# Patient Record
Sex: MALE | Race: WHITE | Employment: OTHER | URBAN - METROPOLITAN AREA
[De-identification: names, ages, dates, MRNs, and addresses within clinical notes are randomized per-mention and may not be internally consistent; named-entity substitution may affect disease eponyms.]

---

## 2017-02-08 ENCOUNTER — OFFICE VISIT (OUTPATIENT)
Dept: CARDIOLOGY | Facility: CLINIC | Age: 72
End: 2017-02-08
Payer: MEDICARE

## 2017-02-08 VITALS
HEART RATE: 54 BPM | DIASTOLIC BLOOD PRESSURE: 72 MMHG | OXYGEN SATURATION: 95 % | HEIGHT: 68 IN | SYSTOLIC BLOOD PRESSURE: 120 MMHG | BODY MASS INDEX: 33.95 KG/M2 | WEIGHT: 224 LBS

## 2017-02-08 DIAGNOSIS — I25.10 CORONARY ARTERY DISEASE INVOLVING NATIVE CORONARY ARTERY OF NATIVE HEART WITHOUT ANGINA PECTORIS: Primary | ICD-10-CM

## 2017-02-08 PROCEDURE — 99213 OFFICE O/P EST LOW 20 MIN: CPT | Performed by: INTERNAL MEDICINE

## 2017-02-08 NOTE — Clinical Note
2017             [place] [address]      RE:    Jose Tejada   MRN:  [id number]   :  1945      Dear Dr. Arizmendi:      I had the pleasure of following up on our mutual patient, Jose Tejada.  He is a most delightful, pleasant 71-year-old gentleman.  He has coronary artery disease dating back essentially 2 decades.  He has had stenting to the LAD and the right coronary artery.  He has a 70% obtuse marginal lesion.  Given one of his episodes a DVT and an unusual amount of clot in his coronary arteries, I elected do a hypercoagulable workup, and as you know, he was identified as a result though that as having a genetic predisposition with both factor V Leiden and protein C deficiency.        After he was diagnosed with a hypercoagulable state, it turned out that we then uncovered a family history of clot disorder.  Fortunately, the patient has not had any angina.  A little over a year ago, we cleared him for his hip surgery.  His stress test did show mild ischemia of the lateral wall which probably corresponds to the obtuse marginal which was 70% narrowed.  It was a small area.  On his echocardiogram that was done as part of an attempt at a dobutamine stress echo, his ejection fraction is normal.  A nuclear test suggested a mildly reduced ejection fraction.        He has no heart failure symptoms.  There is a discrepancy.  Our office note says he is on Zocor 30 mg, which is an unusual dose and the records we got from [facility], that are dated 2017, if I am reading correctly, does not list any cholesterol pill.  The patient unfortunately did not have his medicine list with him so the medicine list attached to this chart may be inaccurate.      Also, I do not have any blood work over the last 2 years, although I have no doubt that you are following it regularly and the patient thinks all those numbers were at  goal.        At this juncture then, I am not making any changes.  I will see him back in a year.  I am going to do a nuclear stress test next year; that will be slightly more than 2 years from the last test, which showed ischemia.  I just want to make sure that the ischemia burden did not increase.  If he has new onset angina, he would require an angiogram or if he has no angina, but the next nuclear test shows a large area of ischemia, we would also do an angiogram.  If it shows the same mild area of ischemia, I would not recommend heart catheterization in an asymptomatic patient.      Thank you for allowing me to see Mr. Tejada.  This was a 35-minute visit; greater than 50% in counseling.      Sincerely,         Gurvinder Yun MD

## 2017-02-08 NOTE — PROGRESS NOTES
HPI and Plan:   See dictation    Orders Placed This Encounter   Procedures     NM Lexiscan stress test (nuc card)     Follow-Up with Cardiologist     No orders of the defined types were placed in this encounter.     There are no discontinued medications.      Encounter Diagnosis   Name Primary?     Coronary artery disease involving native coronary artery of native heart without angina pectoris Yes       CURRENT MEDICATIONS:  Current Outpatient Prescriptions   Medication Sig Dispense Refill     Multiple Vitamin (DAILY MULTIVITAMIN PO) Take by mouth daily       Omega-3 Fatty Acids (FISH OIL PO) Take by mouth daily       Multiple Vitamins-Minerals (ICAPS PO) Take by mouth daily       Coenzyme Q10 (COQ-10 PO) Take by mouth daily       aspirin 81 MG tablet Take 81 mg by mouth daily       gabapentin (NEURONTIN) 300 MG capsule Take 300 mg by mouth 3 times daily       Warfarin Sodium (COUMADIN PO) Take 5 mg by mouth daily 7.5 mg on Monday, Wednesday and Friday, 5 mg all other days       BuPROPion HCl (WELLBUTRIN PO) Take 100 mg by mouth daily.       Metoprolol Succinate (TOPROL XL PO) Take 25 mg by mouth daily Dosage is unknown       Simvastatin (ZOCOR PO) Take 30 mg by mouth.       oxyCODONE-acetaminophen (PERCOCET) 5-325 MG per tablet Take 1 tablet by mouth every 6 hours as needed 1 or 2 for breakthrough pain         ALLERGIES     Allergies   Allergen Reactions     Norvasc [Amlodipine] Rash     Septra [Sulfamethoxazole W-Trimethoprim] Rash       PAST MEDICAL HISTORY:  Past Medical History   Diagnosis Date     CAD (coronary artery disease)      Hyperlipidemia      HTN (hypertension)      PRIMITIVO (obstructive sleep apnea)      cpap     Asthma      MI, old      DVT (deep vein thrombosis) in pregnancy (H)      Spinal stenosis      GERD (gastroesophageal reflux disease)      Factor 5 Leiden mutation, heterozygous (H)      Protein C deficiency (H)        PAST SURGICAL HISTORY:  Past Surgical History   Procedure Laterality Date      "Arthroplasty revision hip Left 2013     Appendectomy open  1958     Arthroplasty hip Left 1997     As spinal fusion,ant,ea adnl level  2010     L4-L5     C total hip arthroplasty Right 2015     THR       FAMILY HISTORY:  Family History   Problem Relation Age of Onset     Myocardial Infarction Father      HEART DISEASE Maternal Grandfather        SOCIAL HISTORY:  Social History     Social History     Marital Status:      Spouse Name: N/A     Number of Children: N/A     Years of Education: N/A     Social History Main Topics     Smoking status: Former Smoker     Smokeless tobacco: Not on file     Alcohol Use: No      Comment: rare      Drug Use: Not on file     Sexual Activity: Not on file     Other Topics Concern     Caffeine Concern No     1 -2 cups daily      Special Diet No     Exercise No     Social History Narrative       Review of Systems:  Skin:  Negative     Eyes:  Negative    ENT:  Negative    Respiratory:  Positive for sleep apnea;CPAP  Cardiovascular:  Negative    Gastroenterology: Negative    Genitourinary:  Negative    Musculoskeletal:  Positive for joint pain;muscular weakness;nocturnal cramping  Neurologic:  Positive for numbness or tingling of feet;numbness or tingling of hands  Psychiatric:  Negative    Heme/Lymph/Imm:  Negative    Endocrine:  Negative      Physical Exam:  Vitals: /72 mmHg  Pulse 54  Ht 1.727 m (5' 8\")  Wt 101.606 kg (224 lb)  BMI 34.07 kg/m2  SpO2 95%    Constitutional:  cooperative, alert and oriented, well developed, well nourished, in no acute distress        Skin:  warm and dry to the touch, no apparent skin lesions or masses noted        Head:  normocephalic, no masses or lesions        Eyes:  pupils equal and round, conjunctivae and lids unremarkable, sclera white, no xanthalasma, EOMS intact, no nystagmus        ENT:  no pallor or cyanosis, dentition good        Neck:  carotid pulses are full and equal bilaterally, JVP normal, no carotid bruit, no thyromegaly "        Chest:  normal breath sounds, clear to auscultation, normal A-P diameter, normal symmetry, normal respiratory excursion, no use of accessory muscles          Cardiac: regular rhythm, normal S1/S2, no S3 or S4, apical impulse not displaced, no murmurs, gallops or rubs                  Abdomen:  abdomen soft, non-tender, BS normoactive, no mass, no HSM, no bruits obese difficult exam    Vascular: pulses full and equal, no bruits auscultated                                        Extremities and Back:  no edema         brace on R leg--foot drop    Neurological:  affect appropriate, oriented to time, person and place   foot drop on R      Recent Lab Results:  LIPID RESULTS:  Lab Results   Component Value Date    CHOL 140 03/20/2008    HDL 35* 04/21/2014    LDL 60* 04/21/2014    TRIG 142 04/21/2014    CHOLHDLRATIO 3.5 04/21/2014       LIVER ENZYME RESULTS:  Lab Results   Component Value Date    AST 20 04/03/2014    ALT 5 04/21/2014       CBC RESULTS:  Lab Results   Component Value Date    WBC 8.7 03/21/2008    RBC 4.51 03/21/2008    HGB 14.6 04/03/2014    HCT 43.4 04/03/2014    MCV 90.6 04/03/2014    MCH 30.5 04/03/2014    MCHC 33.6 04/03/2014    RDW 12.9 04/03/2014     03/21/2008       BMP RESULTS:  Lab Results   Component Value Date     04/21/2014    POTASSIUM 4.7 04/21/2014    CHLORIDE 104 04/21/2014    CO2 25 04/21/2014    ANIONGAP 8 03/21/2008    * 04/21/2014    BUN 12.5 04/21/2014    BUN 12 04/21/2014    CR 1.0 04/21/2014    GFRESTIMATED 73 03/21/2008    GFRESTBLACK 89 03/21/2008    CODI 9.3 04/21/2014        A1C RESULTS:  No results found for: A1C    INR RESULTS:  Lab Results   Component Value Date    INR 1.80* 03/21/2008    INR 2.83* 03/20/2008           CC  DO STEPHANIE Ron Ferdinand  [address] [place] [address]

## 2017-02-08 NOTE — PROGRESS NOTES
2017             [place] [address]      RE:    Jose Tejada   MRN:  [id number]   :  1945      Dear Dr. Arizmendi:      I had the pleasure of following up on our mutual patient, Jose Tejada.  He is a most delightful, pleasant 71-year-old gentleman.  He has coronary artery disease dating back essentially 2 decades.  He has had stenting to the LAD and the right coronary artery.  He has a 70% obtuse marginal lesion.  Given one of his episodes a DVT and an unusual amount of clot in his coronary arteries, I elected do a hypercoagulable workup, and as you know, he was identified as a result though that as having a genetic predisposition with both factor V Leiden and protein C deficiency.        After he was diagnosed with a hypercoagulable state, it turned out that we then uncovered a family history of clot disorder.  Fortunately, the patient has not had any angina.  A little over a year ago, we cleared him for his hip surgery.  His stress test did show mild ischemia of the lateral wall which probably corresponds to the obtuse marginal which was 70% narrowed.  It was a small area.  On his echocardiogram that was done as part of an attempt at a dobutamine stress echo, his ejection fraction is normal.  A nuclear test suggested a mildly reduced ejection fraction.        He has no heart failure symptoms.  There is a discrepancy.  Our office note says he is on Zocor 30 mg, which is an unusual dose and the records we got from [facility], that are dated 2017, if I am reading correctly, does not list any cholesterol pill.  The patient unfortunately did not have his medicine list with him so the medicine list attached to this chart may be inaccurate.      Also, I do not have any blood work over the last 2 years, although I have no doubt that you are following it regularly and the patient thinks all those numbers were at  goal.        At this juncture then, I am not making any changes.  I will see him back in a year.  I am going to do a nuclear stress test next year; that will be slightly more than 2 years from the last test, which showed ischemia.  I just want to make sure that the ischemia burden did not increase.  If he has new onset angina, he would require an angiogram or if he has no angina, but the next nuclear test shows a large area of ischemia, we would also do an angiogram.  If it shows the same mild area of ischemia, I would not recommend heart catheterization in an asymptomatic patient.      Thank you for allowing me to see Mr. Tejada.  This was a 35-minute visit; greater than 50% in counseling.      Sincerely,         MD MELIA Cardona MD             D: 2017 10:45   T: 2017 16:01   MT: PRISCILLA      Name:     ELIE TEJADA   MRN:      [id number]        Account:      [id number]   :      1945           Service Date: 2017      Document: [id number]

## 2018-03-15 ENCOUNTER — HOSPITAL ENCOUNTER (OUTPATIENT)
Dept: CARDIOLOGY | Facility: CLINIC | Age: 73
Discharge: HOME | End: 2018-03-15
Attending: INTERNAL MEDICINE | Admitting: INTERNAL MEDICINE
Payer: MEDICARE

## 2018-03-15 DIAGNOSIS — I25.10 CORONARY ARTERY DISEASE INVOLVING NATIVE CORONARY ARTERY OF NATIVE HEART WITHOUT ANGINA PECTORIS: ICD-10-CM

## 2018-03-15 PROCEDURE — 93018 CV STRESS TEST I&R ONLY: CPT | Performed by: INTERNAL MEDICINE

## 2018-03-15 PROCEDURE — 78452 HT MUSCLE IMAGE SPECT MULT: CPT | Mod: 26 | Performed by: INTERNAL MEDICINE

## 2018-03-15 PROCEDURE — 93017 CV STRESS TEST TRACING ONLY: CPT

## 2018-03-15 PROCEDURE — 25000128 H RX IP 250 OP 636: Performed by: INTERNAL MEDICINE

## 2018-03-15 PROCEDURE — 34300033 ZZH RX 343: Performed by: INTERNAL MEDICINE

## 2018-03-15 PROCEDURE — A9502 TC99M TETROFOSMIN: HCPCS | Performed by: INTERNAL MEDICINE

## 2018-03-15 PROCEDURE — 93016 CV STRESS TEST SUPVJ ONLY: CPT | Performed by: INTERNAL MEDICINE

## 2018-03-15 RX ORDER — REGADENOSON 0.08 MG/ML
0.4 INJECTION, SOLUTION INTRAVENOUS ONCE
Status: COMPLETED | OUTPATIENT
Start: 2018-03-15 | End: 2018-03-15

## 2018-03-15 RX ORDER — ACYCLOVIR 200 MG/1
0-1 CAPSULE ORAL
Status: DISCONTINUED | OUTPATIENT
Start: 2018-03-15 | End: 2018-03-16 | Stop reason: HOSPADM

## 2018-03-15 RX ORDER — AMINOPHYLLINE 25 MG/ML
50-100 INJECTION, SOLUTION INTRAVENOUS
Status: DISCONTINUED | OUTPATIENT
Start: 2018-03-15 | End: 2018-03-16 | Stop reason: HOSPADM

## 2018-03-15 RX ORDER — ALBUTEROL SULFATE 90 UG/1
2 AEROSOL, METERED RESPIRATORY (INHALATION) EVERY 5 MIN PRN
Status: DISCONTINUED | OUTPATIENT
Start: 2018-03-15 | End: 2018-03-16 | Stop reason: HOSPADM

## 2018-03-15 RX ADMIN — TETROFOSMIN 5.7 MCI.: 1.38 INJECTION, POWDER, LYOPHILIZED, FOR SOLUTION INTRAVENOUS at 09:43

## 2018-03-15 RX ADMIN — REGADENOSON 0.4 MG: 0.08 INJECTION, SOLUTION INTRAVENOUS at 11:14

## 2018-03-15 RX ADMIN — TETROFOSMIN 19.69 MCI.: 1.38 INJECTION, POWDER, LYOPHILIZED, FOR SOLUTION INTRAVENOUS at 11:16

## 2018-04-23 ENCOUNTER — TELEPHONE (OUTPATIENT)
Dept: CARDIOLOGY | Facility: CLINIC | Age: 73
End: 2018-04-23

## 2018-04-23 NOTE — TELEPHONE ENCOUNTER
Result Notes   Notes Recorded by Shantell Redd, RN on 3/20/2018 at 3:51 PM  Called Pt regarding stress test per DR Yun. Pt says he is doing well with no SOB, or any CP Pt is asymptomatic. Pt asked to call it any changes. Pt has OV 4/30/18. MARIA R Redd RN  ------    Notes Recorded by Shantell Redd RN on 3/15/2018 at 1:43 PM  Ov 4/30/18 new ischemia results to DR Yun paper inbox

## 2018-04-30 ENCOUNTER — OFFICE VISIT (OUTPATIENT)
Dept: CARDIOLOGY | Facility: CLINIC | Age: 73
End: 2018-04-30
Attending: INTERNAL MEDICINE
Payer: MEDICARE

## 2018-04-30 VITALS
WEIGHT: 225.8 LBS | SYSTOLIC BLOOD PRESSURE: 128 MMHG | HEIGHT: 68 IN | BODY MASS INDEX: 34.22 KG/M2 | DIASTOLIC BLOOD PRESSURE: 73 MMHG | HEART RATE: 56 BPM

## 2018-04-30 DIAGNOSIS — I25.10 CORONARY ARTERY DISEASE INVOLVING NATIVE CORONARY ARTERY OF NATIVE HEART WITHOUT ANGINA PECTORIS: ICD-10-CM

## 2018-04-30 DIAGNOSIS — I10 ESSENTIAL HYPERTENSION: ICD-10-CM

## 2018-04-30 DIAGNOSIS — E78.2 MIXED HYPERLIPIDEMIA: Primary | ICD-10-CM

## 2018-04-30 PROCEDURE — 99214 OFFICE O/P EST MOD 30 MIN: CPT | Performed by: INTERNAL MEDICINE

## 2018-04-30 RX ORDER — DESONIDE 0.5 MG/G
CREAM TOPICAL 2 TIMES DAILY
Status: ON HOLD | COMMUNITY
End: 2019-10-22

## 2018-04-30 RX ORDER — CLOTRIMAZOLE 1 %
CREAM (GRAM) TOPICAL 2 TIMES DAILY
Status: ON HOLD | COMMUNITY
End: 2019-10-22

## 2018-04-30 RX ORDER — METOPROLOL SUCCINATE 25 MG/1
25 TABLET, EXTENDED RELEASE ORAL DAILY
COMMUNITY
End: 2018-04-30

## 2018-04-30 RX ORDER — LIDOCAINE 50 MG/G
1 PATCH TOPICAL EVERY 12 HOURS PRN
Status: ON HOLD | COMMUNITY
End: 2019-10-25

## 2018-04-30 RX ORDER — LORATADINE 10 MG/1
10 CAPSULE, LIQUID FILLED ORAL DAILY
Status: ON HOLD | COMMUNITY
End: 2019-10-22

## 2018-04-30 RX ORDER — SIMVASTATIN 80 MG
80 TABLET ORAL AT BEDTIME
COMMUNITY
End: 2019-11-08

## 2018-04-30 RX ORDER — CLOBETASOL PROPIONATE 0.5 MG/G
CREAM TOPICAL 2 TIMES DAILY
Status: ON HOLD | COMMUNITY
End: 2019-10-22

## 2018-04-30 RX ORDER — NORTRIPTYLINE HCL 25 MG
25 CAPSULE ORAL AT BEDTIME
Status: ON HOLD | COMMUNITY
End: 2021-11-19

## 2018-04-30 RX ORDER — LIDOCAINE 40 MG/G
CREAM TOPICAL PRN
Status: ON HOLD | COMMUNITY
End: 2019-10-22

## 2018-04-30 RX ORDER — SODIUM CHLORIDE/ALOE VERA
GEL (GRAM) NASAL 4 TIMES DAILY PRN
Status: ON HOLD | COMMUNITY
End: 2019-10-22

## 2018-04-30 RX ORDER — METOPROLOL TARTRATE 50 MG
25 TABLET ORAL 2 TIMES DAILY
Status: ON HOLD | COMMUNITY
End: 2019-10-22

## 2018-04-30 RX ORDER — SIMVASTATIN 20 MG
30 TABLET ORAL AT BEDTIME
COMMUNITY
End: 2018-04-30

## 2018-04-30 NOTE — PROGRESS NOTES
HPI and Plan:   See dictation    Orders Placed This Encounter   Procedures     Follow-Up with Cardiologist     Orders Placed This Encounter   Medications     DISCONTD: metoprolol succinate (TOPROL-XL) 25 MG 24 hr tablet     Sig: Take 25 mg by mouth daily     DISCONTD: simvastatin (ZOCOR) 20 MG tablet     Sig: Take 30 mg by mouth At Bedtime     Medications Discontinued During This Encounter   Medication Reason     Metoprolol Succinate (TOPROL XL PO) Medication Reconciliation Clean Up     Simvastatin (ZOCOR PO) Medication Reconciliation Clean Up     metoprolol succinate (TOPROL-XL) 25 MG 24 hr tablet Medication Reconciliation Clean Up     simvastatin (ZOCOR) 20 MG tablet Medication Reconciliation Clean Up         Encounter Diagnoses   Name Primary?     Coronary artery disease involving native coronary artery of native heart without angina pectoris      Mixed hyperlipidemia Yes     Essential hypertension        CURRENT MEDICATIONS:  Current Outpatient Prescriptions   Medication Sig Dispense Refill     aspirin 81 MG tablet Take 81 mg by mouth daily       BuPROPion HCl (WELLBUTRIN PO) Take 100 mg by mouth daily.       Coenzyme Q10 (COQ-10 PO) Take by mouth daily       gabapentin (NEURONTIN) 300 MG capsule Take 300 mg by mouth 3 times daily Pt takes 400 mg tid       Multiple Vitamin (DAILY MULTIVITAMIN PO) Take by mouth daily       Multiple Vitamins-Minerals (ICAPS PO) Take by mouth daily       Omega-3 Fatty Acids (FISH OIL PO) Take by mouth daily       oxyCODONE-acetaminophen (PERCOCET) 5-325 MG per tablet Take 1 tablet by mouth every 6 hours as needed 1 or 2 for breakthrough pain   Pt takes 2 tabs tid       Warfarin Sodium (COUMADIN PO) Take 5 mg by mouth daily 7.5 mg on Monday, Wednesday and Friday, 5 mg all other days         ALLERGIES     Allergies   Allergen Reactions     Norvasc [Amlodipine] Rash     Septra [Sulfamethoxazole W-Trimethoprim] Rash       PAST MEDICAL HISTORY:  Past Medical History:   Diagnosis Date  "    Asthma      CAD (coronary artery disease)     stent to Lad, RCA (OM 70%-med tx)     DVT (deep venous thrombosis) (H)      Factor 5 Leiden mutation, heterozygous (H)      GERD (gastroesophageal reflux disease)      HTN (hypertension)      Hyperlipidemia      Neuropathy     wears brace R foot for drop foot     PRIMITIVO (obstructive sleep apnea)     cpap     Protein C deficiency (H)      Spinal stenosis        PAST SURGICAL HISTORY:  Past Surgical History:   Procedure Laterality Date     APPENDECTOMY OPEN  1958     ARTHROPLASTY HIP Left 1997     ARTHROPLASTY REVISION HIP Left 2013     AS SPINAL FUSION,ANT,EA ADNL LEVEL  2010    L4-L5     C TOTAL HIP ARTHROPLASTY Right 2015    THR       FAMILY HISTORY:  Family History   Problem Relation Age of Onset     Myocardial Infarction Father      HEART DISEASE Maternal Grandfather        SOCIAL HISTORY:  Social History     Social History     Marital status:      Spouse name: N/A     Number of children: N/A     Years of education: N/A     Social History Main Topics     Smoking status: Former Smoker     Smokeless tobacco: Never Used     Alcohol use 0.0 oz/week     0 Standard drinks or equivalent per week      Comment: once every two weeks     Drug use: None     Sexual activity: Not Asked     Other Topics Concern     Caffeine Concern No     1 -2 cups daily      Special Diet No     Exercise No     Social History Narrative       Review of Systems:  Skin:  Negative     Eyes:  Negative    ENT:  Negative    Respiratory:  Positive for sleep apnea;CPAP  Cardiovascular:  Negative    Gastroenterology: Negative    Genitourinary:  Negative    Musculoskeletal:  Positive for joint pain  Neurologic:  Positive for numbness or tingling of feet;numbness or tingling of hands  Psychiatric:  Negative    Heme/Lymph/Imm:  Positive for allergies  Endocrine:  Negative      Physical Exam:  Vitals: /73  Pulse 56  Ht 1.727 m (5' 8\")  Wt 102.4 kg (225 lb 12.8 oz)  BMI 34.33 " kg/m2    Constitutional:  cooperative, alert and oriented, well developed, well nourished, in no acute distress        Skin:  warm and dry to the touch, no apparent skin lesions or masses noted          Head:  normocephalic, no masses or lesions        Eyes:  pupils equal and round, conjunctivae and lids unremarkable, sclera white, no xanthalasma, EOMS intact, no nystagmus        Lymph:No Cervical lymphadenopathy present     ENT:  no pallor or cyanosis, dentition good        Neck:  carotid pulses are full and equal bilaterally, JVP normal, no carotid bruit        Respiratory:  normal breath sounds, clear to auscultation, normal A-P diameter, normal symmetry, normal respiratory excursion, no use of accessory muscles         Cardiac: regular rhythm, normal S1/S2, no S3 or S4, apical impulse not displaced, no murmurs, gallops or rubs                    2+           1+ 2+           2+          GI:  abdomen soft, non-tender, BS normoactive, no mass, no HSM, no bruits obese      Extremities and Muscular Skeletal:  no deformities, clubbing, cyanosis, erythema observed;no edema         brace on R leg--foot drop    Neurological:      foot drop on R    Psych:  Alert and Oriented x 3      Recent Lab Results:  LIPID RESULTS:  Lab Results   Component Value Date    CHOL 140 03/20/2008    HDL 35 (L) 04/21/2014    LDL 60 (L) 04/21/2014    TRIG 142 04/21/2014    CHOLHDLRATIO 3.5 04/21/2014       LIVER ENZYME RESULTS:  Lab Results   Component Value Date    AST 20 04/03/2014    ALT 5 04/21/2014       CBC RESULTS:  Lab Results   Component Value Date    WBC 8.7 03/21/2008    RBC 4.51 03/21/2008    HGB 14.6 04/03/2014    HCT 43.4 04/03/2014    MCV 90.6 04/03/2014    MCH 30.5 04/03/2014    MCHC 33.6 04/03/2014    RDW 12.9 04/03/2014     03/21/2008       BMP RESULTS:  Lab Results   Component Value Date     04/21/2014    POTASSIUM 4.7 04/21/2014    CHLORIDE 104 04/21/2014    CO2 25 04/21/2014    ANIONGAP 8 03/21/2008      (H) 04/21/2014    BUN 12 04/21/2014    CR 1.0 04/21/2014    GFRESTIMATED 73 03/21/2008    GFRESTBLACK 89 03/21/2008    CODI 9.3 04/21/2014        A1C RESULTS:  No results found for: A1C    INR RESULTS:  Lab Results   Component Value Date    INR 1.80 (H) 03/21/2008    INR 2.83 (H) 03/20/2008           CC  Gurvinder Yun MD  [address]  [place] [id number]

## 2018-04-30 NOTE — LETTER
4/30/2018      [facility]  [address] [place] [id number]      RE: Jose Tejada       Dear Colleague,    I had the pleasure of seeing Jose Tejada in the [facility].    Service Date: 04/30/2018      HISTORY OF PRESENT ILLNESS:  I had the pleasure following up on our mutual patient, Jose Tejada.  He is a delightful 73-year-old gentleman.  One of the issues here is [facility] but he states he has not been to [facility] or had blood work for more than 2 years and he is not sure what the [facility] has done in terms of blood work, although I cannot imagine that they have not been totally up-to-date with him.  In [facility], again, the last blood work was more than 2 years ago at [facility].  I cannot access the records from the [facility] and the patient is not sure what has been done.  The reason I bring this up is the last blood work I have here is 2014 which was reasonable.  He runs a low HDL and upper-normal triglyceride and then in 2016 he had a similar pattern at [facility].  He had a history of coronary disease and MI.  We eventually stented his LAD and his right coronary artery.  He also had a DVT which made me suspicious.  I ordered a hypercoagulable panel and it turned out he indeed has genetic hypercoagulable state with protein C deficiency and heterozygous factor V Leiden.  He has been maintained on Coumadin with [facility] pokes [facility].  I asked him if he ever had an arm blood draw since that is how they would have done his cholesterol, electrolytes, etc. and he said no but then he thought more about it and thought maybe.  So, unfortunately, does not identify anyone as his primary care doctor and I told him it would be in his interest to have one place that would be identified as his main doctor.      Today, he reports no anginal symptoms at all and is feeling well.  His  blood pressure is normal.  We reviewed his nuclear stress test.  He has a known stent to the LAD, known stent to the right coronary artery.  He has a 70% circumflex marginal with a mildly positive stress test.  He again reports no angina, no cardiovascular complaints whatsoever.  No heart failure symptoms, no fluid, etc.  The nuclear stress test is a little bit more ischemic in the lateral wall but it is still a relatively small area with no symptoms, and knowing it is branch-vessel disease, I am going to continue to watch it with medical therapy as we have been doing.  I told him if he develops new angina, he should let us know.  Otherwise, I will see him back in 2 years at which time I will repeat the nuclear stress test.  I hand-wrote out a note that he should give to whoever he identifies as his primary care doctor to make sure that at least once a year he has had a lipid and periodic electrolyte panel.  [facility] has been [facility] and I understand that they have been managing it quite well.  The medicine list that we have may not be correct.  He did not bring all of his medicines in and so we do not know if the list is correct.  This is the same issue we had last time.  It makes it a little hard to manage him when we do not exactly know what medicines were done, who is his primary doctor and what tests have been done in the past.        Today's visit was 25 minutes, greater than 50% counseling.      Melia Vences MD       cc:   [address]   Attn:  Medical Records/[place] [address]         MELIA VENCES MD             D: 2018   T: 2018   MT: MARILYNN      Name:     ELIE FONTANEZ   MRN:      [id number]        Account:      [id number]   :      1945           Service Date: 2018      Document: [id number]         Outpatient  Encounter Prescriptions as of 4/30/2018   Medication Sig Dispense Refill     aspirin 81 MG tablet Take 81 mg by mouth daily       BuPROPion HCl (WELLBUTRIN PO) Take 100 mg by mouth daily Takes 150 bid       clobetasol (TEMOVATE) 0.05 % cream Apply topically 2 times daily       clotrimazole (LOTRIMIN) 1 % cream Apply topically 2 times daily       Coenzyme Q10 (COQ-10 PO) Take by mouth daily       desonide (DESOWEN) 0.05 % cream Apply topically 2 times daily       gabapentin (NEURONTIN) 300 MG capsule Take 300 mg by mouth 3 times daily Pt takes 400 mg tid       lidocaine (LIDODERM) 5 % Patch Place 1 patch onto the skin every 12 hours       lidocaine (LMX4) 4 % CREA cream Apply topically as needed for mild pain       loratadine 10 MG capsule Take 10 mg by mouth daily       metoprolol tartrate (LOPRESSOR) 50 MG tablet Take 25 mg by mouth 2 times daily       Multiple Vitamin (DAILY MULTIVITAMIN PO) Take by mouth daily       Multiple Vitamins-Minerals (ICAPS PO) Take by mouth daily       nortriptyline (PAMELOR) 25 MG capsule Take 25 mg by mouth daily       Omega-3 Fatty Acids (FISH OIL PO) Take by mouth daily       omeprazole (PRILOSEC) 20 MG CR capsule Take 20 mg by mouth 2 times daily       oxyCODONE-acetaminophen (PERCOCET) 5-325 MG per tablet Take 1 tablet by mouth every 6 hours as needed 1 or 2 for breakthrough pain   Pt takes 2 tabs tid       saline nasal (AYR SALINE) GEL topical gel Apply into each nare 4 times daily as needed for congestion       simvastatin (ZOCOR) 80 MG tablet Take 80 mg by mouth At Bedtime       Warfarin Sodium (COUMADIN PO) Take 5 mg by mouth daily 7.5 mg on Monday, Wednesday and Friday, 5 mg all other days       [DISCONTINUED] Metoprolol Succinate (TOPROL XL PO) Take 25 mg by mouth daily Dosage is unknown       [DISCONTINUED] metoprolol succinate (TOPROL-XL) 25 MG 24 hr tablet Take 25 mg by mouth daily       [DISCONTINUED] Simvastatin (ZOCOR PO) Take 30 mg by mouth.       [DISCONTINUED]  simvastatin (ZOCOR) 20 MG tablet Take 30 mg by mouth At Bedtime       No facility-administered encounter medications on file as of 4/30/2018.          Again, thank you for allowing me to participate in the care of your patient.      Sincerely,    Gurvinder Yun MD     [facility]

## 2018-04-30 NOTE — PROGRESS NOTES
Service Date: 04/30/2018      HISTORY OF PRESENT ILLNESS:  I had the pleasure following up on our mutual patient, Jose Tejada.  He is a delightful 73-year-old gentleman.  One of the issues here is [facility] but he states he has not been to [facility] or had blood work for more than 2 years and he is not sure what the [facility] has done in terms of blood work, although I cannot imagine that they have not been totally up-to-date with him.  In Care Everywhere/[facility], again, the last blood work was more than 2 years ago at [facility].  I cannot access the records from the [facility] and the patient is not sure what has been done.  The reason I bring this up is the last blood work I have here is 2014 which was reasonable.  He runs a low HDL and upper-normal triglyceride and then in 2016 he had a similar pattern at [facility].  He had a history of coronary disease and MI.  We eventually stented his LAD and his right coronary artery.  He also had a DVT which made me suspicious.  I ordered a hypercoagulable panel and it turned out he indeed has genetic hypercoagulable state with protein C deficiency and heterozygous factor V Leiden.  He has been maintained on Coumadin [facility].  I asked him if he ever had an arm blood draw since that is how they would have done his cholesterol, electrolytes, etc. and he said no but then he thought more about it and thought maybe.  So, unfortunately, does not identify anyone as his primary care doctor and I told him it would be in his interest to have one place that would be identified as his main doctor.      Today, he reports no anginal symptoms at all and is feeling well.  His blood pressure is normal.  We reviewed his nuclear stress test.  He has a known stent to the LAD, known stent to the right coronary artery.  He has a 70% circumflex marginal with a mildly positive stress test.  He again reports no angina, no  cardiovascular complaints whatsoever.  No heart failure symptoms, no fluid, etc.  The nuclear stress test is a little bit more ischemic in the lateral wall but it is still a relatively small area with no symptoms, and knowing it is branch-vessel disease, I am going to continue to watch it with medical therapy as we have been doing.  I told him if he develops new angina, he should let us know.  Otherwise, I will see him back in 2 years at which time I will repeat the nuclear stress test.  I hand-wrote out a note that he should give to whoever he identifies as his primary care doctor to make sure that at least once a year he has had a lipid and periodic electrolyte panel.  [facility] has [facility] and I understand that they have been managing it quite well.  The medicine list that we have may not be correct.  He did not bring all of his medicines in and so we do not know if the list is correct.  This is the same issue we had last time.  It makes it a little hard to manage him when we do not exactly know what medicines were done, who is his primary doctor and what tests have been done in the past.        Today's visit was 25 minutes, greater than 50% counseling.      Melia Vences MD       cc:   [address]   Attn:  Medical Records/[place] [address]         MELIA VENCES MD             D: 2018   T: 2018   MT: MARILYNN      Name:     ELIE FONTANEZ   MRN:      [id number]        Account:      [id number]   :      1945           Service Date: 2018      Document: [id number]

## 2018-04-30 NOTE — LETTER
4/30/2018    [facility]  [address] [place] [id number]    RE: Jose Tejada       Dear Colleague,    I had the pleasure of seeing Jose Tejada in the [facility].    HPI and Plan:   See dictation    Orders Placed This Encounter   Procedures     Follow-Up with Cardiologist     Orders Placed This Encounter   Medications     DISCONTD: metoprolol succinate (TOPROL-XL) 25 MG 24 hr tablet     Sig: Take 25 mg by mouth daily     DISCONTD: simvastatin (ZOCOR) 20 MG tablet     Sig: Take 30 mg by mouth At Bedtime     Medications Discontinued During This Encounter   Medication Reason     Metoprolol Succinate (TOPROL XL PO) Medication Reconciliation Clean Up     Simvastatin (ZOCOR PO) Medication Reconciliation Clean Up     metoprolol succinate (TOPROL-XL) 25 MG 24 hr tablet Medication Reconciliation Clean Up     simvastatin (ZOCOR) 20 MG tablet Medication Reconciliation Clean Up         Encounter Diagnoses   Name Primary?     Coronary artery disease involving native coronary artery of native heart without angina pectoris      Mixed hyperlipidemia Yes     Essential hypertension        CURRENT MEDICATIONS:  Current Outpatient Prescriptions   Medication Sig Dispense Refill     aspirin 81 MG tablet Take 81 mg by mouth daily       BuPROPion HCl (WELLBUTRIN PO) Take 100 mg by mouth daily.       Coenzyme Q10 (COQ-10 PO) Take by mouth daily       gabapentin (NEURONTIN) 300 MG capsule Take 300 mg by mouth 3 times daily Pt takes 400 mg tid       Multiple Vitamin (DAILY MULTIVITAMIN PO) Take by mouth daily       Multiple Vitamins-Minerals (ICAPS PO) Take by mouth daily       Omega-3 Fatty Acids (FISH OIL PO) Take by mouth daily       oxyCODONE-acetaminophen (PERCOCET) 5-325 MG per tablet Take 1 tablet by mouth every 6 hours as needed 1 or 2 for breakthrough pain   Pt takes 2 tabs tid       Warfarin Sodium (COUMADIN PO) Take 5 mg by mouth daily 7.5 mg on  Monday, Wednesday and Friday, 5 mg all other days         ALLERGIES     Allergies   Allergen Reactions     Norvasc [Amlodipine] Rash     Septra [Sulfamethoxazole W-Trimethoprim] Rash       PAST MEDICAL HISTORY:  Past Medical History:   Diagnosis Date     Asthma      CAD (coronary artery disease)     stent to Lad, RCA (OM 70%-med tx)     DVT (deep venous thrombosis) (H)      Factor 5 Leiden mutation, heterozygous (H)      GERD (gastroesophageal reflux disease)      HTN (hypertension)      Hyperlipidemia      Neuropathy     wears brace R foot for drop foot     PRIMITIVO (obstructive sleep apnea)     cpap     Protein C deficiency (H)      Spinal stenosis        PAST SURGICAL HISTORY:  Past Surgical History:   Procedure Laterality Date     APPENDECTOMY OPEN  1958     ARTHROPLASTY HIP Left 1997     ARTHROPLASTY REVISION HIP Left 2013     AS SPINAL FUSION,ANT,EA ADNL LEVEL  2010    L4-L5     C TOTAL HIP ARTHROPLASTY Right 2015    THR       FAMILY HISTORY:  Family History   Problem Relation Age of Onset     Myocardial Infarction Father      HEART DISEASE Maternal Grandfather        SOCIAL HISTORY:  Social History     Social History     Marital status:      Spouse name: N/A     Number of children: N/A     Years of education: N/A     Social History Main Topics     Smoking status: Former Smoker     Smokeless tobacco: Never Used     Alcohol use 0.0 oz/week     0 Standard drinks or equivalent per week      Comment: once every two weeks     Drug use: None     Sexual activity: Not Asked     Other Topics Concern     Caffeine Concern No     1 -2 cups daily      Special Diet No     Exercise No     Social History Narrative       Review of Systems:  Skin:  Negative     Eyes:  Negative    ENT:  Negative    Respiratory:  Positive for sleep apnea;CPAP  Cardiovascular:  Negative    Gastroenterology: Negative    Genitourinary:  Negative    Musculoskeletal:  Positive for joint pain  Neurologic:  Positive for numbness or tingling of  "feet;numbness or tingling of hands  Psychiatric:  Negative    Heme/Lymph/Imm:  Positive for allergies  Endocrine:  Negative      Physical Exam:  Vitals: /73  Pulse 56  Ht 1.727 m (5' 8\")  Wt 102.4 kg (225 lb 12.8 oz)  BMI 34.33 kg/m2    Constitutional:  cooperative, alert and oriented, well developed, well nourished, in no acute distress        Skin:  warm and dry to the touch, no apparent skin lesions or masses noted          Head:  normocephalic, no masses or lesions        Eyes:  pupils equal and round, conjunctivae and lids unremarkable, sclera white, no xanthalasma, EOMS intact, no nystagmus        Lymph:No Cervical lymphadenopathy present     ENT:  no pallor or cyanosis, dentition good        Neck:  carotid pulses are full and equal bilaterally, JVP normal, no carotid bruit        Respiratory:  normal breath sounds, clear to auscultation, normal A-P diameter, normal symmetry, normal respiratory excursion, no use of accessory muscles         Cardiac: regular rhythm, normal S1/S2, no S3 or S4, apical impulse not displaced, no murmurs, gallops or rubs                    2+           1+ 2+           2+          GI:  abdomen soft, non-tender, BS normoactive, no mass, no HSM, no bruits obese      Extremities and Muscular Skeletal:  no deformities, clubbing, cyanosis, erythema observed;no edema         brace on R leg--foot drop    Neurological:      foot drop on R    Psych:  Alert and Oriented x 3      Recent Lab Results:  LIPID RESULTS:  Lab Results   Component Value Date    CHOL 140 03/20/2008    HDL 35 (L) 04/21/2014    LDL 60 (L) 04/21/2014    TRIG 142 04/21/2014    CHOLHDLRATIO 3.5 04/21/2014       LIVER ENZYME RESULTS:  Lab Results   Component Value Date    AST 20 04/03/2014    ALT 5 04/21/2014       CBC RESULTS:  Lab Results   Component Value Date    WBC 8.7 03/21/2008    RBC 4.51 03/21/2008    HGB 14.6 04/03/2014    HCT 43.4 04/03/2014    MCV 90.6 04/03/2014    MCH 30.5 04/03/2014    MCHC 33.6 " 04/03/2014    RDW 12.9 04/03/2014     03/21/2008       BMP RESULTS:  Lab Results   Component Value Date     04/21/2014    POTASSIUM 4.7 04/21/2014    CHLORIDE 104 04/21/2014    CO2 25 04/21/2014    ANIONGAP 8 03/21/2008     (H) 04/21/2014    BUN 12 04/21/2014    CR 1.0 04/21/2014    GFRESTIMATED 73 03/21/2008    GFRESTBLACK 89 03/21/2008    CODI 9.3 04/21/2014        A1C RESULTS:  No results found for: A1C    INR RESULTS:  Lab Results   Component Value Date    INR 1.80 (H) 03/21/2008    INR 2.83 (H) 03/20/2008           CC  Gurvinder Yun MD  [address]  [place] [address]    Service Date: 04/30/2018      HISTORY OF PRESENT ILLNESS:  I had the pleasure following up on our mutual patient, Jose Tejada.  He is a delightful 73-year-old gentleman.  One of the issues here is he [facility] but he states he has not been to [facility] or had blood work for more than 2 years and he is not sure what the [facility] has done in terms of blood work, although I cannot imagine that they have not been totally up-to-date with him.  In Care Everywhere/[facility], again, the last blood work was more than 2 years ago at [facility].  I cannot access the records from the [facility] and the patient is not sure what has been done.  The reason I bring this up is the last blood work I have here is 2014 which was reasonable.  He runs a low HDL and upper-normal triglyceride and then in 2016 he had a similar pattern at [facility].  He had a history of coronary disease and MI.  We eventually stented his LAD and his right coronary artery.  He also had a DVT which made me suspicious.  I ordered a hypercoagulable panel and it turned out he indeed has genetic hypercoagulable state with protein C deficiency and heterozygous factor V Leiden.  He has been maintained on Coumadin [facility].  I asked him if he ever had an arm blood draw since that is how they would  have done his cholesterol, electrolytes, etc. and he said no but then he thought more about it and thought maybe.  So, unfortunately, does not identify anyone as his primary care doctor and I told him it would be in his interest to have one place that would be identified as his main doctor.      Today, he reports no anginal symptoms at all and is feeling well.  His blood pressure is normal.  We reviewed his nuclear stress test.  He has a known stent to the LAD, known stent to the right coronary artery.  He has a 70% circumflex marginal with a mildly positive stress test.  He again reports no angina, no cardiovascular complaints whatsoever.  No heart failure symptoms, no fluid, etc.  The nuclear stress test is a little bit more ischemic in the lateral wall but it is still a relatively small area with no symptoms, and knowing it is branch-vessel disease, I am going to continue to watch it with medical therapy as we have been doing.  I told him if he develops new angina, he should let us know.  Otherwise, I will see him back in 2 years at which time I will repeat the nuclear stress test.  I hand-wrote out a note that he should give to whoever he identifies as his primary care doctor to make sure that at least once a year he has had a lipid and periodic electrolyte panel.  [facility] and I understand that they have been managing it quite well.  The medicine list that we have may not be correct.  He did not bring all of his medicines in and so we do not know if the list is correct.  This is the same issue we had last time.  It makes it a little hard to manage him when we do not exactly know what medicines were done, who is his primary doctor and what tests have been done in the past.        Today's visit was 25 minutes, greater than 50% counseling.      Gurvinder Yun MD       cc:   [place] [address]  [facility]   Attn:  Medical Records/[place] [address]         GURVINDER YUN MD             D: 2018   T: 2018   MT: MARILYNN      Name:     ELIE FONTANEZ   MRN:      [id number]        Account:      [id number]   :      1945           Service Date: 2018      Document: [id number]       Thank you for allowing me to participate in the care of your patient.      Sincerely,     Gurvinder Yun MD     [facility]    cc:   Gurvinder Yun MD  [address]  [place] [address]

## 2018-04-30 NOTE — MR AVS SNAPSHOT
"              After Visit Summary   4/30/2018    Jose Tejada    MRN: [id number]           Patient Information     Date Of Birth          1945        Visit Information        Provider Department      4/30/2018 8:15 AM Gurvinder Yun MD [facility]        Today's Diagnoses     Mixed hyperlipidemia    -  1    Coronary artery disease involving native coronary artery of native heart without angina pectoris        Essential hypertension           Follow-ups after your visit        Additional Services     Follow-Up with Cardiologist       Order  lexiscan nuclear stress test before office visit                  Future tests that were ordered for you today     Open Future Orders        Priority Expected Expires Ordered    Follow-Up with Cardiologist Routine 4/29/2020 5/19/2020 4/30/2018            Who to contact     If you have questions or need follow up information about today's clinic visit or your schedule please contact [facility] directly at [phone].  Normal or non-critical lab and imaging results will be communicated to you by MyChart, letter or phone within 4 business days after the clinic has received the results. If you do not hear from us within 7 days, please contact the clinic through [employer] or phone. If you have a critical or abnormal lab result, we will notify you by phone as soon as possible.  Submit refill requests through [employer] or call your pharmacy and they will forward the refill request to us. Please allow 3 business days for your refill to be completed.          Additional Information About Your Visit        MyChart Information     [employer] lets you send messages to your doctor, view your test results, renew your prescriptions, schedule appointments and more. To sign up, go to [email or web address] . Click on \"Log in\" on the left side of the screen, which will take you to the Welcome page. Then click " "on \"Sign up Now\" on the right side of the page.     You will be asked to enter the access code listed below, as well as some personal information. Please follow the directions to create your username and password.     Your access code is: [email or web address]  Expires: 2018  2:29 PM     Your access code will  in 90 days. If you need help or a new code, please call your [place] clinic or [phone].        Care EveryWhere ID     This is your Care EveryWhere ID. This could be used by other organizations to access your [place] medical records  [id number]        Your Vitals Were     Pulse Height BMI (Body Mass Index)             56 1.727 m (5' 8\") 34.33 kg/m2          Blood Pressure from Last 3 Encounters:   18 128/73   17 120/72   12/23/15 120/76    Weight from Last 3 Encounters:   18 102.4 kg (225 lb 12.8 oz)   17 101.6 kg (224 lb)   12/23/15 101.6 kg (224 lb)              We Performed the Following     Follow-Up with Cardiologist        Primary Care Provider Office Phone # Fax #    [facility] [phone] [address] [facility] [address]        Equal Access to Services     MEDINA JOHNS AH: Hadii christos greeno Sogeoffrey, waaxda luqadaha, qaybta kaalmada adeegyada, alex celeste . So [facility] [phone].    ATENCIÓN: Si habla español, tiene a aguilar disposición servicios gratuitos de asistencia lingüística. Llame al [phone].    We comply with applicable federal civil rights laws and [place] laws. We do not discriminate on the basis of race, color, national origin, age, disability, sex, sexual orientation, or gender identity.            Thank you!     Thank you for choosing [facility]  for your care. Our goal is always to provide you with excellent care. Hearing back from our patients is one way we can continue to improve our services. Please take a few minutes to " complete the written survey that you may receive in the mail after your visit with us. Thank you!             Your Updated Medication List - Protect others around you: Learn how to safely use, store and throw away your medicines at www.disposemymeds.org.          This list is accurate as of 4/30/18  2:29 PM.  Always use your most recent med list.                   Brand Name Dispense Instructions for use Diagnosis    aspirin 81 MG tablet      Take 81 mg by mouth daily        clobetasol 0.05 % cream    TEMOVATE     Apply topically 2 times daily        clotrimazole 1 % cream    LOTRIMIN     Apply topically 2 times daily        COQ-10 PO      Take by mouth daily        COUMADIN PO      Take 5 mg by mouth daily 7.5 mg on Monday, Wednesday and Friday, 5 mg all other days        DAILY MULTIVITAMIN PO      Take by mouth daily        ICAPS PO      Take by mouth daily        desonide 0.05 % cream    DESOWEN     Apply topically 2 times daily        FISH OIL PO      Take by mouth daily        gabapentin 300 MG capsule    NEURONTIN     Take 300 mg by mouth 3 times daily Pt takes 400 mg tid        * lidocaine 4 % Crea cream    LMX4     Apply topically as needed for mild pain        * lidocaine 5 % Patch    LIDODERM     Place 1 patch onto the skin every 12 hours        loratadine 10 MG capsule      Take 10 mg by mouth daily        metoprolol tartrate 50 MG tablet    LOPRESSOR     Take 25 mg by mouth 2 times daily        nortriptyline 25 MG capsule    PAMELOR     Take 25 mg by mouth daily        omeprazole 20 MG CR capsule    priLOSEC     Take 20 mg by mouth 2 times daily        oxyCODONE-acetaminophen 5-325 MG per tablet    PERCOCET     Take 1 tablet by mouth every 6 hours as needed 1 or 2 for breakthrough pain  Pt takes 2 tabs tid        saline nasal Gel topical gel      Apply into each nare 4 times daily as needed for congestion        simvastatin 80 MG tablet    ZOCOR     Take 80 mg by mouth At Bedtime        WELLBUTRIN PO       Take 100 mg by mouth daily Takes 150 bid        * Notice:  This list has 2 medication(s) that are the same as other medications prescribed for you. Read the directions carefully, and ask your doctor or other care provider to review them with you.

## 2019-10-22 ENCOUNTER — HOSPITAL ENCOUNTER (INPATIENT)
Facility: CLINIC | Age: 74
LOS: 3 days | Discharge: HOME | DRG: 287 | End: 2019-10-25
Attending: EMERGENCY MEDICINE | Admitting: HOSPITALIST
Payer: MEDICARE

## 2019-10-22 ENCOUNTER — APPOINTMENT (OUTPATIENT)
Dept: GENERAL RADIOLOGY | Facility: CLINIC | Age: 74
DRG: 287 | End: 2019-10-22
Attending: EMERGENCY MEDICINE
Payer: MEDICARE

## 2019-10-22 ENCOUNTER — APPOINTMENT (OUTPATIENT)
Dept: CT IMAGING | Facility: CLINIC | Age: 74
DRG: 287 | End: 2019-10-22
Attending: EMERGENCY MEDICINE
Payer: MEDICARE

## 2019-10-22 DIAGNOSIS — I20.89 STABLE ANGINA (H): ICD-10-CM

## 2019-10-22 DIAGNOSIS — M25.50 MULTIPLE JOINT PAIN: ICD-10-CM

## 2019-10-22 DIAGNOSIS — Z79.01 CURRENT USE OF LONG TERM ANTICOAGULATION: ICD-10-CM

## 2019-10-22 DIAGNOSIS — I20.0 UNSTABLE ANGINA (H): Primary | ICD-10-CM

## 2019-10-22 DIAGNOSIS — I25.118 CORONARY ARTERY DISEASE OF NATIVE ARTERY OF NATIVE HEART WITH STABLE ANGINA PECTORIS (H): ICD-10-CM

## 2019-10-22 DIAGNOSIS — I20.0 UNSTABLE ANGINA (H): ICD-10-CM

## 2019-10-22 DIAGNOSIS — R07.9 EXERTIONAL CHEST PAIN: ICD-10-CM

## 2019-10-22 LAB
ALBUMIN SERPL-MCNC: 4.2 G/DL (ref 3.4–5)
ALP SERPL-CCNC: 65 U/L (ref 40–150)
ALT SERPL W P-5'-P-CCNC: 25 U/L (ref 0–70)
ANION GAP SERPL CALCULATED.3IONS-SCNC: 4 MMOL/L (ref 3–14)
AST SERPL W P-5'-P-CCNC: 21 U/L (ref 0–45)
BASOPHILS # BLD AUTO: 0 10E9/L (ref 0–0.2)
BASOPHILS NFR BLD AUTO: 0.6 %
BILIRUB SERPL-MCNC: 0.6 MG/DL (ref 0.2–1.3)
BUN SERPL-MCNC: 9 MG/DL (ref 7–30)
CALCIUM SERPL-MCNC: 9.4 MG/DL (ref 8.5–10.1)
CHLORIDE SERPL-SCNC: 106 MMOL/L (ref 94–109)
CO2 SERPL-SCNC: 29 MMOL/L (ref 20–32)
CREAT SERPL-MCNC: 0.81 MG/DL (ref 0.66–1.25)
DIFFERENTIAL METHOD BLD: NORMAL
EOSINOPHIL # BLD AUTO: 0.2 10E9/L (ref 0–0.7)
EOSINOPHIL NFR BLD AUTO: 2.6 %
ERYTHROCYTE [DISTWIDTH] IN BLOOD BY AUTOMATED COUNT: 13.2 % (ref 10–15)
GFR SERPL CREATININE-BSD FRML MDRD: 88 ML/MIN/{1.73_M2}
GLUCOSE SERPL-MCNC: 97 MG/DL (ref 70–99)
HCT VFR BLD AUTO: 43.8 % (ref 40–53)
HGB BLD-MCNC: 15.2 G/DL (ref 13.3–17.7)
IMM GRANULOCYTES # BLD: 0 10E9/L (ref 0–0.4)
IMM GRANULOCYTES NFR BLD: 0.2 %
INR PPP: 2.06 (ref 0.86–1.14)
LYMPHOCYTES # BLD AUTO: 1.7 10E9/L (ref 0.8–5.3)
LYMPHOCYTES NFR BLD AUTO: 26.4 %
MCH RBC QN AUTO: 31.6 PG (ref 26.5–33)
MCHC RBC AUTO-ENTMCNC: 34.7 G/DL (ref 31.5–36.5)
MCV RBC AUTO: 91 FL (ref 78–100)
MONOCYTES # BLD AUTO: 0.5 10E9/L (ref 0–1.3)
MONOCYTES NFR BLD AUTO: 8.2 %
NEUTROPHILS # BLD AUTO: 4 10E9/L (ref 1.6–8.3)
NEUTROPHILS NFR BLD AUTO: 62 %
NRBC # BLD AUTO: 0 10*3/UL
NRBC BLD AUTO-RTO: 0 /100
PLATELET # BLD AUTO: 260 10E9/L (ref 150–450)
POTASSIUM SERPL-SCNC: 4.3 MMOL/L (ref 3.4–5.3)
PROT SERPL-MCNC: 8.2 G/DL (ref 6.8–8.8)
RBC # BLD AUTO: 4.81 10E12/L (ref 4.4–5.9)
SODIUM SERPL-SCNC: 139 MMOL/L (ref 133–144)
TROPONIN I BLD-MCNC: 0.02 UG/L (ref 0–0.08)
TROPONIN I SERPL-MCNC: <0.015 UG/L (ref 0–0.04)
TROPONIN I SERPL-MCNC: <0.015 UG/L (ref 0–0.04)
TSH SERPL DL<=0.005 MIU/L-ACNC: 1.35 MU/L (ref 0.4–4)
WBC # BLD AUTO: 6.4 10E9/L (ref 4–11)

## 2019-10-22 PROCEDURE — 85025 COMPLETE CBC W/AUTO DIFF WBC: CPT | Performed by: EMERGENCY MEDICINE

## 2019-10-22 PROCEDURE — 84484 ASSAY OF TROPONIN QUANT: CPT

## 2019-10-22 PROCEDURE — 93005 ELECTROCARDIOGRAM TRACING: CPT

## 2019-10-22 PROCEDURE — 85610 PROTHROMBIN TIME: CPT | Performed by: EMERGENCY MEDICINE

## 2019-10-22 PROCEDURE — 25000132 ZZH RX MED GY IP 250 OP 250 PS 637: Mod: GY | Performed by: HOSPITALIST

## 2019-10-22 PROCEDURE — 99285 EMERGENCY DEPT VISIT HI MDM: CPT | Mod: 25

## 2019-10-22 PROCEDURE — 84484 ASSAY OF TROPONIN QUANT: CPT | Performed by: EMERGENCY MEDICINE

## 2019-10-22 PROCEDURE — 99223 1ST HOSP IP/OBS HIGH 75: CPT | Mod: AI | Performed by: HOSPITALIST

## 2019-10-22 PROCEDURE — 70450 CT HEAD/BRAIN W/O DYE: CPT

## 2019-10-22 PROCEDURE — 36415 COLL VENOUS BLD VENIPUNCTURE: CPT | Performed by: HOSPITALIST

## 2019-10-22 PROCEDURE — 80053 COMPREHEN METABOLIC PANEL: CPT | Performed by: EMERGENCY MEDICINE

## 2019-10-22 PROCEDURE — 71046 X-RAY EXAM CHEST 2 VIEWS: CPT

## 2019-10-22 PROCEDURE — 84443 ASSAY THYROID STIM HORMONE: CPT | Performed by: HOSPITALIST

## 2019-10-22 PROCEDURE — 21000001 ZZH R&B HEART CARE

## 2019-10-22 PROCEDURE — 84484 ASSAY OF TROPONIN QUANT: CPT | Performed by: HOSPITALIST

## 2019-10-22 RX ORDER — NORTRIPTYLINE HCL 25 MG
25 CAPSULE ORAL AT BEDTIME
Status: DISCONTINUED | OUTPATIENT
Start: 2019-10-22 | End: 2019-10-25 | Stop reason: HOSPADM

## 2019-10-22 RX ORDER — GABAPENTIN 300 MG/1
1200 CAPSULE ORAL 3 TIMES DAILY
Status: DISCONTINUED | OUTPATIENT
Start: 2019-10-22 | End: 2019-10-25 | Stop reason: HOSPADM

## 2019-10-22 RX ORDER — LIDOCAINE 4 G/G
1 PATCH TOPICAL
Status: DISCONTINUED | OUTPATIENT
Start: 2019-10-22 | End: 2019-10-23

## 2019-10-22 RX ORDER — SODIUM CHLORIDE 9 MG/ML
INJECTION, SOLUTION INTRAVENOUS CONTINUOUS
Status: DISCONTINUED | OUTPATIENT
Start: 2019-10-23 | End: 2019-10-25

## 2019-10-22 RX ORDER — NALOXONE HYDROCHLORIDE 0.4 MG/ML
.1-.4 INJECTION, SOLUTION INTRAMUSCULAR; INTRAVENOUS; SUBCUTANEOUS
Status: DISCONTINUED | OUTPATIENT
Start: 2019-10-22 | End: 2019-10-24

## 2019-10-22 RX ORDER — OXYCODONE AND ACETAMINOPHEN 5; 325 MG/1; MG/1
1 TABLET ORAL EVERY 6 HOURS PRN
Status: DISCONTINUED | OUTPATIENT
Start: 2019-10-22 | End: 2019-10-23

## 2019-10-22 RX ORDER — ONDANSETRON 4 MG/1
4 TABLET, ORALLY DISINTEGRATING ORAL EVERY 6 HOURS PRN
Status: DISCONTINUED | OUTPATIENT
Start: 2019-10-22 | End: 2019-10-25 | Stop reason: HOSPADM

## 2019-10-22 RX ORDER — LIDOCAINE 40 MG/G
CREAM TOPICAL
Status: DISCONTINUED | OUTPATIENT
Start: 2019-10-22 | End: 2019-10-24

## 2019-10-22 RX ORDER — ACETAMINOPHEN 650 MG/1
650 SUPPOSITORY RECTAL EVERY 4 HOURS PRN
Status: DISCONTINUED | OUTPATIENT
Start: 2019-10-22 | End: 2019-10-25 | Stop reason: HOSPADM

## 2019-10-22 RX ORDER — ONDANSETRON 2 MG/ML
4 INJECTION INTRAMUSCULAR; INTRAVENOUS EVERY 6 HOURS PRN
Status: DISCONTINUED | OUTPATIENT
Start: 2019-10-22 | End: 2019-10-25 | Stop reason: HOSPADM

## 2019-10-22 RX ORDER — NITROGLYCERIN 0.4 MG/1
0.4 TABLET SUBLINGUAL EVERY 5 MIN PRN
Status: DISCONTINUED | OUTPATIENT
Start: 2019-10-22 | End: 2019-10-25 | Stop reason: HOSPADM

## 2019-10-22 RX ORDER — LISINOPRIL 10 MG/1
10 TABLET ORAL DAILY
Status: ON HOLD | COMMUNITY
End: 2019-10-25

## 2019-10-22 RX ORDER — ASPIRIN 81 MG/1
81 TABLET, CHEWABLE ORAL DAILY
Status: DISCONTINUED | OUTPATIENT
Start: 2019-10-22 | End: 2019-10-24

## 2019-10-22 RX ORDER — GABAPENTIN 400 MG/1
1200 CAPSULE ORAL 3 TIMES DAILY
COMMUNITY

## 2019-10-22 RX ORDER — ALBUTEROL SULFATE 90 UG/1
2 AEROSOL, METERED RESPIRATORY (INHALATION) EVERY 4 HOURS PRN
COMMUNITY

## 2019-10-22 RX ORDER — ACETAMINOPHEN 325 MG/1
650 TABLET ORAL EVERY 4 HOURS PRN
Status: DISCONTINUED | OUTPATIENT
Start: 2019-10-22 | End: 2019-10-24

## 2019-10-22 RX ORDER — SIMVASTATIN 40 MG
80 TABLET ORAL AT BEDTIME
Status: DISCONTINUED | OUTPATIENT
Start: 2019-10-22 | End: 2019-10-25 | Stop reason: HOSPADM

## 2019-10-22 RX ORDER — POLYETHYLENE GLYCOL 3350 17 G/17G
17 POWDER, FOR SOLUTION ORAL DAILY PRN
Status: DISCONTINUED | OUTPATIENT
Start: 2019-10-22 | End: 2019-10-25 | Stop reason: HOSPADM

## 2019-10-22 RX ORDER — ALBUTEROL SULFATE 90 UG/1
2 AEROSOL, METERED RESPIRATORY (INHALATION) EVERY 6 HOURS PRN
Status: DISCONTINUED | OUTPATIENT
Start: 2019-10-22 | End: 2019-10-25 | Stop reason: HOSPADM

## 2019-10-22 RX ORDER — METOPROLOL TARTRATE 25 MG/1
25 TABLET, FILM COATED ORAL 2 TIMES DAILY
Status: DISCONTINUED | OUTPATIENT
Start: 2019-10-22 | End: 2019-10-22

## 2019-10-22 RX ADMIN — NORTRIPTYLINE HYDROCHLORIDE 25 MG: 25 CAPSULE ORAL at 21:10

## 2019-10-22 RX ADMIN — ASPIRIN 81 MG 81 MG: 81 TABLET ORAL at 21:10

## 2019-10-22 RX ADMIN — GABAPENTIN 1200 MG: 300 CAPSULE ORAL at 21:10

## 2019-10-22 RX ADMIN — SIMVASTATIN 80 MG: 40 TABLET, FILM COATED ORAL at 21:10

## 2019-10-22 RX ADMIN — OMEPRAZOLE 20 MG: 20 CAPSULE, DELAYED RELEASE ORAL at 21:10

## 2019-10-22 ASSESSMENT — ACTIVITIES OF DAILY LIVING (ADL): ADLS_ACUITY_SCORE: 14

## 2019-10-22 ASSESSMENT — ENCOUNTER SYMPTOMS
NAUSEA: 0
SHORTNESS OF BREATH: 0
FEVER: 0
HEADACHES: 1
COUGH: 0

## 2019-10-22 ASSESSMENT — MIFFLIN-ST. JEOR
SCORE: 1563.65
SCORE: 1540.97

## 2019-10-22 NOTE — H&P
[facility]  History and Physical - Hospitalist Service       Date of Admission:  10/22/2019    Assessment & Plan   Jose Tejada is a 74 year old very pleasant male with medical history significant for coronary artery disease with stents to LAD and RCA with known 70% lesion to circumflex, protein C deficiency and factor V Leyden mutation, heterozygous with history of DVT, peripheral neuropathy and right foot drop, hypertension, hyperlipidemia, GERD, asthma, spinal stenosis presented to the ER due to exertional chest tightness.  He is being admitted to the hospital for further management.       Stable angina  CAD with stents to LAD and RCA, and Cx lesion  HTN, HL  Patient with known CAD, stents to LAD and RCA in the past, abnormal stress test in February 2017, followed by cardiology,  noted residual 70% lesion in circumflex per cardiology.  Medical management was recommended since he did not have a symptom at that time.  Patient now seems to have progressive symptoms. EKG shows T inversion on lead III.  Troponin is negative.  -Continue PTA Lopressor 25 mg p.o. twice daily with hold parameters (HR in 50s), ASA 81 mg p.o. daily, simvastatin 80 mg p.o. at bedtime.  -Telemetry, serial troponin, PRN nitro if needed  -Patient will likely need coronary angiogram, cardiology consulted, defer ECHO to cardiology.    Factor V Leiden mutation, heterozygous  History of DVT on chronic anticoagulation with Coumadin  -INR is therapeutic at 2.06  -Hold warfarin tonight since he will likely go for angiogram tomorrow.    Asthma:   He has exertional chest discomfort but no wheezing.  Presentation does not seem to be related to asthma saturation.  -PRN albuterol    Neuropathy and peripheral foot drop  -Patient uses splint to the right leg  -Continue PTA Neurontin when verified    Bilateral dense cataract  -Follow up with PCP      Diet: Cardiac diet for now, n.p.o. after midnight.  DVT Prophylaxis: Warfarin  Bennie  Catheter: not present  Code Status: Full code    Disposition Plan   Expected discharge: 2 days likely, recommended to prior living arrangement once cardiac work up complete.  Entered: Rocio Landon MD 10/22/2019, 6:23 PM     The patient's care was discussed with the Patient.    Rocio Landon MD  [facility]    ______________________________________________________________________    Chief Complaint   Chest tightness on exertion    History is obtained from the patient, chart review and discussion with ER attending.    History of Present Illness   Jose Tejada is a 74 year old very pleasant male with medical history significant for coronary artery disease with stents to LAD and RCA with known 70% lesion to circumflex, protein C deficiency and factor V Leiden mutation, heterozygous with history of DVT, peripheral neuropathy and right foot drop, hypertension, hyperlipidemia, GERD, asthma, spinal stenosis presented to the ER due to exertional chest tightness.    Patient reports progressive left-sided dull chest discomfort/pain with exertion.  He is doing some yard work and has been noticing it.  It has progressively worsened over the last couple/few weeks.  Patient does not have any symptom at rest.  Chest discomfort in fact alleviates with rest.  Patient states his normal heart rate is in 50s and with the discomfort he feels runs faster than his usual.    He went to the urgent care clinic today with above symptoms and was sent to ER for further evaluation.  Patient denies orthopnea, PND, dyspnea, fever, cough.    In ER, patient was evaluated by Dr. Hensley.  Patient is hypertensive.  Heart rate mostly in 50s/60s.  Afebrile.  Room air.  Troponin has been negative x2.  CMP and CBC is unremarkable.  INR was therapeutic at 2.06.  Chest x-ray is unremarkable.  EKG shows underlying sinus rhythm with T inversion in lead III.    Review of Systems    The 10 point Review of Systems is negative other  than noted in the HPI or here.      Past Medical History    I have reviewed this patient's medical history and updated it with pertinent information if needed.   Past Medical History:   Diagnosis Date     Asthma      CAD (coronary artery disease)     stent to Lad, RCA (OM 70%-med tx)     DVT (deep venous thrombosis) (H)      Factor 5 Leiden mutation, heterozygous (H)      GERD (gastroesophageal reflux disease)      HTN (hypertension)      Hyperlipidemia      Neuropathy     wears brace R foot for drop foot     PRIMITIVO (obstructive sleep apnea)     cpap     Protein C deficiency (H)      Spinal stenosis        Past Surgical History   I have reviewed this patient's surgical history and updated it with pertinent information if needed.  Past Surgical History:   Procedure Laterality Date     APPENDECTOMY OPEN  1958     ARTHROPLASTY HIP Left 1997     ARTHROPLASTY REVISION HIP Left 2013     AS SPINAL FUSION,ANT,EA ADNL LEVEL  2010    L4-L5     C TOTAL HIP ARTHROPLASTY Right 2015    THR       Social History   I have reviewed this patient's social history and updated it with pertinent information if needed.  Social History     Tobacco Use     Smoking status: Former Smoker     Smokeless tobacco: Never Used   Substance Use Topics     Alcohol use: Yes     Alcohol/week: 0.0 standard drinks     Comment: once every two weeks     Drug use: Not on file       Family History   I have reviewed this patient's family history and updated it with pertinent information if needed.   Family History   Problem Relation Age of Onset     Myocardial Infarction Father      Heart Disease Maternal Grandfather         Prior to Admission Medications   Prior to Admission Medications   Prescriptions Last Dose Informant Patient Reported? Taking?   BuPROPion HCl (WELLBUTRIN PO)   Yes No   Sig: Take 100 mg by mouth daily Takes 150 bid   Coenzyme Q10 (COQ-10 PO)   Yes No   Sig: Take by mouth daily   Multiple Vitamin (DAILY MULTIVITAMIN PO)   Yes No   Sig: Take by  mouth daily   Multiple Vitamins-Minerals (ICAPS PO)   Yes No   Sig: Take by mouth daily   Omega-3 Fatty Acids (FISH OIL PO)   Yes No   Sig: Take by mouth daily   Warfarin Sodium (COUMADIN PO)   Yes No   Sig: Take 5 mg by mouth daily 7.5 mg on Monday, Wednesday and Friday, 5 mg all other days   aspirin 81 MG tablet   Yes No   Sig: Take 81 mg by mouth daily   clobetasol (TEMOVATE) 0.05 % cream   Yes No   Sig: Apply topically 2 times daily   clotrimazole (LOTRIMIN) 1 % cream   Yes No   Sig: Apply topically 2 times daily   desonide (DESOWEN) 0.05 % cream   Yes No   Sig: Apply topically 2 times daily   gabapentin (NEURONTIN) 300 MG capsule   Yes No   Sig: Take 300 mg by mouth 3 times daily Pt takes 400 mg tid   lidocaine (LIDODERM) 5 % Patch   Yes No   Sig: Place 1 patch onto the skin every 12 hours   lidocaine (LMX4) 4 % CREA cream   Yes No   Sig: Apply topically as needed for mild pain   loratadine 10 MG capsule   Yes No   Sig: Take 10 mg by mouth daily   metoprolol tartrate (LOPRESSOR) 50 MG tablet   Yes No   Sig: Take 25 mg by mouth 2 times daily   nortriptyline (PAMELOR) 25 MG capsule   Yes No   Sig: Take 25 mg by mouth daily   omeprazole (PRILOSEC) 20 MG CR capsule   Yes No   Sig: Take 20 mg by mouth 2 times daily   oxyCODONE-acetaminophen (PERCOCET) 5-325 MG per tablet   Yes No   Sig: Take 1 tablet by mouth every 6 hours as needed 1 or 2 for breakthrough pain   Pt takes 2 tabs tid   saline nasal (AYR SALINE) GEL topical gel   Yes No   Sig: Apply into each nare 4 times daily as needed for congestion   simvastatin (ZOCOR) 80 MG tablet   Yes No   Sig: Take 80 mg by mouth At Bedtime      Facility-Administered Medications: None     Allergies   Allergies   Allergen Reactions     Norvasc [Amlodipine] Rash     Septra [Sulfamethoxazole W-Trimethoprim] Rash       Physical Exam   Vital Signs: Temp: 97.9  F (36.6  C) Temp src: Oral BP: 132/87 Pulse: 58   Resp: 16 SpO2: 98 % O2 Device: None (Room air)    Weight: 185 lbs 0  oz    General: AAOx3, appears comfortable.  HEENT: PERRLA EOMI. Mucosa moist.  Bilateral dense cataract  Lungs: Bilateral equal air entry. Clear to auscultation, normal work of breathing.   CVS: S1S2 regular, no tachycardia or murmur.   Abdomen: Soft, NT, ND. BS heard.  MSK: No edema.  Right foot drop noted.  Neuro: AAOX3. CN 2-12 normal. Strength symmetrical.  Skin: No rash.     Data   Data reviewed today: I reviewed all medications, new labs and imaging results over the last 24 hours. I personally reviewed EKG and labs as above.    Recent Labs   Lab 10/22/19  1602 10/22/19  1557   WBC 6.4  --    HGB 15.2  --    MCV 91  --      --    INR 2.06*  --      --    POTASSIUM 4.3  --    CHLORIDE 106  --    CO2 29  --    BUN 9  --    CR 0.81  --    ANIONGAP 4  --    CODI 9.4  --    GLC 97  --    ALBUMIN 4.2  --    PROTTOTAL 8.2  --    BILITOTAL 0.6  --    ALKPHOS 65  --    ALT 25  --    AST 21  --    TROPI <0.015  --    TROPONIN  --  0.02     Recent Results (from the past 24 hour(s))   XR Chest 2 Views    Narrative    CHEST TWO VIEW   10/22/2019 4:54 PM     HISTORY: Chest pain.    COMPARISON: Chest x-ray 10/22/2019.      Impression    IMPRESSION: PA and lateral views of the chest. Lungs are clear. Heart  is normal in size. No effusions are evident. No pneumothorax. Tortuous  thoracic aorta is noted. Degenerative lower thoracic spine changes are  present.    HARLEY RODRIGUEZ MD   Head CT w/o contrast    Narrative    CT SCAN OF THE HEAD WITHOUT CONTRAST   10/22/2019 5:25 PM     HISTORY: Daily headaches, on coumadin    TECHNIQUE: Axial images of the head and coronal reformations without  IV contrast material. Radiation dose for this scan was reduced using  automated exposure control, adjustment of the mA and/or kV according  to patient size, or iterative reconstruction technique.    COMPARISON: None.    FINDINGS: There is no evidence of intracranial hemorrhage, mass, acute  infarct or anomaly. The ventricles are normal  in size, shape and  configuration. The brain parenchyma and subarachnoid spaces are  normal.     Mild mucosal thickening in the left ethmoid air cells and visualized  left maxillary sinus. The bony calvarium and bones of the skull base  appear intact.       Impression    IMPRESSION: No evidence of acute intracranial hemorrhage, mass, or  herniation.      MARIANA BAEZA MD

## 2019-10-22 NOTE — ED PROVIDER NOTES
History     Chief Complaint:  Chest Pain    HPI   Jose Tejada is a 74 year old male with history of CAD, DVT, Factor 5 Leiden mutation anticoagulated on Coumadin (last INR 1.80 (H), dated 3/21/08) s/p coronary stent placement in 1996 who presents with localized left sided chest pain. Per chart review, the patient was in urgent care today, suggested to come here for further work up, prompting his presentation. Here, the patient reports mild left sided headaches, mostly at night, and localized left sided chest discomfort, exacerbated with exertion, alleviated with rest. He explains that he went out last night with friends, not having much to eat during the evening, and on the way home he felt his heart beat faster. He checked his pulse upon arriving home, which is nromally mid-high 50's, but it was over 80. Reported blood pressure was 190/97. As he laid down for bed, his chest discomfort subsided, but upon waking in the morning he had a lingering headache with pressure behind his eyes and return of mild chest pain.  There is no difficulty breathing or nausea associated with his headache. No recent falls or head trauma, fever, leg swelling, or coughing. Chest discomfort has been ongoing intermittently for a few weeks now.    Allergies:  Norvasc  Septra     Medications:    Wellbutrin  Gabapentin  Loratadine  Lisinopril  Nortriptyline  Omeprazole  Percocet  Simvastatin  Coumadin    Past Medical History:    Asthma  CAD  DVT  Factor 5 Leiden mutation, heterozygous  GERD  Hypertension  Hyperlipidemia  Neuropathy  PRIMITIVO  Protein C deficiency  Spinal stenosis  Sleep apnea  Nuclear cataract  RLS  MI  Mitral incompetence    Past Surgical History:    Open appendectomy  Hip arthroplasty  Spinal fusion  Coronary stent placement    Family History:    MI    Social History:  Smoking status: Former smoker  Alcohol use: Yes  Drug use: No  [facility]  Presents to the ED with himself  Marital Status:   "      Review of Systems   Constitutional: Negative for fever.   Respiratory: Negative for cough and shortness of breath.    Cardiovascular: Positive for chest pain. Negative for leg swelling.   Gastrointestinal: Negative for nausea.   Neurological: Positive for headaches.   All other systems reviewed and are negative.    Physical Exam     Patient Vitals for the past 24 hrs:   BP Temp Temp src Pulse Resp SpO2 Height Weight   10/22/19 1434 132/87 -- -- -- -- -- -- --   10/22/19 1433 -- 97.9  F (36.6  C) Oral 58 16 98 % 1.727 m (5' 8\") 83.9 kg (185 lb)       Physical Exam  General: Resting comfortably on the gurney  Eyes:  The pupils are equal and round    Conjunctivae and sclerae are normal  ENT:    Moist mucous membranes  Neck:  Normal range of motion  CV:  Bradycardic rate and regular rhythm    Skin warm and well perfused   Resp:  Lungs are clear    Non-labored    No rales    No wheezing   GI:  Abdomen is soft, there is no rigidity    No distension    No rebound tenderness     No abdominal tenderness  MS:  Normal muscular tone  Skin:  No rash or acute skin lesions noted  Neuro:   Awake, alert.      Speech is normal and fluent.    Face is symmetric.     Moves all extremities equally    No arm or leg drift    SILT on bilateral UE/LE    EOMI  Psych: Normal affect.  Appropriate interactions.    Emergency Department Course   ECG (14:34:53):  Rate 65 bpm. IL interval 184. QRS duration 98. QT/QTc 410/426. P-R-T axes 48 -22 11. Sinus rhythm with occasional premature ventricular complexes. Anterior infarct, age undetermined. Abnormal EKG. Agree with computer interpretation. New T wave inversion in lead III compared to EKG dated 12/1/15. Interpreted at 1610 by Essence Hensley MD.    Imaging:  Radiographic findings were communicated with the patient who voiced understanding of the findings.    XR Chest 2 Views  PA and lateral views of the chest. Lungs are clear. Heart  is normal in size. No effusions are " evident. No pneumothorax. Tortuous  thoracic aorta is noted. Degenerative lower thoracic spine changes are  present.  As read by Radiology.    Head CT w/o Contrast  No evidence of acute intracranial hemorrhage, mass, or  herniation.  As read by Radiology.    Laboratory:  CBC: WNL (WBC 6.4, HGB 15.2, )  INR: 2.06 (H)  Troponin I (1602): <0.015  Troponin POCT (1557): 0.02  CMP: (Creatinine 0.81)    Emergency Department Course:  Past medical records, nursing notes, and vitals reviewed.  1606: I performed an exam of the patient and obtained history, as documented above.    EKG obtained, findings above.    IV inserted and blood drawn.    The patient was sent for a chest x-ray and head CT while in the emergency department, findings above.    1744: Findings and plan explained to the patient who consents to admission.     1754: Discussed the patient with Dr. Landon, who will admit the patient to an observation bed for further monitoring, evaluation, and treatment.    Impression & Plan    Medical Decision Making:  Jose Tejada is a 74-year-old male who presented to the emergency department with chest pain.  Has had exertional chest pain for the last 2 weeks.  Concerning for angina.  EKG with T wave inversion in lead III which is new.  Labs with a normal troponin.  INR is therapeutic at 2.06.  Doubt PE or dissection.  Chest x-ray is clear.  Already given aspirin at urgent care.  Has also had mild intermittent headaches and given that he is on anticoagulation, did obtain head CT.  This was unremarkable. Doubt CVA, meningitis, temporal arteritis, etc. Likely will need angiogram given that he had an abnormal stress test about 1-1/2 years ago but angiogram was not done at that time given that he had no symptoms and the area of ischemia was mild. Discussed patient with hospitalist for admission.    Diagnosis:    ICD-10-CM    1. Exertional chest pain R07.9        Disposition: Admitted     I, Mony Reyes am serving  as a scribe at 4:06 PM on 10/22/2019 to document services personally performed by Essence Hensley MD based on my observations and the provider's statements to me.       Mony Reyes  10/22/2019    EMERGENCY DEPARTMENT       Essence Hensley MD  10/22/19 [id number]

## 2019-10-22 NOTE — ED TRIAGE NOTES
Chest pain and palpitations for last week. Pt noticed it worse last night after [facility]. Recent changes in medications as well.

## 2019-10-22 NOTE — ED NOTES
"[facility]  ED Nurse Handoff Report    ED Chief complaint: Chest Pain      ED Diagnosis:   Final diagnoses:   Exertional chest pain       Code Status: Full Code    Allergies:   Allergies   Allergen Reactions     Norvasc [Amlodipine] Rash     Septra [Sulfamethoxazole W-Trimethoprim] Rash       Activity level - Baseline/Home:  Independent  Activity Level - Current:   Independent    Patient's Preferred language: English   Needed?: No    Isolation: No  Infection: Not Applicable  Bariatric?: No    Vital Signs:   Vitals:    10/22/19 1433 10/22/19 1434   BP:  132/87   Pulse: 58    Resp: 16    Temp: 97.9  F (36.6  C)    TempSrc: Oral    SpO2: 98%    Weight: 83.9 kg (185 lb)    Height: 1.727 m (5' 8\")        Cardiac Rhythm: ,        Pain level: 0-10 Pain Scale: 3    Is this patient confused?: No   Does this patient have a guardian?  No         If yes, is there guardianship documents in the Epic \"Code/ACP\" activity?  N/A         Guardian Notified?  N/A  [place] - Suicide Severity Rating Scale Completed?  Yes  If yes, what color did the patient score?  White    Patient Report: Initial Complaint: Palpitations since last evening.  A/O x4.  Walks to [place] independently.  Troponin .02 so will stay for obs care.  Denies chest pain now.    Focused Assessment: see above  Tests Performed:   Results for orders placed or performed during the hospital encounter of 10/22/19   XR Chest 2 Views    Narrative    CHEST TWO VIEW   10/22/2019 4:54 PM     HISTORY: Chest pain.    COMPARISON: Chest x-ray 10/22/2019.      Impression    IMPRESSION: PA and lateral views of the chest. Lungs are clear. Heart  is normal in size. No effusions are evident. No pneumothorax. Tortuous  thoracic aorta is noted. Degenerative lower thoracic spine changes are  present.    HARLEY RODRIGUEZ MD   Head CT w/o contrast    Narrative    CT SCAN OF THE HEAD WITHOUT CONTRAST   10/22/2019 5:25 PM     HISTORY: Daily headaches, on coumadin    TECHNIQUE: " Axial images of the head and coronal reformations without  IV contrast material. Radiation dose for this scan was reduced using  automated exposure control, adjustment of the mA and/or kV according  to patient size, or iterative reconstruction technique.    COMPARISON: None.    FINDINGS: There is no evidence of intracranial hemorrhage, mass, acute  infarct or anomaly. The ventricles are normal in size, shape and  configuration. The brain parenchyma and subarachnoid spaces are  normal.     Mild mucosal thickening in the left ethmoid air cells and visualized  left maxillary sinus. The bony calvarium and bones of the skull base  appear intact.       Impression    IMPRESSION: No evidence of acute intracranial hemorrhage, mass, or  herniation.     CBC with platelets differential   Result Value Ref Range    WBC 6.4 4.0 - 11.0 10e9/L    RBC Count 4.81 4.4 - 5.9 10e12/L    Hemoglobin 15.2 13.3 - 17.7 g/dL    Hematocrit 43.8 40.0 - 53.0 %    MCV 91 78 - 100 fl    MCH 31.6 26.5 - 33.0 pg    MCHC 34.7 31.5 - 36.5 g/dL    RDW 13.2 10.0 - 15.0 %    Platelet Count 260 150 - 450 10e9/L    Diff Method Automated Method     % Neutrophils 62.0 %    % Lymphocytes 26.4 %    % Monocytes 8.2 %    % Eosinophils 2.6 %    % Basophils 0.6 %    % Immature Granulocytes 0.2 %    Nucleated RBCs 0 0 /100    Absolute Neutrophil 4.0 1.6 - 8.3 10e9/L    Absolute Lymphocytes 1.7 0.8 - 5.3 10e9/L    Absolute Monocytes 0.5 0.0 - 1.3 10e9/L    Absolute Eosinophils 0.2 0.0 - 0.7 10e9/L    Absolute Basophils 0.0 0.0 - 0.2 10e9/L    Abs Immature Granulocytes 0.0 0 - 0.4 10e9/L    Absolute Nucleated RBC 0.0    INR   Result Value Ref Range    INR 2.06 (H) 0.86 - 1.14   Troponin I   Result Value Ref Range    Troponin I ES <0.015 0.000 - 0.045 ug/L   Comprehensive metabolic panel   Result Value Ref Range    Sodium 139 133 - 144 mmol/L    Potassium 4.3 3.4 - 5.3 mmol/L    Chloride 106 94 - 109 mmol/L    Carbon Dioxide 29 20 - 32 mmol/L    Anion Gap 4 3 - 14  mmol/L    Glucose 97 70 - 99 mg/dL    Urea Nitrogen 9 7 - 30 mg/dL    Creatinine 0.81 0.66 - 1.25 mg/dL    GFR Estimate 88 >60 mL/min/[1.73_m2]    GFR Estimate If Black >90 >60 mL/min/[1.73_m2]    Calcium 9.4 8.5 - 10.1 mg/dL    Bilirubin Total 0.6 0.2 - 1.3 mg/dL    Albumin 4.2 3.4 - 5.0 g/dL    Protein Total 8.2 6.8 - 8.8 g/dL    Alkaline Phosphatase 65 40 - 150 U/L    ALT 25 0 - 70 U/L    AST 21 0 - 45 U/L   Troponin POCT   Result Value Ref Range    Troponin I 0.02 0.00 - 0.08 ug/L       Abnormal Results: see above  Treatments provided: see above    Family Comments: none    OBS brochure/video discussed/provided to patient/family: Yes              Name of person given brochure if not patient: na              Relationship to patient: na    ED Medications: Medications - No data to display    Drips infusing?:  No    For the majority of the shift this patient was Green.   Interventions performed were encouragement.    Severe Sepsis OR Septic Shock Diagnosis Present: No    To be done/followed up on inpatient unit:  none    ED NURSE PHONE NUMBER: [phone]

## 2019-10-22 NOTE — PROGRESS NOTES
RECEIVING UNIT ED HANDOFF REVIEW    ED Nurse Handoff Report was reviewed by: Marlyn Adler RN on October 22, 2019 at 6:34 PM

## 2019-10-23 PROBLEM — I20.0 UNSTABLE ANGINA (H): Status: ACTIVE | Noted: 2019-10-22

## 2019-10-23 LAB
ANION GAP SERPL CALCULATED.3IONS-SCNC: 4 MMOL/L (ref 3–14)
BUN SERPL-MCNC: 11 MG/DL (ref 7–30)
CALCIUM SERPL-MCNC: 8.7 MG/DL (ref 8.5–10.1)
CHLORIDE SERPL-SCNC: 107 MMOL/L (ref 94–109)
CHOLEST SERPL-MCNC: 106 MG/DL
CO2 SERPL-SCNC: 28 MMOL/L (ref 20–32)
CREAT SERPL-MCNC: 0.72 MG/DL (ref 0.66–1.25)
GFR SERPL CREATININE-BSD FRML MDRD: >90 ML/MIN/{1.73_M2}
GLUCOSE SERPL-MCNC: 93 MG/DL (ref 70–99)
HDLC SERPL-MCNC: 35 MG/DL
INR PPP: 2.13 (ref 0.86–1.14)
INR PPP: 2.46 (ref 0.86–1.14)
LDLC SERPL CALC-MCNC: 49 MG/DL
NONHDLC SERPL-MCNC: 71 MG/DL
POTASSIUM SERPL-SCNC: 3.5 MMOL/L (ref 3.4–5.3)
SODIUM SERPL-SCNC: 139 MMOL/L (ref 133–144)
TRIGL SERPL-MCNC: 109 MG/DL
TROPONIN I SERPL-MCNC: <0.015 UG/L (ref 0–0.04)

## 2019-10-23 PROCEDURE — 99232 SBSQ HOSP IP/OBS MODERATE 35: CPT | Performed by: PHYSICIAN ASSISTANT

## 2019-10-23 PROCEDURE — 80061 LIPID PANEL: CPT | Performed by: HOSPITALIST

## 2019-10-23 PROCEDURE — 84484 ASSAY OF TROPONIN QUANT: CPT | Performed by: HOSPITALIST

## 2019-10-23 PROCEDURE — 80048 BASIC METABOLIC PNL TOTAL CA: CPT | Performed by: HOSPITALIST

## 2019-10-23 PROCEDURE — 36415 COLL VENOUS BLD VENIPUNCTURE: CPT | Performed by: HOSPITALIST

## 2019-10-23 PROCEDURE — 25000132 ZZH RX MED GY IP 250 OP 250 PS 637: Mod: GY | Performed by: HOSPITALIST

## 2019-10-23 PROCEDURE — 85610 PROTHROMBIN TIME: CPT | Performed by: PHYSICIAN ASSISTANT

## 2019-10-23 PROCEDURE — 99222 1ST HOSP IP/OBS MODERATE 55: CPT | Performed by: INTERNAL MEDICINE

## 2019-10-23 PROCEDURE — 25000132 ZZH RX MED GY IP 250 OP 250 PS 637: Mod: GY | Performed by: INTERNAL MEDICINE

## 2019-10-23 PROCEDURE — 25000132 ZZH RX MED GY IP 250 OP 250 PS 637: Mod: GY | Performed by: PHYSICIAN ASSISTANT

## 2019-10-23 PROCEDURE — 36415 COLL VENOUS BLD VENIPUNCTURE: CPT | Performed by: PHYSICIAN ASSISTANT

## 2019-10-23 PROCEDURE — 85610 PROTHROMBIN TIME: CPT | Performed by: HOSPITALIST

## 2019-10-23 PROCEDURE — 21000001 ZZH R&B HEART CARE

## 2019-10-23 RX ORDER — LIDOCAINE 4 G/G
1 PATCH TOPICAL
Status: DISCONTINUED | OUTPATIENT
Start: 2019-10-23 | End: 2019-10-25 | Stop reason: HOSPADM

## 2019-10-23 RX ORDER — LISINOPRIL 20 MG/1
20 TABLET ORAL DAILY
Status: DISCONTINUED | OUTPATIENT
Start: 2019-10-23 | End: 2019-10-25 | Stop reason: HOSPADM

## 2019-10-23 RX ORDER — OXYCODONE AND ACETAMINOPHEN 5; 325 MG/1; MG/1
2 TABLET ORAL 3 TIMES DAILY PRN
Status: DISCONTINUED | OUTPATIENT
Start: 2019-10-23 | End: 2019-10-25 | Stop reason: HOSPADM

## 2019-10-23 RX ORDER — LISINOPRIL 10 MG/1
10 TABLET ORAL DAILY
Status: DISCONTINUED | OUTPATIENT
Start: 2019-10-23 | End: 2019-10-23

## 2019-10-23 RX ORDER — OXYCODONE AND ACETAMINOPHEN 5; 325 MG/1; MG/1
2 TABLET ORAL EVERY 6 HOURS PRN
Status: DISCONTINUED | OUTPATIENT
Start: 2019-10-23 | End: 2019-10-23

## 2019-10-23 RX ADMIN — OXYCODONE HYDROCHLORIDE AND ACETAMINOPHEN 1 TABLET: 5; 325 TABLET ORAL at 10:45

## 2019-10-23 RX ADMIN — OMEPRAZOLE 20 MG: 20 CAPSULE, DELAYED RELEASE ORAL at 21:55

## 2019-10-23 RX ADMIN — LISINOPRIL 20 MG: 20 TABLET ORAL at 10:32

## 2019-10-23 RX ADMIN — OXYCODONE HYDROCHLORIDE AND ACETAMINOPHEN 1 TABLET: 5; 325 TABLET ORAL at 18:52

## 2019-10-23 RX ADMIN — GABAPENTIN 1200 MG: 300 CAPSULE ORAL at 15:59

## 2019-10-23 RX ADMIN — ASPIRIN 81 MG 81 MG: 81 TABLET ORAL at 10:32

## 2019-10-23 RX ADMIN — SIMVASTATIN 80 MG: 40 TABLET, FILM COATED ORAL at 18:52

## 2019-10-23 RX ADMIN — LIDOCAINE 1 PATCH: 560 PATCH PERCUTANEOUS; TOPICAL; TRANSDERMAL at 13:59

## 2019-10-23 RX ADMIN — GABAPENTIN 1200 MG: 300 CAPSULE ORAL at 10:44

## 2019-10-23 RX ADMIN — NORTRIPTYLINE HYDROCHLORIDE 25 MG: 25 CAPSULE ORAL at 18:53

## 2019-10-23 RX ADMIN — GABAPENTIN 1200 MG: 300 CAPSULE ORAL at 21:55

## 2019-10-23 ASSESSMENT — ACTIVITIES OF DAILY LIVING (ADL)
ADLS_ACUITY_SCORE: 10
FALL_HISTORY_WITHIN_LAST_SIX_MONTHS: NO
ADLS_ACUITY_SCORE: 14
ADLS_ACUITY_SCORE: 14
ADLS_ACUITY_SCORE: 10
ADLS_ACUITY_SCORE: 11
ADLS_ACUITY_SCORE: 10

## 2019-10-23 ASSESSMENT — MIFFLIN-ST. JEOR: SCORE: 1526.91

## 2019-10-23 NOTE — PLAN OF CARE
A&Ox4. Up SBA, unsteady upon standing. VSS on RA ex bradycardia into 50's at times. Tele SR/SB. Denies CP, nausea, or SOB at this time. CMS intact. Baseline neuropathy. Cards consulted. Trops neg. NPO midnight. Possible angio tomorrow.

## 2019-10-23 NOTE — PROGRESS NOTES
[facility]    Medicine Progress Note - Hospitalist Service       Date of Admission:  10/22/2019  Assessment & Plan      Jose Tejada is a 74 year old very pleasant male with medical history significant for coronary artery disease with stents to LAD and RCA with known 70% lesion to circumflex, protein C deficiency and factor V Leyden mutation, heterozygous with history of DVT, peripheral neuropathy and right foot drop, hypertension, hyperlipidemia, GERD, asthma, spinal stenosis presented to the ER due to exertional chest tightness.  He is being admitted to the hospital for further management.        Unstable Angina  CAD with stents to LAD and RCA, and Cx lesion  HTN, HL: Patient with known CAD, stents to LAD and RCA in the past, abnormal stress test in February 2017, followed by cardiology,  noted residual 70% lesion in circumflex per cardiology.  Medical management was recommended since he did not have a symptom at that time.  Patient now seems to have progressive symptoms. EKG shows T inversion on lead III.    - Trop negative x 3. No further chest pain.  - Greatly appreciate Cardiology consultation. Plan for angiogram when INR lower, hopefully tomorrow  - Continue PTA ASA and statin  - PTA lisinopril increased to 20 mg/d. Not on BB due to bradycardia  - Echo pending  - Per Cardiology no need for heparin gtt until INR < 1.8       Factor V Leiden mutation, heterozygous  Protein C deficiency  History of DVT on chronic anticoagulation with Coumadin  -INR 2.4 today  -Hold warfarin. Avoiding reversal due to h/o hypercoagulable state     Asthma:   He has exertional chest discomfort but no wheezing.  Presentation does not seem to be related to asthma saturation.  -PRN albuterol     Neuropathy and peripheral foot drop  -Patient uses splint to the right leg  -Continue PTA Neurontin      Bilateral dense cataract  -Follow up with PCP       Diet: Regular Diet Adult    DVT Prophylaxis: Warfarin on hold.  INR drifting down  Avery Catheter: not present  Code Status: Full Code      Disposition Plan   Expected discharge: 1-2 days pending angiogram  Entered: Darling Carias PA-C 10/23/2019, 10:17 AM       The patient's care was discussed with the Bedside Nurse, Patient and Cardiology Consultant.    Darling Carias PA-C  Hospitalist Service  [facility]    ______________________________________________________________________    Interval History   No complaints of chest pain overnight. Discussed plans per cardiology. Denies SOB    Data reviewed today: I reviewed all medications, new labs and imaging results over the last 24 hours. I personally reviewed no images or EKG's today.    Physical Exam   Vital Signs: Temp: 97.7  F (36.5  C) Temp src: Oral BP: (!) 133/91 Pulse: 59 Heart Rate: 57 Resp: 18 SpO2: 98 % O2 Device: None (Room air)    Weight: 176 lbs 14.4 oz  Constitutional: Alert, resting comfortably in NAD  HEENT: Head normocephalic, atraumatic. Eyes sclera non icteric.   Respiratory: Normal effort, symmetric expansion, no crackles or wheezing  Cardiovascular: RRR no murmurs   GI: Non distended, normal bowels sounds, no tenderness or guarding  MSK: LE without edema. Dorsalis pedis pulse palpated bilaterally.   Skin/Integumen: Clear  Neuro: CN II-XII grossly intact  Psych:  Alert and oriented x 3. Normal affect      Data   Recent Labs   Lab 10/23/19  0252 10/22/19  2133 10/22/19  1602 10/22/19  1557   WBC  --   --  6.4  --    HGB  --   --  15.2  --    MCV  --   --  91  --    PLT  --   --  260  --    INR 2.46*  --  2.06*  --      --  139  --    POTASSIUM 3.5  --  4.3  --    CHLORIDE 107  --  106  --    CO2 28  --  29  --    BUN 11  --  9  --    CR 0.72  --  0.81  --    ANIONGAP 4  --  4  --    CODI 8.7  --  9.4  --    GLC 93  --  97  --    ALBUMIN  --   --  4.2  --    PROTTOTAL  --   --  8.2  --    BILITOTAL  --   --  0.6  --    ALKPHOS  --   --  65  --    ALT  --   --  25  --    AST  --   --   21  --    TROPI <0.015 <0.015 <0.015  --    TROPONIN  --   --   --  0.02

## 2019-10-23 NOTE — PLAN OF CARE
VSS:WNL for this pt  Neuro/Orientation: A&Ox4  Transfer/ activity: SBA  LS/O2 sats:clear on room air  CV/Rhythm: SR  GI/diet: NPO at NOC  /Avery: voiding adequately  Lines/ drains/ IVs/ GTT: PIV SL  Procedures/Test results/Labs: R radial and R groin angio site   Arterial sites/Bruit/ CMS /hematoma: CDI, bruising  Isolation/Wounds/ Drains/ Dressing: CDI  Pain/PRN meds:na  Behaviors/ sitters/ CIWA:na  Edema:na  Discharge Plan: possible today    Other:  Will pass on and continue to monitor

## 2019-10-23 NOTE — CONSULTS
[facility]    Cardiology Consultation     Date of Admission:  10/22/2019    Assessment & Plan   Jose Tejada is a 74 year old male who was admitted on 10/22/2019.    1.  Recent onset exertional angina on mild exertion, now crescendo, form of unstable angina    Patient's symptoms are mainly exertional but recent onset.  It is somewhat worsening.  It could be prescribed by lack of antianginal therapy in form of metoprolol and uncontrolled high blood pressure.  However, he also has underlying circumflex lesion with the known area of lateral wall ischemia on the last stress test.  Given the above, I recommend coronary angiography with ad hoc revascularization.Risks and benefits of left heart catheterization and coronary angiogram were discussed with the patient in detail. 0.1-0.3% (for diagnostic angio) and 1-2% (for PCI)  risk of stroke, MI, death, emergent bypass for diagnostic angio, risk of contrast induced allergic reaction, renal dysfunction, vascular complications were discussed. Pros and cons of bare metal Vs drug eluting stents was discussed. Patient understands and wishes to proceed with it.  He understands that he will need aspirin Plavix if a drug-eluting stent is placed along with warfarin.  We will do that for about 2 to 4 weeks and then discontinue aspirin and just use Plavix or Brilinta with warfarin for rest of the 1 year of therapy after the stent.  After that, he can go back on warfarin and baby aspirin.  Given his baseline heart rate is 55, will hold off on beta-blockers.  We will increase lisinopril for high blood pressure to 20 mg daily.  We will do the coronary angiography when INR.  I am holding of reversing INR with vitamin K given that he has history of protein C deficiency and DVT.  Echocardiogram has been requested to assess wall motion and ejection fraction.  EKG was reviewed by me revealed sinus rhythm with occasional PVC and poor R wave progression.  Cannot rule out  anterior infarct given loss of R wave height.    2.  Hypertension  Increase lisinopril to 20 mg daily.    3.  Hyperlipidemia  Continue high-dose simvastatin.  LDL was 49.    4.  History of DVT and protein C deficiency and heterozygous factor V Leyden  On warfarin.  Will need to bridge him after the PCI.      Pradip Daniel MD    Primary cardiologist is Dr. Yun    Primary Care Physician   [facility]    Reason for Consult   Reason for consult: I was asked by hospitalist to evaluate this patient for exertional chest pain.    History of Present Illness   Jose Tejada is a 74 year old very pleasant male with medical history significant for coronary artery disease with stents to LAD and RCA (1996) with known 70% lesion to circumflex, protein C deficiency and factor V Leiden mutation, heterozygous with history of DVT, peripheral neuropathy and right foot drop, hypertension, hyperlipidemia, GERD, asthma, spinal stenosis presented to the ER due to exertional chest tightness.  Patient tells me that he has been having exertional chest tightness of mild to moderate intensity for last 2 to 3 weeks.  It does occur when he is climbing stairs was working on his yard.  There is no rest pain.    Patient had a stress nuclear study last year which showed some lateral wall ischemia consistent with his known 70% circumflex lesion but was managed medically as he was asymptomatic.    Earlier this year, his heart rates were apparently in the 40s and 50s but he was asymptomatic.  His metoprolol was discontinued recently and started on lisinopril few weeks ago for high blood pressure readings.  Patient thinks that since the metoprolol was discontinued, he felt that the chest tightness was related to that.    On admission, his troponins have been negative.  EKG was sinus rhythm without ischemic changes.  He was hypertensive.  Heart rates are in the 50s to 60s.    His INR was 2.06 and increased to 2.4.   His last dose of warfarin was yesterday morning.        Patient Active Problem List   Diagnosis     CAD (coronary artery disease)     Hyperlipidemia     HTN (hypertension)     Stable angina (H)       Past Medical History   I have reviewed this patient's medical history and updated it with pertinent information if needed.   Past Medical History:   Diagnosis Date     Asthma      CAD (coronary artery disease)     stent to Lad, RCA (OM 70%-med tx)     DVT (deep venous thrombosis) (H)      Factor 5 Leiden mutation, heterozygous (H)      GERD (gastroesophageal reflux disease)      HTN (hypertension)      Hyperlipidemia      Neuropathy     wears brace R foot for drop foot     PRIMITIVO (obstructive sleep apnea)     cpap     Protein C deficiency (H)      Spinal stenosis        Past Surgical History   I have reviewed this patient's surgical history and updated it with pertinent information if needed.  Past Surgical History:   Procedure Laterality Date     APPENDECTOMY OPEN  1958     ARTHROPLASTY HIP Left 1997     ARTHROPLASTY REVISION HIP Left 2013     AS SPINAL FUSION,ANT,EA ADNL LEVEL  2010    L4-L5     C TOTAL HIP ARTHROPLASTY Right 2015    THR       Prior to Admission Medications   Prior to Admission Medications   Prescriptions Last Dose Informant Patient Reported? Taking?   Warfarin Sodium (COUMADIN PO) 10/21/2019 at Unknown time  Yes Yes   Sig: Take 5 mg by mouth daily 7.5 mg on Monday and Friday, 5 mg all other days   albuterol (PROAIR HFA/PROVENTIL HFA/VENTOLIN HFA) 108 (90 Base) MCG/ACT inhaler prn  Yes Yes   Sig: Inhale 2 puffs into the lungs every 4 hours as needed for shortness of breath / dyspnea or wheezing   gabapentin (NEURONTIN) 400 MG capsule 10/22/2019 at x2 doses  Yes Yes   Sig: Take 1,200 mg by mouth 3 times daily   lidocaine (LIDODERM) 5 % Patch PRN  Yes Yes   Sig: Place 1 patch onto the skin every 12 hours as needed (BACK PAIN)    lisinopril (PRINIVIL/ZESTRIL) 10 MG tablet 10/22/2019 at am  Yes Yes   Sig:  Take 10 mg by mouth daily   nortriptyline (PAMELOR) 25 MG capsule 10/21/2019 at HS  Yes Yes   Sig: Take 25 mg by mouth At Bedtime    omeprazole (PRILOSEC) 20 MG CR capsule 10/21/2019 at hs  Yes Yes   Sig: Take 20 mg by mouth At Bedtime    oxyCODONE-acetaminophen (PERCOCET) 5-325 MG per tablet 10/22/2019 at Unknown time  Yes Yes   Sig: Take 2 tablets by mouth 3 times daily as needed Patient takes 2 tablets TID Scheduled   simvastatin (ZOCOR) 80 MG tablet 10/21/2019 at hs  Yes Yes   Sig: Take 80 mg by mouth At Bedtime      Facility-Administered Medications: None     Current Facility-Administered Medications   Medication Dose Route Frequency     aspirin  81 mg Oral Daily     gabapentin  1,200 mg Oral TID     lisinopril  10 mg Oral Daily     nortriptyline  25 mg Oral At Bedtime     omeprazole  20 mg Oral At Bedtime     simvastatin  80 mg Oral At Bedtime     sodium chloride (PF)  3 mL Intracatheter Q8H     Current Facility-Administered Medications   Medication Last Rate     - MEDICATION INSTRUCTIONS -       - MEDICATION INSTRUCTIONS -       - MEDICATION INSTRUCTIONS -       Reason anticoagulation order not selected       - MEDICATION INSTRUCTIONS -       ACE/ARB/ARNI NOT PRESCRIBED       sodium chloride       Allergies   Allergies   Allergen Reactions     Norvasc [Amlodipine] Rash     Septra [Sulfamethoxazole W-Trimethoprim] Rash       Social History    reports that he has quit smoking. He has never used smokeless tobacco. He reports current alcohol use.    Family History   Family History   Problem Relation Age of Onset     Myocardial Infarction Father      Heart Disease Maternal Grandfather        Review of Systems   The comprehensive 10 point Review of Systems is negative other than noted in the HPI or here.     Physical Exam   Vital Signs with Ranges  Temp:  [97.9  F (36.6  C)-98.7  F (37.1  C)] 98.1  F (36.7  C)  Pulse:  [51-63] 59  Heart Rate:  [52-65] 55  Resp:  [10-23] 18  BP: (132-163)/() 145/84  SpO2:  [89  "%-98 %] 92 %  Wt Readings from Last 4 Encounters:   10/23/19 80.2 kg (176 lb 14.4 oz)   04/30/18 102.4 kg (225 lb 12.8 oz)   02/08/17 101.6 kg (224 lb)   12/23/15 101.6 kg (224 lb)     No intake/output data recorded.      Vitals: BP (!) 145/84 (BP Location: Right arm)   Pulse 59   Temp 98.1  F (36.7  C) (Oral)   Resp 18   Ht 1.727 m (5' 8\")   Wt 80.2 kg (176 lb 14.4 oz)   SpO2 92%   BMI 26.90 kg/m      Constitutional:   awake   Eyes:   extra-ocular muscles intact   ENT:   normocepalic, without obvious abnormality   Neck:   no jugular venous distension   Hematologic / Lymphatic:   no cervical lymphadenopathy   Back:   symmetric   Lungs:   clear to auscultation   Cardiovascular:   normal S1 and S2 and no murmur noted   Abdomen:   non-distended and non-tender   Musculoskeletal:   motor strength is 5 out of 5 all extremities bilaterally   Neurologic:   Motor Exam:  moves all extremities well and symmetrically   Neuropsychiatric:   Orientation: oriented to self, place, time and situation         Recent Labs   Lab 10/23/19  0252 10/22/19  2133 10/22/19  1602   TROPI <0.015 <0.015 <0.015       Recent Labs   Lab 10/23/19  0252 10/22/19  2133 10/22/19  1602   WBC  --   --  6.4   HGB  --   --  15.2   MCV  --   --  91   PLT  --   --  260   INR 2.46*  --  2.06*     --  139   POTASSIUM 3.5  --  4.3   CHLORIDE 107  --  106   CO2 28  --  29   BUN 11  --  9   CR 0.72  --  0.81   GFRESTIMATED >90  --  88   GFRESTBLACK >90  --  >90   ANIONGAP 4  --  4   CODI 8.7  --  9.4   GLC 93  --  97   ALBUMIN  --   --  4.2   PROTTOTAL  --   --  8.2   BILITOTAL  --   --  0.6   ALKPHOS  --   --  65   ALT  --   --  25   AST  --   --  21   TROPI <0.015 <0.015 <0.015     Recent Labs   Lab Test 10/23/19  0252 04/21/14  0000  03/26/13  0000   CHOL 106  --   --   --    HDL 35* 35*   < > 32*   LDL 49 60*   < > 49*   TRIG 109 142   < > 152*   CHOLHDLRATIO  --  3.5  --  3.5    < > = values in this interval not displayed.     Recent Labs   Lab " 10/22/19  1602   WBC 6.4   HGB 15.2   HCT 43.8   MCV 91        No results for input(s): PH, PHV, PO2, PO2V, SAT, PCO2, PCO2V, HCO3, HCO3V in the last 168 hours.  No results for input(s): NTBNPI, NTBNP in the last 168 hours.  No results for input(s): DD in the last 168 hours.  No results for input(s): SED, CRP in the last 168 hours.  Recent Labs   Lab 10/22/19  1602        Recent Labs   Lab 10/22/19  2133   TSH 1.35     No results for input(s): COLOR, APPEARANCE, URINEGLC, URINEBILI, URINEKETONE, SG, UBLD, URINEPH, PROTEIN, UROBILINOGEN, NITRITE, LEUKEST, RBCU, WBCU in the last 168 hours.    Imaging:  Recent Results (from the past 48 hour(s))   XR Chest 2 Views    Narrative    CHEST TWO VIEW   10/22/2019 4:54 PM     HISTORY: Chest pain.    COMPARISON: Chest x-ray 10/22/2019.      Impression    IMPRESSION: PA and lateral views of the chest. Lungs are clear. Heart  is normal in size. No effusions are evident. No pneumothorax. Tortuous  thoracic aorta is noted. Degenerative lower thoracic spine changes are  present.    HARLEY RODRIGUEZ MD   Head CT w/o contrast    Narrative    CT SCAN OF THE HEAD WITHOUT CONTRAST   10/22/2019 5:25 PM     HISTORY: Daily headaches, on coumadin    TECHNIQUE: Axial images of the head and coronal reformations without  IV contrast material. Radiation dose for this scan was reduced using  automated exposure control, adjustment of the mA and/or kV according  to patient size, or iterative reconstruction technique.    COMPARISON: None.    FINDINGS: There is no evidence of intracranial hemorrhage, mass, acute  infarct or anomaly. The ventricles are normal in size, shape and  configuration. The brain parenchyma and subarachnoid spaces are  normal.     Mild mucosal thickening in the left ethmoid air cells and visualized  left maxillary sinus. The bony calvarium and bones of the skull base  appear intact.       Impression    IMPRESSION: No evidence of acute intracranial hemorrhage, mass,  or  herniation.      MARIANA BAEZA MD       Echo:  No results found for this or any previous visit (from the past 4320 hour(s)).

## 2019-10-24 ENCOUNTER — APPOINTMENT (OUTPATIENT)
Dept: CARDIOLOGY | Facility: CLINIC | Age: 74
DRG: 287 | End: 2019-10-24
Attending: NURSE PRACTITIONER
Payer: MEDICARE

## 2019-10-24 ENCOUNTER — SURGERY (OUTPATIENT)
Age: 74
End: 2019-10-24
Payer: MEDICARE

## 2019-10-24 LAB
ANION GAP SERPL CALCULATED.3IONS-SCNC: 6 MMOL/L (ref 3–14)
BUN SERPL-MCNC: 13 MG/DL (ref 7–30)
CALCIUM SERPL-MCNC: 8.6 MG/DL (ref 8.5–10.1)
CHLORIDE SERPL-SCNC: 109 MMOL/L (ref 94–109)
CO2 SERPL-SCNC: 26 MMOL/L (ref 20–32)
CREAT SERPL-MCNC: 0.76 MG/DL (ref 0.66–1.25)
ERYTHROCYTE [DISTWIDTH] IN BLOOD BY AUTOMATED COUNT: 13.2 % (ref 10–15)
GFR SERPL CREATININE-BSD FRML MDRD: >90 ML/MIN/{1.73_M2}
GLUCOSE SERPL-MCNC: 97 MG/DL (ref 70–99)
HCT VFR BLD AUTO: 41 % (ref 40–53)
HGB BLD-MCNC: 14.1 G/DL (ref 13.3–17.7)
INR PPP: 1.61 (ref 0.86–1.14)
MCH RBC QN AUTO: 31 PG (ref 26.5–33)
MCHC RBC AUTO-ENTMCNC: 34.4 G/DL (ref 31.5–36.5)
MCV RBC AUTO: 90 FL (ref 78–100)
PLATELET # BLD AUTO: 250 10E9/L (ref 150–450)
POTASSIUM SERPL-SCNC: 3.6 MMOL/L (ref 3.4–5.3)
RBC # BLD AUTO: 4.55 10E12/L (ref 4.4–5.9)
SODIUM SERPL-SCNC: 141 MMOL/L (ref 133–144)
WBC # BLD AUTO: 5.7 10E9/L (ref 4–11)

## 2019-10-24 PROCEDURE — 93005 ELECTROCARDIOGRAM TRACING: CPT

## 2019-10-24 PROCEDURE — C1887 CATHETER, GUIDING: HCPCS | Performed by: INTERNAL MEDICINE

## 2019-10-24 PROCEDURE — 93454 CORONARY ARTERY ANGIO S&I: CPT | Mod: 26 | Performed by: INTERNAL MEDICINE

## 2019-10-24 PROCEDURE — 25000128 H RX IP 250 OP 636: Performed by: HOSPITALIST

## 2019-10-24 PROCEDURE — 27210794 ZZH OR GENERAL SUPPLY STERILE: Performed by: INTERNAL MEDICINE

## 2019-10-24 PROCEDURE — 36415 COLL VENOUS BLD VENIPUNCTURE: CPT | Performed by: PHYSICIAN ASSISTANT

## 2019-10-24 PROCEDURE — 4A033BC MEASUREMENT OF ARTERIAL PRESSURE, CORONARY, PERCUTANEOUS APPROACH: ICD-10-PCS | Performed by: INTERNAL MEDICINE

## 2019-10-24 PROCEDURE — 93571 IV DOP VEL&/PRESS C FLO 1ST: CPT | Performed by: INTERNAL MEDICINE

## 2019-10-24 PROCEDURE — 99233 SBSQ HOSP IP/OBS HIGH 50: CPT | Mod: 25 | Performed by: INTERNAL MEDICINE

## 2019-10-24 PROCEDURE — B2111ZZ FLUOROSCOPY OF MULTIPLE CORONARY ARTERIES USING LOW OSMOLAR CONTRAST: ICD-10-PCS | Performed by: INTERNAL MEDICINE

## 2019-10-24 PROCEDURE — C1769 GUIDE WIRE: HCPCS | Performed by: INTERNAL MEDICINE

## 2019-10-24 PROCEDURE — 99152 MOD SED SAME PHYS/QHP 5/>YRS: CPT | Performed by: INTERNAL MEDICINE

## 2019-10-24 PROCEDURE — 93306 TTE W/DOPPLER COMPLETE: CPT | Mod: 26 | Performed by: INTERNAL MEDICINE

## 2019-10-24 PROCEDURE — 40000852 ZZH STATISTIC HEART CATH LAB OR EP LAB

## 2019-10-24 PROCEDURE — 93571 IV DOP VEL&/PRESS C FLO 1ST: CPT | Mod: 26 | Performed by: INTERNAL MEDICINE

## 2019-10-24 PROCEDURE — 25000132 ZZH RX MED GY IP 250 OP 250 PS 637: Mod: GY | Performed by: INTERNAL MEDICINE

## 2019-10-24 PROCEDURE — 80048 BASIC METABOLIC PNL TOTAL CA: CPT | Performed by: PHYSICIAN ASSISTANT

## 2019-10-24 PROCEDURE — 25000132 ZZH RX MED GY IP 250 OP 250 PS 637: Mod: GY | Performed by: HOSPITALIST

## 2019-10-24 PROCEDURE — 93454 CORONARY ARTERY ANGIO S&I: CPT | Performed by: INTERNAL MEDICINE

## 2019-10-24 PROCEDURE — 25500064 ZZH RX 255 OP 636: Performed by: HOSPITALIST

## 2019-10-24 PROCEDURE — 25000128 H RX IP 250 OP 636: Performed by: INTERNAL MEDICINE

## 2019-10-24 PROCEDURE — 99232 SBSQ HOSP IP/OBS MODERATE 35: CPT | Performed by: HOSPITALIST

## 2019-10-24 PROCEDURE — 21000001 ZZH R&B HEART CARE

## 2019-10-24 PROCEDURE — 99153 MOD SED SAME PHYS/QHP EA: CPT | Performed by: INTERNAL MEDICINE

## 2019-10-24 PROCEDURE — 85610 PROTHROMBIN TIME: CPT | Performed by: PHYSICIAN ASSISTANT

## 2019-10-24 PROCEDURE — 85027 COMPLETE CBC AUTOMATED: CPT | Performed by: HOSPITALIST

## 2019-10-24 PROCEDURE — 25800030 ZZH RX IP 258 OP 636: Performed by: INTERNAL MEDICINE

## 2019-10-24 PROCEDURE — 25000132 ZZH RX MED GY IP 250 OP 250 PS 637: Mod: GY | Performed by: PHYSICIAN ASSISTANT

## 2019-10-24 PROCEDURE — C1894 INTRO/SHEATH, NON-LASER: HCPCS | Performed by: INTERNAL MEDICINE

## 2019-10-24 PROCEDURE — 40000264 ECHOCARDIOGRAM COMPLETE

## 2019-10-24 PROCEDURE — 93010 ELECTROCARDIOGRAM REPORT: CPT | Performed by: INTERNAL MEDICINE

## 2019-10-24 PROCEDURE — 25000125 ZZHC RX 250: Performed by: INTERNAL MEDICINE

## 2019-10-24 PROCEDURE — 36415 COLL VENOUS BLD VENIPUNCTURE: CPT | Performed by: HOSPITALIST

## 2019-10-24 RX ORDER — NALOXONE HYDROCHLORIDE 0.4 MG/ML
.2-.4 INJECTION, SOLUTION INTRAMUSCULAR; INTRAVENOUS; SUBCUTANEOUS
Status: DISCONTINUED | OUTPATIENT
Start: 2019-10-24 | End: 2019-10-24

## 2019-10-24 RX ORDER — HEPARIN SODIUM 10000 [USP'U]/100ML
900 INJECTION, SOLUTION INTRAVENOUS CONTINUOUS
Status: DISCONTINUED | OUTPATIENT
Start: 2019-10-24 | End: 2019-10-24

## 2019-10-24 RX ORDER — DOBUTAMINE HYDROCHLORIDE 200 MG/100ML
2-20 INJECTION INTRAVENOUS CONTINUOUS PRN
Status: DISCONTINUED | OUTPATIENT
Start: 2019-10-24 | End: 2019-10-24 | Stop reason: HOSPADM

## 2019-10-24 RX ORDER — ATROPINE SULFATE 0.1 MG/ML
0.5 INJECTION INTRAVENOUS EVERY 5 MIN PRN
Status: ACTIVE | OUTPATIENT
Start: 2019-10-24 | End: 2019-10-25

## 2019-10-24 RX ORDER — FENTANYL CITRATE 50 UG/ML
25-50 INJECTION, SOLUTION INTRAMUSCULAR; INTRAVENOUS
Status: ACTIVE | OUTPATIENT
Start: 2019-10-24 | End: 2019-10-25

## 2019-10-24 RX ORDER — NITROGLYCERIN 20 MG/100ML
.07-2 INJECTION INTRAVENOUS CONTINUOUS PRN
Status: DISCONTINUED | OUTPATIENT
Start: 2019-10-24 | End: 2019-10-24 | Stop reason: HOSPADM

## 2019-10-24 RX ORDER — WARFARIN SODIUM 7.5 MG/1
7.5 TABLET ORAL
Status: DISCONTINUED | OUTPATIENT
Start: 2019-10-24 | End: 2019-10-24

## 2019-10-24 RX ORDER — EPTIFIBATIDE 2 MG/ML
2 INJECTION, SOLUTION INTRAVENOUS CONTINUOUS PRN
Status: DISCONTINUED | OUTPATIENT
Start: 2019-10-24 | End: 2019-10-24 | Stop reason: HOSPADM

## 2019-10-24 RX ORDER — LORAZEPAM 2 MG/ML
0.5 INJECTION INTRAMUSCULAR
Status: DISCONTINUED | OUTPATIENT
Start: 2019-10-24 | End: 2019-10-24 | Stop reason: HOSPADM

## 2019-10-24 RX ORDER — DOPAMINE HYDROCHLORIDE 160 MG/100ML
2-20 INJECTION, SOLUTION INTRAVENOUS CONTINUOUS PRN
Status: DISCONTINUED | OUTPATIENT
Start: 2019-10-24 | End: 2019-10-24 | Stop reason: HOSPADM

## 2019-10-24 RX ORDER — NOREPINEPHRINE BITARTRATE 0.06 MG/ML
.03-.4 INJECTION, SOLUTION INTRAVENOUS CONTINUOUS PRN
Status: DISCONTINUED | OUTPATIENT
Start: 2019-10-24 | End: 2019-10-24 | Stop reason: HOSPADM

## 2019-10-24 RX ORDER — FENTANYL CITRATE 50 UG/ML
INJECTION, SOLUTION INTRAMUSCULAR; INTRAVENOUS
Status: DISCONTINUED | OUTPATIENT
Start: 2019-10-24 | End: 2019-10-24 | Stop reason: HOSPADM

## 2019-10-24 RX ORDER — ARGATROBAN 1 MG/ML
350 INJECTION, SOLUTION INTRAVENOUS
Status: DISCONTINUED | OUTPATIENT
Start: 2019-10-24 | End: 2019-10-24 | Stop reason: HOSPADM

## 2019-10-24 RX ORDER — HEPARIN SODIUM 1000 [USP'U]/ML
INJECTION, SOLUTION INTRAVENOUS; SUBCUTANEOUS
Status: DISCONTINUED | OUTPATIENT
Start: 2019-10-24 | End: 2019-10-24 | Stop reason: HOSPADM

## 2019-10-24 RX ORDER — ARGATROBAN 1 MG/ML
150 INJECTION, SOLUTION INTRAVENOUS
Status: DISCONTINUED | OUTPATIENT
Start: 2019-10-24 | End: 2019-10-24 | Stop reason: HOSPADM

## 2019-10-24 RX ORDER — SODIUM CHLORIDE 9 MG/ML
INJECTION, SOLUTION INTRAVENOUS CONTINUOUS
Status: DISCONTINUED | OUTPATIENT
Start: 2019-10-24 | End: 2019-10-24

## 2019-10-24 RX ORDER — ISOSORBIDE MONONITRATE 30 MG/1
30 TABLET, EXTENDED RELEASE ORAL DAILY
Status: DISCONTINUED | OUTPATIENT
Start: 2019-10-24 | End: 2019-10-25 | Stop reason: HOSPADM

## 2019-10-24 RX ORDER — NITROGLYCERIN 5 MG/ML
VIAL (ML) INTRAVENOUS
Status: DISCONTINUED | OUTPATIENT
Start: 2019-10-24 | End: 2019-10-24 | Stop reason: HOSPADM

## 2019-10-24 RX ORDER — CLOPIDOGREL BISULFATE 75 MG/1
75 TABLET ORAL DAILY
Status: DISCONTINUED | OUTPATIENT
Start: 2019-10-25 | End: 2019-10-25 | Stop reason: HOSPADM

## 2019-10-24 RX ORDER — HEPARIN SODIUM 10000 [USP'U]/100ML
100-1000 INJECTION, SOLUTION INTRAVENOUS CONTINUOUS PRN
Status: DISCONTINUED | OUTPATIENT
Start: 2019-10-24 | End: 2019-10-24 | Stop reason: HOSPADM

## 2019-10-24 RX ORDER — WARFARIN SODIUM 7.5 MG/1
7.5 TABLET ORAL
Status: COMPLETED | OUTPATIENT
Start: 2019-10-24 | End: 2019-10-24

## 2019-10-24 RX ORDER — LORAZEPAM 0.5 MG/1
0.5 TABLET ORAL
Status: DISCONTINUED | OUTPATIENT
Start: 2019-10-24 | End: 2019-10-24 | Stop reason: HOSPADM

## 2019-10-24 RX ORDER — LIDOCAINE 40 MG/G
CREAM TOPICAL
Status: DISCONTINUED | OUTPATIENT
Start: 2019-10-24 | End: 2019-10-25 | Stop reason: HOSPADM

## 2019-10-24 RX ORDER — VERAPAMIL HYDROCHLORIDE 2.5 MG/ML
INJECTION, SOLUTION INTRAVENOUS
Status: DISCONTINUED | OUTPATIENT
Start: 2019-10-24 | End: 2019-10-24 | Stop reason: HOSPADM

## 2019-10-24 RX ORDER — NALOXONE HYDROCHLORIDE 0.4 MG/ML
.1-.4 INJECTION, SOLUTION INTRAMUSCULAR; INTRAVENOUS; SUBCUTANEOUS
Status: DISCONTINUED | OUTPATIENT
Start: 2019-10-24 | End: 2019-10-25 | Stop reason: HOSPADM

## 2019-10-24 RX ORDER — PHENYLEPHRINE HCL IN 0.9% NACL 50MG/250ML
.5-6 PLASTIC BAG, INJECTION (ML) INTRAVENOUS CONTINUOUS PRN
Status: DISCONTINUED | OUTPATIENT
Start: 2019-10-24 | End: 2019-10-24 | Stop reason: HOSPADM

## 2019-10-24 RX ORDER — SODIUM CHLORIDE 9 MG/ML
INJECTION, SOLUTION INTRAVENOUS CONTINUOUS
Status: DISCONTINUED | OUTPATIENT
Start: 2019-10-24 | End: 2019-10-24 | Stop reason: HOSPADM

## 2019-10-24 RX ORDER — FLUMAZENIL 0.1 MG/ML
0.2 INJECTION, SOLUTION INTRAVENOUS
Status: ACTIVE | OUTPATIENT
Start: 2019-10-24 | End: 2019-10-25

## 2019-10-24 RX ORDER — HEPARIN SODIUM 10000 [USP'U]/100ML
1300 INJECTION, SOLUTION INTRAVENOUS CONTINUOUS
Status: DISCONTINUED | OUTPATIENT
Start: 2019-10-24 | End: 2019-10-25

## 2019-10-24 RX ORDER — POTASSIUM CHLORIDE 1500 MG/1
20 TABLET, EXTENDED RELEASE ORAL
Status: COMPLETED | OUTPATIENT
Start: 2019-10-24 | End: 2019-10-24

## 2019-10-24 RX ORDER — LIDOCAINE 40 MG/G
CREAM TOPICAL
Status: DISCONTINUED | OUTPATIENT
Start: 2019-10-24 | End: 2019-10-24 | Stop reason: HOSPADM

## 2019-10-24 RX ORDER — ACETAMINOPHEN 325 MG/1
650 TABLET ORAL EVERY 4 HOURS PRN
Status: DISCONTINUED | OUTPATIENT
Start: 2019-10-24 | End: 2019-10-25 | Stop reason: HOSPADM

## 2019-10-24 RX ORDER — IOPAMIDOL 755 MG/ML
INJECTION, SOLUTION INTRAVASCULAR
Status: DISCONTINUED | OUTPATIENT
Start: 2019-10-24 | End: 2019-10-24 | Stop reason: HOSPADM

## 2019-10-24 RX ORDER — EPTIFIBATIDE 2 MG/ML
180 INJECTION, SOLUTION INTRAVENOUS EVERY 10 MIN PRN
Status: DISCONTINUED | OUTPATIENT
Start: 2019-10-24 | End: 2019-10-24 | Stop reason: HOSPADM

## 2019-10-24 RX ORDER — CLOPIDOGREL BISULFATE 75 MG/1
300 TABLET ORAL ONCE
Status: COMPLETED | OUTPATIENT
Start: 2019-10-24 | End: 2019-10-24

## 2019-10-24 RX ADMIN — FENTANYL CITRATE 50 MCG: 50 INJECTION, SOLUTION INTRAMUSCULAR; INTRAVENOUS at 12:28

## 2019-10-24 RX ADMIN — LORAZEPAM 0.5 MG: 2 INJECTION, SOLUTION INTRAMUSCULAR; INTRAVENOUS at 11:19

## 2019-10-24 RX ADMIN — SODIUM CHLORIDE: 9 INJECTION, SOLUTION INTRAVENOUS at 08:16

## 2019-10-24 RX ADMIN — HEPARIN SODIUM 5000 UNITS: 1000 INJECTION, SOLUTION INTRAVENOUS; SUBCUTANEOUS at 12:31

## 2019-10-24 RX ADMIN — FENTANYL CITRATE 25 MCG: 50 INJECTION, SOLUTION INTRAMUSCULAR; INTRAVENOUS at 12:20

## 2019-10-24 RX ADMIN — HEPARIN SODIUM 1300 UNITS/HR: 10000 INJECTION, SOLUTION INTRAVENOUS at 19:52

## 2019-10-24 RX ADMIN — OXYCODONE HYDROCHLORIDE AND ACETAMINOPHEN 2 TABLET: 5; 325 TABLET ORAL at 14:07

## 2019-10-24 RX ADMIN — OMEPRAZOLE 20 MG: 20 CAPSULE, DELAYED RELEASE ORAL at 18:27

## 2019-10-24 RX ADMIN — OXYCODONE HYDROCHLORIDE AND ACETAMINOPHEN 2 TABLET: 5; 325 TABLET ORAL at 08:21

## 2019-10-24 RX ADMIN — GABAPENTIN 1200 MG: 300 CAPSULE ORAL at 08:15

## 2019-10-24 RX ADMIN — HUMAN ALBUMIN MICROSPHERES AND PERFLUTREN 9 ML: 10; .22 INJECTION, SOLUTION INTRAVENOUS at 14:58

## 2019-10-24 RX ADMIN — NITROGLYCERIN 400 MCG: 5 INJECTION, SOLUTION INTRAVENOUS at 12:30

## 2019-10-24 RX ADMIN — IOPAMIDOL 160 ML: 755 INJECTION, SOLUTION INTRAVENOUS at 13:15

## 2019-10-24 RX ADMIN — NORTRIPTYLINE HYDROCHLORIDE 25 MG: 25 CAPSULE ORAL at 18:27

## 2019-10-24 RX ADMIN — POTASSIUM CHLORIDE 20 MEQ: 1500 TABLET, EXTENDED RELEASE ORAL at 08:15

## 2019-10-24 RX ADMIN — VERAPAMIL HYDROCHLORIDE 2.5 MG: 2.5 INJECTION, SOLUTION INTRAVENOUS at 12:31

## 2019-10-24 RX ADMIN — MIDAZOLAM 1 MG: 1 INJECTION INTRAMUSCULAR; INTRAVENOUS at 12:28

## 2019-10-24 RX ADMIN — GABAPENTIN 1200 MG: 300 CAPSULE ORAL at 14:07

## 2019-10-24 RX ADMIN — LISINOPRIL 20 MG: 20 TABLET ORAL at 08:15

## 2019-10-24 RX ADMIN — GABAPENTIN 1200 MG: 300 CAPSULE ORAL at 18:27

## 2019-10-24 RX ADMIN — ASPIRIN 325 MG: 325 TABLET, DELAYED RELEASE ORAL at 08:15

## 2019-10-24 RX ADMIN — WARFARIN SODIUM 7.5 MG: 7.5 TABLET ORAL at 18:28

## 2019-10-24 RX ADMIN — LIDOCAINE HYDROCHLORIDE 1 ML: 10 INJECTION, SOLUTION EPIDURAL; INFILTRATION; INTRACAUDAL; PERINEURAL at 12:27

## 2019-10-24 RX ADMIN — MIDAZOLAM 0.5 MG: 1 INJECTION INTRAMUSCULAR; INTRAVENOUS at 12:20

## 2019-10-24 RX ADMIN — HEPARIN SODIUM 900 UNITS/HR: 10000 INJECTION, SOLUTION INTRAVENOUS at 08:17

## 2019-10-24 RX ADMIN — SIMVASTATIN 80 MG: 40 TABLET, FILM COATED ORAL at 18:28

## 2019-10-24 RX ADMIN — ISOSORBIDE MONONITRATE 30 MG: 30 TABLET, EXTENDED RELEASE ORAL at 18:27

## 2019-10-24 RX ADMIN — CLOPIDOGREL BISULFATE 300 MG: 75 TABLET, FILM COATED ORAL at 18:27

## 2019-10-24 RX ADMIN — OXYCODONE HYDROCHLORIDE AND ACETAMINOPHEN 2 TABLET: 5; 325 TABLET ORAL at 19:49

## 2019-10-24 ASSESSMENT — ACTIVITIES OF DAILY LIVING (ADL)
ADLS_ACUITY_SCORE: 10

## 2019-10-24 ASSESSMENT — MIFFLIN-ST. JEOR: SCORE: 1536.44

## 2019-10-24 NOTE — PRE-PROCEDURE
GENERAL PRE-PROCEDURE:   Procedure:  CAG possible PCI  Date/Time:  10/24/2019 12:12 PM    Verbal consent obtained?: Yes    Written consent obtained?: Yes    Risks and benefits: Risks, benefits and alternatives were discussed    Consent given by:  Patient  Patient states understanding of procedure being performed: Yes    Patient's understanding of procedure matches consent: Yes    Procedure consent matches procedure scheduled: Yes    Expected level of sedation:  Moderate  Appropriately NPO:  Yes  ASA Class:  Class 3- Severe systemic disease, definite functional limitations  Mallampati  :  Grade 2- soft palate, base of uvula, tonsillar pillars, and portion of posterior pharyngeal wall visible  Lungs:  Lungs clear with good breath sounds bilaterally  Heart:  Normal heart sounds and rate  Statement of review:  I have reviewed the lab findings, diagnostic data, medications, and the plan for sedation  I have examined the patient, reviewed the history, medications and pre procedural tests. He has known CAD sp PCI many years ago and describes worsening angina, felt to be unstable angina by his referring cardiologist. He has FV and protein C deficiency with a history of DVT and is on chronic anticoagulation.  I have explained to the patient the risks of death, MI, stroke, hematoma, possible urgent bypass surgery for failed PCI, use of stents, thienopyridine agents which may increase bleeding complications, possible peripheral vascular complications, arrhythmia, the use of FFR in clinical decision-making and alternative of medical therapy alone in regards to left heart catheterization, left ventriculography, coronary angiography, and possible percutaneous coronary intervention. The patient voiced understanding and wishes to proceed. The patient has a good right radial pulse, normal ulnar pulse and a normal Terell's sign.

## 2019-10-24 NOTE — PROGRESS NOTES
[facility]    Medicine Progress Note - Hospitalist Service       Date of Admission:  10/22/2019  Assessment & Plan   Jose Tejada is a 74 year old very pleasant male with medical history significant for coronary artery disease with stents to LAD and RCA with known 70% lesion to circumflex, protein C deficiency and factor V Leyden mutation, heterozygous with history of DVT, peripheral neuropathy and right foot drop, hypertension, hyperlipidemia, GERD, asthma, spinal stenosis presented to the ER due to exertional chest tightness.  He is being admitted to the hospital for further management.        Unstable Angina  CAD with stents to LAD and RCA, and Cx lesion  HTN, HL  Patient with known CAD, stents to LAD and RCA in the past, abnormal stress test in February 2017, followed by cardiology, noted residual 70% lesion in circumflex per cardiology.  Medical management was recommended, but now with progressive symptoms. EKG shows T inversion on lead III, serial trop negative.   - angiogram 10/24; no intervention, recommend medical management  - continue aspirin, statin, lisinopril (increased 10/23)  - no BB due to bradycardia  - Echo pending      Factor V Leiden mutation, heterozygous  Protein C deficiency  History of DVT on chronic anticoagulation with Coumadin  INR allowed to trend down for angiogram 10/24.  - resume heparin bridging following angiogram     Asthma  - PRN albuterol     Neuropathy and peripheral foot drop  - Patient uses splint to the right leg  - Continue PTA Neurontin   - recommend establish with neurology outpatient     Bilateral dense cataract  - Follow up with PCP           Diet: NPO for Medical/Clinical Reasons Except for: Meds    DVT Prophylaxis: heparin gtt  Avery Catheter: not present  Code Status: Full Code      Disposition Plan   Expected discharge: Tomorrow, recommended to prior living arrangement once cleared by cardiology.  Entered: Jacob Owens MD 10/24/2019, 11:21  AM       The patient's care was discussed with the Bedside Nurse, Patient and Patient's Family.    Jacob Owens MD  Hospitalist Service  [facility]    ______________________________________________________________________    Interval History   No chest pain/pressure, dyspnea.  Primary complaint is neuropathy and has multiple questions regarding management options, which were answered.    Data reviewed today: I reviewed all medications, new labs and imaging results over the last 24 hours. I personally reviewed no images or EKG's today.    Physical Exam   Vital Signs: Temp: 97.6  F (36.4  C) Temp src: Oral BP: 124/86   Heart Rate: 59 Resp: 14 SpO2: 92 % O2 Device: None (Room air)    Weight: 179 lbs 0 oz  General Appearance: Well developed, well nourished male in NAD  Respiratory: lungs CTAB, no wheezes or crackles  Cardiovascular: RRR, normal s1/s2 without murmur  GI: abdomen soft, normal bowel sounds  Skin: no swelling of radial band site  Other: Alert and appropriate, cranial nerves grossly intact     Data   Recent Labs   Lab 10/24/19  0753 10/24/19  0538 10/23/19  1531 10/23/19  0252 10/22/19  2133 10/22/19  1602  10/22/19  1557   WBC 5.7  --   --   --   --  6.4  --   --    HGB 14.1  --   --   --   --  15.2  --   --    MCV 90  --   --   --   --  91  --   --      --   --   --   --  260  --   --    INR  --  1.61* 2.13* 2.46*  --  2.06*   < >  --    NA  --  141  --  139  --  139  --   --    POTASSIUM  --  3.6  --  3.5  --  4.3  --   --    CHLORIDE  --  109  --  107  --  106  --   --    CO2  --  26  --  28  --  29  --   --    BUN  --  13  --  11  --  9  --   --    CR  --  0.76  --  0.72  --  0.81  --   --    ANIONGAP  --  6  --  4  --  4  --   --    CODI  --  8.6  --  8.7  --  9.4  --   --    GLC  --  97  --  93  --  97  --   --    ALBUMIN  --   --   --   --   --  4.2  --   --    PROTTOTAL  --   --   --   --   --  8.2  --   --    BILITOTAL  --   --   --   --   --  0.6  --   --    ALKPHOS  --    --   --   --   --  65  --   --    ALT  --   --   --   --   --  25  --   --    AST  --   --   --   --   --  21  --   --    TROPI  --   --   --  <0.015 <0.015 <0.015  --   --    TROPONIN  --   --   --   --   --   --   --  0.02    < > = values in this interval not displayed.

## 2019-10-24 NOTE — PHARMACY-ANTICOAGULATION SERVICE
Clinical Pharmacy - Warfarin Dosing Consult     Pharmacy has been consulted to manage this patient s warfarin therapy.  Indication: DVT/ PE Treatment  Therapy Goal: INR 2-3  Warfarin Prior to Admission: Yes  Warfarin PTA Regimen: 7.5 mg Monday and Friday, 5 mg all other days  Recent documented change in oral intake/nutrition: Unknown    INR   Date Value Ref Range Status   10/24/2019 1.61 (H) 0.86 - 1.14 Final   10/23/2019 2.13 (H) 0.86 - 1.14 Final       Recommend warfarin 7.5 mg today.  Pharmacy will monitor Jose Tejada daily and order warfarin doses to achieve specified goal.      Please contact pharmacy as soon as possible if the warfarin needs to be held for a procedure or if the warfarin goals change.      Miranda Ritter RPH on 10/24/2019 at 2:56 PM

## 2019-10-24 NOTE — PLAN OF CARE
A&O x 4. VSS on RA. Pt c/o pain in legs. PRN Percocet x 2 given.Tele: SR.  Up with SBA.  Aggression color: green. Recheck INR in morning 10/24. Plan for Angio tmr 10/24 once INR is less.Continue to monitor.

## 2019-10-24 NOTE — PROVIDER NOTIFICATION
MD Notification    Notified Person: MD    Notified Person Name: Suhail    Notification Date/Time: 10/24/19 0603    Notification Interaction: amcon text page    Purpose of Notification: Pt having angio today, per hospitalist note pt needs heparin gtt when INR <1.8. this morning INR is 1.61, please advise      Orders Received: New order for pharmacy to dose heparin gtt.     Comments:

## 2019-10-24 NOTE — PLAN OF CARE
[id number]: A&Ox4, flat affect, irritable at times. VSS on RA. Denies CP or shortness of breath. LS clear. Baseline neuropathy and R foot drop. INR 2.13, recheck in morning is 1.61, will start heparin gtt. K 3.6, replaced with 20mEq orally. NPO @ 0000. Plan for angio 10-24.

## 2019-10-24 NOTE — PROCEDURES
[facility]    Procedure: *Cath without PCI  Date/Time: 10/24/2019 1:15 PM  Performed by: Valdemar Hinojosa MD  Authorized by: Valdemar Hinojosa MD     UNIVERSAL PROTOCOL   Site Marked: Yes  Prior Images Obtained and Reviewed:  Yes  Required items: Required blood products, implants, devices and special equipment available    Patient identity confirmed:  Verbally with patient  Patient was reevaluated immediately before administering moderate or deep sedation or anesthesia  Confirmation Checklist:  Patient's identity using two indicators  Time out: Immediately prior to the procedure a time out was called       ANESTHESIA    Local Anesthetic: Lidocaine 1% without epinephrine      SEDATION    Patient Sedated: Yes    Sedation:  Midazolam and fentanyl  Vital signs: Vital signs monitored during sedation    PROCEDURE   Time of Sedation in Minutes by Physician:  45  Procedure  1) CAG  2) IFR proximal CX 0.95    Findings  LMCA no significant stenosis  LAD stented with 30% in stent restenosis  CX proximal eccentric 70 %  IFR 0.95   % proximal very well developed left to right collaterals    Assess The CX lesion does not appear to warrant PCI. The RCA is chronically occluded and well collateralized     Recommend Medical management. If he fails medical therapy could discuss with  team, but may be challenging.     Dior

## 2019-10-24 NOTE — PLAN OF CARE
VSS on RA.  Tele: SR with PVC.  Denies pain or shortness of breath.  Up ind.  L radial site CDI.  Call light within reach.  Will continue to monitor.

## 2019-10-24 NOTE — PROGRESS NOTES
Care Suites Admission Nursing Note    Reason for admission: Left heart cath holding  CS arrival time: 1134  Accompanied by: ZOHRA Arnold  Consent signed: yes  Abnormal assessment/labs: K replaced  If abnormal, provider notified: Yes  Education/questions answered: yes  Plan: Continue plan of care  VS stable, denies any pain or discomfort. NSR, pt's wife is at the bedside.

## 2019-10-24 NOTE — PROGRESS NOTES
[facility]  Cardiology Progress Note  Date of Service: 10/24/2019  Primary Cardiologist: Dr. Yun    Assessment & Plan    Jose Tejada is a 74 year old male with past medical history significant for coronary artery disease with stents to LAD and RCA (1996) with known 70% lesion to circumflex, protein C deficiency and factor V Leiden mutation, heterozygous with history of DVT, peripheral neuropathy and right foot drop, hypertension, hyperlipidemia, GERD, asthma, spinal stenosis was admitted on 10/22/2019 with exertional chest tightness.     Assessment and Plan:   1. Recent onset of exertional angina with mild exertion, now crescendo, from unstable angina    Known CAD with underlying circumflex lesion with the known area of lateral wall ischemia on the last stress test    EKG was SR without ischemic changes, HR 50's    Serial troponin's negative    Hepatin gtt infusing, aspirin and statin therapy     No current chest pain     2. Hypertension    Controlled, 124/81    Lisinopril 20 mg daily    3. Hyperlipidemia    LDL 49    Simvastatin 80 mg daily    4. History of DVT and protein C deficiency and heterozygous factor V Leiden    Warfarin therapy as outpatient    INR 1.6 today    Currently on IV heparin gtt      likely will need to be bridged after PCI    Plan:  1. Echocardiogram to assess for wall motion abnormalities (ordered yesterday)  2. Coronary angiogram today given unstable angina. Risks and benefits reviewed and discussed with Dr. Daniel. ( If a KEITH is placed, pt will require ASA and Plavix alone with Warfarin for 2-4 weeks. Then discontinue aspirin and use Plavix/Brilinta and warfarin for the remainder of the year. After that, return to warfarin and aspirin. )   3. Continue to hold off on beta blocker due to bradycardia  4. Continue lisinopril and high intensity statin       BRENDAN Virgen CNP  Pager:  [phone]   Text Page  (7am - 4pm, M-F)      Interval History   Resting in bed.  No complaints of chest pain or shortness of breath    Physical Exam   Temp: 97.6  F (36.4  C) Temp src: Oral BP: 124/86   Heart Rate: 59 Resp: 14 SpO2: 92 % O2 Device: None (Room air)    Vitals:    10/22/19 1855 10/23/19 0445 10/24/19 0601   Weight: 81.6 kg (180 lb) 80.2 kg (176 lb 14.4 oz) 81.2 kg (179 lb)       Constitutional:   NAD   Skin:   Warm and dry   Head:   Nontraumatic   Neck:   supple, symmetrical, trachea midline   Lungs:   symmetric, clear to auscultation   Cardiovascular:   regular rate and rhythm, normal S1 and S2 and no murmur noted   Abdomen:   Benign   Extremities and Back:   No edema   Neurological:   Grossly nonfocal       Medications     - MEDICATION INSTRUCTIONS -       - MEDICATION INSTRUCTIONS -       - MEDICATION INSTRUCTIONS -       Reason anticoagulation order not selected       HEParin 900 Units/hr (10/24/19 0817)     - MEDICATION INSTRUCTIONS -       - MEDICATION INSTRUCTIONS -       ACE/ARB/ARNI NOT PRESCRIBED       sodium chloride 150 mL/hr at 10/24/19 0816     sodium chloride         aspirin  81 mg Oral Daily     aspirin  325 mg Oral Daily     gabapentin  1,200 mg Oral TID     lidocaine   Transdermal Q8H     lidocaine   Transdermal Q24h     lisinopril  20 mg Oral Daily     nortriptyline  25 mg Oral At Bedtime     omeprazole  20 mg Oral At Bedtime     simvastatin  80 mg Oral At Bedtime     sodium chloride (PF)  3 mL Intracatheter Q8H     sodium chloride (PF)  3 mL Intracatheter Q8H       Data     Most Recent 3 CBC's:  Recent Labs   Lab Test 10/24/19  0753 10/22/19  1602 04/03/14  0000   WBC 5.7 6.4  --    HGB 14.1 15.2 14.6   MCV 90 91 90.6    260  --      Most Recent 3 BMP's:  Recent Labs   Lab Test 10/24/19  0538 10/23/19  0252 10/22/19  1602    139 139   POTASSIUM 3.6 3.5 4.3   CHLORIDE 109 107 106   CO2 26 28 29   BUN 13 11 9   CR 0.76 0.72 0.81   ANIONGAP 6 4 4   CODI 8.6 8.7 9.4   GLC 97 93 97     Most Recent 3 Troponin's:  Recent Labs   Lab Test 10/23/19  0252  10/22/19  2133 10/22/19  1602 10/22/19  1557   TROPI <0.015 <0.015 <0.015  --    TROPONIN  --   --   --  0.02

## 2019-10-25 ENCOUNTER — APPOINTMENT (OUTPATIENT)
Dept: OCCUPATIONAL THERAPY | Facility: CLINIC | Age: 74
DRG: 287 | End: 2019-10-25
Attending: HOSPITALIST
Payer: MEDICARE

## 2019-10-25 VITALS
TEMPERATURE: 98 F | HEIGHT: 68 IN | DIASTOLIC BLOOD PRESSURE: 66 MMHG | HEART RATE: 69 BPM | OXYGEN SATURATION: 94 % | SYSTOLIC BLOOD PRESSURE: 107 MMHG | BODY MASS INDEX: 27.45 KG/M2 | RESPIRATION RATE: 16 BRPM | WEIGHT: 181.1 LBS

## 2019-10-25 LAB
INR PPP: 1.35 (ref 0.86–1.14)
LMWH PPP CHRO-ACNC: 0.48 IU/ML

## 2019-10-25 PROCEDURE — 97110 THERAPEUTIC EXERCISES: CPT | Mod: GO | Performed by: OCCUPATIONAL THERAPIST

## 2019-10-25 PROCEDURE — 25000132 ZZH RX MED GY IP 250 OP 250 PS 637: Mod: GY | Performed by: HOSPITALIST

## 2019-10-25 PROCEDURE — 85610 PROTHROMBIN TIME: CPT | Performed by: HOSPITALIST

## 2019-10-25 PROCEDURE — 97165 OT EVAL LOW COMPLEX 30 MIN: CPT | Mod: GO

## 2019-10-25 PROCEDURE — 36415 COLL VENOUS BLD VENIPUNCTURE: CPT | Performed by: HOSPITALIST

## 2019-10-25 PROCEDURE — 25000128 H RX IP 250 OP 636: Performed by: HOSPITALIST

## 2019-10-25 PROCEDURE — 99232 SBSQ HOSP IP/OBS MODERATE 35: CPT | Performed by: INTERNAL MEDICINE

## 2019-10-25 PROCEDURE — 25000132 ZZH RX MED GY IP 250 OP 250 PS 637: Mod: GY | Performed by: PHYSICIAN ASSISTANT

## 2019-10-25 PROCEDURE — 25000132 ZZH RX MED GY IP 250 OP 250 PS 637: Mod: GY | Performed by: INTERNAL MEDICINE

## 2019-10-25 PROCEDURE — 97110 THERAPEUTIC EXERCISES: CPT | Mod: GO

## 2019-10-25 PROCEDURE — 99239 HOSP IP/OBS DSCHRG MGMT >30: CPT | Performed by: HOSPITALIST

## 2019-10-25 PROCEDURE — 85520 HEPARIN ASSAY: CPT | Performed by: HOSPITALIST

## 2019-10-25 RX ORDER — LISINOPRIL 20 MG/1
20 TABLET ORAL DAILY
Qty: 30 TABLET | Refills: 0 | Status: SHIPPED | OUTPATIENT
Start: 2019-10-25

## 2019-10-25 RX ORDER — CLOPIDOGREL BISULFATE 75 MG/1
75 TABLET ORAL DAILY
Qty: 30 TABLET | Refills: 0 | Status: SHIPPED | OUTPATIENT
Start: 2019-10-26

## 2019-10-25 RX ORDER — WARFARIN SODIUM 7.5 MG/1
7.5 TABLET ORAL
Status: DISCONTINUED | OUTPATIENT
Start: 2019-10-25 | End: 2019-10-25 | Stop reason: HOSPADM

## 2019-10-25 RX ORDER — ISOSORBIDE MONONITRATE 30 MG/1
30 TABLET, EXTENDED RELEASE ORAL DAILY
Qty: 30 TABLET | Refills: 0 | Status: SHIPPED | OUTPATIENT
Start: 2019-10-26 | End: 2020-03-04

## 2019-10-25 RX ORDER — LIDOCAINE 50 MG/G
1 PATCH TOPICAL EVERY 24 HOURS
Start: 2019-10-25 | End: 2021-11-17

## 2019-10-25 RX ORDER — NITROGLYCERIN 0.4 MG/1
TABLET SUBLINGUAL
Qty: 20 TABLET | Refills: 0 | Status: SHIPPED | OUTPATIENT
Start: 2019-10-25

## 2019-10-25 RX ORDER — HEPARIN SODIUM 10000 [USP'U]/100ML
1300 INJECTION, SOLUTION INTRAVENOUS CONTINUOUS
Status: CANCELLED | OUTPATIENT
Start: 2019-10-25

## 2019-10-25 RX ADMIN — GABAPENTIN 1200 MG: 300 CAPSULE ORAL at 11:46

## 2019-10-25 RX ADMIN — CLOPIDOGREL BISULFATE 75 MG: 75 TABLET, FILM COATED ORAL at 09:29

## 2019-10-25 RX ADMIN — OXYCODONE HYDROCHLORIDE AND ACETAMINOPHEN 1 TABLET: 5; 325 TABLET ORAL at 11:55

## 2019-10-25 RX ADMIN — GABAPENTIN 1200 MG: 300 CAPSULE ORAL at 06:25

## 2019-10-25 RX ADMIN — ISOSORBIDE MONONITRATE 30 MG: 30 TABLET, EXTENDED RELEASE ORAL at 09:29

## 2019-10-25 RX ADMIN — OXYCODONE HYDROCHLORIDE AND ACETAMINOPHEN 1 TABLET: 5; 325 TABLET ORAL at 06:26

## 2019-10-25 RX ADMIN — ENOXAPARIN SODIUM 80 MG: 80 INJECTION SUBCUTANEOUS at 11:46

## 2019-10-25 ASSESSMENT — ACTIVITIES OF DAILY LIVING (ADL)
ADLS_ACUITY_SCORE: 10

## 2019-10-25 ASSESSMENT — MIFFLIN-ST. JEOR: SCORE: 1545.96

## 2019-10-25 NOTE — PROGRESS NOTES
10/25/19 0841   Quick Adds   Type of Visit Initial Occupational Therapy Evaluation   Living Environment   Lives With spouse   Living Arrangements house   Living Environment Comment [address], 15 stairs inside.    Self-Care   Regular Exercise No   Activity/Exercise/Self-Care Comment Has 7 acres, does gardening and yardwork. No formal exercise.   Functional Level   Prior Functional Level Comment Has an AFO for RLE.   General Information   Onset of Illness/Injury or Date of Surgery - Date 10/22/19   Referring Physician María   Patient/Family Goals Statement None stated.   Additional Occupational Profile Info/Pertinent History of Current Problem Admitted iwth chest tightness.    Precautions/Limitations no known precautions/limitations   General Info Comments Started Imdur today.   Cognitive Status Examination   Cognitive Comment No cognitive deficits noted.    Pain Assessment   Patient Currently in Pain No  (headache)   Balance   Balance Comments Up I'ly in room.   Grooming   Level of [place]: Grooming independent   Activities of Daily Living Analysis   Impairments Contributing to Impaired Activities of Daily Living   (decreased exercise)   General Therapy Interventions   Planned Therapy Interventions home program guidelines;progressive activity/exercise   Clinical Impression   Criteria for Skilled Therapeutic Interventions Met yes, treatment indicated   OT Diagnosis Decreased exercise tolerance   Influenced by the following impairments need for monitored exercise   Assessment of Occupational Performance 1-3 Performance Deficits   Identified Performance Deficits exercise   Clinical Decision Making (Complexity) Low complexity   Therapy Frequency Daily   Predicted Duration of Therapy Intervention (days/wks) 2 days   Anticipated Discharge Disposition Home with Outpatient Therapy   Risks and Benefits of Treatment have been explained. Yes   Patient, Family & other staff in agreement with plan of care Yes   Total  Evaluation Time   Total Evaluation Time (Minutes) 10

## 2019-10-25 NOTE — PROGRESS NOTES
NSG DISCHARGE NOTE    Patient discharged to home at 5:06 PM via wheel chair. Accompanied by other: staff. Discharge instructions reviewed with patient, opportunity offered to ask questions. Prescriptions sent to patients preferred pharmacy. All belongings sent with patient.    Norman Morales RN

## 2019-10-25 NOTE — DISCHARGE SUMMARY
[facility]  Hospitalist Discharge Summary       Date of Admission:  10/22/2019  Date of Discharge:  10/25/2019  Discharging Provider: Jacob Owens MD      Discharge Diagnoses   Unstable angina  Hx CAD s/p PCI  HTN  Hyperlipidemia  Factori V Leiden mutation  Protein C deficiency  Hx DVT  Asthma  Neuropathy  Bilateral cataracts    Follow-ups Needed After Discharge   Follow-up Appointments     Follow-up and recommended labs and tests       -A follow-up appointment has been made for you with Dr. Meyer, [facility] Provider on Tuesday, October 29th at 9:30 AM at [facility].  Please call the clinic at [phone] should you need to   change or cancel your appointment.  Please arrive 15 minutes early to your   scheduled appointment and bring your photo ID, insurance card and   co-payment.     -A follow-up appointment has been made for you with Sara Jhaveri NP   on Wednesday, October 30th at 8:15 AM at [facility].  Please call   the clinic at [phone] should you need to change or cancel your   appointment.  Please arrive 15 minutes early to your scheduled appointment   and bring your photo ID, insurance card and co-payment.             Discharge Disposition   Discharged to home  Condition at discharge: Stable    Hospital Course   Jose Tejada is a 74 year old very pleasant male with medical history significant for coronary artery disease with stents to LAD and RCA with known 70% lesion to circumflex, protein C deficiency and factor V Leyden mutation, heterozygous with history of DVT, peripheral neuropathy and right foot drop, hypertension, hyperlipidemia, GERD, asthma, spinal stenosis presented to the ER due to exertional chest tightness.  He is being admitted to the hospital for further management.        Unstable Angina  CAD with stents to LAD and RCA, and Cx lesion  HTN, HL  Patient with known CAD, stents to LAD and RCA in the past, abnormal stress  test in February 2017, followed by cardiology, noted residual 70% lesion in circumflex per cardiology.  Medical management was recommended, but now with progressive symptoms. EKG shows T inversion on lead III, serial trop negative.  TTE 10/24 with EF 55-60% without WMA, mildly decreased RV systolic function.  Underwent angiogram 10/24; no intervention, recommend medical management but may consider complex PCI if recurrent symptoms.  Initiated on Plavix this admission and lisinopril increased to 20 mg daily.  Continue PTA statin, is not on BB due to bradycardia, no aspirin due to Plavix and warfarin.       Factor V Leiden mutation, heterozygous  Protein C deficiency  History of DVT on chronic anticoagulation with Coumadin  INR allowed to trend down for angiogram 10/24.  Will discharge on lovenox bridge, follow up for INR check 10/26.     Asthma  Continue PRN albuterol     Neuropathy and peripheral foot drop  Patient uses splint to the right leg.  Continue PTA Neurontin, recommend establish with neurology outpatient.     Bilateral dense cataract  Follow up with PCP        Consultations This Hospital Stay   CARDIAC REHAB IP CONSULT  CARDIOLOGY IP CONSULT  PHARMACY TO DOSE HEPARIN  PHARMACY TO DOSE HEPARIN  PHARMACY IP CONSULT  PHARMACY IP CONSULT  PHARMACY TO DOSE WARFARIN  PHARMACY TO DOSE HEPARIN  PHARMACY TO DOSE HEPARIN  CARDIAC REHAB IP CONSULT  CARE TRANSITION RN/SW IP CONSULT  SMOKING CESSATION PROGRAM IP CONSULT  SMOKING CESSATION PROGRAM IP CONSULT    Code Status   Full Code    Time Spent on this Encounter   I, Jacob Owens MD, personally saw the patient today and spent greater than 30 minutes discharging this patient.       Jacob Owens MD  [facility]  ______________________________________________________________________    Physical Exam   Vital Signs: Temp: 98.1  F (36.7  C) Temp src: Oral BP: 117/64 Pulse: 69 Heart Rate: 70 Resp: 16 SpO2: 94 % O2 Device: None (Room air) Oxygen Delivery:  2 LPM  Weight: 181 lbs 1.6 oz  General Appearance: Well developed, well nourished male in NAD  Respiratory: lungs CTAB, no wheezes or crackles  Cardiovascular: RRR, normal s1/s2 without murmur  GI: abdomen soft, nontender, normal bowel sounds  Skin: no swelling over radial access site  Other: Alert and appropriate, cranial nerves grossly intact        Primary Care Physician   [facility]    Discharge Orders      Cardiac Rehab Referral      Reason for your hospital stay    You were admitted for chest pain due to coronary disease (plaque in the arteries of your heart).     Activity    Your activity upon discharge: activity as tolerated     Follow-up and recommended labs and tests     -A follow-up appointment has been made for you with Dr. Meyer, [facility] Provider on Tuesday, October 29th at 9:30 AM at [facility].  Please call the clinic at [phone] should you need to change or cancel your appointment.  Please arrive 15 minutes early to your scheduled appointment and bring your photo ID, insurance card and co-payment.     -A follow-up appointment has been made for you with Sara Jhaveri NP on Wednesday, October 30th at 8:15 AM at [facility].  Please call the clinic at [phone] should you need to change or cancel your appointment.  Please arrive 15 minutes early to your scheduled appointment and bring your photo ID, insurance card and co-payment.    Follow up for INR check on 10/26/19.     Full Code     Diet    Follow this diet upon discharge:  Low Saturated Fat Na <2400mg Diet       Significant Results and Procedures   Most Recent 3 CBC's:  Recent Labs   Lab Test 10/24/19  0753 10/22/19  1602 04/03/14  0000   WBC 5.7 6.4  --    HGB 14.1 15.2 14.6   MCV 90 91 90.6    260  --      Most Recent 3 BMP's:  Recent Labs   Lab Test 10/24/19  0538 10/23/19  0252 10/22/19  1602    139 139   POTASSIUM 3.6 3.5 4.3   CHLORIDE 109 107 106   CO2 26 28 29    BUN 13 11 9   CR 0.76 0.72 0.81   ANIONGAP 6 4 4   CODI 8.6 8.7 9.4   GLC 97 93 97     Most Recent 3 Troponin's:  Recent Labs   Lab Test 10/23/19  0252 10/22/19  2133 10/22/19  1602 10/22/19  1557   TROPI <0.015 <0.015 <0.015  --    TROPONIN  --   --   --  0.02   ,   Results for orders placed or performed during the hospital encounter of 10/22/19   XR Chest 2 Views    Narrative    CHEST TWO VIEW   10/22/2019 4:54 PM     HISTORY: Chest pain.    COMPARISON: Chest x-ray 10/22/2019.      Impression    IMPRESSION: PA and lateral views of the chest. Lungs are clear. Heart  is normal in size. No effusions are evident. No pneumothorax. Tortuous  thoracic aorta is noted. Degenerative lower thoracic spine changes are  present.    HARLEY RODRIGUEZ MD   Head CT w/o contrast    Narrative    CT SCAN OF THE HEAD WITHOUT CONTRAST   10/22/2019 5:25 PM     HISTORY: Daily headaches, on coumadin    TECHNIQUE: Axial images of the head and coronal reformations without  IV contrast material. Radiation dose for this scan was reduced using  automated exposure control, adjustment of the mA and/or kV according  to patient size, or iterative reconstruction technique.    COMPARISON: None.    FINDINGS: There is no evidence of intracranial hemorrhage, mass, acute  infarct or anomaly. The ventricles are normal in size, shape and  configuration. The brain parenchyma and subarachnoid spaces are  normal.     Mild mucosal thickening in the left ethmoid air cells and visualized  left maxillary sinus. The bony calvarium and bones of the skull base  appear intact.       Impression    IMPRESSION: No evidence of acute intracranial hemorrhage, mass, or  herniation.      MARIANA BAEZA MD   Echocardiogram Complete    Narrative    [id number]  [id number]  [id number]  [id number]^OLIVERIO^RONNELL           [facility]  Echocardiography Laboratory  [facility] [place] [address]        Name: ELIE FONTANEZ  MRN: [id number]  : 1945  Study  Date: 10/24/2019 02:07 PM  Age: 74 yrs  Gender: Male  Patient Location: [facility]  Reason For Study: Unstable Angina  History: CAD  Ordering Physician: RONNELL DURON  Referring Physician: STEPHANIE DICKSON [facility]  Performed By: Jude Alergia RDCS     BSA: 2.0 m2  Height: 68 in  Weight: 180 lb  HR: 51  BP: 117/69 mmHg  _____________________________________________________________________________  __        Procedure  Complete Portable Echo Adult. Optison (NDC #2517-1605) given intravenously.  _____________________________________________________________________________  __        Interpretation Summary     Mild aortic root dilatation.  The ascending aorta is Mildly dilated.  The right ventricle is moderately dilated.  Mildly decreased right ventricular systolic function  The left atrium is moderate to severely dilated.  The visual ejection fraction is estimated at 55-60%.  Normal left ventricular wall motion  Right ventricle systolic pressure estimate normal  Inferior vena cava not well visualized for estimation of right atrial  pressure.  The patient exhibited frequent PVCs.  There is no comparison study available.  _____________________________________________________________________________  __        Left Ventricle  The left ventricle is normal in size. There is normal left ventricular wall  thickness. The visual ejection fraction is estimated at 55-60%. Grade I or  early diastolic dysfunction. Normal left ventricular wall motion.     Right Ventricle  The right ventricle is moderately dilated. Mildly decreased right ventricular  systolic function.     Atria  The left atrium is moderate to severely dilated. Right atrial size is normal.  Intact atrial septum.     Mitral Valve  The mitral valve is normal in structure and function. There is trace mitral  regurgitation.        Tricuspid Valve  The tricuspid valve is normal in structure and function. Right ventricle  systolic pressure estimate normal. There is  trace to mild tricuspid  regurgitation.     Aortic Valve  The aortic valve is trileaflet with aortic valve sclerosis. There is mild (1+)  aortic regurgitation.     Pulmonic Valve  The pulmonic valve is not well seen, but is grossly normal. There is trace  pulmonic valvular regurgitation.     Vessels  Mild aortic root dilatation. The ascending aorta is Mildly dilated. Inferior  vena cava not well visualized for estimation of right atrial pressure.     Pericardium  The pericardium appears normal.        Rhythm  Sinus rhythm was noted. The patient exhibited frequent PVCs.  _____________________________________________________________________________  __  MMode/2D Measurements & Calculations  IVSd: 1.1 cm     LVIDd: 5.1 cm  LVIDs: 3.4 cm  LVPWd: 1.2 cm  FS: 33.8 %  LV mass(C)d: 227.4 grams  LV mass(C)dI: 116.3 grams/m2  Ao root diam: 4.2 cm  LA dimension: 3.5 cm  asc Aorta Diam: 4.3 cm  LA/Ao: 0.84  LVOT diam: 2.3 cm  LVOT area: 4.0 cm2  LA Volume (BP): 89.5 ml  LA Volume Index (BP): 45.9 ml/m2  RWT: 0.46           Doppler Measurements & Calculations  MV E max misbah: 35.3 cm/sec  MV A max misbah: 49.0 cm/sec  MV E/A: 0.72  MV dec time: 0.40 sec  Ao V2 max: 179.2 cm/sec  Ao max P.0 mmHg  Ao V2 mean: 116.1 cm/sec  Ao mean P.6 mmHg  Ao V2 VTI: 39.2 cm  RITA(I,D): 2.5 cm2  RITA(V,D): 2.2 cm2  AI P1/2t: 907.4 msec  LV V1 max PG: 3.8 mmHg  LV V1 max: 97.7 cm/sec  LV V1 VTI: 24.9 cm  SV(LVOT): 99.6 ml  SI(LVOT): 51.0 ml/m2  PA acc time: 0.14 sec  TR max misbah: 207.6 cm/sec  TR max P.2 mmHg  AV Misbah Ratio (DI): 0.55  RITA Index (cm2/m2): 1.3  E/E' av.1  Lateral E/e': 3.9  Medial E/e': 6.4              _____________________________________________________________________________  __        Report approved by: Yaniv Hartman 10/24/2019 03:38 PM      Cardiac Catheterization    Narrative    1.  There is a proximal chronic occlusion of the dominant right coronary   which fills via profuse left to right collaterals.   There is no   significant narrowing the left main.  There is no significant narrowing in   the LAD which has a patent proximal stent.  There is a diffuse 70%   narrowing in the proximal circumflex with a Pd/Pa equals 0.95 that suggest   the patient would not benefit from revascularization of the circumflex.       Discharge Medications   Current Discharge Medication List      START taking these medications    Details   clopidogrel (PLAVIX) 75 MG tablet Take 1 tablet (75 mg) by mouth daily  Qty: 30 tablet, Refills: 0    Comments: [facility] [phone].  Associated Diagnoses: Coronary artery disease of native artery of native heart with stable angina pectoris (H)      enoxaparin ANTICOAGULANT (LOVENOX) 80 MG/0.8ML syringe Inject 0.8 mLs (80 mg) Subcutaneous every 12 hours  Qty: 10 Syringe, Refills: 0    Associated Diagnoses: Current use of long term anticoagulation      isosorbide mononitrate (IMDUR) 30 MG 24 hr tablet Take 1 tablet (30 mg) by mouth daily  Qty: 30 tablet, Refills: 0    Comments: Future refills [facility] phone number [phone].  Associated Diagnoses: Coronary artery disease of native artery of native heart with stable angina pectoris (H)      nitroGLYcerin (NITROSTAT) 0.4 MG sublingual tablet For chest pain place 1 tablet under the tongue every 5 minutes for 3 doses. If symptoms persist 5 minutes after 1st dose call 911.  Qty: 20 tablet, Refills: 0    Comments: Future refills [facility] phone number [phone].  Associated Diagnoses: Coronary artery disease of native artery of native heart with stable angina pectoris (H)         CONTINUE these medications which have CHANGED    Details   lidocaine (LIDODERM) 5 % patch Place 1 patch onto the skin every 24 hours    Associated Diagnoses: Multiple joint pain      lisinopril (PRINIVIL/ZESTRIL) 20 MG tablet  Take 1 tablet (20 mg) by mouth daily  Qty: 30 tablet, Refills: 0    Comments: Future refills by PCP Dr. Hood [facility] with phone number [phone].  Associated Diagnoses: Coronary artery disease of native artery of native heart with stable angina pectoris (H)         CONTINUE these medications which have NOT CHANGED    Details   albuterol (PROAIR HFA/PROVENTIL HFA/VENTOLIN HFA) 108 (90 Base) MCG/ACT inhaler Inhale 2 puffs into the lungs every 4 hours as needed for shortness of breath / dyspnea or wheezing    Comments: Pharmacy may dispense brand covered by insurance (Proair, or proventil or ventolin or generic albuterol inhaler)      gabapentin (NEURONTIN) 400 MG capsule Take 1,200 mg by mouth 3 times daily      nortriptyline (PAMELOR) 25 MG capsule Take 25 mg by mouth At Bedtime       omeprazole (PRILOSEC) 20 MG CR capsule Take 20 mg by mouth At Bedtime       oxyCODONE-acetaminophen (PERCOCET) 5-325 MG per tablet Take 2 tablets by mouth 3 times daily as needed Patient takes 2 tablets TID Scheduled      simvastatin (ZOCOR) 80 MG tablet Take 80 mg by mouth At Bedtime      Warfarin Sodium (COUMADIN PO) Take 5 mg by mouth daily 7.5 mg on Monday and Friday, 5 mg all other days           Allergies   Allergies   Allergen Reactions     Norvasc [Amlodipine] Rash     Septra [Sulfamethoxazole W-Trimethoprim] Rash

## 2019-10-25 NOTE — PLAN OF CARE
Discharge Planner OT   Patient plan for discharge: Home.   Current status: Eval complete and treatment initiated. Patient able to ambulate in [place] (treadmill exercise deferred as patient with AFO, declined trial) with IND, 12 min with stable CV response.   Barriers to return to prior living situation: None.   Recommendations for discharge: Home with OP CR if he'll go.   Rationale for recommendations: Patient would benefit from continued monitored exercise however not sure he'll go.        Entered by: Iva Ghotra 10/25/2019 9:16 AM

## 2019-10-25 NOTE — PLAN OF CARE
VSS. A&O.  Denies pain or discomfort. Rhythm is SR.  R wrisr cdi.  Cms good to R hand.  Independent  in room.

## 2019-10-25 NOTE — PROGRESS NOTES
[facility]  Cardiology Progress Note  Date of Service: 10/25/2019  Primary Cardiologist: Dr. Yun    Assessment & Plan    Jose Tejada is a 74 year old male with past medical history significant for coronary artery disease with stents to LAD and RCA (1996) with known 70% lesion to circumflex, protein C deficiency and factor V Leiden mutation, heterozygous with history of DVT, peripheral neuropathy and right foot drop, hypertension, hyperlipidemia, GERD, asthma, spinal stenosis was admitted on 10/22/2019 with exertional chest tightness.     Assessment and Plan:   1. Recent onset of exertional angina with mild exertion, now crescendo, from unstable angina    Known CAD with underlying circumflex lesion with the known area of lateral wall ischemia on the last stress test    Coronary angiogram 10/24 with  to prox RCA with profuse left to right collaterals. Diffuse 70% narrowing in the prox cx  with a Pd/Pa equals 0.95     Echocardiogram notes LVEF 55-60%    EKG was SR without ischemic changes, HR 50's    Serial troponin's negative    Plavix and Warfarin, Simvastatin 80 mg daily, Imdur 30 mg daily     No current chest pain     2. Hypertension    Controlled, 117/64    Lisinopril 20 mg daily    3. Hyperlipidemia    LDL 49    Simvastatin 80 mg daily    4. History of DVT and protein C deficiency and heterozygous factor V Leiden    Warfarin therapy as outpatient    INR 1.35 today    Will likely bridge with Lovenox, defer to Hospitalist      Plan:  1. Continue to hold off on beta blocker due to bradycardia  2. He is tolerating Imdur with no recurrent chest discomfort. Can up titrate to 60 mg if needed for exertional chest discomfort.   3. Continue stating therapy.   4. Continue Plavix and warfarin. Hospitalist to bridge patient with Lovenox   5. Cardiac rehab today and in the outpatient setting  6. If he fails medical therapy and rehab, and an outpatient elective PCI to the proximal circumflex can  be considered and eventually also look at  intervention of the right coronary artery. Will have patient follow-up with Jaya. This can be further discussed with Dr. Yun at that time     Follow-up NORI Lake 11/8/19 in [place] at 10:50    BRENDAN Virgen CNP  Pager:  [phone]   Text Page  (7am - 4pm, M-F)      Interval History   Resting in bed. No complaints of chest pain or shortness of breath    Physical Exam   Temp: 98.1  F (36.7  C) Temp src: Oral BP: 117/64 Pulse: 69 Heart Rate: 70 Resp: 16 SpO2: 94 % O2 Device: None (Room air) Oxygen Delivery: 2 LPM  Vitals:    10/23/19 0445 10/24/19 0601 10/25/19 0700   Weight: 80.2 kg (176 lb 14.4 oz) 81.2 kg (179 lb) 82.1 kg (181 lb 1.6 oz)       Constitutional:   NAD   Skin:   Warm and dry   Head:   Nontraumatic   Neck:   supple, symmetrical, trachea midline   Lungs:   symmetric, clear to auscultation   Cardiovascular:   regular rate and rhythm, normal S1 and S2 and no murmur noted   Abdomen:   Benign   Extremities and Back:   No edema   Neurological:   Grossly nonfocal       Medications     - MEDICATION INSTRUCTIONS -       - MEDICATION INSTRUCTIONS -       - MEDICATION INSTRUCTIONS -       Reason anticoagulation order not selected       - MEDICATION INSTRUCTIONS -       ACE/ARB/ARNI NOT PRESCRIBED       Warfarin Therapy Reminder         clopidogrel  75 mg Oral Daily     enoxaparin ANTICOAGULANT  1 mg/kg Subcutaneous Q12H     gabapentin  1,200 mg Oral TID     isosorbide mononitrate  30 mg Oral Daily     lidocaine   Transdermal Q8H     lidocaine   Transdermal Q24h     lisinopril  20 mg Oral Daily     nortriptyline  25 mg Oral At Bedtime     omeprazole  20 mg Oral At Bedtime     simvastatin  80 mg Oral At Bedtime     sodium chloride (PF)  3 mL Intracatheter Q8H     warfarin ANTICOAGULANT  7.5 mg Oral ONCE at 18:00       Data     Most Recent 3 CBC's:  Recent Labs   Lab Test 10/24/19  0753 10/22/19  1602 04/03/14  0000   WBC 5.7 6.4  --    HGB 14.1 15.2 14.6    MCV 90 91 90.6    260  --      Most Recent 3 BMP's:  Recent Labs   Lab Test 10/24/19  0538 10/23/19  0252 10/22/19  1602    139 139   POTASSIUM 3.6 3.5 4.3   CHLORIDE 109 107 106   CO2 26 28 29   BUN 13 11 9   CR 0.76 0.72 0.81   ANIONGAP 6 4 4   CODI 8.6 8.7 9.4   GLC 97 93 97     Most Recent 3 Troponin's:  Recent Labs   Lab Test 10/23/19  0252 10/22/19  2133 10/22/19  1602 10/22/19  1557   TROPI <0.015 <0.015 <0.015  --    TROPONIN  --   --   --  0.02

## 2019-10-25 NOTE — PLAN OF CARE
Discharge Planner OT   Patient plan for discharge: Home  Current status: Seen for PM session.  Pt stated he may discharge today, talked about home situation and possible methods for exercising.  Pt states he is limited seconday to his AFO and peripheral neuropathy.  Estimated he could walk 2 blocks at a time.  Vital signs Patient would benefit from continued monitored exercise however not sure he'll go.stable, see VS flowsheet for details  Barriers to return to prior living situation: None noted  Recommendations for discharge: Home with OP CR.  Rationale for recommendations:        Entered by: Homero Harman 10/25/2019 1:34 PM        Occupational Therapy Discharge Summary    Reason for therapy discharge:    Discharged to home with outpatient therapy.    Progress towards therapy goal(s). See goals on Care Plan in [facility] electronic health record for goal details.  Goals partially met.  Barriers to achieving goals:   discharge from facility.    Therapy recommendation(s):    Continued therapy is recommended.  Rationale/Recommendations:  Recommend Phase II CR to increase endurance for activity and knowledge about heart healthy behaviors.  Pt is uncertain if he will follow up with this, provided encouragement as able..

## 2019-10-25 NOTE — PROGRESS NOTES
Care Transition Initial Assessment - RN        Met with: Patient and Family.  DATA   Principal Problem:    Unstable angina (H)  Active Problems:    Stable angina (H)       Cognitive Status: awake and alert.        Contact information and PCP information verified: Yes  Lives With: spouse           Insurance concerns: No Insurance issues identified  ASSESSMENT  Patient currently receives the following services:  none        Identified issues/concerns regarding health management: Jose was admitted on 10/22 for stable angina. He lives independently in his home with his wife.  [facility]. Pt states his old primary MD has left the [facility] and he now see's and NP.  He is concerned about seeing her given his cardiac issues and wonders if he should see an MD.  Offered to make an appointment with another provider in the clinic but pt prefers to stay with Sara Jhaveri NP  PLAN  Financial costs for the patient include: copays .  Patient given options and choices for discharge; Yes .  Patient/family is agreeable to the plan?  Yes:   Patient anticipates discharging to Home .        Patient anticipates needs for home equipment: No  Plan/Disposition: Home   Appointments:     A follow-up appointment has been made for you with Dr. Meyer, [facility] Provider on Tuesday, October 29th at 9:30 AM at [facility].  Please call the clinic at [phone] should you need to change or cancel your appointment.  Please arrive 15 minutes early to your scheduled appointment and bring your photo ID, insurance card and co-payment.     A follow-up appointment has been made for you with Sara Jhaveri NP on Wednesday, October 30th at 8:15 AM at [facility].  Please call the clinic at [phone] should you need to change or cancel your appointment.  Please arrive 15 minutes early to your scheduled appointment and bring your photo ID, insurance card and  co-payment.     Care  (CTS) will continue to follow as needed.      Danisha Bass RN BAN  Inpatient Care Coordination  [address] [address]  [email or web address]   Office: [phone]  Fax: [phone]  Gender pronouns: she/her/hers  Employed by [facility]

## 2019-10-28 ENCOUNTER — TELEPHONE (OUTPATIENT)
Dept: CARDIOLOGY | Facility: CLINIC | Age: 74
End: 2019-10-28

## 2019-10-28 LAB — INTERPRETATION ECG - MUSE: NORMAL

## 2019-10-28 NOTE — TELEPHONE ENCOUNTER
Patient was evaluated by cardiology while inpatient for increased chest pain with exertion. PMH of CAD with KEITH placement. 10/23/19: There is a proximal chronic occlusion of the dominant right coronary which fills via profuse left to right collaterals. There is no significant narrowing the left main. There is no significant narrowing in the LAD which has a patent proximal stent. There is a diffuse 70% narrowing in the proximal circumflex with a Pd/Pa equals 0.95 that suggest the patient would not benefit from revascularization of the circumflex. We would advise continued medical therapy. If the patient fails medical therapy, consultation with the /CHIP team could be considered. Started on Imdur, Plavix and PRN SL NTG. Writer attempted to call patient to discuss any post hospital d/c questions he may have, review medication changes, and confirm f/u appts, but no answer. VM left instructing pt to call back with any medication questions, if experiencing any chest pain, SOB or lightheadedness, or with any questions or concerns. Instructed that RRA access site should be without any signs of infection, swelling or drainage. Reminder left of  apt scheduled on 11/8/19 with NIURKA Jaya Nunes. Cardiac rehab scheduled on 11/1/19. Advised to call clinic with any cardiac related questions or concerns prior to this niurka'jazmine Lund RN.

## 2019-10-30 LAB — INTERPRETATION ECG - MUSE: NORMAL

## 2019-11-08 ENCOUNTER — OFFICE VISIT (OUTPATIENT)
Dept: CARDIOLOGY | Facility: CLINIC | Age: 74
End: 2019-11-08
Payer: MEDICARE

## 2019-11-08 ENCOUNTER — CARE COORDINATION (OUTPATIENT)
Dept: CARDIOLOGY | Facility: CLINIC | Age: 74
End: 2019-11-08

## 2019-11-08 VITALS
WEIGHT: 186 LBS | BODY MASS INDEX: 28.19 KG/M2 | HEART RATE: 68 BPM | DIASTOLIC BLOOD PRESSURE: 68 MMHG | SYSTOLIC BLOOD PRESSURE: 110 MMHG | HEIGHT: 68 IN

## 2019-11-08 DIAGNOSIS — I25.10 CORONARY ARTERY DISEASE INVOLVING NATIVE CORONARY ARTERY OF NATIVE HEART WITHOUT ANGINA PECTORIS: ICD-10-CM

## 2019-11-08 DIAGNOSIS — I25.118 CORONARY ARTERY DISEASE OF NATIVE ARTERY OF NATIVE HEART WITH STABLE ANGINA PECTORIS (H): Primary | ICD-10-CM

## 2019-11-08 DIAGNOSIS — I10 BENIGN ESSENTIAL HYPERTENSION: ICD-10-CM

## 2019-11-08 LAB
ANION GAP SERPL CALCULATED.3IONS-SCNC: 5 MMOL/L (ref 3–14)
BUN SERPL-MCNC: 12 MG/DL (ref 7–30)
CALCIUM SERPL-MCNC: 8.6 MG/DL (ref 8.5–10.1)
CHLORIDE SERPL-SCNC: 102 MMOL/L (ref 94–109)
CO2 SERPL-SCNC: 28 MMOL/L (ref 20–32)
CREAT SERPL-MCNC: 0.75 MG/DL (ref 0.66–1.25)
GFR SERPL CREATININE-BSD FRML MDRD: >90 ML/MIN/{1.73_M2}
GLUCOSE SERPL-MCNC: 109 MG/DL (ref 70–99)
POTASSIUM SERPL-SCNC: 3.9 MMOL/L (ref 3.4–5.3)
SODIUM SERPL-SCNC: 135 MMOL/L (ref 133–144)

## 2019-11-08 PROCEDURE — 80048 BASIC METABOLIC PNL TOTAL CA: CPT | Performed by: NURSE PRACTITIONER

## 2019-11-08 PROCEDURE — 99215 OFFICE O/P EST HI 40 MIN: CPT | Performed by: NURSE PRACTITIONER

## 2019-11-08 PROCEDURE — 36415 COLL VENOUS BLD VENIPUNCTURE: CPT | Performed by: NURSE PRACTITIONER

## 2019-11-08 RX ORDER — RANOLAZINE 500 MG/1
500 TABLET, EXTENDED RELEASE ORAL 2 TIMES DAILY
Qty: 60 TABLET | Refills: 3 | Status: SHIPPED | OUTPATIENT
Start: 2019-11-08 | End: 2019-11-08

## 2019-11-08 RX ORDER — RANOLAZINE 500 MG/1
500 TABLET, EXTENDED RELEASE ORAL 2 TIMES DAILY
Qty: 180 TABLET | Refills: 3 | Status: ON HOLD | OUTPATIENT
Start: 2019-11-08 | End: 2020-02-28

## 2019-11-08 RX ORDER — ROSUVASTATIN CALCIUM 20 MG/1
20 TABLET, COATED ORAL DAILY
Qty: 90 TABLET | Refills: 3 | Status: SHIPPED | OUTPATIENT
Start: 2019-11-08

## 2019-11-08 RX ORDER — ROSUVASTATIN CALCIUM 20 MG/1
20 TABLET, COATED ORAL DAILY
Qty: 30 TABLET | Refills: 3 | Status: SHIPPED | OUTPATIENT
Start: 2019-11-08 | End: 2019-11-08

## 2019-11-08 ASSESSMENT — MIFFLIN-ST. JEOR: SCORE: 1568.19

## 2019-11-08 NOTE — PROGRESS NOTES
Crestor Rx and Ranexa Rx were printed and signed by NORI Carroll.  Faxed prescriptions and NORI Gibson's signed office note [facility] (fx: [phone])    ZOHRA Durham 2:51 PM 11/8/2019

## 2019-11-08 NOTE — PROGRESS NOTES
Service Date: 11/08/2019      OFFICE NOTE      HISTORY OF PRESENT ILLNESS:  Jose Tejada is a delightful 74-year-old gentleman followed here for many years by Dr. Gurvinder Yun.  He has a history of coronary artery disease with stents to his LAD and right coronary in 1996 with a known 70% lesion to his circumflex.  He has a protein C deficiency and factor V Leiden mutation (heterozygous) with history of DVT.  He has developed peripheral neuropathy with right foot drop and wears a brace without frequent falls.  He also has hypertension, dyslipidemia, reflux disease, asthma, spinal stenosis and is followed at the [facility] by Dr. Meyer.      Two week ago, he presented to [facility] with exertional chest tightness.  This came on after a meal and playing bingo with his friends at the [facility].  He reported to Dr. Daniel at the time that he had been having exertional chest tightness for 2-3 weeks prior to admission that was occurring when he would go up stairs or work in his yard.  He had no rest pain.      His last nuclear study a year ago showed some lateral wall ischemia consistent with the circumflex stenosis and was managed medically.  He was asymptomatic until recently.  He has developed slow heart rates and his metoprolol was discontinued recently because of that.  He was also placed on lisinopril recently for high blood pressure.  The patient feels his chest discomfort increased when the metoprolol was stopped.  His troponins in the hospital were negative.  His EKG was unremarkable.  However, Dr. Daniel felt that an angiogram was indicated.      This was performed via right radial artery approach by Dr. Hinojosa on 10/22.  Findings included a proximal chronic total occlusion of a dominant right coronary artery which had profuse left-to-right collateral flow.  There was no significant re-narrowing in the LAD which had been stented in the past and there was an ongoing 70% proximal  circumflex with a flow reserve (Pd/Pa) of 0.95(cut off is 0.89) suggesting no revascularization would be necessary.  He was placed on isosorbide and if fails medical therapy, the recommendation was to consider a  or CHIP procedure.  I have messaged Dr. Yun to review his films but I have not heard back yet.  Plavix was also added during his recent admission and aspirin was stopped.      He remains on his warfarin therapy and he was bridged post procedure but INR is now therapeutic, so bridging has been discontinued (Lovenox).      Echocardiography was done in the hospital showing preserved LV function at 55%-60%.  The ascending aorta is mildly dilated at the root at 4.2 cm, ascending position 4.3 cm.  Right ventricle is moderately dilated with mild decrease in RV systolic function.  Left atrium is moderately to severely dilated.  He has known sleep apnea treated with Cpap. Frequent PVCs were also noted during this echo.      Jose tells me, since being home, he is still having mild exertional discomfort that is lessened but not disappeared.  He has a mild acute on chronic headache from the isosorbide.  His blood pressure is relatively soft, not allowing me to increase his isosorbide further.  His right radial artery has healed well with no hum or bruit.  His lungs are clear.  He looks much thinner than the last time I saw him many years ago and in fact, over the past year and a half, he has lost 40 pounds which he states is intentional.  Lipid profile in October showed an LDL of 49, HDL 35, triglycerides 109.  He has been on simvastatin 80 mg per day.  QT interval on nortriptyline was normal on his EKG in the hospital.      IMPRESSION AND PLAN:   1.  Recent onset of unstable angina with known coronary artery disease.  His anatomy is outlined above as is his angiogram.  I will maximize medical therapy by adding Ranexa 500 mg twice daily.  I will send a prescription to the [facility].  I am not able to  increase his isosorbide due to headache and low blood pressure.  We will continue Plavix.  I have asked Dr. Yun to review his films in the event that we cannot obtain symptom control on medical therapy.  Because of the drug-drug interaction between Ranexa and simvastatin high-dose, I will stop simvastatin and place him on Crestor.  Please see below.  I have scheduled a visit back with us in 4-6 weeks with an EKG to check his QTc on the Ranexa.  For now, continue Plavix remain off aspirin.   2.  Hypertension, controlled.   3.  Dyslipidemia, controlled.  Switch simvastatin to Crestor 20 mg per day.  He will need a lipid profile in 8-12 weeks.   4.  History of DVT and protein C deficiency and heterozygous for factor V Leiden.  He remains on warfarin and does need bridging prior to or after any invasive procedures where the Coumadin is interrupted,   5.  Neuropathy with right foot drop.  He has not had frequent falls.   6.  Basic metabolic panel was drawn today post contrast and it is normal.      We will plan to see him back in 4-6 weeks' time.  Once I hear from Dr. Yun regarding the potential for revascularization with  procedure, I will contact the patient.  I have asked him to call me in the interim if his chest pain increases or if he has any problems with Ranexa.  I will forward my note to the [facility] with prescriptions to try to obtain coverage for these new medications.  For the short term, they were sent to a local pharmacy.        ADDENDUM: Dr. Mccall reviewed films and feels there may be thrombus burden in the RCA. We will try medical therapy for 30 days and if fails we will consider bridging with lovenox and perhaps IV Heparin for 24 hours prior to the angiogram with  attempt. I will try to move up his office visit and I have called the patient.    Greater than 50% of this 45-minute visit today was spent in counseling/collaboration         ARSENIO ROSADO NP             D:  2019   T: 2019   MT: MARILYNN      Name:     ELEI FONTANEZ   MRN:      [id number]        Account:      [id number]   :      1945           Service Date: 2019      Document: [id number]

## 2019-11-08 NOTE — LETTER
11/8/2019      [facility]  [address] [place] [id number]      RE: Jose Tejada       Dear Colleague,    I had the pleasure of seeing Jose Tejada in the [facility].    Service Date: 11/08/2019      OFFICE NOTE      HISTORY OF PRESENT ILLNESS:  Jose Tejada is a delightful 74-year-old gentleman followed here for many years by Dr. Gurvinder Yun.  He has a history of coronary artery disease with stents to his LAD and right coronary in 1996 with a known 70% lesion to his circumflex.  He has a protein C deficiency and factor V Leiden mutation (heterozygous) with history of DVT.  He has developed peripheral neuropathy with right foot drop and wears a brace without frequent falls.  He also has hypertension, dyslipidemia, reflux disease, asthma, spinal stenosis and is followed at the [facility] by Dr. Meyer.      Two week ago, he presented to [facility] with exertional chest tightness.  This came on after a meal and playing bingo with his friends at the [facility].  He reported to Dr. Daniel at the time that he had been having exertional chest tightness for 2-3 weeks prior to admission that was occurring when he would go up stairs or work in his yard.  He had no rest pain.      His last nuclear study a year ago showed some lateral wall ischemia consistent with the circumflex stenosis and was managed medically.  He was asymptomatic until recently.  He has developed slow heart rates and his metoprolol was discontinued recently because of that.  He was also placed on lisinopril recently for high blood pressure.  The patient feels his chest discomfort increased when the metoprolol was stopped.  His troponins in the hospital were negative.  His EKG was unremarkable.  However, Dr. Daniel felt that an angiogram was indicated.      This was performed via right radial artery approach by Dr. Hinojosa on 10/22.  Findings  included a proximal chronic total occlusion of a dominant right coronary artery which had profuse left-to-right collateral flow.  There was no significant re-narrowing in the LAD which had been stented in the past and there was an ongoing 70% proximal circumflex with a flow reserve (Pd/Pa) of 0.95(cut off is 0.89) suggesting no revascularization would be necessary.  He was placed on isosorbide and if fails medical therapy, the recommendation was to consider a  or CHIP procedure.  I have messaged Dr. Yun to review his films but I have not heard back yet.  Plavix was also added during his recent admission and aspirin was stopped.      He remains on his warfarin therapy and he was bridged post procedure but INR is now therapeutic, so bridging has been discontinued (Lovenox).      Echocardiography was done in the hospital showing preserved LV function at 55%-60%.  The ascending aorta is mildly dilated at the root at 4.2 cm, ascending position 4.3 cm.  Right ventricle is moderately dilated with mild decrease in RV systolic function.  Left atrium is moderately to severely dilated.  He has known sleep apnea treated with Cpap. Frequent PVCs were also noted during this echo.      Jose tells me, since being home, he is still having mild exertional discomfort that is lessened but not disappeared.  He has a mild acute on chronic headache from the isosorbide.  His blood pressure is relatively soft, not allowing me to increase his isosorbide further.  His right radial artery has healed well with no hum or bruit.  His lungs are clear.  He looks much thinner than the last time I saw him many years ago and in fact, over the past year and a half, he has lost 40 pounds which he states is intentional.  Lipid profile in October showed an LDL of 49, HDL 35, triglycerides 109.  He has been on simvastatin 80 mg per day.  QT interval on nortriptyline was normal on his EKG in the hospital.      IMPRESSION AND PLAN:   1.  Recent  onset of unstable angina with known coronary artery disease.  His anatomy is outlined above as is his angiogram.  I will maximize medical therapy by adding Ranexa 500 mg twice daily.  I will send a prescription to the [facility].  I am not able to increase his isosorbide due to headache and low blood pressure.  We will continue Plavix.  I have asked Dr. Yun to review his films in the event that we cannot obtain symptom control on medical therapy.  Because of the drug-drug interaction between Ranexa and simvastatin high-dose, I will stop simvastatin and place him on Crestor.  Please see below.  I have scheduled a visit back with us in 4-6 weeks with an EKG to check his QTc on the Ranexa.  For now, continue Plavix remain off aspirin.   2.  Hypertension, controlled.   3.  Dyslipidemia, controlled.  Switch simvastatin to Crestor 20 mg per day.  He will need a lipid profile in 8-12 weeks.   4.  History of DVT and protein C deficiency and heterozygous for factor V Leiden.  He remains on warfarin and does need bridging prior to or after any invasive procedures where the Coumadin is interrupted,   5.  Neuropathy with right foot drop.  He has not had frequent falls.   6.  Basic metabolic panel was drawn today post contrast and it is normal.      We will plan to see him back in 4-6 weeks' time.  Once I hear from Dr. Yun regarding the potential for revascularization with  procedure, I will contact the patient.  I have asked him to call me in the interim if his chest pain increases or if he has any problems with Ranexa.  I will forward my note to the [facility] with prescriptions to try to obtain coverage for these new medications.  For the short term, they were sent to a local pharmacy.        ADDENDUM: Dr. Mccall reviewed films and feels there may be thrombus burden in the RCA. We will try medical therapy for 30 days and if fails we will consider bridging with lovenox and perhaps IV Heparin for 24  hours prior to the angiogram with  attempt. I will try to move up his office visit and I have called the patient.    Greater than 50% of this 45-minute visit today was spent in counseling/collaboration         ARSENIO ROSADO NP             D: 2019   T: 2019   MT: MARILYNN      Name:     ELIE FONTANEZ   MRN:      [id number]        Account:      [id number]   :      1945           Service Date: 2019      Document: [id number]           Outpatient Encounter Medications as of 2019   Medication Sig Dispense Refill     albuterol (PROAIR HFA/PROVENTIL HFA/VENTOLIN HFA) 108 (90 Base) MCG/ACT inhaler Inhale 2 puffs into the lungs every 4 hours as needed for shortness of breath / dyspnea or wheezing       clopidogrel (PLAVIX) 75 MG tablet Take 1 tablet (75 mg) by mouth daily 30 tablet 0     gabapentin (NEURONTIN) 400 MG capsule Take 1,200 mg by mouth 3 times daily       isosorbide mononitrate (IMDUR) 30 MG 24 hr tablet Take 1 tablet (30 mg) by mouth daily 30 tablet 0     lidocaine (LIDODERM) 5 % patch Place 1 patch onto the skin every 24 hours       lisinopril (PRINIVIL/ZESTRIL) 20 MG tablet Take 1 tablet (20 mg) by mouth daily 30 tablet 0     nitroGLYcerin (NITROSTAT) 0.4 MG sublingual tablet For chest pain place 1 tablet under the tongue every 5 minutes for 3 doses. If symptoms persist 5 minutes after 1st dose call 911. 20 tablet 0     nortriptyline (PAMELOR) 25 MG capsule Take 25 mg by mouth At Bedtime        omeprazole (PRILOSEC) 20 MG CR capsule Take 20 mg by mouth At Bedtime        oxyCODONE-acetaminophen (PERCOCET) 5-325 MG per tablet Take 2 tablets by mouth 3 times daily as needed Patient takes 2 tablets TID Scheduled       ranolazine (RANEXA) 500 MG 12 hr tablet Take 1 tablet (500 mg) by mouth 2 times daily 180 tablet 3     rosuvastatin (CRESTOR) 20 MG tablet Take 1 tablet (20 mg) by mouth daily 90 tablet 3     Warfarin Sodium (COUMADIN PO) Take 5 mg by mouth daily 7.5 mg on  Monday and Friday, 5 mg all other days       [DISCONTINUED] enoxaparin ANTICOAGULANT (LOVENOX) 80 MG/0.8ML syringe Inject 0.8 mLs (80 mg) Subcutaneous every 12 hours (Patient not taking: Reported on 11/8/2019) 10 Syringe 0     [DISCONTINUED] ranolazine (RANEXA) 500 MG 12 hr tablet Take 1 tablet (500 mg) by mouth 2 times daily 60 tablet 3     [DISCONTINUED] ranolazine (RANEXA) 500 MG 12 hr tablet Take 1 tablet (500 mg) by mouth 2 times daily 60 tablet 3     [DISCONTINUED] rosuvastatin (CRESTOR) 20 MG tablet Take 1 tablet (20 mg) by mouth daily 30 tablet 3     [DISCONTINUED] simvastatin (ZOCOR) 80 MG tablet Take 80 mg by mouth At Bedtime       No facility-administered encounter medications on file as of 11/8/2019.        Again, thank you for allowing me to participate in the care of your patient.      Sincerely,    BRENDAN Mccrary [facility]

## 2019-11-08 NOTE — PATIENT INSTRUCTIONS
Blood work today    Stop Simvastatin and start Crestor (rosuvastatin) 20mg daily instead    Add Ranexa 500mg twice daily    Call my nurse if you have worse chest pain or problems  [phone]--Maria Victoria    See Dr. Yun in December with fasting labs and EKG

## 2019-11-08 NOTE — LETTER
11/8/2019    [facility]  [address] [place] [id number]    RE: Jose Tejada       Dear Colleague,    I had the pleasure of seeing Jose CANDY Tejada in the [facility].      History of Present Illness:    [id number]  Impression/Plan:         Jaya Flores, MSN, APRN-BC, CNP  Cardiology    Orders Placed This Encounter   Procedures     Basic metabolic panel     Lipid Profile     ALT     Follow-Up with Cardiologist     EKG 12-lead complete w/read - Clinics (to be scheduled)     Orders Placed This Encounter   Medications     DISCONTD: ranolazine (RANEXA) 500 MG 12 hr tablet     Sig: Take 1 tablet (500 mg) by mouth 2 times daily     Dispense:  60 tablet     Refill:  3     ranolazine (RANEXA) 500 MG 12 hr tablet     Sig: Take 1 tablet (500 mg) by mouth 2 times daily     Dispense:  60 tablet     Refill:  3     rosuvastatin (CRESTOR) 20 MG tablet     Sig: Take 1 tablet (20 mg) by mouth daily     Dispense:  30 tablet     Refill:  3     Medications Discontinued During This Encounter   Medication Reason     enoxaparin ANTICOAGULANT (LOVENOX) 80 MG/0.8ML syringe      ranolazine (RANEXA) 500 MG 12 hr tablet      simvastatin (ZOCOR) 80 MG tablet          Encounter Diagnoses   Name Primary?     Benign essential hypertension Yes     Coronary artery disease involving native coronary artery of native heart without angina pectoris        CURRENT MEDICATIONS:  Current Outpatient Medications   Medication Sig Dispense Refill     albuterol (PROAIR HFA/PROVENTIL HFA/VENTOLIN HFA) 108 (90 Base) MCG/ACT inhaler Inhale 2 puffs into the lungs every 4 hours as needed for shortness of breath / dyspnea or wheezing       clopidogrel (PLAVIX) 75 MG tablet Take 1 tablet (75 mg) by mouth daily 30 tablet 0     gabapentin (NEURONTIN) 400 MG capsule Take 1,200 mg by mouth 3 times daily       isosorbide mononitrate (IMDUR) 30 MG 24 hr tablet Take 1 tablet (30 mg) by mouth  daily 30 tablet 0     lidocaine (LIDODERM) 5 % patch Place 1 patch onto the skin every 24 hours       lisinopril (PRINIVIL/ZESTRIL) 20 MG tablet Take 1 tablet (20 mg) by mouth daily 30 tablet 0     nitroGLYcerin (NITROSTAT) 0.4 MG sublingual tablet For chest pain place 1 tablet under the tongue every 5 minutes for 3 doses. If symptoms persist 5 minutes after 1st dose call 911. 20 tablet 0     nortriptyline (PAMELOR) 25 MG capsule Take 25 mg by mouth At Bedtime        omeprazole (PRILOSEC) 20 MG CR capsule Take 20 mg by mouth At Bedtime        oxyCODONE-acetaminophen (PERCOCET) 5-325 MG per tablet Take 2 tablets by mouth 3 times daily as needed Patient takes 2 tablets TID Scheduled       ranolazine (RANEXA) 500 MG 12 hr tablet Take 1 tablet (500 mg) by mouth 2 times daily 60 tablet 3     rosuvastatin (CRESTOR) 20 MG tablet Take 1 tablet (20 mg) by mouth daily 30 tablet 3     Warfarin Sodium (COUMADIN PO) Take 5 mg by mouth daily 7.5 mg on Monday and Friday, 5 mg all other days         ALLERGIES     Allergies   Allergen Reactions     Niacin Other (See Comments)     Other reaction(s): Flushing         Norvasc [Amlodipine] Rash     Septra [Sulfamethoxazole W-Trimethoprim] Rash       PAST MEDICAL HISTORY:  Past Medical History:   Diagnosis Date     Asthma      CAD (coronary artery disease)     1996 angioplasty and stent to the prox LAD, PTCA with intracoronary stent placement of RCA, 70%OM; 10/24/19 Cath: Occluded RCA, prox circumflex lesion - pd/pa not significant, advised medical therapy first by Interventional cardiologist     DVT (deep venous thrombosis) (H)      Factor 5 Leiden mutation, heterozygous (H)      GERD (gastroesophageal reflux disease)      HTN (hypertension)      Hyperlipidemia      Neuropathy     wears brace R foot for drop foot     PRIMITIVO (obstructive sleep apnea)     cpap     Protein C deficiency (H)      Spinal stenosis        PAST SURGICAL HISTORY:  Past Surgical History:   Procedure Laterality Date      APPENDECTOMY OPEN  1958     ARTHROPLASTY HIP Left 1997     ARTHROPLASTY REVISION HIP Left 2013     AS SPINAL FUSION,ANT,EA ADNL LEVEL  2010    L4-L5     C TOTAL HIP ARTHROPLASTY Right 2015    THR     CV CORONARY ANGIOGRAM N/A 10/24/2019    Procedure: Coronary Angiogram;  Surgeon: Valdemar Hinojosa MD;  Location:  HE[facility]ATH LAB     CV CORONARY ANGIOGRAM  1996 1996 angioplasty and stent to the prox LAD, PTCA with intracoronary stent placement of RCA, 70%OM     CV FRACTIONAL FLOW RESERVE N/A 10/24/2019    Procedure: Fractional Flow Cook Sta;  Surgeon: Valdemar Hinojosa MD;  Location:  HEART CARD[facility] CATH LAB       FAMILY HISTORY:  Family History   Problem Relation Age of Onset     Myocardial Infarction Father      Heart Disease Maternal Grandfather        SOCIAL HISTORY:  Social History     Socioeconomic History     Marital status:      Spouse name: None     Number of children: None     Years of education: None     Highest education level: None   Occupational History     None   Social Needs     Financial resource strain: None     Food insecurity:     Worry: None     Inability: None     Transportation needs:     Medical: None     Non-medical: None   Tobacco Use     Smoking status: Former Smoker     Smokeless tobacco: Never Used   Substance and Sexual Activity     Alcohol use: Yes     Alcohol/week: 0.0 standard drinks     Comment: once every two weeks     Drug use: None     Sexual activity: None   Lifestyle     Physical activity:     Days per week: None     Minutes per session: None     Stress: None   Relationships     Social connections:     Talks on phone: None     Gets together: None     Attends Buddhist service: None     Active member of club or organization: None     Attends meetings of clubs or organizations: None     Relationship status: None     Intimate partner violence:     Fear of current or ex partner: None     Emotionally abused: None     Physically abused: None     Forced  "sexual activity: None   Other Topics Concern     Parent/sibling w/ CABG, MI or angioplasty before 65F 55M? Not Asked      Service Not Asked     Blood Transfusions Not Asked     Caffeine Concern No     Comment: 1 -2 cups daily      Occupational Exposure Not Asked     Hobby Hazards Not Asked     Sleep Concern Not Asked     Stress Concern Not Asked     Weight Concern Not Asked     Special Diet No     Back Care Not Asked     Exercise No     Bike Helmet Not Asked     Seat Belt Not Asked     Self-Exams Not Asked   Social History Narrative     None       Review of Systems:  Skin:  Negative       Eyes:  Negative      ENT:  Negative      Respiratory:  Positive for sleep apnea;CPAP     Cardiovascular:  Negative      Gastroenterology: Negative      Genitourinary:  Negative      Musculoskeletal:  Negative      Neurologic:  Positive for numbness or tingling of feet;numbness or tingling of hands;headaches occ headaches  Psychiatric:  Negative      Heme/Lymph/Imm:  Positive for allergies    Endocrine:  Negative        Physical Exam:  Vitals: /68 (BP Location: Right arm, Patient Position: Sitting, Cuff Size: Adult Regular)   Pulse 68   Ht 1.727 m (5' 8\")   Wt 84.4 kg (186 lb)   BMI 28.28 kg/m       Constitutional:  cooperative, alert and oriented, well developed, well nourished, in no acute distress   thin and frail looking    Skin:  warm and dry to the touch, no apparent skin lesions or masses noted          Head:  normocephalic, no masses or lesions        Eyes:  pupils equal and round, conjunctivae and lids unremarkable, sclera white, no xanthalasma, EOMS intact, no nystagmus        Lymph:No Cervical lymphadenopathy present     ENT:  no pallor or cyanosis, dentition good        Neck:  carotid pulses are full and equal bilaterally, JVP normal, no carotid bruit        Respiratory:  normal breath sounds, clear to auscultation, normal A-P diameter, normal symmetry, normal respiratory excursion, no use of accessory " muscles         Cardiac: regular rhythm, normal S1/S2, no S3 or S4, apical impulse not displaced, no murmurs, gallops or rubs                pulses full and equal, no bruits auscultated   2+           1+ 2+           2+     right radial artery clean without hum or bruit    GI:  abdomen soft, non-tender, BS normoactive, no mass, no HSM, no bruits obese difficult exam    Extremities and Muscular Skeletal:  no deformities, clubbing, cyanosis, erythema observed;no edema         brace on R leg--foot drop    Neurological:      foot drop on R-wears brace    Psych:  Alert and Oriented x 3      Recent Lab Results:  LIPID RESULTS:  Lab Results   Component Value Date    CHOL 106 10/23/2019    HDL 35 (L) 10/23/2019    LDL 49 10/23/2019    TRIG 109 10/23/2019    CHOLHDLRATIO 3.5 04/21/2014       LIVER ENZYME RESULTS:  Lab Results   Component Value Date    AST 21 10/22/2019    ALT 25 10/22/2019       CBC RESULTS:  Lab Results   Component Value Date    WBC 5.7 10/24/2019    RBC 4.55 10/24/2019    HGB 14.1 10/24/2019    HCT 41.0 10/24/2019    MCV 90 10/24/2019    MCH 31.0 10/24/2019    MCHC 34.4 10/24/2019    RDW 13.2 10/24/2019     10/24/2019       BMP RESULTS:  Lab Results   Component Value Date     11/08/2019    POTASSIUM 3.9 11/08/2019    CHLORIDE 102 11/08/2019    CO2 28 11/08/2019    ANIONGAP 5 11/08/2019     (H) 11/08/2019    BUN 12 11/08/2019    CR 0.75 11/08/2019    GFRESTIMATED >90 11/08/2019    GFRESTBLACK >90 11/08/2019    CODI 8.6 11/08/2019        A1C RESULTS:  No results found for: A1C    INR RESULTS:  Lab Results   Component Value Date    INR 1.35 (H) 10/25/2019    INR 1.61 (H) 10/24/2019           CC  No referring provider defined for this encounter.                  Service Date: 11/08/2019      OFFICE NOTE      HISTORY OF PRESENT ILLNESS:  Jose Tejada is a delightful 74-year-old gentleman followed here for many years by Dr. Gurvinder Yun.  He has a history of coronary artery disease with  stents to his LAD and right coronary in 1996 with a known 70% lesion to his circumflex.  He has a protein C deficiency and factor V Leiden mutation (heterozygous) with history of DVT.  He has developed peripheral neuropathy with right foot drop and wears a brace without frequent falls.  He also has hypertension, dyslipidemia, reflux disease, asthma, spinal stenosis and is followed at the [facility] by Dr. Meyer.      Two week ago, he presented to [facility] with exertional chest tightness.  This came on after a meal and playing bingo with his friends at the [facility].  He reported to Dr. Daniel at the time that he had been having exertional chest tightness for 2-3 weeks prior to admission that was occurring when he would go up stairs or work in his yard.  He had no rest pain.      His last nuclear study a year ago showed some lateral wall ischemia consistent with the circumflex stenosis and was managed medically.  He was asymptomatic until recently.  He has developed slow heart rates and his metoprolol was discontinued recently because of that.  He was also placed on lisinopril recently for high blood pressure.  The patient feels his chest discomfort increased when the metoprolol was stopped.  His troponins in the hospital were negative.  His EKG was unremarkable.  However, Dr. Daniel felt that an angiogram was indicated.      This was performed via right radial artery approach by Dr. Hinojosa on 10/22.  Findings included a proximal chronic total occlusion of a dominant right coronary artery which had profuse left-to-right collateral flow.  There was no significant re-narrowing in the LAD which had been stented in the past and there was an ongoing 70% proximal circumflex with a flow reserve (Pd/Pa) of 0.95(cut off is 0.89) suggesting no revascularization would be necessary.  He was placed on isosorbide and if fails medical therapy, the recommendation was to consider a  or CHIP  procedure.  I have messaged Dr. Yun to review his films but I have not heard back yet.  Plavix was also added during his recent admission and aspirin was stopped.      He remains on his warfarin therapy and he was bridged post procedure but INR is now therapeutic, so bridging has been discontinued (Lovenox).      Echocardiography was done in the hospital showing preserved LV function at 55%-60%.  The ascending aorta is mildly dilated at the root at 4.2 cm, ascending position 4.3 cm.  Right ventricle is moderately dilated with mild decrease in RV systolic function.  Left atrium is moderately to severely dilated.  He has known sleep apnea treated with Cpap. Frequent PVCs were also noted during this echo.      Jose tells me, since being home, he is still having mild exertional discomfort that is lessened but not disappeared.  He has a mild acute on chronic headache from the isosorbide.  His blood pressure is relatively soft, not allowing me to increase his isosorbide further.  His right radial artery has healed well with no hum or bruit.  His lungs are clear.  He looks much thinner than the last time I saw him many years ago and in fact, over the past year and a half, he has lost 40 pounds which he states is intentional.  Lipid profile in October showed an LDL of 49, HDL 35, triglycerides 109.  He has been on simvastatin 80 mg per day.  QT interval on nortriptyline was normal on his EKG in the hospital.      IMPRESSION AND PLAN:   1.  Recent onset of unstable angina with known coronary artery disease.  His anatomy is outlined above as is his angiogram.  I will maximize medical therapy by adding Ranexa 500 mg twice daily.  I will send a prescription to the [facility].  I am not able to increase his isosorbide due to headache and low blood pressure.  We will continue Plavix.  I have asked Dr. Yun to review his films in the event that we cannot obtain symptom control on medical therapy.  Because of the  drug-drug interaction between Ranexa and simvastatin high-dose, I will stop simvastatin and place him on Crestor.  Please see below.  I have scheduled a visit back with us in 4-6 weeks with an EKG to check his QTc on the Ranexa.  For now, continue Plavix remain off aspirin.   2.  Hypertension, controlled.   3.  Dyslipidemia, controlled.  Switch simvastatin to Crestor 20 mg per day.  He will need a lipid profile in 8-12 weeks.   4.  History of DVT and protein C deficiency and heterozygous for factor V Leiden.  He remains on warfarin and does need bridging prior to or after any invasive procedures where the Coumadin is interrupted,   5.  Neuropathy with right foot drop.  He has not had frequent falls.   6.  Basic metabolic panel was drawn today post contrast and it is normal.      We will plan to see him back in 4-6 weeks' time.  Once I hear from Dr. Yun regarding the potential for revascularization with  procedure, I will contact the patient.  I have asked him to call me in the interim if his chest pain increases or if he has any problems with Ranexa.  I will forward my note to the [facility] with prescriptions to try to obtain coverage for these new medications.  For the short term, they were sent to a local pharmacy.        ADDENDUM: Dr. Mccall reviewed films and feels there may be thrombus burden in the RCA. We will try medical therapy for 30 days and if fails we will consider bridging with lovenox and perhaps IV Heparin for 24 hours prior to the angiogram with  attempt. I will try to move up his office visit and I have called the patient.    Greater than 50% of this 45-minute visit today was spent in counseling/collaboration         ARSENIO ROSADO NP             D: 2019   T: 2019   MT: MARILYNN      Name:     ELIE FONTANEZ   MRN:      [id number]        Account:      [id number]   :      1945           Service Date: 2019      Document: [id number]        Thank  you for allowing me to participate in the care of your patient.      Sincerely,     BRENDAN Mccrary [facility]    cc:   No referring provider defined for this encounter.

## 2019-11-08 NOTE — PROGRESS NOTES
History of Present Illness:    [id number]  Impression/Plan:         Jaya Flores, MSN, APRN-BC, CNP  Cardiology    Orders Placed This Encounter   Procedures     Basic metabolic panel     Lipid Profile     ALT     Follow-Up with Cardiologist     EKG 12-lead complete w/read - Clinics (to be scheduled)     Orders Placed This Encounter   Medications     DISCONTD: ranolazine (RANEXA) 500 MG 12 hr tablet     Sig: Take 1 tablet (500 mg) by mouth 2 times daily     Dispense:  60 tablet     Refill:  3     ranolazine (RANEXA) 500 MG 12 hr tablet     Sig: Take 1 tablet (500 mg) by mouth 2 times daily     Dispense:  60 tablet     Refill:  3     rosuvastatin (CRESTOR) 20 MG tablet     Sig: Take 1 tablet (20 mg) by mouth daily     Dispense:  30 tablet     Refill:  3     Medications Discontinued During This Encounter   Medication Reason     enoxaparin ANTICOAGULANT (LOVENOX) 80 MG/0.8ML syringe      ranolazine (RANEXA) 500 MG 12 hr tablet      simvastatin (ZOCOR) 80 MG tablet          Encounter Diagnoses   Name Primary?     Benign essential hypertension Yes     Coronary artery disease involving native coronary artery of native heart without angina pectoris        CURRENT MEDICATIONS:  Current Outpatient Medications   Medication Sig Dispense Refill     albuterol (PROAIR HFA/PROVENTIL HFA/VENTOLIN HFA) 108 (90 Base) MCG/ACT inhaler Inhale 2 puffs into the lungs every 4 hours as needed for shortness of breath / dyspnea or wheezing       clopidogrel (PLAVIX) 75 MG tablet Take 1 tablet (75 mg) by mouth daily 30 tablet 0     gabapentin (NEURONTIN) 400 MG capsule Take 1,200 mg by mouth 3 times daily       isosorbide mononitrate (IMDUR) 30 MG 24 hr tablet Take 1 tablet (30 mg) by mouth daily 30 tablet 0     lidocaine (LIDODERM) 5 % patch Place 1 patch onto the skin every 24 hours       lisinopril (PRINIVIL/ZESTRIL) 20 MG tablet Take 1 tablet (20 mg) by mouth daily 30 tablet 0     nitroGLYcerin (NITROSTAT) 0.4 MG sublingual tablet For  chest pain place 1 tablet under the tongue every 5 minutes for 3 doses. If symptoms persist 5 minutes after 1st dose call 911. 20 tablet 0     nortriptyline (PAMELOR) 25 MG capsule Take 25 mg by mouth At Bedtime        omeprazole (PRILOSEC) 20 MG CR capsule Take 20 mg by mouth At Bedtime        oxyCODONE-acetaminophen (PERCOCET) 5-325 MG per tablet Take 2 tablets by mouth 3 times daily as needed Patient takes 2 tablets TID Scheduled       ranolazine (RANEXA) 500 MG 12 hr tablet Take 1 tablet (500 mg) by mouth 2 times daily 60 tablet 3     rosuvastatin (CRESTOR) 20 MG tablet Take 1 tablet (20 mg) by mouth daily 30 tablet 3     Warfarin Sodium (COUMADIN PO) Take 5 mg by mouth daily 7.5 mg on Monday and Friday, 5 mg all other days         ALLERGIES     Allergies   Allergen Reactions     Niacin Other (See Comments)     Other reaction(s): Flushing         Norvasc [Amlodipine] Rash     Septra [Sulfamethoxazole W-Trimethoprim] Rash       PAST MEDICAL HISTORY:  Past Medical History:   Diagnosis Date     Asthma      CAD (coronary artery disease)     1996 angioplasty and stent to the prox LAD, PTCA with intracoronary stent placement of RCA, 70%OM; 10/24/19 Cath: Occluded RCA, prox circumflex lesion - pd/pa not significant, advised medical therapy first by Interventional cardiologist     DVT (deep venous thrombosis) (H)      Factor 5 Leiden mutation, heterozygous (H)      GERD (gastroesophageal reflux disease)      HTN (hypertension)      Hyperlipidemia      Neuropathy     wears brace R foot for drop foot     PRIMITIVO (obstructive sleep apnea)     cpap     Protein C deficiency (H)      Spinal stenosis        PAST SURGICAL HISTORY:  Past Surgical History:   Procedure Laterality Date     APPENDECTOMY OPEN  1958     ARTHROPLASTY HIP Left 1997     ARTHROPLASTY REVISION HIP Left 2013     AS SPINAL FUSION,ANT,EA ADNL LEVEL  2010    L4-L5     C TOTAL HIP ARTHROPLASTY Right 2015    THR     CV CORONARY ANGIOGRAM N/A 10/24/2019    Procedure:  Coronary Angiogram;  Surgeon: Valdemar Hinojosa MD;  Location: [facility] CARDIAC CATH LAB     CV CORONARY ANGIOGRAM  1996 1996 angioplasty and stent to the prox LAD, PTCA with intracoronary stent placement of RCA, 70%OM     CV FRACTIONAL FLOW RESERVE N/A 10/24/2019    Procedure: Fractional Flow [place];  Surgeon: Valdemar Hinojosa MD;  Location:  HEART CARDIAC CATH LAB       FAMILY HISTORY:  Family History   Problem Relation Age of Onset     Myocardial Infarction Father      Heart Disease Maternal Grandfather        SOCIAL HISTORY:  Social History     Socioeconomic History     Marital status:      Spouse name: None     Number of children: None     Years of education: None     Highest education level: None   Occupational History     None   Social Needs     Financial resource strain: None     Food insecurity:     Worry: None     Inability: None     Transportation needs:     Medical: None     Non-medical: None   Tobacco Use     Smoking status: Former Smoker     Smokeless tobacco: Never Used   Substance and Sexual Activity     Alcohol use: Yes     Alcohol/week: 0.0 standard drinks     Comment: once every two weeks     Drug use: None     Sexual activity: None   Lifestyle     Physical activity:     Days per week: None     Minutes per session: None     Stress: None   Relationships     Social connections:     Talks on phone: None     Gets together: None     Attends Islam service: None     Active member of club or organization: None     Attends meetings of clubs or organizations: None     Relationship status: None     Intimate partner violence:     Fear of current or ex partner: None     Emotionally abused: None     Physically abused: None     Forced sexual activity: None   Other Topics Concern     Parent/sibling w/ CABG, MI or angioplasty before 65F 55M? Not Asked      Service Not Asked     Blood Transfusions Not Asked     Caffeine Concern No     Comment: 1 -2 cups daily      Occupational  "Exposure Not Asked     Hobby Hazards Not Asked     Sleep Concern Not Asked     Stress Concern Not Asked     Weight Concern Not Asked     Special Diet No     Back Care Not Asked     Exercise No     Bike Helmet Not Asked     Seat Belt Not Asked     Self-Exams Not Asked   Social History Narrative     None       Review of Systems:  Skin:  Negative       Eyes:  Negative      ENT:  Negative      Respiratory:  Positive for sleep apnea;CPAP     Cardiovascular:  Negative      Gastroenterology: Negative      Genitourinary:  Negative      Musculoskeletal:  Negative      Neurologic:  Positive for numbness or tingling of feet;numbness or tingling of hands;headaches occ headaches  Psychiatric:  Negative      Heme/Lymph/Imm:  Positive for allergies    Endocrine:  Negative        Physical Exam:  Vitals: /68 (BP Location: Right arm, Patient Position: Sitting, Cuff Size: Adult Regular)   Pulse 68   Ht 1.727 m (5' 8\")   Wt 84.4 kg (186 lb)   BMI 28.28 kg/m      Constitutional:  cooperative, alert and oriented, well developed, well nourished, in no acute distress   thin and frail looking    Skin:  warm and dry to the touch, no apparent skin lesions or masses noted          Head:  normocephalic, no masses or lesions        Eyes:  pupils equal and round, conjunctivae and lids unremarkable, sclera white, no xanthalasma, EOMS intact, no nystagmus        Lymph:No Cervical lymphadenopathy present     ENT:  no pallor or cyanosis, dentition good        Neck:  carotid pulses are full and equal bilaterally, JVP normal, no carotid bruit        Respiratory:  normal breath sounds, clear to auscultation, normal A-P diameter, normal symmetry, normal respiratory excursion, no use of accessory muscles         Cardiac: regular rhythm, normal S1/S2, no S3 or S4, apical impulse not displaced, no murmurs, gallops or rubs                pulses full and equal, no bruits auscultated   2+           1+ 2+           2+     right radial artery clean " without hum or bruit    GI:  abdomen soft, non-tender, BS normoactive, no mass, no HSM, no bruits obese difficult exam    Extremities and Muscular Skeletal:  no deformities, clubbing, cyanosis, erythema observed;no edema         brace on R leg--foot drop    Neurological:      foot drop on R-wears brace    Psych:  Alert and Oriented x 3      Recent Lab Results:  LIPID RESULTS:  Lab Results   Component Value Date    CHOL 106 10/23/2019    HDL 35 (L) 10/23/2019    LDL 49 10/23/2019    TRIG 109 10/23/2019    CHOLHDLRATIO 3.5 04/21/2014       LIVER ENZYME RESULTS:  Lab Results   Component Value Date    AST 21 10/22/2019    ALT 25 10/22/2019       CBC RESULTS:  Lab Results   Component Value Date    WBC 5.7 10/24/2019    RBC 4.55 10/24/2019    HGB 14.1 10/24/2019    HCT 41.0 10/24/2019    MCV 90 10/24/2019    MCH 31.0 10/24/2019    MCHC 34.4 10/24/2019    RDW 13.2 10/24/2019     10/24/2019       BMP RESULTS:  Lab Results   Component Value Date     11/08/2019    POTASSIUM 3.9 11/08/2019    CHLORIDE 102 11/08/2019    CO2 28 11/08/2019    ANIONGAP 5 11/08/2019     (H) 11/08/2019    BUN 12 11/08/2019    CR 0.75 11/08/2019    GFRESTIMATED >90 11/08/2019    GFRESTBLACK >90 11/08/2019    CODI 8.6 11/08/2019        A1C RESULTS:  No results found for: A1C    INR RESULTS:  Lab Results   Component Value Date    INR 1.35 (H) 10/25/2019    INR 1.61 (H) 10/24/2019           CC  No referring provider defined for this encounter.

## 2019-12-19 PROBLEM — I44.0 1ST DEGREE AV BLOCK: Status: ACTIVE | Noted: 2019-05-29

## 2019-12-20 ENCOUNTER — OFFICE VISIT (OUTPATIENT)
Dept: CARDIOLOGY | Facility: CLINIC | Age: 74
End: 2019-12-20
Attending: NURSE PRACTITIONER
Payer: MEDICARE

## 2019-12-20 VITALS
HEIGHT: 68 IN | DIASTOLIC BLOOD PRESSURE: 68 MMHG | BODY MASS INDEX: 28.01 KG/M2 | HEART RATE: 68 BPM | SYSTOLIC BLOOD PRESSURE: 110 MMHG | WEIGHT: 184.8 LBS

## 2019-12-20 DIAGNOSIS — I25.10 CORONARY ARTERY DISEASE INVOLVING NATIVE CORONARY ARTERY OF NATIVE HEART WITHOUT ANGINA PECTORIS: ICD-10-CM

## 2019-12-20 LAB
ALT SERPL W P-5'-P-CCNC: 20 U/L (ref 0–70)
CHOLEST SERPL-MCNC: 94 MG/DL
HDLC SERPL-MCNC: 40 MG/DL
LDLC SERPL CALC-MCNC: 36 MG/DL
NONHDLC SERPL-MCNC: 54 MG/DL
TRIGL SERPL-MCNC: 92 MG/DL

## 2019-12-20 PROCEDURE — 80061 LIPID PANEL: CPT | Performed by: NURSE PRACTITIONER

## 2019-12-20 PROCEDURE — 99215 OFFICE O/P EST HI 40 MIN: CPT | Performed by: INTERNAL MEDICINE

## 2019-12-20 PROCEDURE — 93000 ELECTROCARDIOGRAM COMPLETE: CPT | Performed by: INTERNAL MEDICINE

## 2019-12-20 PROCEDURE — 36415 COLL VENOUS BLD VENIPUNCTURE: CPT | Performed by: NURSE PRACTITIONER

## 2019-12-20 PROCEDURE — 84460 ALANINE AMINO (ALT) (SGPT): CPT | Performed by: NURSE PRACTITIONER

## 2019-12-20 RX ORDER — METOPROLOL SUCCINATE 25 MG/1
12.5 TABLET, EXTENDED RELEASE ORAL DAILY
Qty: 50 TABLET | Refills: 3 | Status: SHIPPED | OUTPATIENT
Start: 2019-12-20 | End: 2020-01-15

## 2019-12-20 ASSESSMENT — MIFFLIN-ST. JEOR: SCORE: 1562.75

## 2019-12-20 NOTE — LETTER
12/20/2019      [facility]  [address] [place] [id number]      RE: Jose Tejada       Dear Colleague,    I had the pleasure of seeing Jose Tejada in the [facility].    Service Date: 12/20/2019      HISTORY OF PRESENT ILLNESS:  I had the pleasure of reacquainting myself with our mutual patient, Jose Tejada.  I last saw him in 04/2018.  He has a history of coronary disease dating back more than 2 decades.  He has had a previous myocardial infarction.  He has had stenting to his LAD and to his right coronary artery, many, many years ago.  He had a 70% left circumflex lesion which had normal flow reserve, and he was doing well.  He also has hypercoagulable state and a DVT.  He has protein C deficiency, and he is also heterozygous for factor V Leiden.  As a result, he has been maintained on Coumadin, but he is also on Plavix.  Apparently in September he started having chest pain.  He presented to our hospital in October.  One of my partners saw him and heart cath was performed, which I reviewed with him today.  The left main artery is open.  LAD stent is open.  There is mild to no more than moderate diffuse LAD disease.  The left circumflex had a 70% narrowing again, but the flow reserve was normal.  The right coronary artery is totally occluded shortly after its origin and right after a conus branch.  The conus branch also has severe narrowing.  There is a robust collateral from the LAD apical epicardial to the PDA vessel.  There is a smaller collateral from the left circumflex to the posterolateral branch.  The proximal right coronary artery has a somewhat ambiguous takeoff.  It is certainly not tapered, but it almost has the look of an acute or subacute thrombosis.  The distance of the right coronary artery that is occluded is hard to tell.  The camera did not stay on long when they did the left coronary injections to see the  collateral, but I am confident that there is probably more than 20 mm of vessel missing.  The other problem is that the right coronary artery is heavily, heavily calcified.  No doubt, part of that is also the stent that was placed many years ago.  Calcium does make angioplasty much more difficult, although it does provide a road map for the vessel it is.  There may also be some very small LAD septal collaterals to the PDA, but the majority of the flow is apical LAD epicardial to the PDA.  The patient was maintained on medication.  He was taken off his beta blocker for fatigue in the past.  I am going to restart the metoprolol at Toprol-XL 12.5 mg a day.  He is already on nitrates and Ranexa, and his chest pain is better but still present.  On echo, his LV function is preserved.  His RV is enlarged and decreased function.  No doubt, that is because of ischemia to the right ventricle since he does not have known cor pulmonale or lung disease.  Today we reviewed the actual films with him.  His blood pressure and pulse look good, and that is with a heart rate of 68 and blood pressure 110, so I am not going to increase the metoprolol above 12.5 today at least.  He had been on 25 in the past.  We have the option of raising the Ranexa to 1000 mg b.i.d. later.  We do not have a lot more room to go with the isosorbide because of the somewhat low blood pressure unless we lowered the dose of lisinopril, which would be an option.  We talked about the pros and cons of  angioplasty.  I think if we are going to do it, we should do it on the earlier end because I suspect that some of this is a thrombotic occlusion and it is going to turn harder the longer we wait.  The patient will get back to us about his thoughts about doing this.  I talked to him about the fact that  angioplasty has a lower success rate and a higher complication rate than standard angioplasty, but I suspect that the success rate is still going to be above  60%-70% with a complication rate likely of 7% or less.  He would obviously have to come off his Coumadin in preparation for that.  He is already on long-term Plavix.  I am not going to add aspirin at this point, given the Coumadin and Plavix combination.  The patient will get back to us if he wants to move forward.  With regards to the lab tests, his cholesterol numbers were checked on , which was this morning.  They are excellent.  HDL is 40, LDL 36, triglycerides 92.  Electrolyte panel was checked in November and it was also quite good.      Today's visit was 45 minutes, greater than 50% counseling to discuss the above.  Thank you for allowing me to see Mr. Tejada.  We will keep you apprised of his decision.      cc:   [place] [phone] [place] [phone]      [facility]  [address]      Jose Tejada    [address] [place]  [address]         MELIA VENCES MD             D: 2019   T: 2019   MT: CARY      Name:     JOSE TEJADA   MRN:      [id number]        Account:      [id number]   :      1945           Service Date: 2019      Document: [id number]         Outpatient Encounter Medications as of 2019   Medication Sig Dispense Refill     albuterol (PROAIR HFA/PROVENTIL HFA/VENTOLIN HFA) 108 (90 Base) MCG/ACT inhaler Inhale 2 puffs into the lungs every 4 hours as needed for shortness of breath / dyspnea or wheezing       clopidogrel (PLAVIX) 75 MG tablet Take 1 tablet (75 mg) by mouth daily 30 tablet 0     gabapentin (NEURONTIN) 400 MG capsule Take 1,200 mg by mouth 3 times daily       isosorbide mononitrate (IMDUR) 30 MG 24 hr tablet Take 1 tablet (30 mg) by mouth daily 30 tablet 0     lidocaine (LIDODERM) 5 % patch Place 1 patch onto the skin every 24 hours       lisinopril (PRINIVIL/ZESTRIL) 20 MG tablet Take 1 tablet (20 mg) by mouth daily 30  tablet 0     metoprolol succinate ER (TOPROL XL) 25 MG 24 hr tablet Take 0.5 tablets (12.5 mg) by mouth daily 50 tablet 3     nitroGLYcerin (NITROSTAT) 0.4 MG sublingual tablet For chest pain place 1 tablet under the tongue every 5 minutes for 3 doses. If symptoms persist 5 minutes after 1st dose call 911. 20 tablet 0     nortriptyline (PAMELOR) 25 MG capsule Take 25 mg by mouth At Bedtime        omeprazole (PRILOSEC) 20 MG CR capsule Take 20 mg by mouth At Bedtime        oxyCODONE-acetaminophen (PERCOCET) 5-325 MG per tablet Take 2 tablets by mouth 3 times daily as needed Patient takes 2 tablets TID Scheduled       ranolazine (RANEXA) 500 MG 12 hr tablet Take 1 tablet (500 mg) by mouth 2 times daily 180 tablet 3     rosuvastatin (CRESTOR) 20 MG tablet Take 1 tablet (20 mg) by mouth daily 90 tablet 3     Warfarin Sodium (COUMADIN PO) Take 5 mg by mouth daily 7.5 mg on Monday and Friday, 5 mg all other days       No facility-administered encounter medications on file as of 12/20/2019.        Again, thank you for allowing me to participate in the care of your patient.      Sincerely,    Gurvinder Yun MD     [facility]

## 2019-12-20 NOTE — PROGRESS NOTES
Service Date: 12/20/2019      HISTORY OF PRESENT ILLNESS:  I had the pleasure of reacquainting myself with our mutual patient, Jose Tejada.  I last saw him in 04/2018.  He has a history of coronary disease dating back more than 2 decades.  He has had a previous myocardial infarction.  He has had stenting to his LAD and to his right coronary artery, many, many years ago.  He had a 70% left circumflex lesion which had normal flow reserve, and he was doing well.  He also has hypercoagulable state and a DVT.  He has protein C deficiency, and he is also heterozygous for factor V Leiden.  As a result, he has been maintained on Coumadin, but he is also on Plavix.  Apparently in September he started having chest pain.  He presented to our hospital in October.  One of my partners saw him and heart cath was performed, which I reviewed with him today.  The left main artery is open.  LAD stent is open.  There is mild to no more than moderate diffuse LAD disease.  The left circumflex had a 70% narrowing again, but the flow reserve was normal.  The right coronary artery is totally occluded shortly after its origin and right after a conus branch.  The conus branch also has severe narrowing.  There is a robust collateral from the LAD apical epicardial to the PDA vessel.  There is a smaller collateral from the left circumflex to the posterolateral branch.  The proximal right coronary artery has a somewhat ambiguous takeoff.  It is certainly not tapered, but it almost has the look of an acute or subacute thrombosis.  The distance of the right coronary artery that is occluded is hard to tell.  The camera did not stay on long when they did the left coronary injections to see the collateral, but I am confident that there is probably more than 20 mm of vessel missing.  The other problem is that the right coronary artery is heavily, heavily calcified.  No doubt, part of that is also the stent that was placed many years ago.  Calcium  does make angioplasty much more difficult, although it does provide a road map for the vessel it is.  There may also be some very small LAD septal collaterals to the PDA, but the majority of the flow is apical LAD epicardial to the PDA.  The patient was maintained on medication.  He was taken off his beta blocker for fatigue in the past.  I am going to restart the metoprolol at Toprol-XL 12.5 mg a day.  He is already on nitrates and Ranexa, and his chest pain is better but still present.  On echo, his LV function is preserved.  His RV is enlarged and decreased function.  No doubt, that is because of ischemia to the right ventricle since he does not have known cor pulmonale or lung disease.  Today we reviewed the actual films with him.  His blood pressure and pulse look good, and that is with a heart rate of 68 and blood pressure 110, so I am not going to increase the metoprolol above 12.5 today at least.  He had been on 25 in the past.  We have the option of raising the Ranexa to 1000 mg b.i.d. later.  We do not have a lot more room to go with the isosorbide because of the somewhat low blood pressure unless we lowered the dose of lisinopril, which would be an option.  We talked about the pros and cons of  angioplasty.  I think if we are going to do it, we should do it on the earlier end because I suspect that some of this is a thrombotic occlusion and it is going to turn harder the longer we wait.  The patient will get back to us about his thoughts about doing this.  I talked to him about the fact that  angioplasty has a lower success rate and a higher complication rate than standard angioplasty, but I suspect that the success rate is still going to be above 60%-70% with a complication rate likely of 7% or less.  He would obviously have to come off his Coumadin in preparation for that.  He is already on long-term Plavix.  I am not going to add aspirin at this point, given the Coumadin and Plavix combination.   The patient will get back to us if he wants to move forward.  With regards to the lab tests, his cholesterol numbers were checked on , which was this morning.  They are excellent.  HDL is 40, LDL 36, triglycerides 92.  Electrolyte panel was checked in November and it was also quite good.      Today's visit was 45 minutes, greater than 50% counseling to discuss the above.  Thank you for allowing me to see Mr. Tejada.  We will keep you apprised of his decision.      cc:   Valeri [place] [phone]      [facility] [place]  [address]      Elie Tejada    [address] [place]  [address]         MELIA VENCES MD             D: 2019   T: 2019   MT: CARY      Name:     ELIE TEJADA   MRN:      [id number]        Account:      [id number]   :      1945           Service Date: 2019      Document: [id number]

## 2019-12-20 NOTE — PROGRESS NOTES
HPI and Plan:   See dictation    No orders of the defined types were placed in this encounter.    Orders Placed This Encounter   Medications     metoprolol succinate ER (TOPROL XL) 25 MG 24 hr tablet     Sig: Take 0.5 tablets (12.5 mg) by mouth daily     Dispense:  50 tablet     Refill:  3     There are no discontinued medications.      Encounter Diagnosis   Name Primary?     Coronary artery disease involving native coronary artery of native heart without angina pectoris        CURRENT MEDICATIONS:  Current Outpatient Medications   Medication Sig Dispense Refill     albuterol (PROAIR HFA/PROVENTIL HFA/VENTOLIN HFA) 108 (90 Base) MCG/ACT inhaler Inhale 2 puffs into the lungs every 4 hours as needed for shortness of breath / dyspnea or wheezing       clopidogrel (PLAVIX) 75 MG tablet Take 1 tablet (75 mg) by mouth daily 30 tablet 0     gabapentin (NEURONTIN) 400 MG capsule Take 1,200 mg by mouth 3 times daily       isosorbide mononitrate (IMDUR) 30 MG 24 hr tablet Take 1 tablet (30 mg) by mouth daily 30 tablet 0     lidocaine (LIDODERM) 5 % patch Place 1 patch onto the skin every 24 hours       lisinopril (PRINIVIL/ZESTRIL) 20 MG tablet Take 1 tablet (20 mg) by mouth daily 30 tablet 0     metoprolol succinate ER (TOPROL XL) 25 MG 24 hr tablet Take 0.5 tablets (12.5 mg) by mouth daily 50 tablet 3     nitroGLYcerin (NITROSTAT) 0.4 MG sublingual tablet For chest pain place 1 tablet under the tongue every 5 minutes for 3 doses. If symptoms persist 5 minutes after 1st dose call 911. 20 tablet 0     nortriptyline (PAMELOR) 25 MG capsule Take 25 mg by mouth At Bedtime        omeprazole (PRILOSEC) 20 MG CR capsule Take 20 mg by mouth At Bedtime        oxyCODONE-acetaminophen (PERCOCET) 5-325 MG per tablet Take 2 tablets by mouth 3 times daily as needed Patient takes 2 tablets TID Scheduled       ranolazine (RANEXA) 500 MG 12 hr tablet Take 1 tablet (500 mg) by mouth 2 times daily 180 tablet 3     rosuvastatin (CRESTOR) 20 MG  tablet Take 1 tablet (20 mg) by mouth daily 90 tablet 3     Warfarin Sodium (COUMADIN PO) Take 5 mg by mouth daily 7.5 mg on Monday and Friday, 5 mg all other days         ALLERGIES     Allergies   Allergen Reactions     Niacin Other (See Comments)     Other reaction(s): Flushing         Norvasc [Amlodipine] Rash     Septra [Sulfamethoxazole W-Trimethoprim] Rash       PAST MEDICAL HISTORY:  Past Medical History:   Diagnosis Date     Asthma      CAD (coronary artery disease)     1996 angioplasty and stent to the prox LAD, PTCA with intracoronary stent placement of RCA, 70%OM; 10/24/19 Cath: Occluded RCA, prox circumflex lesion - pd/pa not significant, advised medical therapy first by Interventional cardiologist     DVT (deep venous thrombosis) (H)      Factor 5 Leiden mutation, heterozygous (H)      GERD (gastroesophageal reflux disease)      HTN (hypertension)      Hyperlipidemia      Neuropathy     wears brace R foot for drop foot     PRIMITIVO (obstructive sleep apnea)     cpap     Protein C deficiency (H)      Spinal stenosis        PAST SURGICAL HISTORY:  Past Surgical History:   Procedure Laterality Date     APPENDECTOMY OPEN  1958     ARTHROPLASTY HIP Left 1997     ARTHROPLASTY REVISION HIP Left 2013     AS SPINAL FUSION,ANT,EA ADNL LEVEL  2010    L4-L5     C TOTAL HIP ARTHROPLASTY Right 2015    THR     CV CORONARY ANGIOGRAM N/A 10/24/2019    Procedure: Coronary Angiogram;  Surgeon: Valdemar Hinojosa MD;  Location: [facility] CARDIAC CATH LAB     CV CORONARY ANGIOGRAM  1996 1996 angioplasty and stent to the prox LAD, PTCA with intracoronary stent placement of RCA, 70%OM     CV FRACTIONAL FLOW RESERVE N/A 10/24/2019    Procedure: Fractional Flow [place];  Surgeon: Valdemar Hinojosa MD;  Location: [facility] CARDIAC CATH LAB       FAMILY HISTORY:  Family History   Problem Relation Age of Onset     Myocardial Infarction Father      Heart Disease Maternal Grandfather        SOCIAL HISTORY:  Social History      Socioeconomic History     Marital status:      Spouse name: None     Number of children: None     Years of education: None     Highest education level: None   Occupational History     None   Social Needs     Financial resource strain: None     Food insecurity:     Worry: None     Inability: None     Transportation needs:     Medical: None     Non-medical: None   Tobacco Use     Smoking status: Former Smoker     Smokeless tobacco: Never Used   Substance and Sexual Activity     Alcohol use: Yes     Alcohol/week: 0.0 standard drinks     Comment: once every two weeks if that     Drug use: None     Sexual activity: None   Lifestyle     Physical activity:     Days per week: None     Minutes per session: None     Stress: None   Relationships     Social connections:     Talks on phone: None     Gets together: None     Attends Jain service: None     Active member of club or organization: None     Attends meetings of clubs or organizations: None     Relationship status: None     Intimate partner violence:     Fear of current or ex partner: None     Emotionally abused: None     Physically abused: None     Forced sexual activity: None   Other Topics Concern     Parent/sibling w/ CABG, MI or angioplasty before 65F 55M? Not Asked      Service Not Asked     Blood Transfusions Not Asked     Caffeine Concern No     Comment: 1 -2 cups daily      Occupational Exposure Not Asked     Hobby Hazards Not Asked     Sleep Concern Not Asked     Stress Concern Not Asked     Weight Concern Not Asked     Special Diet No     Back Care Not Asked     Exercise No     Bike Helmet Not Asked     Seat Belt Not Asked     Self-Exams Not Asked   Social History Narrative     None       Review of Systems:  Skin:  Negative     Eyes:  Negative    ENT:  Negative    Respiratory:  Positive for sleep apnea;CPAP  Cardiovascular:  Negative    Gastroenterology: Negative    Genitourinary:  Positive for nocturia;decreased urinary  "stream  Musculoskeletal:  Positive for nocturnal cramping  Neurologic:  Positive for numbness or tingling of hands;numbness or tingling of feet  Psychiatric:  Negative    Heme/Lymph/Imm:  Positive for allergies  Endocrine:  Negative      Physical Exam:  Vitals: /68   Pulse 68   Ht 1.727 m (5' 8\")   Wt 83.8 kg (184 lb 12.8 oz)   BMI 28.10 kg/m      Constitutional:  cooperative, alert and oriented, well developed, well nourished, in no acute distress        Skin:  warm and dry to the touch, no apparent skin lesions or masses noted          Head:  normocephalic, no masses or lesions        Eyes:  pupils equal and round, conjunctivae and lids unremarkable, sclera white, no xanthalasma, EOMS intact, no nystagmus        Lymph:No Cervical lymphadenopathy present     ENT:  no pallor or cyanosis, dentition good        Neck:  carotid pulses are full and equal bilaterally, JVP normal, no carotid bruit        Respiratory:  normal breath sounds, clear to auscultation, normal A-P diameter, normal symmetry, normal respiratory excursion, no use of accessory muscles         Cardiac: regular rhythm, normal S1/S2, no S3 or S4, apical impulse not displaced, no murmurs, gallops or rubs occasional premature beats S4            pulses full and equal, no bruits auscultated                                        GI:  abdomen soft, non-tender, BS normoactive, no mass, no HSM, no bruits obese      Extremities and Muscular Skeletal:  no edema         brace on R leg--foot drop    Neurological:  no gross motor deficits        Psych:  Alert and Oriented x 3      Recent Lab Results:  LIPID RESULTS:  Lab Results   Component Value Date    CHOL 94 12/20/2019    HDL 40 12/20/2019    LDL 36 12/20/2019    TRIG 92 12/20/2019    CHOLHDLRATIO 3.5 04/21/2014       LIVER ENZYME RESULTS:  Lab Results   Component Value Date    AST 21 10/22/2019    ALT 20 12/20/2019       CBC RESULTS:  Lab Results   Component Value Date    WBC 5.7 10/24/2019    RBC " 4.55 10/24/2019    HGB 14.1 10/24/2019    HCT 41.0 10/24/2019    MCV 90 10/24/2019    MCH 31.0 10/24/2019    MCHC 34.4 10/24/2019    RDW 13.2 10/24/2019     10/24/2019       BMP RESULTS:  Lab Results   Component Value Date     11/08/2019    POTASSIUM 3.9 11/08/2019    CHLORIDE 102 11/08/2019    CO2 28 11/08/2019    ANIONGAP 5 11/08/2019     (H) 11/08/2019    BUN 12 11/08/2019    CR 0.75 11/08/2019    GFRESTIMATED >90 11/08/2019    GFRESTBLACK >90 11/08/2019    CODI 8.6 11/08/2019        A1C RESULTS:  No results found for: A1C    INR RESULTS:  Lab Results   Component Value Date    INR 1.35 (H) 10/25/2019    INR 1.61 (H) 10/24/2019           CC  Jaya Flores, APRN CNP  [address]  [place] [id number]

## 2019-12-20 NOTE — LETTER
12/20/2019    [facility]  [address] [place] [id number]    RE: Jose Tejada       Dear Colleague,    I had the pleasure of seeing Jose Tejada in the [facility].    HPI and Plan:   See dictation    No orders of the defined types were placed in this encounter.    Orders Placed This Encounter   Medications     metoprolol succinate ER (TOPROL XL) 25 MG 24 hr tablet     Sig: Take 0.5 tablets (12.5 mg) by mouth daily     Dispense:  50 tablet     Refill:  3     There are no discontinued medications.      Encounter Diagnosis   Name Primary?     Coronary artery disease involving native coronary artery of native heart without angina pectoris        CURRENT MEDICATIONS:  Current Outpatient Medications   Medication Sig Dispense Refill     albuterol (PROAIR HFA/PROVENTIL HFA/VENTOLIN HFA) 108 (90 Base) MCG/ACT inhaler Inhale 2 puffs into the lungs every 4 hours as needed for shortness of breath / dyspnea or wheezing       clopidogrel (PLAVIX) 75 MG tablet Take 1 tablet (75 mg) by mouth daily 30 tablet 0     gabapentin (NEURONTIN) 400 MG capsule Take 1,200 mg by mouth 3 times daily       isosorbide mononitrate (IMDUR) 30 MG 24 hr tablet Take 1 tablet (30 mg) by mouth daily 30 tablet 0     lidocaine (LIDODERM) 5 % patch Place 1 patch onto the skin every 24 hours       lisinopril (PRINIVIL/ZESTRIL) 20 MG tablet Take 1 tablet (20 mg) by mouth daily 30 tablet 0     metoprolol succinate ER (TOPROL XL) 25 MG 24 hr tablet Take 0.5 tablets (12.5 mg) by mouth daily 50 tablet 3     nitroGLYcerin (NITROSTAT) 0.4 MG sublingual tablet For chest pain place 1 tablet under the tongue every 5 minutes for 3 doses. If symptoms persist 5 minutes after 1st dose call 911. 20 tablet 0     nortriptyline (PAMELOR) 25 MG capsule Take 25 mg by mouth At Bedtime        omeprazole (PRILOSEC) 20 MG CR capsule Take 20 mg by mouth At Bedtime        oxyCODONE-acetaminophen  (PERCOCET) 5-325 MG per tablet Take 2 tablets by mouth 3 times daily as needed Patient takes 2 tablets TID Scheduled       ranolazine (RANEXA) 500 MG 12 hr tablet Take 1 tablet (500 mg) by mouth 2 times daily 180 tablet 3     rosuvastatin (CRESTOR) 20 MG tablet Take 1 tablet (20 mg) by mouth daily 90 tablet 3     Warfarin Sodium (COUMADIN PO) Take 5 mg by mouth daily 7.5 mg on Monday and Friday, 5 mg all other days         ALLERGIES     Allergies   Allergen Reactions     Niacin Other (See Comments)     Other reaction(s): Flushing         Norvasc [Amlodipine] Rash     Septra [Sulfamethoxazole W-Trimethoprim] Rash       PAST MEDICAL HISTORY:  Past Medical History:   Diagnosis Date     Asthma      CAD (coronary artery disease)     1996 angioplasty and stent to the prox LAD, PTCA with intracoronary stent placement of RCA, 70%OM; 10/24/19 Cath: Occluded RCA, prox circumflex lesion - pd/pa not significant, advised medical therapy first by Interventional cardiologist     DVT (deep venous thrombosis) (H)      Factor 5 Leiden mutation, heterozygous (H)      GERD (gastroesophageal reflux disease)      HTN (hypertension)      Hyperlipidemia      Neuropathy     wears brace R foot for drop foot     PRIMITIVO (obstructive sleep apnea)     cpap     Protein C deficiency (H)      Spinal stenosis        PAST SURGICAL HISTORY:  Past Surgical History:   Procedure Laterality Date     APPENDECTOMY OPEN  1958     ARTHROPLASTY HIP Left 1997     ARTHROPLASTY REVISION HIP Left 2013     AS SPINAL FUSION,ANT,EA ADNL LEVEL  2010    L4-L5     C TOTAL HIP ARTHROPLASTY Right 2015    THR     CV CORONARY ANGIOGRAM N/A 10/24/2019    Procedure: Coronary Angiogram;  Surgeon: Valdemar Hinojosa MD;  Location:  HEART CARDIAC CATH LAB     CV CORONARY ANGIOGRAM  1996 1996 angioplasty and stent to the prox LAD, PTCA with intracoronary stent placement of RCA, 70%OM     CV FRACTIONAL FLOW RESERVE N/A 10/24/2019    Procedure: Fractional Flow [place];   Surgeon: Valdemar Hinojosa MD;  Location:  HEART CARDIAC CATH LAB       FAMILY HISTORY:  Family History   Problem Relation Age of Onset     Myocardial Infarction Father      Heart Disease Maternal Grandfather        SOCIAL HISTORY:  Social History     Socioeconomic History     Marital status:      Spouse name: None     Number of children: None     Years of education: None     Highest education level: None   Occupational History     None   Social Needs     Financial resource strain: None     Food insecurity:     Worry: None     Inability: None     Transportation needs:     Medical: None     Non-medical: None   Tobacco Use     Smoking status: Former Smoker     Smokeless tobacco: Never Used   Substance and Sexual Activity     Alcohol use: Yes     Alcohol/week: 0.0 standard drinks     Comment: once every two weeks if that     Drug use: None     Sexual activity: None   Lifestyle     Physical activity:     Days per week: None     Minutes per session: None     Stress: None   Relationships     Social connections:     Talks on phone: None     Gets together: None     Attends Congregation service: None     Active member of club or organization: None     Attends meetings of clubs or organizations: None     Relationship status: None     Intimate partner violence:     Fear of current or ex partner: None     Emotionally abused: None     Physically abused: None     Forced sexual activity: None   Other Topics Concern     Parent/sibling w/ CABG, MI or angioplasty before 65F 55M? Not Asked      Service Not Asked     Blood Transfusions Not Asked     Caffeine Concern No     Comment: 1 -2 cups daily      Occupational Exposure Not Asked     Hobby Hazards Not Asked     Sleep Concern Not Asked     Stress Concern Not Asked     Weight Concern Not Asked     Special Diet No     Back Care Not Asked     Exercise No     Bike Helmet Not Asked     Seat Belt Not Asked     Self-Exams Not Asked   Social History Narrative     None  "      Review of Systems:  Skin:  Negative     Eyes:  Negative    ENT:  Negative    Respiratory:  Positive for sleep apnea;CPAP  Cardiovascular:  Negative    Gastroenterology: Negative    Genitourinary:  Positive for nocturia;decreased urinary stream  Musculoskeletal:  Positive for nocturnal cramping  Neurologic:  Positive for numbness or tingling of hands;numbness or tingling of feet  Psychiatric:  Negative    Heme/Lymph/Imm:  Positive for allergies  Endocrine:  Negative      Physical Exam:  Vitals: /68   Pulse 68   Ht 1.727 m (5' 8\")   Wt 83.8 kg (184 lb 12.8 oz)   BMI 28.10 kg/m       Constitutional:  cooperative, alert and oriented, well developed, well nourished, in no acute distress        Skin:  warm and dry to the touch, no apparent skin lesions or masses noted          Head:  normocephalic, no masses or lesions        Eyes:  pupils equal and round, conjunctivae and lids unremarkable, sclera white, no xanthalasma, EOMS intact, no nystagmus        Lymph:No Cervical lymphadenopathy present     ENT:  no pallor or cyanosis, dentition good        Neck:  carotid pulses are full and equal bilaterally, JVP normal, no carotid bruit        Respiratory:  normal breath sounds, clear to auscultation, normal A-P diameter, normal symmetry, normal respiratory excursion, no use of accessory muscles         Cardiac: regular rhythm, normal S1/S2, no S3 or S4, apical impulse not displaced, no murmurs, gallops or rubs occasional premature beats S4            pulses full and equal, no bruits auscultated                                        GI:  abdomen soft, non-tender, BS normoactive, no mass, no HSM, no bruits obese      Extremities and Muscular Skeletal:  no edema         brace on R leg--foot drop    Neurological:  no gross motor deficits        Psych:  Alert and Oriented x 3      Recent Lab Results:  LIPID RESULTS:  Lab Results   Component Value Date    CHOL 94 12/20/2019    HDL 40 12/20/2019    LDL 36 12/20/2019 "    TRIG 92 12/20/2019    CHOLHDLRATIO 3.5 04/21/2014       LIVER ENZYME RESULTS:  Lab Results   Component Value Date    AST 21 10/22/2019    ALT 20 12/20/2019       CBC RESULTS:  Lab Results   Component Value Date    WBC 5.7 10/24/2019    RBC 4.55 10/24/2019    HGB 14.1 10/24/2019    HCT 41.0 10/24/2019    MCV 90 10/24/2019    MCH 31.0 10/24/2019    MCHC 34.4 10/24/2019    RDW 13.2 10/24/2019     10/24/2019       BMP RESULTS:  Lab Results   Component Value Date     11/08/2019    POTASSIUM 3.9 11/08/2019    CHLORIDE 102 11/08/2019    CO2 28 11/08/2019    ANIONGAP 5 11/08/2019     (H) 11/08/2019    BUN 12 11/08/2019    CR 0.75 11/08/2019    GFRESTIMATED >90 11/08/2019    GFRESTBLACK >90 11/08/2019    CODI 8.6 11/08/2019        A1C RESULTS:  No results found for: A1C    INR RESULTS:  Lab Results   Component Value Date    INR 1.35 (H) 10/25/2019    INR 1.61 (H) 10/24/2019           CC  BRENDAN Mccrary CNP  [address] [place] [address]      Thank you for allowing me to participate in the care of your patient.      Sincerely,     Gurvinder Yun MD     [facility]    cc:   BRENDAN Mccrary CNP  [address]  [place] [address]

## 2019-12-27 ENCOUNTER — TELEPHONE (OUTPATIENT)
Dept: LAB | Facility: CLINIC | Age: 74
End: 2019-12-27

## 2019-12-27 NOTE — TELEPHONE ENCOUNTER
Patient called and left VM with questions regarding upcoming procedure. RN returned patient's call and left VM advising patient to call clinic to discuss his questions further.       12/20/19 SELWYN Yun  HISTORY OF PRESENT ILLNESS:  I had the pleasure of reacquainting myself with our mutual patient, Jose Tejada.  I last saw him in 04/2018.  He has a history of coronary disease dating back more than 2 decades.  He has had a previous myocardial infarction.  He has had stenting to his LAD and to his right coronary artery, many, many years ago.  He had a 70% left circumflex lesion which had normal flow reserve, and he was doing well.  He also has hypercoagulable state and a DVT.  He has protein C deficiency, and he is also heterozygous for factor V Leiden.  As a result, he has been maintained on Coumadin, but he is also on Plavix.  Apparently in September he started having chest pain.  He presented to our hospital in October.  One of my partners saw him and heart cath was performed, which I reviewed with him today.  The left main artery is open.  LAD stent is open.  There is mild to no more than moderate diffuse LAD disease.  The left circumflex had a 70% narrowing again, but the flow reserve was normal.  The right coronary artery is totally occluded shortly after its origin and right after a conus branch.  The conus branch also has severe narrowing.  There is a robust collateral from the LAD apical epicardial to the PDA vessel.  There is a smaller collateral from the left circumflex to the posterolateral branch.  The proximal right coronary artery has a somewhat ambiguous takeoff.  It is certainly not tapered, but it almost has the look of an acute or subacute thrombosis.  The distance of the right coronary artery that is occluded is hard to tell.  The camera did not stay on long when they did the left coronary injections to see the collateral, but I am confident that there is probably more than 20 mm of  vessel missing.  The other problem is that the right coronary artery is heavily, heavily calcified.  No doubt, part of that is also the stent that was placed many years ago.  Calcium does make angioplasty much more difficult, although it does provide a road map for the vessel it is.  There may also be some very small LAD septal collaterals to the PDA, but the majority of the flow is apical LAD epicardial to the PDA.  The patient was maintained on medication.  He was taken off his beta blocker for fatigue in the past.  I am going to restart the metoprolol at Toprol-XL 12.5 mg a day.  He is already on nitrates and Ranexa, and his chest pain is better but still present.  On echo, his LV function is preserved.  His RV is enlarged and decreased function.  No doubt, that is because of ischemia to the right ventricle since he does not have known cor pulmonale or lung disease.  Today we reviewed the actual films with him.  His blood pressure and pulse look good, and that is with a heart rate of 68 and blood pressure 110, so I am not going to increase the metoprolol above 12.5 today at least.  He had been on 25 in the past.  We have the option of raising the Ranexa to 1000 mg b.i.d. later.  We do not have a lot more room to go with the isosorbide because of the somewhat low blood pressure unless we lowered the dose of lisinopril, which would be an option.  We talked about the pros and cons of  angioplasty.  I think if we are going to do it, we should do it on the earlier end because I suspect that some of this is a thrombotic occlusion and it is going to turn harder the longer we wait.  The patient will get back to us about his thoughts about doing this.  I talked to him about the fact that  angioplasty has a lower success rate and a higher complication rate than standard angioplasty, but I suspect that the success rate is still going to be above 60%-70% with a complication rate likely of 7% or less.  He would  obviously have to come off his Coumadin in preparation for that.  He is already on long-term Plavix.  I am not going to add aspirin at this point, given the Coumadin and Plavix combination.  The patient will get back to us if he wants to move forward.  With regards to the lab tests, his cholesterol numbers were checked on 12/20, which was this morning.  They are excellent.  HDL is 40, LDL 36, triglycerides 92.  Electrolyte panel was checked in November and it was also quite good.

## 2019-12-30 ENCOUNTER — TELEPHONE (OUTPATIENT)
Dept: CARDIOLOGY | Facility: CLINIC | Age: 74
End: 2019-12-30

## 2019-12-30 NOTE — TELEPHONE ENCOUNTER
Pt is to have  angioplasty 1/16/20. Pt called for bridging medication. Per DR Yun note:  He also has hypercoagulable state and a DVT.  He has protein C deficiency, and he is also heterozygous for factor V Leiden.  As a result, he has been maintained on Coumadin, but he is also on Plavix. Pt did call for bridging medication asking for Heparin.  Per DR's note it looks like Pt was to call back to clinic to say when he wanted to do procedure?  This is nurses first contact with Pt and procedure already scheduled as previously stated.       Pt did call back and informed nurse that RX Pt was given for metoprolol he did not fill and is not taking. Pt says his metoprolol had been stopped because of drowsiness. Per notes Pt was weaned off of it starting  5/29/19, and HR in the 40's and 50's. Note in care everywhere 5/28/19.        MARIA R Redd RN

## 2020-01-09 NOTE — TELEPHONE ENCOUNTER
Called Pt and went over instructions for plavix 5 day hold, 81 mg aspirin started same day plavix stopped. Hold coumadin 2-3 days before and check INR daily when drops drop below 2.0 start lovenox.  Pt weight 83.9 kg will need dosing called into pharmacy. Pt has no PMD [facility] does his INR's. MARIA R Redd RN

## 2020-01-09 NOTE — TELEPHONE ENCOUNTER
Call out to Pt to discuss angiogram/.Message   Received: Yesterday   Message Contents   Gurvinder Yun MD Schwartz, Shantell J, RN   Caller: Unspecified (1 week ago)             I would stop plavix 5 days before pci and start asa 81mg/day the day he stops the plavix   Hold coumadin for 2-3 days before and daily inr, when inr <2 start lovenox   Make sure his case is on a real  day with two MD's to do   thanks      MARIA R Redd RN

## 2020-01-10 DIAGNOSIS — D68.51 FACTOR 5 LEIDEN MUTATION, HETEROZYGOUS (H): Primary | ICD-10-CM

## 2020-01-10 NOTE — TELEPHONE ENCOUNTER
Spoke with DR Yun. Pt will do Lovenox when INR drops below 2.0. Per DR Yun will send Lovenox dosing 80 mg syringe Pt last weight 83.8 kg.   Pt's INR today 2.8 see care everywhere [facility]. Pt next INR on Monday 1/13/19.  MARIA R Redd RN

## 2020-01-13 NOTE — TELEPHONE ENCOUNTER
Called Pt this AM he states he stopped taking his warfarin saturday. Informed Pt that may put him on Lovenox sooner than nurse had intended. Pt will not start lovenox until nurse tells him to when INR's are below 2.0. Pt is holding Plavix, and on baby asirin 81 mg. MARIA R Redd RN

## 2020-01-13 NOTE — TELEPHONE ENCOUNTER
Pt's INR is already 1.8 today. Pt informed he needs to start Lovenox and it is at pharmacy for him to . Called pharmacy to verify med there.  Will ask pharmacy to send reminder call for Pt to  medication.  MARIA R Redd RN

## 2020-01-14 ENCOUNTER — TELEPHONE (OUTPATIENT)
Dept: CARDIOLOGY | Facility: CLINIC | Age: 75
End: 2020-01-14

## 2020-01-14 RX ORDER — POTASSIUM CHLORIDE 750 MG/1
20 TABLET, EXTENDED RELEASE ORAL
Status: CANCELLED | OUTPATIENT
Start: 2020-01-14

## 2020-01-14 RX ORDER — LIDOCAINE 40 MG/G
CREAM TOPICAL
Status: CANCELLED | OUTPATIENT
Start: 2020-01-14

## 2020-01-14 RX ORDER — SODIUM CHLORIDE 9 MG/ML
INJECTION, SOLUTION INTRAVENOUS CONTINUOUS
Status: CANCELLED | OUTPATIENT
Start: 2020-01-14

## 2020-01-14 RX ORDER — ASPIRIN 81 MG/1
81 TABLET ORAL DAILY
Status: CANCELLED | OUTPATIENT
Start: 2020-01-14 | End: 2020-01-16

## 2020-01-14 NOTE — PROGRESS NOTES
Orders entered: Left Heart Cath Order Set       Zoraida RN, BSN    [facility] [place] [facility]

## 2020-01-14 NOTE — TELEPHONE ENCOUNTER
Left Coronary Angiogram    Scheduled: 01- (Thursday)  Location: [facility]  Check-in time: 10 am   Procedure time: 12 noon      Prep instructions were reviewed with patient over the phone.      Plavix was stopped on 01-.   Patient is currently taking ASA 81 mg/d.   Coumadin is on hold.   Patient injected his first dose of Lovenox last night.     Patient to be NPO after midnight, the night before the procedure.      Patient should take his ASA, Lisinopril, and pain meds on the morning of the procedure. (With small sips of water)    Patient's wife will be driving him home and will be staying with him for 24 hours after the procedure.     Patient was advised to have his wife be present when he checks in for his procedure.    Patient stated that he would like to be admitted overnight. Patient was advised to speak with the cardiologist that is performing his procedure. This is a same day procedure. Patient's insurance will not cover a overnight stay if it is not warranted.        Zoraida RN, BSN  [facility]

## 2020-01-14 NOTE — TELEPHONE ENCOUNTER
Spoke with Pt he is doing well. Pt already received instructions form Nurse in [place] for pre heart cath instructions. Informed Pt aspirin night before and morning of angiogram. MARIA R Redd RN

## 2020-01-15 ENCOUNTER — TELEPHONE (OUTPATIENT)
Dept: CARDIOLOGY | Facility: CLINIC | Age: 75
End: 2020-01-15

## 2020-01-15 DIAGNOSIS — I25.10 CORONARY ARTERY DISEASE INVOLVING NATIVE CORONARY ARTERY OF NATIVE HEART WITHOUT ANGINA PECTORIS: ICD-10-CM

## 2020-01-15 RX ORDER — METOPROLOL SUCCINATE 25 MG/1
TABLET, EXTENDED RELEASE ORAL
Qty: 50 TABLET | Refills: 3 | Status: SHIPPED | DISCHARGE
Start: 2020-01-15 | End: 2020-02-07

## 2020-01-15 NOTE — TELEPHONE ENCOUNTER
Called Pt he is doing fine today. Reminded him again last Lovenox dose this evening 8 pm and no more before angiogram. Pt very concerned about being sent home. Pt wants to be admitted for overnight.  MARIA R Redd RN

## 2020-01-16 ENCOUNTER — HOSPITAL ENCOUNTER (OUTPATIENT)
Facility: CLINIC | Age: 75
Discharge: HOME | End: 2020-01-17
Admitting: HOSPITALIST
Payer: MEDICARE

## 2020-01-16 DIAGNOSIS — D68.51 FACTOR 5 LEIDEN MUTATION, HETEROZYGOUS (H): ICD-10-CM

## 2020-01-16 DIAGNOSIS — Z98.61 S/P PTCA (PERCUTANEOUS TRANSLUMINAL CORONARY ANGIOPLASTY): Primary | ICD-10-CM

## 2020-01-16 DIAGNOSIS — I25.10 CORONARY ARTERY DISEASE INVOLVING NATIVE CORONARY ARTERY OF NATIVE HEART WITHOUT ANGINA PECTORIS: ICD-10-CM

## 2020-01-16 DIAGNOSIS — I20.89 STABLE ANGINA (H): ICD-10-CM

## 2020-01-16 LAB
ANION GAP SERPL CALCULATED.3IONS-SCNC: 5 MMOL/L (ref 3–14)
APTT PPP: 35 SEC (ref 22–37)
BUN SERPL-MCNC: 12 MG/DL (ref 7–30)
CALCIUM SERPL-MCNC: 9.3 MG/DL (ref 8.5–10.1)
CHLORIDE SERPL-SCNC: 105 MMOL/L (ref 94–109)
CHOLEST SERPL-MCNC: 99 MG/DL
CO2 SERPL-SCNC: 28 MMOL/L (ref 20–32)
CREAT SERPL-MCNC: 0.82 MG/DL (ref 0.66–1.25)
ERYTHROCYTE [DISTWIDTH] IN BLOOD BY AUTOMATED COUNT: 12.6 % (ref 10–15)
GFR SERPL CREATININE-BSD FRML MDRD: 88 ML/MIN/{1.73_M2}
GLUCOSE SERPL-MCNC: 99 MG/DL (ref 70–99)
HCT VFR BLD AUTO: 40 % (ref 40–53)
HDLC SERPL-MCNC: 40 MG/DL
HGB BLD-MCNC: 13.7 G/DL (ref 13.3–17.7)
INR PPP: 1.11 (ref 0.86–1.14)
KCT BLD-ACNC: 259 SEC (ref 75–150)
KCT BLD-ACNC: 275 SEC (ref 75–150)
KCT BLD-ACNC: 324 SEC (ref 75–150)
KCT BLD-ACNC: 372 SEC (ref 75–150)
LDLC SERPL CALC-MCNC: 39 MG/DL
MCH RBC QN AUTO: 31.4 PG (ref 26.5–33)
MCHC RBC AUTO-ENTMCNC: 34.3 G/DL (ref 31.5–36.5)
MCV RBC AUTO: 92 FL (ref 78–100)
NONHDLC SERPL-MCNC: 59 MG/DL
PLATELET # BLD AUTO: 212 10E9/L (ref 150–450)
POTASSIUM SERPL-SCNC: 3.7 MMOL/L (ref 3.4–5.3)
RBC # BLD AUTO: 4.36 10E12/L (ref 4.4–5.9)
SODIUM SERPL-SCNC: 138 MMOL/L (ref 133–144)
TRIGL SERPL-MCNC: 101 MG/DL
WBC # BLD AUTO: 5.1 10E9/L (ref 4–11)

## 2020-01-16 PROCEDURE — C1760 CLOSURE DEV, VASC: HCPCS | Performed by: INTERNAL MEDICINE

## 2020-01-16 PROCEDURE — 25000132 ZZH RX MED GY IP 250 OP 250 PS 637: Mod: GY | Performed by: INTERNAL MEDICINE

## 2020-01-16 PROCEDURE — 27210788 ZZH MANIFOLD CR3: Performed by: INTERNAL MEDICINE

## 2020-01-16 PROCEDURE — 85347 COAGULATION TIME ACTIVATED: CPT

## 2020-01-16 PROCEDURE — C1887 CATHETER, GUIDING: HCPCS | Performed by: INTERNAL MEDICINE

## 2020-01-16 PROCEDURE — 93005 ELECTROCARDIOGRAM TRACING: CPT

## 2020-01-16 PROCEDURE — 25000125 ZZHC RX 250: Performed by: INTERNAL MEDICINE

## 2020-01-16 PROCEDURE — 25000128 H RX IP 250 OP 636: Performed by: INTERNAL MEDICINE

## 2020-01-16 PROCEDURE — 99222 1ST HOSP IP/OBS MODERATE 55: CPT | Mod: AI | Performed by: HOSPITALIST

## 2020-01-16 PROCEDURE — C1725 CATH, TRANSLUMIN NON-LASER: HCPCS | Performed by: INTERNAL MEDICINE

## 2020-01-16 PROCEDURE — 80048 BASIC METABOLIC PNL TOTAL CA: CPT | Performed by: INTERNAL MEDICINE

## 2020-01-16 PROCEDURE — C1894 INTRO/SHEATH, NON-LASER: HCPCS | Performed by: INTERNAL MEDICINE

## 2020-01-16 PROCEDURE — 92943 PRQ TRLUML REVSC CH OCC ANT: CPT | Mod: RC | Performed by: INTERNAL MEDICINE

## 2020-01-16 PROCEDURE — 93010 ELECTROCARDIOGRAM REPORT: CPT | Mod: 76 | Performed by: INTERNAL MEDICINE

## 2020-01-16 PROCEDURE — 36415 COLL VENOUS BLD VENIPUNCTURE: CPT

## 2020-01-16 PROCEDURE — 27210794 ZZH OR GENERAL SUPPLY STERILE: Performed by: INTERNAL MEDICINE

## 2020-01-16 PROCEDURE — 85730 THROMBOPLASTIN TIME PARTIAL: CPT | Performed by: INTERNAL MEDICINE

## 2020-01-16 PROCEDURE — C1769 GUIDE WIRE: HCPCS | Performed by: INTERNAL MEDICINE

## 2020-01-16 PROCEDURE — 25800030 ZZH RX IP 258 OP 636: Performed by: INTERNAL MEDICINE

## 2020-01-16 PROCEDURE — 40000065 ZZH STATISTIC EKG NON-CHARGEABLE

## 2020-01-16 PROCEDURE — 85027 COMPLETE CBC AUTOMATED: CPT | Performed by: INTERNAL MEDICINE

## 2020-01-16 PROCEDURE — 40000852 ZZH STATISTIC HEART CATH LAB OR EP LAB

## 2020-01-16 PROCEDURE — C1724 CATH, TRANS ATHEREC,ROTATION: HCPCS | Performed by: INTERNAL MEDICINE

## 2020-01-16 PROCEDURE — 99153 MOD SED SAME PHYS/QHP EA: CPT | Performed by: INTERNAL MEDICINE

## 2020-01-16 PROCEDURE — 92924 PRQ TRLUML C ATHRC 1 ART&/BR: CPT | Performed by: INTERNAL MEDICINE

## 2020-01-16 PROCEDURE — 85610 PROTHROMBIN TIME: CPT | Performed by: INTERNAL MEDICINE

## 2020-01-16 PROCEDURE — 40000235 ZZH STATISTIC TELEMETRY

## 2020-01-16 PROCEDURE — 80061 LIPID PANEL: CPT | Performed by: INTERNAL MEDICINE

## 2020-01-16 PROCEDURE — 21000001 ZZH R&B HEART CARE

## 2020-01-16 PROCEDURE — 99152 MOD SED SAME PHYS/QHP 5/>YRS: CPT | Performed by: INTERNAL MEDICINE

## 2020-01-16 RX ORDER — IOPAMIDOL 755 MG/ML
INJECTION, SOLUTION INTRAVASCULAR
Status: DISCONTINUED | OUTPATIENT
Start: 2020-01-16 | End: 2020-01-16 | Stop reason: HOSPADM

## 2020-01-16 RX ORDER — ASPIRIN 81 MG/1
81 TABLET ORAL DAILY
Status: DISCONTINUED | OUTPATIENT
Start: 2020-01-16 | End: 2020-01-16 | Stop reason: HOSPADM

## 2020-01-16 RX ORDER — NITROGLYCERIN 0.4 MG/1
0.4 TABLET SUBLINGUAL EVERY 5 MIN PRN
Status: DISCONTINUED | OUTPATIENT
Start: 2020-01-16 | End: 2020-01-17 | Stop reason: HOSPADM

## 2020-01-16 RX ORDER — ASPIRIN 81 MG/1
81 TABLET ORAL DAILY
Status: DISCONTINUED | OUTPATIENT
Start: 2020-01-17 | End: 2020-01-17 | Stop reason: HOSPADM

## 2020-01-16 RX ORDER — NALOXONE HYDROCHLORIDE 0.4 MG/ML
.2-.4 INJECTION, SOLUTION INTRAMUSCULAR; INTRAVENOUS; SUBCUTANEOUS
Status: ACTIVE | OUTPATIENT
Start: 2020-01-16 | End: 2020-01-17

## 2020-01-16 RX ORDER — SODIUM CHLORIDE 9 MG/ML
INJECTION, SOLUTION INTRAVENOUS CONTINUOUS
Status: DISCONTINUED | OUTPATIENT
Start: 2020-01-16 | End: 2020-01-16 | Stop reason: HOSPADM

## 2020-01-16 RX ORDER — NALOXONE HYDROCHLORIDE 0.4 MG/ML
.1-.4 INJECTION, SOLUTION INTRAMUSCULAR; INTRAVENOUS; SUBCUTANEOUS
Status: DISCONTINUED | OUTPATIENT
Start: 2020-01-16 | End: 2020-01-17 | Stop reason: HOSPADM

## 2020-01-16 RX ORDER — LISINOPRIL 20 MG/1
20 TABLET ORAL DAILY
Status: DISCONTINUED | OUTPATIENT
Start: 2020-01-17 | End: 2020-01-17 | Stop reason: HOSPADM

## 2020-01-16 RX ORDER — POTASSIUM CHLORIDE 1500 MG/1
20 TABLET, EXTENDED RELEASE ORAL
Status: COMPLETED | OUTPATIENT
Start: 2020-01-16 | End: 2020-01-16

## 2020-01-16 RX ORDER — FENTANYL CITRATE 50 UG/ML
25-50 INJECTION, SOLUTION INTRAMUSCULAR; INTRAVENOUS
Status: ACTIVE | OUTPATIENT
Start: 2020-01-16 | End: 2020-01-17

## 2020-01-16 RX ORDER — ATROPINE SULFATE 0.1 MG/ML
0.5 INJECTION INTRAVENOUS EVERY 5 MIN PRN
Status: ACTIVE | OUTPATIENT
Start: 2020-01-16 | End: 2020-01-17

## 2020-01-16 RX ORDER — HEPARIN SODIUM 1000 [USP'U]/ML
INJECTION, SOLUTION INTRAVENOUS; SUBCUTANEOUS
Status: DISCONTINUED | OUTPATIENT
Start: 2020-01-16 | End: 2020-01-16 | Stop reason: HOSPADM

## 2020-01-16 RX ORDER — NORTRIPTYLINE HCL 25 MG
25 CAPSULE ORAL AT BEDTIME
Status: DISCONTINUED | OUTPATIENT
Start: 2020-01-16 | End: 2020-01-17 | Stop reason: HOSPADM

## 2020-01-16 RX ORDER — WARFARIN SODIUM 5 MG/1
5 TABLET ORAL ONCE
Status: COMPLETED | OUTPATIENT
Start: 2020-01-16 | End: 2020-01-16

## 2020-01-16 RX ORDER — RANOLAZINE 500 MG/1
500 TABLET, EXTENDED RELEASE ORAL 2 TIMES DAILY
Status: DISCONTINUED | OUTPATIENT
Start: 2020-01-16 | End: 2020-01-17 | Stop reason: HOSPADM

## 2020-01-16 RX ORDER — GABAPENTIN 300 MG/1
1200 CAPSULE ORAL 3 TIMES DAILY
Status: DISCONTINUED | OUTPATIENT
Start: 2020-01-16 | End: 2020-01-17 | Stop reason: HOSPADM

## 2020-01-16 RX ORDER — CLOPIDOGREL BISULFATE 75 MG/1
75 TABLET ORAL DAILY
Status: DISCONTINUED | OUTPATIENT
Start: 2020-01-17 | End: 2020-01-17 | Stop reason: HOSPADM

## 2020-01-16 RX ORDER — LIDOCAINE 40 MG/G
CREAM TOPICAL
Status: DISCONTINUED | OUTPATIENT
Start: 2020-01-16 | End: 2020-01-16 | Stop reason: HOSPADM

## 2020-01-16 RX ORDER — ROSUVASTATIN CALCIUM 20 MG/1
20 TABLET, COATED ORAL DAILY
Status: DISCONTINUED | OUTPATIENT
Start: 2020-01-16 | End: 2020-01-17 | Stop reason: HOSPADM

## 2020-01-16 RX ORDER — OXYCODONE AND ACETAMINOPHEN 5; 325 MG/1; MG/1
2 TABLET ORAL 3 TIMES DAILY PRN
Status: DISCONTINUED | OUTPATIENT
Start: 2020-01-16 | End: 2020-01-17 | Stop reason: HOSPADM

## 2020-01-16 RX ORDER — ALBUTEROL SULFATE 90 UG/1
2 AEROSOL, METERED RESPIRATORY (INHALATION) EVERY 4 HOURS PRN
Status: DISCONTINUED | OUTPATIENT
Start: 2020-01-16 | End: 2020-01-17 | Stop reason: HOSPADM

## 2020-01-16 RX ORDER — SODIUM CHLORIDE 9 MG/ML
INJECTION, SOLUTION INTRAVENOUS CONTINUOUS
Status: ACTIVE | OUTPATIENT
Start: 2020-01-16 | End: 2020-01-17

## 2020-01-16 RX ORDER — CLOPIDOGREL BISULFATE 75 MG/1
TABLET ORAL
Status: DISCONTINUED | OUTPATIENT
Start: 2020-01-16 | End: 2020-01-16 | Stop reason: HOSPADM

## 2020-01-16 RX ORDER — FENTANYL CITRATE 50 UG/ML
INJECTION, SOLUTION INTRAMUSCULAR; INTRAVENOUS
Status: DISCONTINUED | OUTPATIENT
Start: 2020-01-16 | End: 2020-01-16 | Stop reason: HOSPADM

## 2020-01-16 RX ORDER — FLUMAZENIL 0.1 MG/ML
0.2 INJECTION, SOLUTION INTRAVENOUS
Status: ACTIVE | OUTPATIENT
Start: 2020-01-16 | End: 2020-01-17

## 2020-01-16 RX ORDER — ISOSORBIDE MONONITRATE 30 MG/1
30 TABLET, EXTENDED RELEASE ORAL DAILY
Status: DISCONTINUED | OUTPATIENT
Start: 2020-01-17 | End: 2020-01-17 | Stop reason: HOSPADM

## 2020-01-16 RX ORDER — ACETAMINOPHEN 325 MG/1
650 TABLET ORAL EVERY 4 HOURS PRN
Status: DISCONTINUED | OUTPATIENT
Start: 2020-01-16 | End: 2020-01-17 | Stop reason: HOSPADM

## 2020-01-16 RX ORDER — CLOPIDOGREL BISULFATE 75 MG/1
75 TABLET ORAL DAILY
Status: CANCELLED | OUTPATIENT
Start: 2020-01-17

## 2020-01-16 RX ORDER — NITROGLYCERIN 0.4 MG/1
0.3 TABLET SUBLINGUAL EVERY 5 MIN PRN
Status: DISCONTINUED | OUTPATIENT
Start: 2020-01-16 | End: 2020-01-16

## 2020-01-16 RX ADMIN — OMEPRAZOLE 20 MG: 20 CAPSULE, DELAYED RELEASE ORAL at 22:25

## 2020-01-16 RX ADMIN — GABAPENTIN 1200 MG: 300 CAPSULE ORAL at 22:23

## 2020-01-16 RX ADMIN — WARFARIN SODIUM 5 MG: 5 TABLET ORAL at 22:25

## 2020-01-16 RX ADMIN — SODIUM CHLORIDE: 9 INJECTION, SOLUTION INTRAVENOUS at 10:14

## 2020-01-16 RX ADMIN — POTASSIUM CHLORIDE 20 MEQ: 1500 TABLET, EXTENDED RELEASE ORAL at 11:02

## 2020-01-16 RX ADMIN — RANOLAZINE 500 MG: 500 TABLET, FILM COATED, EXTENDED RELEASE ORAL at 22:24

## 2020-01-16 RX ADMIN — ROSUVASTATIN CALCIUM 20 MG: 20 TABLET, FILM COATED ORAL at 22:24

## 2020-01-16 RX ADMIN — NORTRIPTYLINE HYDROCHLORIDE 25 MG: 25 CAPSULE ORAL at 22:24

## 2020-01-16 ASSESSMENT — MIFFLIN-ST. JEOR: SCORE: 1555.94

## 2020-01-16 NOTE — Clinical Note
The first balloon was inserted into the right coronary artery and proximal right coronary artery.Max pressure = 12 soniya. Total duration = 31 seconds.     Max pressure = 18 soniya. Total duration = 90 seconds.

## 2020-01-16 NOTE — Clinical Note
The first balloon was inserted into the right coronary artery and proximal right coronary artery.Max pressure = 16 soniya. Total duration = 12 seconds.

## 2020-01-16 NOTE — Clinical Note
The first balloon was inserted into the right coronary artery and proximal right coronary artery.Max pressure = 18 soniya. Total duration = 14 seconds.

## 2020-01-16 NOTE — Clinical Note
The first balloon was inserted into the right coronary artery and proximal right coronary artery.Max pressure = 8 soniya. Total duration = 16 seconds.

## 2020-01-16 NOTE — PROGRESS NOTES
Care Suites Admission Nursing Note    Reason for admission:   CS arrival time: 0940    Accompanied by: Sylvia-wife and Kaz-  Name/phone of DC : Sylvia [phone]    Medications held: Coumadin and Plavix last dose was Friday 1/10/2020.  Lovenox injection last dose was 1/15/2020 at 2000.  Consent signed: MD to round and obtain.    Abnormal assessment/labs: No.  Potassium replaced per protocol.  If abnormal, provider notified: NA.    Education/questions answered: Yes.    Plan: Proceed.        A/O. Denies pain. Labs WNL. IV flds infusing. Contrast D/C instructions reviewed with pt with verbal understanding received. Copy given to Sylvia. Procedure explained. All questions & concerns addressed.     Pt resting in bed, denies additional needs at this time, call light within reach. Family at bedside.  Sylvia will stay with pt overnight.      Procedure delayed.  Patient and family updated.     1315  Dr. Yun at bedside.  Informed consent obtained at this time.    1320  Service recovery completed for delay.  Client appears satisfied and understanding of the delay.    1330  Hand off report given to Rojelio ORTEGA RN.

## 2020-01-16 NOTE — Clinical Note
The first balloon was inserted into the right coronary artery and proximal right coronary artery.Max pressure = 20 soniya. Total duration = 35 seconds.     Max pressure = 16 soniya. Total duration = 20 seconds.    Balloon reinflated a second time: Max pressure = 16 soniya. Total duration = 20 seconds.  Balloon reinflated a third time:

## 2020-01-16 NOTE — Clinical Note
Potential access sites were evaluated for patency using ultrasound.   The left femoral artery was selected. Access was obtained under with Sonosite and Fluoroscopic guidance using a standard 18 guage needle with direct visualization of needle entry.

## 2020-01-16 NOTE — Clinical Note
The first balloon was inserted into the right coronary artery and proximal right coronary artery.Max pressure = 6 soniya. Total duration = 30 seconds.

## 2020-01-16 NOTE — Clinical Note
Balloon prepped per 's instructions.   This patient called to check status of call back re: headaches. She spoke w/nurse at @ 3am this morning who said they would talk to on-call provider for [place] and get back to her. Please call.

## 2020-01-16 NOTE — PRE-PROCEDURE
GENERAL PRE-PROCEDURE:   Procedure:  Chronic total    Written consent obtained?: Yes    Risks and benefits: Risks, benefits and alternatives were discussed    Consent given by:  Patient  Patient states understanding of procedure being performed: Yes    Patient's understanding of procedure matches consent: Yes    Procedure consent matches procedure scheduled: Yes    Appropriately NPO:  Yes  ASA Class:  Class 2- mild systemic disease, no acute problems, no functional limitations  Lungs:  Lungs clear with good breath sounds bilaterally  Heart:  Normal heart sounds and rate  History & Physical reviewed:  History and physical reviewed and no updates needed  Statement of review:  I have reviewed the lab findings, diagnostic data, medications, and the plan for sedation

## 2020-01-16 NOTE — Clinical Note
The first balloon was inserted into the right coronary artery and proximal right coronary artery.Max pressure = 14 soniya. Total duration = 11 seconds.     Max pressure = 15 soniya. Total duration = 24 seconds.    Balloon reinflated a second time: Max pressure = 15 soniya. Total duration = 24 seconds.

## 2020-01-16 NOTE — Clinical Note
The first balloon was inserted into the right coronary artery and proximal right coronary artery.Max pressure = 16 soniya. Total duration = 10 seconds.     Max pressure = 16 soniya. Total duration = 12 seconds.    Balloon reinflated a second time: Max pressure = 16 soniya. Total duration = 12 seconds.

## 2020-01-16 NOTE — Clinical Note
The first balloon was inserted into the right coronary artery and proximal right coronary artery.Max pressure = 16 soniya. Total duration = 17 seconds.

## 2020-01-17 ENCOUNTER — APPOINTMENT (OUTPATIENT)
Dept: OCCUPATIONAL THERAPY | Facility: CLINIC | Age: 75
End: 2020-01-17
Attending: INTERNAL MEDICINE
Payer: MEDICARE

## 2020-01-17 VITALS
RESPIRATION RATE: 16 BRPM | HEIGHT: 68 IN | OXYGEN SATURATION: 95 % | WEIGHT: 177.7 LBS | BODY MASS INDEX: 26.93 KG/M2 | TEMPERATURE: 97.4 F | DIASTOLIC BLOOD PRESSURE: 75 MMHG | SYSTOLIC BLOOD PRESSURE: 111 MMHG | HEART RATE: 59 BPM

## 2020-01-17 LAB
ANION GAP SERPL CALCULATED.3IONS-SCNC: 3 MMOL/L (ref 3–14)
BUN SERPL-MCNC: 11 MG/DL (ref 7–30)
CALCIUM SERPL-MCNC: 8.8 MG/DL (ref 8.5–10.1)
CHLORIDE SERPL-SCNC: 106 MMOL/L (ref 94–109)
CO2 SERPL-SCNC: 28 MMOL/L (ref 20–32)
CREAT SERPL-MCNC: 0.79 MG/DL (ref 0.66–1.25)
ERYTHROCYTE [DISTWIDTH] IN BLOOD BY AUTOMATED COUNT: 12.9 % (ref 10–15)
GFR SERPL CREATININE-BSD FRML MDRD: 89 ML/MIN/{1.73_M2}
GLUCOSE SERPL-MCNC: 144 MG/DL (ref 70–99)
HCT VFR BLD AUTO: 36 % (ref 40–53)
HGB BLD-MCNC: 12.4 G/DL (ref 13.3–17.7)
INR PPP: 1.17 (ref 0.86–1.14)
KCT BLD-ACNC: 312 SEC (ref 75–150)
KCT BLD-ACNC: 324 SEC (ref 75–150)
MCH RBC QN AUTO: 32 PG (ref 26.5–33)
MCHC RBC AUTO-ENTMCNC: 34.4 G/DL (ref 31.5–36.5)
MCV RBC AUTO: 93 FL (ref 78–100)
PLATELET # BLD AUTO: 196 10E9/L (ref 150–450)
POTASSIUM SERPL-SCNC: 4.1 MMOL/L (ref 3.4–5.3)
RBC # BLD AUTO: 3.88 10E12/L (ref 4.4–5.9)
SODIUM SERPL-SCNC: 137 MMOL/L (ref 133–144)
WBC # BLD AUTO: 7.4 10E9/L (ref 4–11)

## 2020-01-17 PROCEDURE — 85610 PROTHROMBIN TIME: CPT | Performed by: INTERNAL MEDICINE

## 2020-01-17 PROCEDURE — 80048 BASIC METABOLIC PNL TOTAL CA: CPT | Performed by: INTERNAL MEDICINE

## 2020-01-17 PROCEDURE — 99217 ZZC OBSERVATION CARE DISCHARGE: CPT | Performed by: HOSPITALIST

## 2020-01-17 PROCEDURE — 97165 OT EVAL LOW COMPLEX 30 MIN: CPT | Mod: GO | Performed by: OCCUPATIONAL THERAPIST

## 2020-01-17 PROCEDURE — 97110 THERAPEUTIC EXERCISES: CPT | Mod: GO | Performed by: OCCUPATIONAL THERAPIST

## 2020-01-17 PROCEDURE — 97535 SELF CARE MNGMENT TRAINING: CPT | Mod: GO | Performed by: OCCUPATIONAL THERAPIST

## 2020-01-17 PROCEDURE — 99222 1ST HOSP IP/OBS MODERATE 55: CPT | Performed by: INTERNAL MEDICINE

## 2020-01-17 PROCEDURE — 85027 COMPLETE CBC AUTOMATED: CPT | Performed by: INTERNAL MEDICINE

## 2020-01-17 PROCEDURE — 36415 COLL VENOUS BLD VENIPUNCTURE: CPT | Performed by: INTERNAL MEDICINE

## 2020-01-17 PROCEDURE — 25000132 ZZH RX MED GY IP 250 OP 250 PS 637: Mod: GY | Performed by: INTERNAL MEDICINE

## 2020-01-17 RX ADMIN — ASPIRIN 81 MG: 81 TABLET, DELAYED RELEASE ORAL at 07:09

## 2020-01-17 RX ADMIN — RANOLAZINE 500 MG: 500 TABLET, FILM COATED, EXTENDED RELEASE ORAL at 07:06

## 2020-01-17 RX ADMIN — GABAPENTIN 1200 MG: 300 CAPSULE ORAL at 07:06

## 2020-01-17 RX ADMIN — CLOPIDOGREL BISULFATE 75 MG: 75 TABLET ORAL at 07:07

## 2020-01-17 RX ADMIN — ROSUVASTATIN CALCIUM 20 MG: 20 TABLET, FILM COATED ORAL at 07:09

## 2020-01-17 RX ADMIN — ISOSORBIDE MONONITRATE 30 MG: 30 TABLET, EXTENDED RELEASE ORAL at 07:09

## 2020-01-17 RX ADMIN — OXYCODONE HYDROCHLORIDE AND ACETAMINOPHEN 2 TABLET: 5; 325 TABLET ORAL at 07:08

## 2020-01-17 ASSESSMENT — ACTIVITIES OF DAILY LIVING (ADL): PREVIOUS_RESPONSIBILITIES: DRIVING;FINANCES;MEDICATION MANAGEMENT;YARDWORK;SHOPPING;LAUNDRY;HOUSEKEEPING;MEAL PREP

## 2020-01-17 ASSESSMENT — MIFFLIN-ST. JEOR: SCORE: 1530.54

## 2020-01-17 NOTE — H&P
[facility]    History and Physical - Hospitalist Service       Date of Admission:  1/16/2020    Assessment & Plan   Jose Tejada is a 74 year old male admitted on 1/16/2020.  Past history of CAD s/p PCI, HTN, hyperlipidemia, Protein C deficiency, Factor V leiden with history of DVT, PRIMITIVO and GERD who presented for elective angiogram 1/16.    CAD s/p PCI  Hx CAD s/p stenting to LAD and RCA many years ago.  Followed in Cardiology clinic with recurrence of chest pain in September with angiogram showing , now presenting for further intervention, formal report pending.  - management per Cardiology; aspirin, Plavix, Imdur, lisinopril, rosuvastatin and Ranexa  - metoprolol held due to bradycardia    Protein C deficiency  Factor V Leiden mutation  Hx DVT  - start coumadin per Cards    PRIMITIVO   - continue CPAP with home settings    GERD  - continue PTA omeprazole       Diet: Advance Diet as Tolerated: Clear Liquid Diet    DVT Prophylaxis: Warfarin  Avery Catheter: not present  Code Status: Full code     Disposition Plan   Expected discharge: Tomorrow, recommended to prior living arrangement once cleared by Cardiology.  Entered: Jacob Owens MD 01/16/2020, 6:52 PM     The patient's care was discussed with the Patient and Patient's Family.    Jacob Owens MD  [facility]    ______________________________________________________________________    Chief Complaint   Chest pain    History is obtained from the patient, chart review and discussion with Dr. Ynu.    History of Present Illness   Jose Tejada is a 74 year old male who presents for intervention of totally occluded RCA.  He has a distant history of CAD s/p stenting to LAD and RCA about 20 years ago, but had recurrence of chest pain in September 2019.  Angiogram in October revealed .  He has followed with Dr. Yun and elected to undergo intervention which was performed today.  Per Dr. Yun, angiogram was quite  prolonged (>6 hours) and only partially successful.  He will likely require repeat procedure in the future.     The patient is seen almost immediately after arriving to the floor.  He reports feeling groggy but has no other complaints.  Denies any chest pain/pressure, shortness of breath.  No recent CHF symptoms.     Review of Systems    The 10 point Review of Systems is negative other than noted in the HPI or here.     Past Medical History    I have reviewed this patient's medical history and updated it with pertinent information if needed.   Past Medical History:   Diagnosis Date     Asthma      CAD (coronary artery disease)     1996 angioplasty and stent to the prox LAD, PTCA with intracoronary stent placement of RCA, 70%OM; 10/24/19 Cath: Occluded RCA, prox circumflex lesion - pd/pa not significant, advised medical therapy first by Interventional cardiologist     DVT (deep venous thrombosis) (H)      Factor 5 Leiden mutation, heterozygous (H)      GERD (gastroesophageal reflux disease)      HTN (hypertension)      Hyperlipidemia      Neuropathy     wears brace R foot for drop foot     PRIMITIVO (obstructive sleep apnea)     cpap     Protein C deficiency (H)      Spinal stenosis        Past Surgical History   I have reviewed this patient's surgical history and updated it with pertinent information if needed.  Past Surgical History:   Procedure Laterality Date     APPENDECTOMY OPEN  1958     ARTHROPLASTY HIP Left 1997     ARTHROPLASTY REVISION HIP Left 2013     AS SPINAL FUSION,ANT,EA ADNL LEVEL  2010    L4-L5     C TOTAL HIP ARTHROPLASTY Right 2015    THR     CV CORONARY ANGIOGRAM N/A 10/24/2019    Procedure: Coronary Angiogram;  Surgeon: Valdemar Hinojosa MD;  Location:  HEART CARDIAC CATH LAB     CV CORONARY ANGIOGRAM  1996 1996 angioplasty and stent to the prox LAD, PTCA with intracoronary stent placement of RCA, 70%OM     CV FRACTIONAL FLOW RESERVE N/A 10/24/2019    Procedure: Fractional Flow Reserve;   Surgeon: Valdemar Hinojosa MD;  Location:  HEART CARDIAC CATH LAB       Social History   I have reviewed this patient's social history and updated it with pertinent information if needed.  Social History     Tobacco Use     Smoking status: Former Smoker     Types: Cigarettes     Last attempt to quit: 3/23/1987     Years since quittin.8     Smokeless tobacco: Never Used     Tobacco comment: Smokes cigars once every 2-3 years   Substance Use Topics     Alcohol use: Yes     Alcohol/week: 0.0 standard drinks     Comment: once every two weeks if that     Drug use: Never       Family History   I have reviewed this patient's family history and updated it with pertinent information if needed.   Family History   Problem Relation Age of Onset     Myocardial Infarction Father      Heart Disease Maternal Grandfather        Prior to Admission Medications   Prior to Admission Medications   Prescriptions Last Dose Informant Patient Reported? Taking?   Warfarin Sodium (COUMADIN PO) 1/10/2020  Yes No   Sig: Take 5 mg by mouth daily 7.5 mg on Monday and Friday, 5 mg all other days   albuterol (PROAIR HFA/PROVENTIL HFA/VENTOLIN HFA) 108 (90 Base) MCG/ACT inhaler More than a month at Unknown time  Yes No   Sig: Inhale 2 puffs into the lungs every 4 hours as needed for shortness of breath / dyspnea or wheezing   clopidogrel (PLAVIX) 75 MG tablet 1/10/2020  No No   Sig: Take 1 tablet (75 mg) by mouth daily   enoxaparin ANTICOAGULANT (LOVENOX ANTICOAGULANT) 80 MG/0.8ML syringe 1/15/2020 at 2000  No Yes   Sig: Inject 0.8 mLs (80 mg) Subcutaneous every 12 hours   gabapentin (NEURONTIN) 400 MG capsule 2020 at Unknown time  Yes Yes   Sig: Take 1,200 mg by mouth 3 times daily   isosorbide mononitrate (IMDUR) 30 MG 24 hr tablet 1/15/2020 at Unknown time  No Yes   Sig: Take 1 tablet (30 mg) by mouth daily   lidocaine (LIDODERM) 5 % patch More than a month at Unknown time  No No   Sig: Place 1 patch onto the skin every 24 hours    lisinopril (PRINIVIL/ZESTRIL) 20 MG tablet 1/16/2020 at Unknown time  No Yes   Sig: Take 1 tablet (20 mg) by mouth daily   metoprolol succinate ER (TOPROL XL) 25 MG 24 hr tablet Unknown at Unknown time  No No   Sig: Pt did not start med said he had bradycardia previously and was weaned off by PMD 5/2019   nitroGLYcerin (NITROSTAT) 0.4 MG sublingual tablet Unknown at Unknown time  No No   Sig: For chest pain place 1 tablet under the tongue every 5 minutes for 3 doses. If symptoms persist 5 minutes after 1st dose call 911.   nortriptyline (PAMELOR) 25 MG capsule 1/15/2020 at Unknown time  Yes Yes   Sig: Take 25 mg by mouth At Bedtime    omeprazole (PRILOSEC) 20 MG CR capsule 1/15/2020 at Unknown time  Yes Yes   Sig: Take 20 mg by mouth At Bedtime    oxyCODONE-acetaminophen (PERCOCET) 5-325 MG per tablet 1/16/2020 at Unknown time  Yes Yes   Sig: Take 2 tablets by mouth 3 times daily as needed Patient takes 2 tablets TID Scheduled   ranolazine (RANEXA) 500 MG 12 hr tablet 1/15/2020 at Unknown time  No Yes   Sig: Take 1 tablet (500 mg) by mouth 2 times daily   rosuvastatin (CRESTOR) 20 MG tablet 1/15/2020 at Unknown time  No Yes   Sig: Take 1 tablet (20 mg) by mouth daily      Facility-Administered Medications: None     Allergies   Allergies   Allergen Reactions     Niacin Other (See Comments)     Other reaction(s): Flushing         Norvasc [Amlodipine] Rash     Septra [Sulfamethoxazole W-Trimethoprim] Rash       Physical Exam   Vital Signs: Temp: 98  F (36.7  C) Temp src: Oral BP: 133/84 Pulse: 66   Resp: 16 SpO2: 97 % O2 Device: Nasal cannula Oxygen Delivery: 2 LPM  Weight: 183 lbs 4.8 oz    General Appearance: well nourished male in NAD  Eyes: sclera anicteric  Respiratory: lungs CTAB, no wheezes or crackles  Cardiovascular: mild bradycardia, regular rhythm, normal s1/s2 without murmur  GI: abdomen soft, normal bowel sounds, nontender, nondistended  Lymph/Hematologic: no peripheral edema  Musculoskeletal:  extremities warm and well perfused  Neurologic: mildly lethargic, arouses appropriately, cranial nerves grossly intact  Psychiatric: normal affect    Data   Data reviewed today: I reviewed all medications, new labs and imaging results over the last 24 hours. I personally reviewed no images or EKG's today.    Recent Labs   Lab 01/16/20  1005   WBC 5.1   HGB 13.7   MCV 92      INR 1.11      POTASSIUM 3.7   CHLORIDE 105   CO2 28   BUN 12   CR 0.82   ANIONGAP 5   CODI 9.3   GLC 99

## 2020-01-17 NOTE — PLAN OF CARE
A&O x 4. VSS. RA. Tele: Sinus Rhythm. Assist x 1 with walker/gait belt. Up to chair this AM. Bilateral groin sites bruised, no bruit. Denies pain. Eating well. Voiding adequately. Plan to possible discharge today. Will continue with plan of care.

## 2020-01-17 NOTE — CONSULTS
NUTRITION EDUCATION    REASON FOR ASSESSMENT:  Nutrition education on American Heart Association (AHA) Heart Healthy Diet.    NUTRITION HISTORY:  Information obtained from pt:  Lives at home with his wife  Wife does the cooking and grocery shopping  Pt reports breakfast is usually his only meal of the day  Breakfast: varies, could be a banana, cold cereal, oatmeal w/ walnuts and raisins, or 2 eggs w/ sausage  Will snack on grapes, clementines, and maybe a cookie throughout the day   Pt reports avoiding starchy foods such as potatoes   When he does eat lunch/dinner his wife will make a salad w/ chicken   [facility] nutrition [facility]    CURRENT DIET ORDER:  Low saturated fat Na <2400 mg     NUTRITION DIAGNOSIS:  Food- and nutrition-related knowledge deficit R/t  Low saturated fat NA <2400 mg AEB no recent nutrition education     INTERVENTIONS:  Nutrition Prescription:    Recommended AHA Heart Healthy Diet    Implementation:     Nutrition Education (Content):  a) reviewed Heart Healthy Diet guidelines  b) provided heart healthy diet handout    Nutrition Education (Application):  a) Discussed current eating habits and recommended alternative food choices    Anticipate fair-good compliance    Diet Education - refer to Education flowsheet    Goals:    Patient verbalizes understanding of diet by asking pertinent questions and engaging in conversation     All of the above goals met during education session    Follow Up/Monitoring:    Provided RD contact information for future questions    Marleni Rao  Dietetic Intern

## 2020-01-17 NOTE — PROGRESS NOTES
Discharge criteria met. Reviewed discharge instructions with pt. Pt verbalized understanding. All questions answered. PIV removed. All personal belongs given to pt. Pt left the unit via W/C in stable condition accompanied by staff.

## 2020-01-17 NOTE — DISCHARGE INSTRUCTIONS
Cardiac Angiogram Discharge Instructions - Femoral    After you go home:      Have an adult stay with you until tomorrow.    Drink extra fluids for 2 days.    You may resume your normal diet.    No smoking       For 24 hours - due to the sedation you received:    Relax and take it easy.    Do NOT make any important or legal decisions.    Do NOT drive or operate machines at home or at work.    Do NOT drink alcohol.    Care of Groin Puncture Site:      For the first 24 hrs - check the puncture site every 1-2 hours while awake.    For 2 days, when you cough, sneeze, laugh or move your bowels, hold your hand over the puncture site and press firmly.    Remove the bandaid after 24 hours. If there is minor oozing, apply another bandaid and remove it after 12 hours.    It is normal to have a small bruise or pea size lump at the site.    You may shower tomorrow. Do NOT take a bath, or use a hot tub or pool for at least 3 days. Do NOT scrub the site. Do not use lotion or powder near the puncture site.    Activity:            For 2 days:    No stooping or squatting    Do NOT do any heavy activity such as exercise, lifting, or straining.     No housework, yard work or any activity that make you sweat    Do NOT lift more than 10 pounds    Bleeding:      If you start bleeding from the site in your groin, lie down flat and press firmly on/above the site for 10 minutes.     Once bleeding stops, lay flat for 2 hours.     Call [facility] as soon as you can.       Call 911 right away if you have heavy bleeding or bleeding that does not stop.      Medicines:      If you are taking an antiplatelet medication such as Plavix, Brilinta or Effient, do not stop taking it until you talk to your cardiologist.      If you are on Metformin (Glucophage), do not restart it until you have blood tests (within 2 to 3 days after discharge).  After you have your blood drawn, you may restart the Metformin.     Take your medications, including blood  thinners, unless your provider tells you not to.  If you take Coumadin (Warfarin), have your INR checked by your provider in  3-5 days. Call your clinic to schedule this.    If you have stopped any medicines, check with your provider about when to restart them.    Follow Up Appointments:      Follow up with [facility] Nurse Practitioner at [facility] of patient preference in 7-10 days.    Call the clinic if:      You have increased pain or a large or growing hard lump around the site.    The site is red, swollen, hot or tender.    Blood or fluid is draining from the site.    You have chills or a fever greater than 101 F (38 C).    Your leg feels numb, cool or changes color.    You have hives, a rash or unusual itching.    New pain in the back or belly that you cannot control with Tylenol.    Any questions or concerns.          [facility] [place]:    [phone] [facility] (7 days a week)

## 2020-01-17 NOTE — DISCHARGE SUMMARY
[facility]  Hospitalist Discharge Summary       Date of Admission:  1/16/2020  Date of Discharge:  1/17/2020  1:30 PM  Discharging Provider: Gregg Madden DO      Discharge Diagnoses   Coronary artery disease    Follow-ups Needed After Discharge   Follow-up Appointments     Follow-up and recommended labs and tests       INR Check on:  Monday, Jan. 20th at 11:15am at [facility].    Follow up with: Sara Jhaveri NP on:   Wednesday, Jan. 29th at   1:15pm.                                                                           [phone] [facility]                                                                            [address] [facility] [place]             Unresulted Labs Ordered in the Past 30 Days of this Admission     No orders found for last 31 day(s).      These results will be followed up by none    Discharge Disposition   Discharged to home  Condition at discharge: Stable    Hospital Course   CAD with symptomatic   History of [facility] Leiden on coumadin  Underwent elective partially successful  procedure  No symptoms on discharge, bilateral groin sites without hematoma  Plan  - lovenox to warfarin bridge, follow-up INR on Monday  - he states he has all the coumadin at home, but needs new Rx for the lovenox.  - aspirin 81 mg x 1 week        Consultations This Hospital Stay   NUTRITION SERVICES ADULT IP CONSULT  CARDIAC REHAB IP CONSULT  PHARMACY IP CONSULT  PHARMACY IP CONSULT  CARDIOLOGY IP CONSULT  SMOKING CESSATION PROGRAM IP CONSULT    Code Status   Full Code    Time Spent on this Encounter   I, Gregg Madden DO, personally saw the patient today and spent less than 30 minutes discharging this patient.       Gregg Madden DO  [facility]  ______________________________________________________________________    Physical Exam   Vital Signs: Temp: 97.4  F (36.3  C) Temp src: Oral BP: 111/75(post ex OT/CR)  Pulse: 59 Heart Rate: 86 Resp: 16 SpO2: 95 % O2 Device: None (Room air) Oxygen Delivery: 2 LPM  Weight: 177 lbs 11.2 oz  General Appearance: well nourished male in NAD  Eyes: sclera anicteric  Respiratory: lungs CTAB, no wheezes or crackles  Cardiovascular: mild bradycardia, regular rhythm, normal s1/s2 without murmur  GI: abdomen soft, normal bowel sounds, nontender, nondistended  Lymph/Hematologic: no peripheral edema  Musculoskeletal: extremities warm and well perfused  Neurologic: mildly lethargic, arouses appropriately, cranial nerves grossly intact  Psychiatric: normal affect       Primary Care Physician   Sara Jhaveri    Discharge Orders      CARDIAC REHAB REFERRAL      CARDIAC REHAB REFERRAL      Reason for your hospital stay    Status post angiogram     Follow-up and recommended labs and tests     INR Check on:  Monday, Jan. 20th at 11:15am at [facility].    Follow up with: Sara Jhaveri NP on:   Wednesday, Jan. 29th at 1:15pm.                                                                         [phone] [facility]                                                                          [address] [facility] [place]     Activity    Your activity upon discharge: activity as tolerated     Full Code     Diet    Follow this diet upon discharge: Orders Placed This Encounter      Low Saturated Fat Na <2400 mg       Significant Results and Procedures   Most Recent 3 CBC's:  Recent Labs   Lab Test 01/17/20  0545 01/16/20  1005 10/24/19  0753   WBC 7.4 5.1 5.7   HGB 12.4* 13.7 14.1   MCV 93 92 90    212 250     Most Recent 3 BMP's:  Recent Labs   Lab Test 01/17/20  0545 01/16/20  1005 11/08/19  1150    138 135   POTASSIUM 4.1 3.7 3.9   CHLORIDE 106 105 102   CO2 28 28 28   BUN 11 12 12   CR 0.79 0.82 0.75   ANIONGAP 3 5 5   CODI 8.8 9.3 8.6   * 99 109*     Most Recent 2 LFT's:  Recent Labs   Lab Test 12/20/19  0722 10/22/19  1602   04/03/14  0000   AST  --  21  --  20   ALT 20 25   < >  --    ALKPHOS  --  65  --   --    BILITOTAL  --  0.6  --   --     < > = values in this interval not displayed.   ,   Results for orders placed or performed during the hospital encounter of 01/16/20   Cardiac Catheterization    Narrative      Prox RCA lesion is 100% stenosed.      angioplasty of RCA.  Guider for RCA difficult, none ideal. Best was   AL1.0 with manupulation and 3DRC with guideliner.  Successful wire was  200.  Uncrossable with device until we used   laser to modify proximal cap. After that we could cross and dilate with   Takura 1.5mm balloon. this was only successful crossing. Microcatheters   did not cross. We used Turnpike Gold to partially cross which allowed   passage of rotablator wire but wire likely went behind stent strut so I   did not activate rotablator. At end of case there was EVAN 3 flow but   heavily calcified lesion  REC-another attempt in 4-8 weeks. Likely try AL1 or ART guiders,  200   wire. Consider turnpike spiral microcatheter. Then attempt passing   rotablator wire down RCA past stent struts or another run with laser.   Possible to try CSI device if it only crosses calcified area and doesnt   move past stent-making sure the wire is not behind any struts.  (Pre PCI   the RCA is 100% obstructed and EVAN 0 flow--post above procedures the   vessel is open with 95% proximal calcified lesion with EVAN 3 flow)           Discharge Medications   Discharge Medication List as of 1/17/2020 12:18 PM      START taking these medications    Details   aspirin (ASA) 81 MG EC tablet Take 1 tablet (81 mg) by mouth daily for 7 days, Disp-7 tablet, R-0, E-Prescribe         CONTINUE these medications which have CHANGED    Details   enoxaparin ANTICOAGULANT (LOVENOX ANTICOAGULANT) 80 MG/0.8ML syringe Inject 0.8 mLs (80 mg) Subcutaneous every 12 hours for 7 days Stop taking when your INR is therapeutic or as recommended by your  anticoagulation clinic, Disp-11.2 mL, R-0, Local Print         CONTINUE these medications which have NOT CHANGED    Details   albuterol (PROAIR HFA/PROVENTIL HFA/VENTOLIN HFA) 108 (90 Base) MCG/ACT inhaler Inhale 2 puffs into the lungs every 4 hours as needed for shortness of breath / dyspnea or wheezing, HistoricalPharmacy may dispense brand covered by insurance (Proair, or proventil or ventolin or generic albuterol inhaler)      clopidogrel (PLAVIX) 75 MG tablet Take 1 tablet (75 mg) by mouth daily, Disp-30 tablet, R-0, E-PrescribeFuture refills by PCP Dr. Hood [facility] with phone number [phone].      gabapentin (NEURONTIN) 400 MG capsule Take 1,200 mg by mouth 3 times daily, Historical      isosorbide mononitrate (IMDUR) 30 MG 24 hr tablet Take 1 tablet (30 mg) by mouth daily, Disp-30 tablet, R-0, E-PrescribeFuture refills by PCP Dr. [facility] with phone number [phone].      lidocaine (LIDODERM) 5 % patch Place 1 patch onto the skin every 24 hoursNo Print Out      lisinopril (PRINIVIL/ZESTRIL) 20 MG tablet Take 1 tablet (20 mg) by mouth daily, Disp-30 tablet, R-0, E-PrescribeFuture refills by PCP Dr. [facility] with phone number [phone].      metoprolol succinate ER (TOPROL XL) 25 MG 24 hr tablet Pt did not start med said he had bradycardia previously and was weaned off by PMD 5/2019, Disp-50 tablet, R-3, Transitional      nitroGLYcerin (NITROSTAT) 0.4 MG sublingual tablet For chest pain place 1 tablet under the tongue every 5 minutes for 3 doses. If symptoms persist 5 minutes after 1st dose call 911., Disp-20 tablet, R-0, E-PrescribeFuture refills by PCP Dr. [facility] with phone number [phone].      nortriptyline (PAMELOR) 25 MG capsule Take 25 mg by mouth At Bedtime , Historical      omeprazole (PRILOSEC) 20 MG CR capsule Take 20 mg by mouth At Bedtime , Historical      oxyCODONE-acetaminophen  (PERCOCET) 5-325 MG per tablet Take 2 tablets by mouth 3 times daily as needed Patient takes 2 tablets TID Scheduled, Historical      ranolazine (RANEXA) 500 MG 12 hr tablet Take 1 tablet (500 mg) by mouth 2 times daily, Disp-180 tablet, R-3, Local Print      rosuvastatin (CRESTOR) 20 MG tablet Take 1 tablet (20 mg) by mouth daily, Disp-90 tablet, R-3, Local Print      Warfarin Sodium (COUMADIN PO) Take 5 mg by mouth daily 7.5 mg on Monday and Friday, 5 mg all other days, Historical           Allergies   Allergies   Allergen Reactions     Niacin Other (See Comments)     Other reaction(s): Flushing         Norvasc [Amlodipine] Rash     Septra [Sulfamethoxazole W-Trimethoprim] Rash

## 2020-01-17 NOTE — PROGRESS NOTES
Pt was upset about wait time for discharge medications. When they arrived, he disagreed with the billed amount of $19.98, stated his copay is usually $10. Informed some of these meds not covered by insurance but declined to take them at this time, requesting to pick them up at preferred [facility] pharmacy. Prescriptions for lovonox, aspirin called to [facility] in [place]. Pt states he has several doses of lovonox and aspirin at home.

## 2020-01-17 NOTE — UTILIZATION REVIEW
Admission Status; Secondary Review Determination       Under the authority of the Utilization Management Committee, the utilization review process indicated a secondary review on the above patient. The review outcome is based on review of the medical records, discussions with staff, and applying clinical experience noted on the date of the review.     (x) Outpatient Status with extended recovery is appropriate - This patient does not meet hospital inpatient criteria. If this patient's primary payer is Medicare and was admitted as an inpatient, Condition Code 44 should be used and patient status changed to outpatient recovery.    RATIONALE FOR DETERMINATION    74years old man who underwent a angiogram, scheduled . Patient was admitted to the hospital after the procedure. Patient has Medicare and the procedure is not on the CMS inpatient list. No documented complications or unexpected recovery. Patient can be safely  monitored for bleeding and recover in outpatient/extended recovery setting.   The severity of illness, intensity of service provided, expected LOS and risk for adverse outcome doesn't meet inpatient hospital admission.     The patient is a 74-year-old male who was admitted by the hospitalist service at the direction of cardiology for the scheduled angiogram.  He does have a previous LAD and right coronary artery stenting.  He is followed by Dr. Arteaga in cardiology.  He has ongoing exertional chest discomfort.  No signs of acute myocardial infarction.  Intervention was attempted during an angiogram done yesterday and was unsuccessful.  The plan is to reattempt and 4 to 8 weeks depending on symptoms.  1 day post admission and post angiogram, he is doing well without chest pain or shortness of breath and will be discharged later today.  Based on appropriate designation of status, he will be switched to outpatient and this was discussed with Dr. Steinberg, his hospitalist.    The information on this  document is developed by the utilization review team in order for the business office to ensure compliance. This only denotes the appropriateness of proper admission status and does not reflect the quality of care rendered.   The definitions of Inpatient Status and Observation Status used in making the determination above are those provided in the CMS Coverage Manual, Chapter 1 and Chapter 6, section 70.4.     Sincerely,   Cody Crawford MD  Physician Advisor  Utilization Review/ Case Management  [facility].

## 2020-01-17 NOTE — PLAN OF CARE
"Discharge Planner OT   Patient plan for discharge: home  Current status: Eval and treatment completed. Pt lives in a house with his wife and I with all ADL/IADL's, doesn't exercise regularly due to drop foot, but is active with gardening and yard work in the summer and snow removal in winter. Pt admitted following angio due to  RCA. S/p angio with angioplasty to RCA, pt reports will need to come back in a few months for further angioplasty, etc.   Pt ambulated without AFO for approx 5.5 mins and 15 steps, vitals stable, see vs flow sheet. Pt reports only uses AFO when going out. Pt aware of CAD risk factors and denies OP CR.   Barriers to return to prior living situation: none with family A strenuous IADL's ie yard work.   Recommendations for discharge: home with A with strenuous IADL\"s ie snow removal, vacuuming and OP CR at [facility], however, pt declines OP CR.   Rationale for recommendations: no further OT/CR warranted inpt, recommend OP CR however, pt declines. Order placed and recommend MD to talk with pt if OP CR warranted.     Occupational Therapy Discharge Summary    Reason for therapy discharge:    All goals and outcomes met, no further needs identified.    Progress towards therapy goal(s). See goals on Care Plan in [facility] electronic health record for goal details.  Goals met    Therapy recommendation(s):    Continued therapy is recommended.  Rationale/Recommendations:  recommend OP CR for monitored progressive exercise and risk factor education and modification however, pt declines.           Entered by: Iva Thomas 01/17/2020 9:37 AM      "

## 2020-01-17 NOTE — PROGRESS NOTES
01/17/20 0856   Quick Adds   Type of Visit Initial Occupational Therapy Evaluation   Living Environment   Lives With spouse   Living Arrangements house   Home Accessibility stairs to enter home;stairs within home   Number of Stairs, Main Entrance 3   Number of Stairs, Within Home, Primary   (15 bedrooms)   Transportation Anticipated car, drives self   Self-Care   Regular Exercise No   Functional Level   Ambulation 0-->independent   Transferring 0-->independent   Toileting 0-->independent   Bathing 0-->independent   Dressing 0-->independent   Eating 0-->independent   Communication 0-->understands/communicates without difficulty   Swallowing 0-->swallows foods/liquids without difficulty   Cognition 0 - no cognition issues reported   Prior Functional Level Comment pt gardens in the summer and does the yard work. pt has an AFO but doesn't use in the house   General Information   Onset of Illness/Injury or Date of Surgery - Date 01/16/20   Referring Physician Je Rodriguez MD   Patient/Family Goals Statement home   Additional Occupational Profile Info/Pertinent History of Current Problem Jose Tejada is a 74 year old male admitted on 1/16/2020.  Past history of CAD s/p PCI, HTN, hyperlipidemia, Protein C deficiency, Factor V leiden with history of DVT, PRIMITIVO and GERD who presented for elective angiogram 1/16. Underwent elective partially successful  procedure, per pt will need to come back in a few months for another angio.    Precautions/Limitations no known precautions/limitations   Heart Disease Risk Factors High blood pressure;Lack of physical activity;Dislipidemia;Family history;Medical history;Gender;Age   Cognitive Status Examination   Orientation orientation to person, place and time   Level of Consciousness alert   Follows Commands (Cognition) WNL   Memory intact   Organization/Problem Solving No deficits were identified   Executive Function No deficits were identified   Sensory Examination  "  Sensory Quick Adds No deficits were identified   Pain Assessment   Patient Currently in Pain No   Range of Motion (ROM)   ROM Comment B UE AROM WFL   Strength   Strength Comments B strength appears WFL   Transfer Skills   Transfer Comments Pt I with ADL's and functional mobility, pt declined wear AFO as only wears this when goes out of the house.    Instrumental Activities of Daily Living (IADL)   Previous Responsibilities driving;finances;medication management;yardwork;shopping;laundry;housekeeping;meal prep   Activities of Daily Living Analysis   Impairments Contributing to Impaired Activities of Daily Living post surgical precautions   General Therapy Interventions   Planned Therapy Interventions home program guidelines;progressive activity/exercise;risk factor education   Clinical Impression   Criteria for Skilled Therapeutic Interventions Met yes, treatment indicated   OT Diagnosis decreased IADL's   Influenced by the following impairments post angio precautoins   Assessment of Occupational Performance 1-3 Performance Deficits   Identified Performance Deficits impaired IADL's ie snow removal and strenuous activies ie vacuuming, etc   Clinical Decision Making (Complexity) Low complexity   Therapy Frequency   (Eval and treatment only)   Anticipated Discharge Disposition Home with Outpatient Therapy  (home with OP CR)   Risks and Benefits of Treatment have been explained. Yes   Patient, Family & other staff in agreement with plan of care Yes   [facility] AM-PAC  \"6 Clicks\" Daily Activity Inpatient Short Form   1. Putting on and taking off regular lower body clothing? 4 - None   2. Bathing (including washing, rinsing, drying)? 4 - None   3. Toileting, which includes using toilet, bedpan or urinal? 4 - None   4. Putting on and taking off regular upper body clothing? 4 - None   5. Taking care of personal grooming such as brushing teeth? 4 - None   6. Eating meals? 4 - None   Daily Activity Raw Score (Score " out of 24.Lower scores equate to lower levels of function) 24   Total Evaluation Time   Total Evaluation Time (Minutes) 10

## 2020-01-20 ENCOUNTER — HOSPITAL ENCOUNTER (EMERGENCY)
Facility: CLINIC | Age: 75
Discharge: HOME | End: 2020-01-20
Attending: EMERGENCY MEDICINE | Admitting: EMERGENCY MEDICINE
Payer: MEDICARE

## 2020-01-20 ENCOUNTER — APPOINTMENT (OUTPATIENT)
Dept: ULTRASOUND IMAGING | Facility: CLINIC | Age: 75
End: 2020-01-20
Attending: EMERGENCY MEDICINE
Payer: MEDICARE

## 2020-01-20 VITALS
SYSTOLIC BLOOD PRESSURE: 134 MMHG | WEIGHT: 185 LBS | HEART RATE: 72 BPM | RESPIRATION RATE: 16 BRPM | HEIGHT: 68 IN | TEMPERATURE: 97.5 F | OXYGEN SATURATION: 97 % | BODY MASS INDEX: 28.04 KG/M2 | DIASTOLIC BLOOD PRESSURE: 88 MMHG

## 2020-01-20 DIAGNOSIS — S30.1XXA HEMATOMA OF GROIN, INITIAL ENCOUNTER: ICD-10-CM

## 2020-01-20 DIAGNOSIS — R79.1 SUBTHERAPEUTIC INTERNATIONAL NORMALIZED RATIO (INR): ICD-10-CM

## 2020-01-20 LAB
BASOPHILS # BLD AUTO: 0 10E9/L (ref 0–0.2)
BASOPHILS NFR BLD AUTO: 0.5 %
DIFFERENTIAL METHOD BLD: ABNORMAL
EOSINOPHIL # BLD AUTO: 0.4 10E9/L (ref 0–0.7)
EOSINOPHIL NFR BLD AUTO: 6.5 %
ERYTHROCYTE [DISTWIDTH] IN BLOOD BY AUTOMATED COUNT: 13.2 % (ref 10–15)
HCT VFR BLD AUTO: 36.2 % (ref 40–53)
HGB BLD-MCNC: 12.3 G/DL (ref 13.3–17.7)
IMM GRANULOCYTES # BLD: 0 10E9/L (ref 0–0.4)
IMM GRANULOCYTES NFR BLD: 0.2 %
INR PPP: 1.52 (ref 0.86–1.14)
LYMPHOCYTES # BLD AUTO: 1.3 10E9/L (ref 0.8–5.3)
LYMPHOCYTES NFR BLD AUTO: 21.7 %
MCH RBC QN AUTO: 31.7 PG (ref 26.5–33)
MCHC RBC AUTO-ENTMCNC: 34 G/DL (ref 31.5–36.5)
MCV RBC AUTO: 93 FL (ref 78–100)
MONOCYTES # BLD AUTO: 0.4 10E9/L (ref 0–1.3)
MONOCYTES NFR BLD AUTO: 7.1 %
NEUTROPHILS # BLD AUTO: 3.7 10E9/L (ref 1.6–8.3)
NEUTROPHILS NFR BLD AUTO: 64 %
NRBC # BLD AUTO: 0 10*3/UL
NRBC BLD AUTO-RTO: 0 /100
PLATELET # BLD AUTO: 214 10E9/L (ref 150–450)
RBC # BLD AUTO: 3.88 10E12/L (ref 4.4–5.9)
WBC # BLD AUTO: 5.8 10E9/L (ref 4–11)

## 2020-01-20 PROCEDURE — 85025 COMPLETE CBC W/AUTO DIFF WBC: CPT | Performed by: EMERGENCY MEDICINE

## 2020-01-20 PROCEDURE — 85610 PROTHROMBIN TIME: CPT | Performed by: EMERGENCY MEDICINE

## 2020-01-20 PROCEDURE — 99284 EMERGENCY DEPT VISIT MOD MDM: CPT | Mod: 25

## 2020-01-20 PROCEDURE — 93971 EXTREMITY STUDY: CPT

## 2020-01-20 ASSESSMENT — ENCOUNTER SYMPTOMS
NUMBNESS: 0
WEAKNESS: 0

## 2020-01-20 ASSESSMENT — MIFFLIN-ST. JEOR: SCORE: 1563.65

## 2020-01-20 NOTE — ED AVS SNAPSHOT
Emergency Department  [address] [facility] [address]  Phone:  [phone]  Fax:  [phone]                                    Jose Tejada   MRN: [id number]    Department:   Emergency Department   Date of Visit:  1/20/2020           After Visit Summary Signature Page    I have received my discharge instructions, and my questions have been answered. I have discussed any challenges I see with this plan with the nurse or doctor.    ..........................................................................................................................................  Patient/Patient Representative Signature      ..........................................................................................................................................  Patient Representative Print Name and Relationship to Patient    ..................................................               ................................................  Date                                   Time    ..........................................................................................................................................  Reviewed by Signature/Title    ...................................................              ..............................................  Date                                               Time          22EPIC Rev 08/18

## 2020-01-20 NOTE — DISCHARGE INSTRUCTIONS
If you have worsening of swelling, apply pressure like you did today.  If there is significant swelling or bruising that is new you should return to the emergency department.    Otherwise, have a recheck of the area with your primary care provider in 2 to 3 days.    Continue Lovenox and Coumadin.  In addition to swelling and bruising, you should return if you have significant shortness of breath, fatigue, weakness, or any other new or concerning symptoms.

## 2020-01-20 NOTE — ED PROVIDER NOTES
History     Chief Complaint:  Groin Swelling    The history is provided by the patient.     Jose Tejada is a 74 year old male with HTN, HLD, CAD, and Factor V Leiden with DVT presenting with groin swelling. Jose had a coronary angiogram 4 days ago. He has since resumed his Coumadin and is bridging with Lovenox. When the patient woke up this morning he noticed swelling in his inguinal regions bilaterally, worse on the left, but without any pain. His wife applied pressure for several minutes before arrival. He has no pain or swelling in his legs or scrotum. He has no new numbness or weakness. He denies other concerns.    Allergies:  Niacin  Norvasc [Amlodipine]  Septra [Sulfamethoxazole W-Trimethoprim]    Medications:    Albuterol   Aspirin   Plavix  Lovenox   Gabapentin   Imdur  Lisinopril   Toprol   Nitroglycerin   Pamelor  Prilosec  Percocet  Ranolazine   Crestor  Warfarin     Past Medical History:    Hyperlipidemia  HTN   Unstable angina  CAD  Coagulopathy   Factor 5 Leiden mutation, heterozygous   1st degree AV block  Asthma  DVT  GERD  Neuropathy  PRIMITIVO  Spinal stenosis    Past Surgical History:    Arthroplasty revision hip left  Appendectomy open  Spinal fusion  Total hip arthroplasty bilateral  Fractional Flow Reserve     Family History:    Myocardial Infarction in his father    Social History:  The patient arrives with his wife  Smoking: former quit 1987  Alcohol use: yes  PCP: Sara Jhaveri  Marital Status:      Review of Systems   Cardiovascular: Negative for leg swelling.   Genitourinary: Negative for penile pain, scrotal swelling and testicular pain.        Groin swelling   Musculoskeletal: Negative for myalgias.   Skin: Positive for color change (ecchymosis). Negative for wound.   Neurological: Negative for weakness and numbness.   All other systems reviewed and are negative.    Physical Exam     Patient Vitals for the past 24 hrs:   BP Temp Temp src Pulse Resp SpO2 Height  "Weight   01/20/20 0820 134/88 97.5  F (36.4  C) Oral 72 16 97 % 1.727 m (5' 8\") 83.9 kg (185 lb)     Physical Exam  General: Well-developed and well-nourished. Well appearing elderly  man. Cooperative.  Head:  Atraumatic.  Eyes:  Conjunctivae, lids, and sclerae are normal.  ENT:    Normal nose. Moist mucous membranes.  Neck:  Supple. Normal range of motion.  CV:  2+ DP bilaterally.  Resp:  No respiratory distress.   GI:  Non-distended.  Ecchymosis in the lower abdominal wall from subcutaneous injections in addition to ecchymosis in the bilateral inguinal regions with a palpable 1 cm hematoma on the right and mild diffuse edema/hematoma on the left inguinal region.  Ecchymosis and edema does not extend to the thighs.   :  Normal external male genitalia without tenderness, ecchymosis, or edema of scrotum and testicles.   MS:  Normal ROM. No bilateral lower extremity edema.  Skin:  Warm. Non-diaphoretic. No pallor.  Neuro:  Awake. A&Ox3. Normal strength.  Psych: Normal mood and affect. Normal speech.  Vitals reviewed.    Emergency Department Course     Imaging:  Radiology findings were communicated with the patient who voiced understanding of the findings.    US post-vascular access lower extremity duplex  IMPRESSION:  1. No evidence for a pseudoaneurysm.  2. Bilateral groin hematomas, left larger than right, as above  Reading per radiology    Laboratory:  Laboratory findings were communicated with the patient who voiced understanding of the findings.    CBC: HGB 12.3 (L) o/w WNL. (WBC 5.8, )     INR: 1.52 (H)    Emergency Department Course:  Past medical records, nursing notes, and vitals reviewed.  0811: I performed an exam of the patient and obtained history, as documented above.     IV was inserted and blood was drawn for laboratory testing, results above.  The patient was sent for a US while in the emergency department, findings above    1022: I rechecked the patient. Explained findings to " patient and wife. The patient's hematoma is unchanged.    I personally reviewed the laboratory and imaging results with the patient and spouse and answered all related questions prior to discharge.     Findings and plan explained to the patient and spouse. Patient discharged home with instructions regarding supportive care, medications, and reasons to return. The importance of close follow-up was reviewed.   Impression & Plan    Medical Decision Making:  Jose is a 74 year old man who underwent coronary angiogram 4 days ago with bilateral femoral arteries accessed.  He resumed warfarin and bridge with Lovenox and was doing well.  However, this morning he felt his groins bilaterally were more swollen, particularly on the left.  His wife applied pressure but this prompted their presentation.  He denies associated pain or any other new or concerning symptoms.  He appears well on exam with excellent distal pulses.  He does have ecchymosis in the groins bilaterally with a palpable small hematoma on the right with a more diffuse edema and hematoma on the left groin though overall I would describe this as mild in appearance.  Concern would certainly be for pseudoaneurysm for which he was sent for ultrasound.  Fortunately, there is no evidence of pseudoaneurysm although there are bilateral groin hematomas as expected with the left of the larger of the two measuring 7.8 cm x 1.9 cm x 1.3 cm.  Fortunately, hemoglobin is stable at 12.3.  INR remains subtherapeutic at 1.52 as expected as he is still bridging with Lovenox.  On repeat evaluation he continues to appear well.  He seems very anxious about his hematomas and tells me he feels the left is continue to increase in size.  I reevaluated the area in conjunction with nursing staff and the patient's wife and we all agree that it is not worsening.  At this point, he is appropriate for discharge as there is no evidence of active or rapidly expanding bleeding/hematoma.  I have  recommended he continue the Lovenox and Coumadin and follow-up with his primary care provider in 2 to 3 days for wound recheck.  However, he understands if he is having rapid expansion he needs to apply pressure and return to the emergency department or if he is developing any other new or concerning symptoms including those for anemia.  I answered all the patient and his wife's questions and they verbalized understanding.  Amenable to discharge.    Diagnosis:    ICD-10-CM   1. Hematoma of groin, initial encounter S30.1XXA   2. Subtherapeutic international normalized ratio (INR) R79.1       Disposition:   discharged home      Scribe Disclosure:  Dorene TELLO, am serving as a scribe at 8:11 AM on 1/20/2020 to document services personally performed by Donna German MD based on my observations and the provider's statements to me.     EMERGENCY DEPARTMENT       Donna German MD  01/22/20 1123

## 2020-01-21 NOTE — PROGRESS NOTES
[facility]      OUTPATIENT OCCUPATIONAL THERAPY  EVALUATION  PLAN OF TREATMENT FOR OUTPATIENT REHABILITATION  (COMPLETE FOR INITIAL CLAIMS ONLY)  Patient's Last Name, First Name, M.I.  YOB: 1945  Jose Tejada                          Provider's Name  [facility] Medical Record No.  [id number]                               Onset Date:  01/16/20   Start of Care Date:     1/17/2020   Type:     ___PT   _X_OT   ___SLP Medical Diagnosis:          CAD with symptomatic               OT Diagnosis:  decreased IADL's   Visits from SOC:  1   _________________________________________________________________________________  Plan of Treatment/Functional Goals    Planned Interventions: home program guidelines, progressive activity/exercise, risk factor education,       Goals: See Occupational Therapy Goals on Care Plan in [employer] electronic health record.    Therapy Frequency: (Eval and treatment only)  Predicted Duration of Therapy Intervention:    _________________________________________________________________________________    I CERTIFY THE NEED FOR THESE SERVICES FURNISHED UNDER        THIS PLAN OF TREATMENT AND WHILE UNDER MY CARE .             Physician Signature               Date    X_____________________________________________________                       ,      Referring Physician: Je Rodriguez MD            Initial Assessment        See Occupational Therapy evaluation dated   in Epic electronic health record.

## 2020-01-22 LAB
INTERPRETATION ECG - MUSE: NORMAL
INTERPRETATION ECG - MUSE: NORMAL

## 2020-01-22 ASSESSMENT — ENCOUNTER SYMPTOMS
COLOR CHANGE: 1
WOUND: 0
MYALGIAS: 0

## 2020-01-24 ENCOUNTER — HOSPITAL ENCOUNTER (OUTPATIENT)
Dept: ULTRASOUND IMAGING | Facility: CLINIC | Age: 75
Discharge: HOME | End: 2020-01-24
Attending: NURSE PRACTITIONER | Admitting: NURSE PRACTITIONER
Payer: MEDICARE

## 2020-01-24 ENCOUNTER — DOCUMENTATION ONLY (OUTPATIENT)
Dept: CARDIOLOGY | Facility: CLINIC | Age: 75
End: 2020-01-24

## 2020-01-24 ENCOUNTER — OFFICE VISIT (OUTPATIENT)
Dept: CARDIOLOGY | Facility: CLINIC | Age: 75
End: 2020-01-24
Payer: MEDICARE

## 2020-01-24 VITALS — OXYGEN SATURATION: 96 % | SYSTOLIC BLOOD PRESSURE: 113 MMHG | DIASTOLIC BLOOD PRESSURE: 74 MMHG | HEART RATE: 63 BPM

## 2020-01-24 VITALS
BODY MASS INDEX: 28.14 KG/M2 | DIASTOLIC BLOOD PRESSURE: 66 MMHG | WEIGHT: 185.7 LBS | HEIGHT: 68 IN | SYSTOLIC BLOOD PRESSURE: 108 MMHG | OXYGEN SATURATION: 93 % | HEART RATE: 70 BPM

## 2020-01-24 DIAGNOSIS — I72.9 PSEUDOANEURYSM FOLLOWING PROCEDURE (H): ICD-10-CM

## 2020-01-24 DIAGNOSIS — I20.89 STABLE ANGINA (H): ICD-10-CM

## 2020-01-24 DIAGNOSIS — T81.718A PSEUDOANEURYSM FOLLOWING PROCEDURE (H): ICD-10-CM

## 2020-01-24 DIAGNOSIS — I25.10 CORONARY ARTERY DISEASE INVOLVING NATIVE CORONARY ARTERY OF NATIVE HEART WITHOUT ANGINA PECTORIS: Primary | ICD-10-CM

## 2020-01-24 PROCEDURE — 25000125 ZZHC RX 250: Performed by: NURSE PRACTITIONER

## 2020-01-24 PROCEDURE — 99215 OFFICE O/P EST HI 40 MIN: CPT | Performed by: NURSE PRACTITIONER

## 2020-01-24 PROCEDURE — 76936 ECHO GUIDE FOR ARTERY REPAIR: CPT

## 2020-01-24 RX ADMIN — THROMBIN, TOPICAL (BOVINE) 200 UNITS: KIT at 14:50

## 2020-01-24 ASSESSMENT — MIFFLIN-ST. JEOR: SCORE: 1566.7

## 2020-01-24 NOTE — PROGRESS NOTES
Called [facility] and reviewed that pt was seen today and pseudoaneurysm found, so can cancel US he has scheduled there.  Pt's upcoming US at [facility] next week was cancelled.     ZOHRA Durham 11:24 AM 1/24/2020

## 2020-01-24 NOTE — LETTER
1/24/2020    Sara Jhaveri, CNP  [facility] [address]  [facility] [address]    RE: Jose Tejada       Dear Colleague,    I had the pleasure of seeing Jose Tejada in the [facility].    Stop warfarin check INR and lovenox when under or at 2.0  Bridge after  ?stop plavix 5 days before again?  Real  day with 2 physicians to do    History of Present Illness:    [id number]        Impression/Plan:     It has been a pleasure seeing Jose Tejada in follow up    Jaya Flores, MSN, APRN-BC, CNP  Cardiology    Orders Placed This Encounter   Procedures     US Pseudoaneursym Compression Repair     Follow-Up with Cardiac Advanced Practice Provider     No orders of the defined types were placed in this encounter.    Medications Discontinued During This Encounter   Medication Reason     aspirin (ASA) 81 MG EC tablet          Encounter Diagnoses   Name Primary?     Coronary artery disease involving native coronary artery of native heart without angina pectoris Yes     Stable angina (H)      Pseudoaneurysm following procedure (H)        CURRENT MEDICATIONS:  Current Outpatient Medications   Medication Sig Dispense Refill     albuterol (PROAIR HFA/PROVENTIL HFA/VENTOLIN HFA) 108 (90 Base) MCG/ACT inhaler Inhale 2 puffs into the lungs every 4 hours as needed for shortness of breath / dyspnea or wheezing       clopidogrel (PLAVIX) 75 MG tablet Take 1 tablet (75 mg) by mouth daily 30 tablet 0     gabapentin (NEURONTIN) 400 MG capsule Take 1,200 mg by mouth 3 times daily       isosorbide mononitrate (IMDUR) 30 MG 24 hr tablet Take 1 tablet (30 mg) by mouth daily 30 tablet 0     lidocaine (LIDODERM) 5 % patch Place 1 patch onto the skin every 24 hours       lisinopril (PRINIVIL/ZESTRIL) 20 MG tablet Take 1 tablet (20 mg) by mouth daily 30 tablet 0     nortriptyline (PAMELOR) 25 MG capsule Take 25 mg by mouth At Bedtime        omeprazole (PRILOSEC) 20 MG CR  capsule Take 20 mg by mouth At Bedtime        oxyCODONE-acetaminophen (PERCOCET) 5-325 MG per tablet Take 2 tablets by mouth 3 times daily as needed Patient takes 2 tablets TID Scheduled       ranolazine (RANEXA) 500 MG 12 hr tablet Take 1 tablet (500 mg) by mouth 2 times daily 180 tablet 3     rosuvastatin (CRESTOR) 20 MG tablet Take 1 tablet (20 mg) by mouth daily 90 tablet 3     Warfarin Sodium (COUMADIN PO) Take 5 mg by mouth daily 7.5 mg on Monday and Friday, 5 mg all other days       enoxaparin ANTICOAGULANT (LOVENOX ANTICOAGULANT) 80 MG/0.8ML syringe Inject 0.8 mLs (80 mg) Subcutaneous every 12 hours for 7 days Stop taking when your INR is therapeutic or as recommended by your anticoagulation clinic (Patient not taking: Reported on 1/24/2020) 11.2 mL 0     metoprolol succinate ER (TOPROL XL) 25 MG 24 hr tablet Pt did not start med said he had bradycardia previously and was weaned off by PMD 5/2019 (Patient not taking: Reported on 1/24/2020) 50 tablet 3     nitroGLYcerin (NITROSTAT) 0.4 MG sublingual tablet For chest pain place 1 tablet under the tongue every 5 minutes for 3 doses. If symptoms persist 5 minutes after 1st dose call 911. (Patient not taking: Reported on 1/24/2020) 20 tablet 0       ALLERGIES     Allergies   Allergen Reactions     Niacin Other (See Comments)     Other reaction(s): Flushing         Norvasc [Amlodipine] Rash     Septra [Sulfamethoxazole W-Trimethoprim] Rash       PAST MEDICAL HISTORY:  Past Medical History:   Diagnosis Date     Asthma      CAD (coronary artery disease)     1996 angioplasty and stent to the prox LAD, PTCA with intracoronary stent placement of RCA, 70%OM; 10/24/19 Cath: Occluded RCA, prox circumflex lesion - pd/pa not significant, advised medical therapy first by Interventional cardiologist     DVT (deep venous thrombosis) (H)      Factor 5 Leiden mutation, heterozygous (H)      GERD (gastroesophageal reflux disease)      HTN (hypertension)      Hyperlipidemia       Neuropathy     wears brace R foot for drop foot     PRIMITIVO (obstructive sleep apnea)     cpap     Protein C deficiency (H)      Spinal stenosis        PAST SURGICAL HISTORY:  Past Surgical History:   Procedure Laterality Date     APPENDECTOMY OPEN       ARTHROPLASTY HIP Left      ARTHROPLASTY REVISION HIP Left      AS SPINAL FUSION,ANT,EA ADNL LEVEL  2010    L4-L5     C TOTAL HIP ARTHROPLASTY Right 2015    THR     CV CORONARY ANGIOGRAM N/A 10/24/2019    Procedure: Coronary Angiogram;  Surgeon: Valdemar Hinojosa MD;  Location:  HEART CARDIAC CATH LAB     CV CORONARY ANGIOGRAM  1996 angioplasty and stent to the prox LAD, PTCA with intracoronary stent placement of RCA, 70%OM     CV FRACTIONAL FLOW RESERVE N/A 10/24/2019    Procedure: Fractional Flow [place];  Surgeon: Valdemar Hinojosa MD;  Location:  HEART CARDIAC CATH LAB     CV LEFT HEART CATH Bilateral 2020    Procedure: Left Heart Cath w/wo Left Ventriculogram  ANGIOPLASTY WITH B[place]ON SCI REP PRESENT;  Surgeon: Gurvinder Yun MD;  Location:  HEART CARDIAC CATH LAB       FAMILY HISTORY:  Family History   Problem Relation Age of Onset     Myocardial Infarction Father      Heart Disease Maternal Grandfather        SOCIAL HISTORY:  Social History     Socioeconomic History     Marital status:      Spouse name: None     Number of children: None     Years of education: None     Highest education level: None   Occupational History     None   Social Needs     Financial resource strain: None     Food insecurity:     Worry: None     Inability: None     Transportation needs:     Medical: None     Non-medical: None   Tobacco Use     Smoking status: Former Smoker     Types: Cigarettes     Last attempt to quit: 3/23/1987     Years since quittin.8     Smokeless tobacco: Never Used     Tobacco comment: Smokes cigars once every 2-3 years   Substance and Sexual Activity     Alcohol use: Yes     Alcohol/week: 0.0 standard drinks  "    Comment: once every two weeks if that     Drug use: Never     Sexual activity: None   Lifestyle     Physical activity:     Days per week: None     Minutes per session: None     Stress: None   Relationships     Social connections:     Talks on phone: None     Gets together: None     Attends Yazidi service: None     Active member of club or organization: None     Attends meetings of clubs or organizations: None     Relationship status: None     Intimate partner violence:     Fear of current or ex partner: None     Emotionally abused: None     Physically abused: None     Forced sexual activity: None   Other Topics Concern     Parent/sibling w/ CABG, MI or angioplasty before 65F 55M? Not Asked      Service Not Asked     Blood Transfusions Not Asked     Caffeine Concern No     Comment: 1 -2 cups daily      Occupational Exposure Not Asked     Hobby Hazards Not Asked     Sleep Concern Not Asked     Stress Concern Not Asked     Weight Concern Not Asked     Special Diet No     Back Care Not Asked     Exercise No     Bike Helmet Not Asked     Seat Belt Not Asked     Self-Exams Not Asked   Social History Narrative     None       Review of Systems:  Skin:  Negative       Eyes:  Negative      ENT:  Negative      Respiratory:  Positive for sleep apnea;CPAP     Cardiovascular:  Negative Positive for;fatigue;lightheadedness    Gastroenterology: Negative      Genitourinary:  Positive for nocturia 1x per night  Musculoskeletal:  Negative      Neurologic:  Positive for numbness or tingling of hands;numbness or tingling of feet    Psychiatric:  Negative      Heme/Lymph/Imm:  Positive for allergies    Endocrine:  Negative        Physical Exam:  Vitals: /66 (BP Location: Right arm, Patient Position: Sitting, Cuff Size: Adult Regular)   Pulse 70   Ht 1.727 m (5' 7.99\")   Wt 84.2 kg (185 lb 11.2 oz)   SpO2 93%   BMI 28.24 kg/m       Constitutional:  cooperative, alert and oriented, well developed, well nourished, " in no acute distress   thin,frail and fatigued    Skin:  warm and dry to the touch, no apparent skin lesions or masses noted          Head:  normocephalic, no masses or lesions        Eyes:  pupils equal and round, conjunctivae and lids unremarkable, sclera white, no xanthalasma, EOMS intact, no nystagmus        Lymph:No Cervical lymphadenopathy present     ENT:  no pallor or cyanosis, dentition good        Neck:  carotid pulses are full and equal bilaterally, JVP normal, no carotid bruit        Respiratory:  normal breath sounds, clear to auscultation, normal A-P diameter, normal symmetry, normal respiratory excursion, no use of accessory muscles         Cardiac: regular rhythm, normal S1/S2, no S3 or S4, apical impulse not displaced, no murmurs, gallops or rubs occasional premature beats S4                2+;3+           1+ 2+           2+   left femoral bruit (+) prominent left femoral pulse with significant hematoma and left femoral bruit--hematoam 7cm by 1.5cm by 1.5cm in left inguinal fold    GI:  abdomen soft, non-tender, BS normoactive, no mass, no HSM, no bruits obese difficult exam    Extremities and Muscular Skeletal:  no edema         brace on R leg--foot drop    Neurological:  no gross motor deficits   foot drop on R-wears brace    Psych:  Alert and Oriented x 3      Recent Lab Results:  LIPID RESULTS:  Lab Results   Component Value Date    CHOL 99 01/16/2020    HDL 40 01/16/2020    LDL 39 01/16/2020    TRIG 101 01/16/2020    CHOLHDLRATIO 3.5 04/21/2014       LIVER ENZYME RESULTS:  Lab Results   Component Value Date    AST 21 10/22/2019    ALT 20 12/20/2019       CBC RESULTS:  Lab Results   Component Value Date    WBC 5.8 01/20/2020    RBC 3.88 (L) 01/20/2020    HGB 12.3 (L) 01/20/2020    HCT 36.2 (L) 01/20/2020    MCV 93 01/20/2020    MCH 31.7 01/20/2020    MCHC 34.0 01/20/2020    RDW 13.2 01/20/2020     01/20/2020       BMP RESULTS:  Lab Results   Component Value Date     01/17/2020     POTASSIUM 4.1 01/17/2020    CHLORIDE 106 01/17/2020    CO2 28 01/17/2020    ANIONGAP 3 01/17/2020     (H) 01/17/2020    BUN 11 01/17/2020    CR 0.79 01/17/2020    GFRESTIMATED 89 01/17/2020    GFRESTBLACK >90 01/17/2020    CODI 8.8 01/17/2020        A1C RESULTS:  No results found for: A1C    INR RESULTS:  Lab Results   Component Value Date    INR 1.52 (H) 01/20/2020    INR 1.17 (H) 01/17/2020           CC  Gurvinder Yun MD  [address]  [place] [address]                  Service Date: 01/24/2020      HISTORY OF PRESENT ILLNESS:  Jose Tejada is a delightful 74-year-old gentleman followed here for many years by Dr. Yun.  He has a history of coronary artery disease with stents to his LAD in the past as well as his right coronary.  This occurred in 1996 with a known 70% circumflex lesion.      He has a history of protein C deficiency and factor V Leiden mutation (heterozygous) with a history of DVT.  He is on chronic warfarin therapy and needs bridging prior to any procedures.        He has developed peripheral neuropathy with right footdrop and wears a brace.  He has not had frequent falls.  In addition, he has hypertension, dyslipidemia, gastroesophageal reflux disease, asthma and spinal stenosis.  His primary care and warfarin typically are followed by Dr. Meyer through the [facility].        In the fall of last year, he developed exertional chest tightness and was admitted to [facility].  Coronary angiogram was performed, noting his LAD was open, circumflex had a 70% stenosis, but the flow reserve was normal.  The right coronary artery was totally occluded shortly after its origin with robust collaterals from the LAD apical epicardial to the PDA vessels.  There were a smaller amounts of collateral from the left circumflex to the posterolateral branch.  The proximal right coronary artery has a bit of an ambiguous takeoff.  There was also heavy calcification  present in the right coronary artery.  Medical therapy was begun.        He was taken off the beta blockers due to fatigue, but Dr. Yun restarted this recently.  He tried nitrates with Ranexa and he continued to have chest discomfort.  His LV function was preserved with RV enlargement and decreased function, probably suspicious for some ischemia to the right ventricle or cor pulmonale with some lung disease.  After review of the films, Dr. Yun felt it was worth attempting a chronic total occlusion procedure to the right coronary artery.  This patient was taken off of aspirin, maintained on Plavix, bridged with Lovenox and Coumadin was interrupted.      He went to the cardiac catheterization lab on 01/16, where a bilateral groin access was undertaken with multiple attempts and catheter exchanges to attempt to open the right coronary artery.  This vessel was quite calcified and this was a challenging procedure.  After multiple attempts with laser and rotablader, the end result was a 95% residual stenosis; however, further attempts will be undertaken in 4-8 weeks per the Cath Lab notes.      Following the procedure, the patient was admitted to the hospital overnight where he did well.  He was bridged with Lovenox.  I believe this was started the day following his angiogram, but it is unclear to me from the hospital records.      He was discharged on 01/17.  Coumadin was restarted.  He was also on aspirin and Plavix leaving the hospital.        On 01/20, he presented to [facility] Emergency Room with groin swelling.  He was found to have bilateral inguinal bruising.  Hemoglobin was 12.3 and stable.  INR at that point was 1.52.  Since that time, his INR has been over 2.0 and Lovenox has been stopped.  An ultrasound was performed during that emergency room visit, which was unremarkable for pseudoaneurysm.  However, there was a 1.4 x 1.7 x 0.3 cm hematoma in the right inguinal location and a 7.8 x 1.9 x 1.3  "cm hematoma in the left inguinal region.      Today, the patient presents with excessive bruising in the left inguinal area extending nearly to the knee medially.  He has some paresthesias on the left lateral calf.  There is a very prominent hematoma in the inguinal fold that measures nearly exactly how the previous ultrasound measured it.  There is a very prominent arterial pulse over the left femoral artery and I hear a loud bruit present.  Our ultrasound technician, Cole, took a quick ultrasound look for me today and identified pseudoaneurysm.  The right groin again has ecchymosis but to a lesser extent.  There are no prominent pulsations out of the ordinary and no bruits.  This patient was sent to Radiology for further investigation and attempts at compressing the pseudoaneurysm later today.      He tells me that he has been doing well since his procedure.  He is a bit fatigued.  He has not used any nitroglycerin.  I will interrupt his aspirin therapy given the level of bruising that he has sustained.  His basic metabolic panel was normal after 100 mL Isovue for his procedure.  He has been off metoprolol due to bradycardia in the since 05/2019.      PHYSICAL EXAMINATION:   VITAL SIGNS:  Blood pressure today is 108/66.  His weight is stable.        He continues on Ranexa 500 mg twice daily.  Lipid profile 01/16 showed a total cholesterol of 99, HDL 40, LDL 39, drawn on Crestor 20 mg per day.      IMPRESSION AND PLAN:   1.  Coronary artery disease with previous remote LAD stent which appears patent.  He now has a chronically occluded right coronary artery and had attempt at chronic total occlusion, which was extensive with multiple instrumentation and ultimately laser atherectomy, however 95% residual stenosis exists.  This was a \"worthwhile investment\" procedure, and Dr. Yun has a plan for a reattempt and suggests a 4 to 8-week timeframe for this.  Currently, the first available time is in May with Dr." Jama; I will reassess this when I am able to speak to Dr. Yun to see if that is an acceptable time frame.  I will continue his Plavix, stop his aspirin to avoid triple therapy and continue his other medications as is.   2.  Left arterial pseudoaneurysm post instrumentation.  He has been referred to the Radiology Department at [facility] to attempt compression of this.  He does have a followup visit with me in approximately 3-4 weeks' time.   3.  Hypercoagulable state.  Continue warfarin therapy.  He does need Lovenox bridging prior to his next upcoming procedure.   4.  Dyslipidemia, controlled.   5.  Hypertension, controlled.   6.  Bradycardia on metoprolol in the past; this has been discontinued.   7.  Neuropathy with right footdrop.  He wears a brace.  He has not had frequent falls.      We will await results from Radiology today, and I will plan to see him back as scheduled or sooner if necessary.      Greater than 50% of this 1-hour visit today was spent in counseling.        This plan was reviewed with Dr. Cleary in the absence of Dr. Yun.      ADDENDUM: sent to ultrasound and pseudoaneursym confirmed. Unsuccessful compression so thrombin was injected with resolution. anabell ROSADO NP             D: 2020   T: 2020   MT: PRISCILLA      Name:     ELIE FONTANEZ   MRN:      [id number]        Account:      [id number]   :      1945           Service Date: 2020      Document: [id number]        Thank you for allowing me to participate in the care of your patient.      Sincerely,     BRENDAN Mccrary [facility]    cc:   Gurvinder Yun MD  [address]  [place] [id number]

## 2020-01-24 NOTE — PROGRESS NOTES
Can you call PMD--he has ultrasound planned at [facility] and He will not need this as we identified pseudoaneurysm here today and we will manage

## 2020-01-24 NOTE — PROGRESS NOTES
Pseudoaneurysm compression repair complete. Pt tolerated well. VSS. Consent obtained by Dr. Radford. Used 200u Thrombin at left groin. Will continue to monitor site. No signs of bleeding at site. No additional hematoma, soft at site. Left pedal pulse palpable. Pt has no pain.  Gave pt discharge instructions and activity limitations. Also educated on signs and symptoms to report.

## 2020-01-24 NOTE — PROGRESS NOTES
Service Date: 01/24/2020      HISTORY OF PRESENT ILLNESS:  Jose Tejada is a delightful 74-year-old gentleman followed here for many years by Dr. Yun.  He has a history of coronary artery disease with stents to his LAD in the past as well as his right coronary.  This occurred in 1996 with a known 70% circumflex lesion.      He has a history of protein C deficiency and factor V Leiden mutation (heterozygous) with a history of DVT.  He is on chronic warfarin therapy and needs bridging prior to any procedures.        He has developed peripheral neuropathy with right footdrop and wears a brace.  He has not had frequent falls.  In addition, he has hypertension, dyslipidemia, gastroesophageal reflux disease, asthma and spinal stenosis.  His primary care and warfarin typically are followed by Dr. Meyer through the [facility].        In the fall of last year, he developed exertional chest tightness and was admitted to [facility].  Coronary angiogram was performed, noting his LAD was open, circumflex had a 70% stenosis, but the flow reserve was normal.  The right coronary artery was totally occluded shortly after its origin with robust collaterals from the LAD apical epicardial to the PDA vessels.  There were a smaller amounts of collateral from the left circumflex to the posterolateral branch.  The proximal right coronary artery has a bit of an ambiguous takeoff.  There was also heavy calcification present in the right coronary artery.  Medical therapy was begun.        He was taken off the beta blockers due to fatigue, but Dr. Yun restarted this recently.  He tried nitrates with Ranexa and he continued to have chest discomfort.  His LV function was preserved with RV enlargement and decreased function, probably suspicious for some ischemia to the right ventricle or cor pulmonale with some lung disease.  After review of the films, Dr. Yun felt it was worth attempting a chronic  total occlusion procedure to the right coronary artery.  This patient was taken off of plavix pre procedure, Aspirin was started the same day as plavix was stopped, he was bridged with Lovenox when Coumadin was interrupted.      He went to the cardiac catheterization lab on 01/16, where a bilateral groin access was undertaken with multiple attempts and catheter exchanges to attempt to open the right coronary artery.  This vessel was quite calcified and this was a challenging procedure.  After multiple attempts with laser and rotablader, the end result was a 95% residual stenosis; however, further attempts will be undertaken in 4-8 weeks per the Cath Lab notes.      Following the procedure, the patient was admitted to the hospital overnight where he did well.  He was bridged with Lovenox.  I believe this was started the day following his angiogram, but it is unclear to me from the hospital records.      He was discharged on 01/17.  Coumadin was restarted.  He was also on aspirin and Plavix leaving the hospital.        On 01/20, he presented to [facility] Emergency Room with groin swelling.  He was found to have bilateral inguinal bruising.  Hemoglobin was 12.3 and stable.  INR at that point was 1.52.  Since that time, his INR has been over 2.0 and Lovenox has been stopped.  An ultrasound was performed during that emergency room visit, which was unremarkable for pseudoaneurysm.  However, there was a 1.4 x 1.7 x 0.3 cm hematoma in the right inguinal location and a 7.8 x 1.9 x 1.3 cm hematoma in the left inguinal region.      Today, the patient presents with excessive bruising in the left inguinal area extending nearly to the knee medially.  He has some paresthesias on the left lateral calf.  There is a very prominent hematoma in the inguinal fold that measures nearly exactly how the previous ultrasound measured it.  There is a very prominent arterial pulse over the left femoral artery and I hear a loud bruit  "present.  Our ultrasound technician, Cole, took a quick ultrasound look for me today and identified pseudoaneurysm.  The right groin again has ecchymosis but to a lesser extent.  There are no prominent pulsations out of the ordinary and no bruits.  This patient was sent to Radiology for further investigation and attempts at compressing the pseudoaneurysm later today.      He tells me that he has been doing well since his procedure.  He is a bit fatigued.  He has not used any nitroglycerin.  I will interrupt his aspirin therapy given the level of bruising that he has sustained.  His basic metabolic panel was normal after 100 mL Isovue for his procedure.  He has been off metoprolol due to bradycardia in the since 05/2019.      PHYSICAL EXAMINATION:   VITAL SIGNS:  Blood pressure today is 108/66.  His weight is stable.        He continues on Ranexa 500 mg twice daily.  Lipid profile 01/16 showed a total cholesterol of 99, HDL 40, LDL 39, drawn on Crestor 20 mg per day.      IMPRESSION AND PLAN:   1.  Coronary artery disease with previous remote LAD stent which appears patent.  He now has a chronically occluded right coronary artery and had attempt at chronic total occlusion, which was extensive with multiple instrumentation and ultimately laser atherectomy, however 95% residual stenosis exists.  This was a \"worthwhile investment\" procedure, and Dr. Yun has a plan for a reattempt and suggests a 4 to 8-week timeframe for this.  Currently, the first available time is in May with Dr. Yun; I will reassess this when I am able to speak to Dr. Yun to see if that is an acceptable time frame.  I will continue his Plavix, stop his aspirin to avoid triple therapy and continue his other medications as is.   2.  Left arterial pseudoaneurysm post instrumentation.  He has been referred to the Radiology Department at [facility] to attempt compression of this.  He does have a followup visit with me in " approximately 3-4 weeks' time.   3.  Hypercoagulable state.  Continue warfarin therapy.  He does need Lovenox bridging prior to his next upcoming procedure.   4.  Dyslipidemia, controlled.   5.  Hypertension, controlled.   6.  Bradycardia on metoprolol in the past; this has been discontinued.   7.  Neuropathy with right footdrop.  He wears a brace.  He has not had frequent falls.      We will await results from Radiology today, and I will plan to see him back as scheduled or sooner if necessary.      Greater than 50% of this 1-hour visit today was spent in counseling.        This plan was reviewed with Dr. Cleary in the absence of Dr. Yun.      ADDENDUM: sent to ultrasound and pseudoaneursym confirmed. Unsuccessful compression so thrombin was injected with resolution. anabell ROSADO NP             D: 2020   T: 2020   MT: PRISCILLA      Name:     ELIE FONTANEZ   MRN:      [id number]        Account:      [id number]   :      1945           Service Date: 2020      Document: [id number]

## 2020-01-24 NOTE — LETTER
1/24/2020      Sara Jhaveri, [facility] [address]  [facility] [address]      RE: Jose Noblemoon       Dear Colleague,    I had the pleasure of seeing Jose Tejada in the [facility].    Service Date: 01/24/2020      HISTORY OF PRESENT ILLNESS:  Jose Tejada is a delightful 74-year-old gentleman followed here for many years by Dr. Yun.  He has a history of coronary artery disease with stents to his LAD in the past as well as his right coronary.  This occurred in 1996 with a known 70% circumflex lesion.      He has a history of protein C deficiency and factor V Leiden mutation (heterozygous) with a history of DVT.  He is on chronic warfarin therapy and needs bridging prior to any procedures.        He has developed peripheral neuropathy with right footdrop and wears a brace.  He has not had frequent falls.  In addition, he has hypertension, dyslipidemia, gastroesophageal reflux disease, asthma and spinal stenosis.  His primary care and warfarin typically are followed by Dr. Meyer through the [facility].        In the fall of last year, he developed exertional chest tightness and was admitted to [facility].  Coronary angiogram was performed, noting his LAD was open, circumflex had a 70% stenosis, but the flow reserve was normal.  The right coronary artery was totally occluded shortly after its origin with robust collaterals from the LAD apical epicardial to the PDA vessels.  There were a smaller amounts of collateral from the left circumflex to the posterolateral branch.  The proximal right coronary artery has a bit of an ambiguous takeoff.  There was also heavy calcification present in the right coronary artery.  Medical therapy was begun.        He was taken off the beta blockers due to fatigue, but Dr. Yun restarted this recently.  He tried nitrates with Ranexa and he continued to have chest discomfort.   His LV function was preserved with RV enlargement and decreased function, probably suspicious for some ischemia to the right ventricle or cor pulmonale with some lung disease.  After review of the films, Dr. Yun felt it was worth attempting a chronic total occlusion procedure to the right coronary artery.  This patient was taken off of plavix pre procedure, Aspirin was started the same day as plavix was stopped, he was bridged with Lovenox when Coumadin was interrupted.      He went to the cardiac catheterization lab on 01/16, where a bilateral groin access was undertaken with multiple attempts and catheter exchanges to attempt to open the right coronary artery.  This vessel was quite calcified and this was a challenging procedure.  After multiple attempts with laser and rotablader, the end result was a 95% residual stenosis; however, further attempts will be undertaken in 4-8 weeks per the Cath Lab notes.      Following the procedure, the patient was admitted to the hospital overnight where he did well.  He was bridged with Lovenox.  I believe this was started the day following his angiogram, but it is unclear to me from the hospital records.      He was discharged on 01/17.  Coumadin was restarted.  He was also on aspirin and Plavix leaving the hospital.        On 01/20, he presented to [facility] Emergency Room with groin swelling.  He was found to have bilateral inguinal bruising.  Hemoglobin was 12.3 and stable.  INR at that point was 1.52.  Since that time, his INR has been over 2.0 and Lovenox has been stopped.  An ultrasound was performed during that emergency room visit, which was unremarkable for pseudoaneurysm.  However, there was a 1.4 x 1.7 x 0.3 cm hematoma in the right inguinal location and a 7.8 x 1.9 x 1.3 cm hematoma in the left inguinal region.      Today, the patient presents with excessive bruising in the left inguinal area extending nearly to the knee medially.  He has some  "paresthesias on the left lateral calf.  There is a very prominent hematoma in the inguinal fold that measures nearly exactly how the previous ultrasound measured it.  There is a very prominent arterial pulse over the left femoral artery and I hear a loud bruit present.  Our ultrasound technician, Cole, took a quick ultrasound look for me today and identified pseudoaneurysm.  The right groin again has ecchymosis but to a lesser extent.  There are no prominent pulsations out of the ordinary and no bruits.  This patient was sent to Radiology for further investigation and attempts at compressing the pseudoaneurysm later today.      He tells me that he has been doing well since his procedure.  He is a bit fatigued.  He has not used any nitroglycerin.  I will interrupt his aspirin therapy given the level of bruising that he has sustained.  His basic metabolic panel was normal after 100 mL Isovue for his procedure.  He has been off metoprolol due to bradycardia in the since 05/2019.      PHYSICAL EXAMINATION:   VITAL SIGNS:  Blood pressure today is 108/66.  His weight is stable.        He continues on Ranexa 500 mg twice daily.  Lipid profile 01/16 showed a total cholesterol of 99, HDL 40, LDL 39, drawn on Crestor 20 mg per day.      IMPRESSION AND PLAN:   1.  Coronary artery disease with previous remote LAD stent which appears patent.  He now has a chronically occluded right coronary artery and had attempt at chronic total occlusion, which was extensive with multiple instrumentation and ultimately laser atherectomy, however 95% residual stenosis exists.  This was a \"worthwhile investment\" procedure, and Dr. Yun has a plan for a reattempt and suggests a 4 to 8-week timeframe for this.  Currently, the first available time is in May with Dr. Yun; I will reassess this when I am able to speak to Dr. Yun to see if that is an acceptable time frame.  I will continue his Plavix, stop his aspirin to avoid triple therapy " and continue his other medications as is.   2.  Left arterial pseudoaneurysm post instrumentation.  He has been referred to the Radiology Department at [facility] to attempt compression of this.  He does have a followup visit with me in approximately 3-4 weeks' time.   3.  Hypercoagulable state.  Continue warfarin therapy.  He does need Lovenox bridging prior to his next upcoming procedure.   4.  Dyslipidemia, controlled.   5.  Hypertension, controlled.   6.  Bradycardia on metoprolol in the past; this has been discontinued.   7.  Neuropathy with right footdrop.  He wears a brace.  He has not had frequent falls.      We will await results from Radiology today, and I will plan to see him back as scheduled or sooner if necessary.      Greater than 50% of this 1-hour visit today was spent in counseling.        This plan was reviewed with Dr. Cleary in the absence of Dr. Yun.      ADDENDUM: sent to ultrasound and pseudoaneursym confirmed. Unsuccessful compression so thrombin was injected with resolution. anabell ROSADO NP             D: 2020   T: 2020   MT: PRISCILLA      Name:     ELIE FONTANEZ   MRN:      [id number]        Account:      [id number]   :      1945           Service Date: 2020      Document: [id number]           Outpatient Encounter Medications as of 2020   Medication Sig Dispense Refill     albuterol (PROAIR HFA/PROVENTIL HFA/VENTOLIN HFA) 108 (90 Base) MCG/ACT inhaler Inhale 2 puffs into the lungs every 4 hours as needed for shortness of breath / dyspnea or wheezing       clopidogrel (PLAVIX) 75 MG tablet Take 1 tablet (75 mg) by mouth daily 30 tablet 0     gabapentin (NEURONTIN) 400 MG capsule Take 1,200 mg by mouth 3 times daily       isosorbide mononitrate (IMDUR) 30 MG 24 hr tablet Take 1 tablet (30 mg) by mouth daily 30 tablet 0     lidocaine (LIDODERM) 5 % patch Place 1 patch onto the skin every 24 hours       lisinopril  (PRINIVIL/ZESTRIL) 20 MG tablet Take 1 tablet (20 mg) by mouth daily 30 tablet 0     nortriptyline (PAMELOR) 25 MG capsule Take 25 mg by mouth At Bedtime        omeprazole (PRILOSEC) 20 MG CR capsule Take 20 mg by mouth At Bedtime        oxyCODONE-acetaminophen (PERCOCET) 5-325 MG per tablet Take 2 tablets by mouth 3 times daily as needed Patient takes 2 tablets TID Scheduled       ranolazine (RANEXA) 500 MG 12 hr tablet Take 1 tablet (500 mg) by mouth 2 times daily 180 tablet 3     rosuvastatin (CRESTOR) 20 MG tablet Take 1 tablet (20 mg) by mouth daily 90 tablet 3     Warfarin Sodium (COUMADIN PO) Take 5 mg by mouth daily 7.5 mg on Monday and Friday, 5 mg all other days       [] enoxaparin ANTICOAGULANT (LOVENOX ANTICOAGULANT) 80 MG/0.8ML syringe Inject 0.8 mLs (80 mg) Subcutaneous every 12 hours for 7 days Stop taking when your INR is therapeutic or as recommended by your anticoagulation clinic (Patient not taking: Reported on 2020) 11.2 mL 0     metoprolol succinate ER (TOPROL XL) 25 MG 24 hr tablet Pt did not start med said he had bradycardia previously and was weaned off by PMD 2019 (Patient not taking: Reported on 2020) 50 tablet 3     nitroGLYcerin (NITROSTAT) 0.4 MG sublingual tablet For chest pain place 1 tablet under the tongue every 5 minutes for 3 doses. If symptoms persist 5 minutes after 1st dose call 911. (Patient not taking: Reported on 2020) 20 tablet 0     [DISCONTINUED] aspirin (ASA) 81 MG EC tablet Take 1 tablet (81 mg) by mouth daily for 7 days 7 tablet 0     No facility-administered encounter medications on file as of 2020.        Again, thank you for allowing me to participate in the care of your patient.      Sincerely,    BRENDAN Mccrary [facility]

## 2020-01-24 NOTE — PROGRESS NOTES
History of Present Illness:    [id number]        Impression/Plan:     It has been a pleasure seeing Jose Tejada in follow up    Jaya Flores, MSN, APRN-BC, CNP  Cardiology    Orders Placed This Encounter   Procedures     US Pseudoaneursym Compression Repair     Follow-Up with Cardiac Advanced Practice Provider     No orders of the defined types were placed in this encounter.    Medications Discontinued During This Encounter   Medication Reason     aspirin (ASA) 81 MG EC tablet          Encounter Diagnoses   Name Primary?     Coronary artery disease involving native coronary artery of native heart without angina pectoris Yes     Stable angina (H)      Pseudoaneurysm following procedure (H)        CURRENT MEDICATIONS:  Current Outpatient Medications   Medication Sig Dispense Refill     albuterol (PROAIR HFA/PROVENTIL HFA/VENTOLIN HFA) 108 (90 Base) MCG/ACT inhaler Inhale 2 puffs into the lungs every 4 hours as needed for shortness of breath / dyspnea or wheezing       clopidogrel (PLAVIX) 75 MG tablet Take 1 tablet (75 mg) by mouth daily 30 tablet 0     gabapentin (NEURONTIN) 400 MG capsule Take 1,200 mg by mouth 3 times daily       isosorbide mononitrate (IMDUR) 30 MG 24 hr tablet Take 1 tablet (30 mg) by mouth daily 30 tablet 0     lidocaine (LIDODERM) 5 % patch Place 1 patch onto the skin every 24 hours       lisinopril (PRINIVIL/ZESTRIL) 20 MG tablet Take 1 tablet (20 mg) by mouth daily 30 tablet 0     nortriptyline (PAMELOR) 25 MG capsule Take 25 mg by mouth At Bedtime        omeprazole (PRILOSEC) 20 MG CR capsule Take 20 mg by mouth At Bedtime        oxyCODONE-acetaminophen (PERCOCET) 5-325 MG per tablet Take 2 tablets by mouth 3 times daily as needed Patient takes 2 tablets TID Scheduled       ranolazine (RANEXA) 500 MG 12 hr tablet Take 1 tablet (500 mg) by mouth 2 times daily 180 tablet 3     rosuvastatin (CRESTOR) 20 MG tablet Take 1 tablet (20 mg) by mouth daily 90 tablet 3     Warfarin Sodium  (COUMADIN PO) Take 5 mg by mouth daily 7.5 mg on Monday and Friday, 5 mg all other days       enoxaparin ANTICOAGULANT (LOVENOX ANTICOAGULANT) 80 MG/0.8ML syringe Inject 0.8 mLs (80 mg) Subcutaneous every 12 hours for 7 days Stop taking when your INR is therapeutic or as recommended by your anticoagulation clinic (Patient not taking: Reported on 1/24/2020) 11.2 mL 0     metoprolol succinate ER (TOPROL XL) 25 MG 24 hr tablet Pt did not start med said he had bradycardia previously and was weaned off by PMD 5/2019 (Patient not taking: Reported on 1/24/2020) 50 tablet 3     nitroGLYcerin (NITROSTAT) 0.4 MG sublingual tablet For chest pain place 1 tablet under the tongue every 5 minutes for 3 doses. If symptoms persist 5 minutes after 1st dose call 911. (Patient not taking: Reported on 1/24/2020) 20 tablet 0       ALLERGIES     Allergies   Allergen Reactions     Niacin Other (See Comments)     Other reaction(s): Flushing         Norvasc [Amlodipine] Rash     Septra [Sulfamethoxazole W-Trimethoprim] Rash       PAST MEDICAL HISTORY:  Past Medical History:   Diagnosis Date     Asthma      CAD (coronary artery disease)     1996 angioplasty and stent to the prox LAD, PTCA with intracoronary stent placement of RCA, 70%OM; 10/24/19 Cath: Occluded RCA, prox circumflex lesion - pd/pa not significant, advised medical therapy first by Interventional cardiologist     DVT (deep venous thrombosis) (H)      Factor 5 Leiden mutation, heterozygous (H)      GERD (gastroesophageal reflux disease)      HTN (hypertension)      Hyperlipidemia      Neuropathy     wears brace R foot for drop foot     PRIMITIVO (obstructive sleep apnea)     cpap     Protein C deficiency (H)      Spinal stenosis        PAST SURGICAL HISTORY:  Past Surgical History:   Procedure Laterality Date     APPENDECTOMY OPEN  1958     ARTHROPLASTY HIP Left 1997     ARTHROPLASTY REVISION HIP Left 2013     AS SPINAL FUSION,ANT,EA ADNL LEVEL  2010    L4-L5     C TOTAL HIP  ARTHROPLASTY Right 2015    THR     CV CORONARY ANGIOGRAM N/A 10/24/2019    Procedure: Coronary Angiogram;  Surgeon: Valdemar Hinojosa MD;  Location:  [facility]     CV CORONARY ANGIOGRAM  1996 angioplasty and stent to the prox LAD, PTCA with intracoronary stent placement of RCA, 70%OM     CV FRACTIONAL FLOW RESERVE N/A 10/24/2019    Procedure: Fractional Flow [place];  Surgeon: Valdemar Hinojosa MD;  Location:  [facility]     CV LEFT HEART CATH Bilateral 2020    Procedure: Left Heart Cath w/wo Left Ventriculogram  ANGIOPLASTY WITH [place] SCI REP PRESENT;  Surgeon: Gurvinder Yun MD;  Location:  [facility]       FAMILY HISTORY:  Family History   Problem Relation Age of Onset     Myocardial Infarction Father      Heart Disease Maternal Grandfather        SOCIAL HISTORY:  Social History     Socioeconomic History     Marital status:      Spouse name: None     Number of children: None     Years of education: None     Highest education level: None   Occupational History     None   Social Needs     Financial resource strain: None     Food insecurity:     Worry: None     Inability: None     Transportation needs:     Medical: None     Non-medical: None   Tobacco Use     Smoking status: Former Smoker     Types: Cigarettes     Last attempt to quit: 3/23/1987     Years since quittin.8     Smokeless tobacco: Never Used     Tobacco comment: Smokes cigars once every 2-3 years   Substance and Sexual Activity     Alcohol use: Yes     Alcohol/week: 0.0 standard drinks     Comment: once every two weeks if that     Drug use: Never     Sexual activity: None   Lifestyle     Physical activity:     Days per week: None     Minutes per session: None     Stress: None   Relationships     Social connections:     Talks on phone: None     Gets together: None     Attends Zoroastrian service: None     Active member of club or organization: None     Attends meetings of  "clubs or organizations: None     Relationship status: None     Intimate partner violence:     Fear of current or ex partner: None     Emotionally abused: None     Physically abused: None     Forced sexual activity: None   Other Topics Concern     Parent/sibling w/ CABG, MI or angioplasty before 65F 55M? Not Asked      Service Not Asked     Blood Transfusions Not Asked     Caffeine Concern No     Comment: 1 -2 cups daily      Occupational Exposure Not Asked     Hobby Hazards Not Asked     Sleep Concern Not Asked     Stress Concern Not Asked     Weight Concern Not Asked     Special Diet No     Back Care Not Asked     Exercise No     Bike Helmet Not Asked     Seat Belt Not Asked     Self-Exams Not Asked   Social History Narrative     None       Review of Systems:  Skin:  Negative       Eyes:  Negative      ENT:  Negative      Respiratory:  Positive for sleep apnea;CPAP     Cardiovascular:  Negative Positive for;fatigue;lightheadedness    Gastroenterology: Negative      Genitourinary:  Positive for nocturia 1x per night  Musculoskeletal:  Negative      Neurologic:  Positive for numbness or tingling of hands;numbness or tingling of feet    Psychiatric:  Negative      Heme/Lymph/Imm:  Positive for allergies    Endocrine:  Negative        Physical Exam:  Vitals: /66 (BP Location: Right arm, Patient Position: Sitting, Cuff Size: Adult Regular)   Pulse 70   Ht 1.727 m (5' 7.99\")   Wt 84.2 kg (185 lb 11.2 oz)   SpO2 93%   BMI 28.24 kg/m      Constitutional:  cooperative, alert and oriented, well developed, well nourished, in no acute distress   thin,frail and fatigued    Skin:  warm and dry to the touch, no apparent skin lesions or masses noted          Head:  normocephalic, no masses or lesions        Eyes:  pupils equal and round, conjunctivae and lids unremarkable, sclera white, no xanthalasma, EOMS intact, no nystagmus        Lymph:No Cervical lymphadenopathy present     ENT:  no pallor or cyanosis, " dentition good        Neck:  carotid pulses are full and equal bilaterally, JVP normal, no carotid bruit        Respiratory:  normal breath sounds, clear to auscultation, normal A-P diameter, normal symmetry, normal respiratory excursion, no use of accessory muscles         Cardiac: regular rhythm, normal S1/S2, no S3 or S4, apical impulse not displaced, no murmurs, gallops or rubs occasional premature beats S4                2+;3+           1+ 2+           2+   left femoral bruit (+) prominent left femoral pulse with significant hematoma and left femoral bruit--hematoam 7cm by 1.5cm by 1.5cm in left inguinal fold    GI:  abdomen soft, non-tender, BS normoactive, no mass, no HSM, no bruits obese difficult exam    Extremities and Muscular Skeletal:  no edema         brace on R leg--foot drop    Neurological:  no gross motor deficits   foot drop on R-wears brace    Psych:  Alert and Oriented x 3      Recent Lab Results:  LIPID RESULTS:  Lab Results   Component Value Date    CHOL 99 01/16/2020    HDL 40 01/16/2020    LDL 39 01/16/2020    TRIG 101 01/16/2020    CHOLHDLRATIO 3.5 04/21/2014       LIVER ENZYME RESULTS:  Lab Results   Component Value Date    AST 21 10/22/2019    ALT 20 12/20/2019       CBC RESULTS:  Lab Results   Component Value Date    WBC 5.8 01/20/2020    RBC 3.88 (L) 01/20/2020    HGB 12.3 (L) 01/20/2020    HCT 36.2 (L) 01/20/2020    MCV 93 01/20/2020    MCH 31.7 01/20/2020    MCHC 34.0 01/20/2020    RDW 13.2 01/20/2020     01/20/2020       BMP RESULTS:  Lab Results   Component Value Date     01/17/2020    POTASSIUM 4.1 01/17/2020    CHLORIDE 106 01/17/2020    CO2 28 01/17/2020    ANIONGAP 3 01/17/2020     (H) 01/17/2020    BUN 11 01/17/2020    CR 0.79 01/17/2020    GFRESTIMATED 89 01/17/2020    GFRESTBLACK >90 01/17/2020    CODI 8.8 01/17/2020        A1C RESULTS:  No results found for: A1C    INR RESULTS:  Lab Results   Component Value Date    INR 1.52 (H) 01/20/2020    INR 1.17 (H)  01/17/2020             Gurvinder Yun MD  [id number]  [place] [id number]

## 2020-01-24 NOTE — PATIENT INSTRUCTIONS
Stop aspirin  Go to hospital to get groin assessed and fixed    See me back in Feburary     Tentatively plan another attempt in March

## 2020-01-29 ENCOUNTER — CARE COORDINATION (OUTPATIENT)
Dept: CARDIOLOGY | Facility: CLINIC | Age: 75
End: 2020-01-29

## 2020-01-29 NOTE — PROGRESS NOTES
Called and spoke with pt.  Reviewed that NORI Carroll discussed plan with Dr. Yun and  procedure can be done on 2/18/20 with pre-precedure OV with NORI Carroll in [place] on 2/7/20 at 12:30pm.  Pt verbalized understanding and agrees with this plan.  Message sent to scheduling to schedule pt on 2/18/20 and to call pt to discuss details of when to arrive on 7/18/20.    ZOHRA Durham 3:17 PM 1/29/2020

## 2020-02-07 ENCOUNTER — OFFICE VISIT (OUTPATIENT)
Dept: CARDIOLOGY | Facility: CLINIC | Age: 75
End: 2020-02-07
Payer: MEDICARE

## 2020-02-07 VITALS
BODY MASS INDEX: 28.49 KG/M2 | WEIGHT: 188 LBS | SYSTOLIC BLOOD PRESSURE: 100 MMHG | DIASTOLIC BLOOD PRESSURE: 70 MMHG | HEART RATE: 64 BPM | HEIGHT: 68 IN

## 2020-02-07 DIAGNOSIS — I48.0 PAROXYSMAL ATRIAL FIBRILLATION (H): Primary | ICD-10-CM

## 2020-02-07 DIAGNOSIS — I20.89 STABLE ANGINA (H): ICD-10-CM

## 2020-02-07 PROCEDURE — 99215 OFFICE O/P EST HI 40 MIN: CPT | Performed by: NURSE PRACTITIONER

## 2020-02-07 ASSESSMENT — MIFFLIN-ST. JEOR: SCORE: 1577.26

## 2020-02-07 NOTE — PROGRESS NOTES
HISTORY OF PRESENT ILLNESS:        Jose Tejada is a delightful 74-year-old gentleman followed here for many years by Dr. Yun.  He is here today for cardiac assessment for his upcoming  procedure and to follow up on his left femoral artery repair for a post procedure Pseudoaneurysm which was discovered and injected at our last office visit.    He has a history of coronary artery disease with stents to his LAD in the past as well as his right coronary.  This occurred in 1996 with a known 70% circumflex lesion.      He has a history of protein C deficiency and factor V Leiden mutation (heterozygous) with a history of DVT.  He is on chronic warfarin therapy and needs bridging prior to any procedures.        He has developed peripheral neuropathy with right footdrop and wears a brace.  He has not had frequent falls.  In addition, he has hypertension, dyslipidemia, gastroesophageal reflux disease, asthma and spinal stenosis.  His primary care and warfarin typically are followed by Dr. Meyer through the [facility].        In the fall of last year, he developed exertional chest tightness and was admitted to [facility].  Coronary angiogram was performed, noting his LAD was open, circumflex had a 70% stenosis, but the flow reserve was normal.  The right coronary artery was totally occluded shortly after its origin with robust collaterals from the LAD apical epicardial to the PDA vessels.  There were a smaller amounts of collateral from the left circumflex to the posterolateral branch.  The proximal right coronary artery has a bit of an ambiguous takeoff.  There was also heavy calcification present in the right coronary artery.  Medical therapy was begun.        He was taken off the beta blockers due to fatigue and bradycardia.  He tried nitrates with Ranexa and he continued to have chest discomfort.  His LV function was preserved with RV enlargement and decreased function, probably  suspicious for some ischemia to the right ventricle or cor pulmonale with some lung disease.  After review of the films, Dr. Yun felt it was worth attempting a chronic total occlusion procedure to the right coronary artery.  This patient was taken off of plavix pre procedure, Aspirin was started the same day as plavix was stopped, he was bridged with Lovenox when Coumadin was interrupted.      He went to the cardiac catheterization lab on 01/16, where a bilateral groin access was undertaken with multiple attempts and catheter exchanges to attempt to open the right coronary artery.  This vessel was quite calcified and this was a challenging procedure.  After multiple attempts with laser and rotablader, the end result was a 95% residual stenosis; however, further attempts will be undertaken in 4-8 weeks per the Cath Lab notes.      Following the procedure, the patient was admitted to the hospital overnight where he did well.  He was bridged with Lovenox.  I believe this was started the day following his angiogram, but it is unclear to me from the hospital records.      He was discharged on 01/17.  Coumadin was restarted.  He was also on aspirin and Plavix leaving the hospital.        On 01/20, he presented to [facility] Emergency Room with groin swelling.  He was found to have bilateral inguinal bruising.  Hemoglobin was 12.3 and stable.  INR at that point was 1.52.  Since that time, his INR has been over 2.0 and Lovenox has been stopped.  An ultrasound was performed during that emergency room visit, which was unremarkable for pseudoaneurysm.     When I saw him back after this,  he has some paresthesias on the left lateral calf.  There was a very prominent hematoma in the left inguinal fold and a very prominent arterial pulse over the left femoral artery with a loud bruit present.  He had a pseudoaneurysm identified; unsuccessful attempts were made at compression with subsequent injection. Follow up U/S  "showed resolution and today his groin appears much improved with residual bruising only in the medial left thigh above the knee; no hum or bruit; left femoral pulse feels normal.    Dr. Yun is planning another  attempt with potential rotablator on 2-.    Jose complains of no chest pain since our last visit. Heart rate off of beta-blockers is 64     He continues on Ranexa 500 mg twice daily.  Lipid profile 01/16 showed a total cholesterol of 99, HDL 40, LDL 39, drawn on Crestor 20 mg per day.    Renal function has been normal prior to his angiogram and after.  Hemoglobin on 1-20 was 12.3 with platelets of 214,000     PHYSICAL EXAMINATION-remainder outlined below  VITAL SIGNS:  Blood pressure today is 108/66.  His weight is stable.              IMPRESSION AND PLAN:       1.  Coronary artery disease with previous remote LAD stent which appears patent.  He now has a chronically occluded right coronary artery and had attempt at chronic total occlusion, which was extensive with multiple instrumentation and ultimately laser atherectomy, however 95% residual stenosis exists.  This was a \"worthwhile investment\" procedure, and Dr. Yun has a plan for a reattempt on 2-.   -stay off of ASA per Dr. Yun  -continue Plavix  Risks and benefits of left heart catheterization and coronary angiogram were discussed with the patient in detail. 0.1-0.3% (for diagnostic angio) and 1-2% (for PCI)  risk of stroke, MI, death, emergent bypass for diagnostic angio, risk of contrast induced allergic reaction, renal dysfunction, vascular complications were discussed. Pros and cons of bare metal Vs drug eluting stents was discussed. Patient understands and wishes to proceed with it.  He will likely need admission post procedure; he has follow up with me a few days after the angiogram  Bridging as outlined below     2.  Left femoral arterial pseudoaneurysm post instrumentation.    Corrected with injection after " compression unsuccessful.    3.  Hypercoagulable state-on warfarin therapy.  He does need Lovenox bridging prior to his next upcoming procedure.   PLAN:  Last dose of warfarin 2-  INR here at our [facility] on 2-  Start Lovenox 80mg every 12 hours when INR nears or is under 2.0  Last dose of Lovenox 7pm on 2- the evening prior to angiogram  He may need admission for Heparin bridging after procedure-discussed with Dr. Yun who will decide   Continue bridging until INR nears 2.0  (INR and warfarin typically managed at [facility] via draws at home)  4.  Dyslipidemia, controlled.   5.  Hypertension, controlled.   6.  Bradycardia and fatigue on metoprolol in the past; this has been discontinued.   7.  Neuropathy with right footdrop.  He wears a brace.  He has not had frequent falls.         Greater than 50% of this 45 minute visit today was spent in counseling and counseling. This plan was reviewed with         It has been a pleasure seeing Jose Tejada in follow up    Jaya Flores, MSN, APRN-BC, CNP  Cardiology    Orders Placed This Encounter   Procedures     PROTIME/INR     Basic metabolic panel     Orders Placed This Encounter   Medications     enoxaparin ANTICOAGULANT (LOVENOX ANTICOAGULANT) 80 MG/0.8ML syringe     Sig: Inject 0.8 mLs (80 mg) Subcutaneous every 12 hours     Dispense:  6 Syringe     Refill:  1     Hold and he will call if he needs this filled     Medications Discontinued During This Encounter   Medication Reason     metoprolol succinate ER (TOPROL XL) 25 MG 24 hr tablet      enoxaparin ANTICOAGULANT (LOVENOX ANTICOAGULANT) 80 MG/0.8ML syringe          Encounter Diagnosis   Name Primary?     Paroxysmal atrial fibrillation (H) Yes       CURRENT MEDICATIONS:  Current Outpatient Medications   Medication Sig Dispense Refill     albuterol (PROAIR HFA/PROVENTIL HFA/VENTOLIN HFA) 108 (90 Base) MCG/ACT inhaler Inhale 2 puffs into the lungs every 4 hours as needed  for shortness of breath / dyspnea or wheezing       clopidogrel (PLAVIX) 75 MG tablet Take 1 tablet (75 mg) by mouth daily 30 tablet 0     enoxaparin ANTICOAGULANT (LOVENOX ANTICOAGULANT) 80 MG/0.8ML syringe Inject 0.8 mLs (80 mg) Subcutaneous every 12 hours 6 Syringe 1     gabapentin (NEURONTIN) 400 MG capsule Take 1,200 mg by mouth 3 times daily       isosorbide mononitrate (IMDUR) 30 MG 24 hr tablet Take 1 tablet (30 mg) by mouth daily 30 tablet 0     lidocaine (LIDODERM) 5 % patch Place 1 patch onto the skin every 24 hours       lisinopril (PRINIVIL/ZESTRIL) 20 MG tablet Take 1 tablet (20 mg) by mouth daily 30 tablet 0     nitroGLYcerin (NITROSTAT) 0.4 MG sublingual tablet For chest pain place 1 tablet under the tongue every 5 minutes for 3 doses. If symptoms persist 5 minutes after 1st dose call 911. 20 tablet 0     nortriptyline (PAMELOR) 25 MG capsule Take 25 mg by mouth At Bedtime        omeprazole (PRILOSEC) 20 MG CR capsule Take 20 mg by mouth At Bedtime        oxyCODONE-acetaminophen (PERCOCET) 5-325 MG per tablet Take 2 tablets by mouth 3 times daily as needed Patient takes 2 tablets TID Scheduled       ranolazine (RANEXA) 500 MG 12 hr tablet Take 1 tablet (500 mg) by mouth 2 times daily 180 tablet 3     rosuvastatin (CRESTOR) 20 MG tablet Take 1 tablet (20 mg) by mouth daily 90 tablet 3     Warfarin Sodium (COUMADIN PO) Take 5 mg by mouth daily 7.5 mg on Monday and Friday, 5 mg all other days         ALLERGIES     Allergies   Allergen Reactions     Metoprolol      Fatigue and bradycardia     Niacin Other (See Comments)     Other reaction(s): Flushing         Norvasc [Amlodipine] Rash     Septra [Sulfamethoxazole W-Trimethoprim] Rash       PAST MEDICAL HISTORY:  Past Medical History:   Diagnosis Date     Asthma      CAD (coronary artery disease)     1996 angioplasty and stent to the prox LAD, PTCA with intracoronary stent placement of RCA, 70%OM; 10/24/19 Cath: Occluded RCA, prox circumflex lesion -  pd/pa not significant, advised medical therapy first by Interventional cardiologist     DVT (deep venous thrombosis) (H)      Factor 5 Leiden mutation, heterozygous (H)      GERD (gastroesophageal reflux disease)      HTN (hypertension)      Hyperlipidemia      Neuropathy     wears brace R foot for drop foot     PRIMITIVO (obstructive sleep apnea)     cpap     Protein C deficiency (H)      Spinal stenosis        PAST SURGICAL HISTORY:  Past Surgical History:   Procedure Laterality Date     APPENDECTOMY OPEN  1958     ARTHROPLASTY HIP Left 1997     ARTHROPLASTY REVISION HIP Left 2013     AS SPINAL FUSION,ANT,EA ADNL LEVEL  2010    L4-L5     C TOTAL HIP ARTHROPLASTY Right 2015    THR     CV CORONARY ANGIOGRAM N/A 10/24/2019    Procedure: Coronary Angiogram;  Surgeon: Valdemar Hinojosa MD;  Location:  HEART CARDIAC CATH LAB     CV CORONARY ANGIOGRAM  1996 1996 angioplasty and stent to the prox LAD, PTCA with intracoronary stent placement of RCA, 70%OM     CV FRACTIONAL FLOW RESERVE N/A 10/24/2019    Procedure: Fractional Flow [place];  Surgeon: Valdemar Hinojosa MD;  Location: [facility] CARDIAC CATH LAB     CV LEFT HEART CATH Bilateral 1/16/2020    Procedure: Left Heart Cath w/wo Left Ventriculogram  ANGIOPLASTY WITH [place] SCI REP PRESENT;  Surgeon: Gurvinder Yun MD;  Location:  HEART CARDIAC CATH LAB       FAMILY HISTORY:  Family History   Problem Relation Age of Onset     Myocardial Infarction Father      Heart Disease Maternal Grandfather        SOCIAL HISTORY:  Social History     Socioeconomic History     Marital status:      Spouse name: None     Number of children: None     Years of education: None     Highest education level: None   Occupational History     None   Social Needs     Financial resource strain: None     Food insecurity:     Worry: None     Inability: None     Transportation needs:     Medical: None     Non-medical: None   Tobacco Use     Smoking status: Former Smoker      Types: Cigarettes     Last attempt to quit: 3/23/1987     Years since quittin.9     Smokeless tobacco: Never Used     Tobacco comment: Smokes cigars once every 2-3 years   Substance and Sexual Activity     Alcohol use: Yes     Alcohol/week: 0.0 standard drinks     Comment: once every two weeks if that     Drug use: Never     Sexual activity: None   Lifestyle     Physical activity:     Days per week: None     Minutes per session: None     Stress: None   Relationships     Social connections:     Talks on phone: None     Gets together: None     Attends Orthodoxy service: None     Active member of club or organization: None     Attends meetings of clubs or organizations: None     Relationship status: None     Intimate partner violence:     Fear of current or ex partner: None     Emotionally abused: None     Physically abused: None     Forced sexual activity: None   Other Topics Concern     Parent/sibling w/ CABG, MI or angioplasty before 65F 55M? Not Asked      Service Not Asked     Blood Transfusions Not Asked     Caffeine Concern No     Comment: 1 -2 cups daily      Occupational Exposure Not Asked     Hobby Hazards Not Asked     Sleep Concern Not Asked     Stress Concern Not Asked     Weight Concern Not Asked     Special Diet No     Back Care Not Asked     Exercise No     Bike Helmet Not Asked     Seat Belt Not Asked     Self-Exams Not Asked   Social History Narrative     None       Review of Systems:  Skin:  Negative       Eyes:  Negative      ENT:  Negative      Respiratory:  Positive for sleep apnea;CPAP     Cardiovascular:  Negative      Gastroenterology: Negative      Genitourinary:  Positive for nocturia    Musculoskeletal:  Negative      Neurologic:  Positive for numbness or tingling of hands;numbness or tingling of feet    Psychiatric:  Negative      Heme/Lymph/Imm:  Positive for allergies    Endocrine:  Negative        Physical Exam:  Vitals: /70 (BP Location: Right arm, Patient Position:  "Sitting, Cuff Size: Adult Regular)   Pulse 64   Ht 1.727 m (5' 8\")   Wt 85.3 kg (188 lb)   BMI 28.59 kg/m      Constitutional:  cooperative, alert and oriented, well developed, well nourished, in no acute distress   thin,frail and fatigued    Skin:  warm and dry to the touch, no apparent skin lesions or masses noted          Head:  normocephalic, no masses or lesions        Eyes:  pupils equal and round, conjunctivae and lids unremarkable, sclera white, no xanthalasma, EOMS intact, no nystagmus        Lymph:No Cervical lymphadenopathy present     ENT:  no pallor or cyanosis, dentition good        Neck:  carotid pulses are full and equal bilaterally, JVP normal, no carotid bruit        Respiratory:  normal breath sounds, clear to auscultation, normal A-P diameter, normal symmetry, normal respiratory excursion, no use of accessory muscles         Cardiac: regular rhythm, normal S1/S2, no S3 or S4, apical impulse not displaced, no murmurs, gallops or rubs occasional premature beats S4                2+;3+           1+ 2+           2+   left femoral bruit (+) no residual left femoral bruit; minimal ecchymosis at medial right thigh just above the knee    GI:  abdomen soft, non-tender, BS normoactive, no mass, no HSM, no bruits obese difficult exam    Extremities and Muscular Skeletal:  no edema         brace on R leg--foot drop    Neurological:  no gross motor deficits   foot drop on R-wears brace    Psych:  Alert and Oriented x 3      Recent Lab Results:  LIPID RESULTS:  Lab Results   Component Value Date    CHOL 99 01/16/2020    HDL 40 01/16/2020    LDL 39 01/16/2020    TRIG 101 01/16/2020    CHOLHDLRATIO 3.5 04/21/2014       LIVER ENZYME RESULTS:  Lab Results   Component Value Date    AST 21 10/22/2019    ALT 20 12/20/2019       CBC RESULTS:  Lab Results   Component Value Date    WBC 5.8 01/20/2020    RBC 3.88 (L) 01/20/2020    HGB 12.3 (L) 01/20/2020    HCT 36.2 (L) 01/20/2020    MCV 93 01/20/2020    MCH 31.7 " 01/20/2020    MCHC 34.0 01/20/2020    RDW 13.2 01/20/2020     01/20/2020       BMP RESULTS:  Lab Results   Component Value Date     01/17/2020    POTASSIUM 4.1 01/17/2020    CHLORIDE 106 01/17/2020    CO2 28 01/17/2020    ANIONGAP 3 01/17/2020     (H) 01/17/2020    BUN 11 01/17/2020    CR 0.79 01/17/2020    GFRESTIMATED 89 01/17/2020    GFRESTBLACK >90 01/17/2020    CODI 8.8 01/17/2020        A1C RESULTS:  No results found for: A1C    INR RESULTS:  Lab Results   Component Value Date    INR 1.52 (H) 01/20/2020    INR 1.17 (H) 01/17/2020           CC  Sara Jhaveri, CNP  [facility]  [address] [place] [address]

## 2020-02-07 NOTE — LETTER
2/7/2020    Sara Jhaveri, [facility] [address]  [facility] [address]    RE: Jose Tejada       Dear Colleague,    I had the pleasure of seeing Jose Tejada in the [facility].    HISTORY OF PRESENT ILLNESS:        Jose Tejada is a delightful 74-year-old gentleman followed here for many years by Dr. Yun.   He is here today for cardiac assessment for his upcoming  procedure and to follow up on his left femoral artery repair for a post procedure Pseudoaneurysm which was discovered and injected at our last office visit.    He has a history of coronary artery disease with stents to his LAD in the past as well as his right coronary.  This occurred in 1996 with a known 70% circumflex lesion.      He has a history of protein C deficiency and factor V Leiden mutation (heterozygous) with a history of DVT.  He is on chronic warfarin therapy and needs bridging prior to any procedures.        He has developed peripheral neuropathy with right footdrop and wears a brace.  He has not had frequent falls.  In addition, he has hypertension, dyslipidemia, gastroesophageal reflux disease, asthma and spinal stenosis.  His primary care and warfarin typically are followed by Dr. Meyer through the [facility].        In the fall of last year, he developed exertional chest tightness and was admitted to [facility].  Coronary angiogram was performed, noting his LAD was open, circumflex had a 70% stenosis, but the flow reserve was normal.  The right coronary artery was totally occluded shortly after its origin with robust collaterals from the LAD apical epicardial to the PDA vessels.  There were a smaller amounts of collateral from the left circumflex to the posterolateral branch.  The proximal right coronary artery has a bit of an ambiguous takeoff.  There was also heavy calcification present in the right coronary artery.   Medical therapy was begun.        He was taken off the beta blockers due to fatigue and bradycardia.  He tried nitrates with Ranexa and he continued to have chest discomfort.  His LV function was preserved with RV enlargement and decreased function, probably suspicious for some ischemia to the right ventricle or cor pulmonale with some lung disease.  After review of the films, Dr. Yun felt it was worth attempting a chronic total occlusion procedure to the right coronary artery.  This patient was taken off of plavix pre procedure, Aspirin was started the same day as plavix was stopped, he was bridged with Lovenox when Coumadin was interrupted.      He went to the cardiac catheterization lab on 01/16, where a bilateral groin access was undertaken with multiple attempts and catheter exchanges to attempt to open the right coronary artery.  This vessel was quite calcified and this was a challenging procedure.  After multiple attempts with laser and rotablader, the end result was a 95% residual stenosis; however, further attempts will be undertaken in 4-8 weeks per the Cath Lab notes.      Following the procedure, the patient was admitted to the hospital overnight where he did well.  He was bridged with Lovenox.  I believe this was started the day following his angiogram, but it is unclear to me from the hospital records.      He was discharged on 01/17.  Coumadin was restarted.  He was also on aspirin and Plavix leaving the hospital.        On 01/20, he presented to [facility] Emergency Room with groin swelling.  He was found to have bilateral inguinal bruising.  Hemoglobin was 12.3 and stable.  INR at that point was 1.52.  Since that time, his INR has been over 2.0 and Lovenox has been stopped.  An ultrasound was performed during that emergency room visit, which was unremarkable for pseudoaneurysm.     When I saw him back after this,  blaise chavez has some paresthesias on the left lateral calf.  There  was a very  "prominent hematoma in the left inguinal fold and a very prominent arterial pulse over the left femoral artery  with a loud bruit present.   He had a pseudoaneurysm identified; unsuccessful attempts were made at compression with subsequent injection. Follow up U/S showed resolution and today his groin appears much improved with residual bruising only in the medial left thigh above the knee; no hum or bruit; left femoral pulse feels normal.    Dr. Yun is planning another  attempt with potential rotablator on 2-.    Jose complains of no chest pain since our last visit. Heart rate off of beta-blockers is 64     He continues on Ranexa 500 mg twice daily.  Lipid profile 01/16 showed a total cholesterol of 99, HDL 40, LDL 39, drawn on Crestor 20 mg per day.    Renal function has been normal prior to his angiogram and after.  Hemoglobin on 1-20 was 12.3 with platelets of 214,000     PHYSICAL EXAMINATION-remainder outlined below  VITAL SIGNS:  Blood pressure today is 108/66.  His weight is stable.              IMPRESSION AND PLAN:       1.  Coronary artery disease with previous remote LAD stent which appears patent.  He now has a chronically occluded right coronary artery and had attempt at chronic total occlusion, which was extensive with multiple instrumentation and ultimately laser atherectomy, however 95% residual stenosis exists.  This was a \"worthwhile investment\" procedure, and Dr. Yun has a plan for a reattempt on 2-.   -stay off of ASA per Dr. Yun  -continue Plavix  Risks and benefits of left heart catheterization and coronary angiogram were discussed with the patient in detail. 0.1-0.3% (for diagnostic angio) and 1-2% (for PCI)  risk of stroke, MI, death, emergent bypass for diagnostic angio, risk of contrast induced allergic reaction, renal dysfunction, vascular complications were discussed. Pros and cons of bare metal Vs drug eluting stents was discussed. Patient understands and " wishes to proceed with it.  He will likely need admission post procedure; he has follow up with me a few days after the angiogram  Bridging as outlined below     2.  Left  femoral arterial pseudoaneurysm post instrumentation.    Corrected with injection after compression unsuccessful.    3.  Hypercoagulable state-on warfarin therapy.  He does need Lovenox bridging prior to his next upcoming procedure.   PLAN:  Last dose of warfarin 2-  INR here at our [facility] on 2-  Start Lovenox 80mg every 12 hours when INR nears or is under 2.0  Last dose of Lovenox 7pm on 2- the evening prior to angiogram  He may need admission for Heparin bridging after procedure-discussed with Dr. Yun who will decide   Continue bridging until INR nears 2.0  (INR and warfarin typically managed at [facility] via draws at home)  4.  Dyslipidemia, controlled.   5.  Hypertension, controlled.   6.  Bradycardia  and fatigue on metoprolol in the past; this has been discontinued.   7.  Neuropathy with right footdrop.  He wears a brace.  He has not had frequent falls.         Greater than 50% of this 45 minute visit today was spent in counseling and counseling. This plan was reviewed with         It has been a pleasure seeing Jose Tejada in follow up    Jaya Flores, MSN, APRN-BC, CNP  Cardiology    Orders Placed This Encounter   Procedures     PROTIME/INR     Basic metabolic panel     Orders Placed This Encounter   Medications     enoxaparin ANTICOAGULANT (LOVENOX ANTICOAGULANT) 80 MG/0.8ML syringe     Sig: Inject 0.8 mLs (80 mg) Subcutaneous every 12 hours     Dispense:  6 Syringe     Refill:  1     Hold and he will call if he needs this filled     Medications Discontinued During This Encounter   Medication Reason     metoprolol succinate ER (TOPROL XL) 25 MG 24 hr tablet      enoxaparin ANTICOAGULANT (LOVENOX ANTICOAGULANT) 80 MG/0.8ML syringe          Encounter Diagnosis   Name Primary?     Paroxysmal  atrial fibrillation (H) Yes       CURRENT MEDICATIONS:  Current Outpatient Medications   Medication Sig Dispense Refill     albuterol (PROAIR HFA/PROVENTIL HFA/VENTOLIN HFA) 108 (90 Base) MCG/ACT inhaler Inhale 2 puffs into the lungs every 4 hours as needed for shortness of breath / dyspnea or wheezing       clopidogrel (PLAVIX) 75 MG tablet Take 1 tablet (75 mg) by mouth daily 30 tablet 0     enoxaparin ANTICOAGULANT (LOVENOX ANTICOAGULANT) 80 MG/0.8ML syringe Inject 0.8 mLs (80 mg) Subcutaneous every 12 hours 6 Syringe 1     gabapentin (NEURONTIN) 400 MG capsule Take 1,200 mg by mouth 3 times daily       isosorbide mononitrate (IMDUR) 30 MG 24 hr tablet Take 1 tablet (30 mg) by mouth daily 30 tablet 0     lidocaine (LIDODERM) 5 % patch Place 1 patch onto the skin every 24 hours       lisinopril (PRINIVIL/ZESTRIL) 20 MG tablet Take 1 tablet (20 mg) by mouth daily 30 tablet 0     nitroGLYcerin (NITROSTAT) 0.4 MG sublingual tablet For chest pain place 1 tablet under the tongue every 5 minutes for 3 doses. If symptoms persist 5 minutes after 1st dose call 911. 20 tablet 0     nortriptyline (PAMELOR) 25 MG capsule Take 25 mg by mouth At Bedtime        omeprazole (PRILOSEC) 20 MG CR capsule Take 20 mg by mouth At Bedtime        oxyCODONE-acetaminophen (PERCOCET) 5-325 MG per tablet Take 2 tablets by mouth 3 times daily as needed Patient takes 2 tablets TID Scheduled       ranolazine (RANEXA) 500 MG 12 hr tablet Take 1 tablet (500 mg) by mouth 2 times daily 180 tablet 3     rosuvastatin (CRESTOR) 20 MG tablet Take 1 tablet (20 mg) by mouth daily 90 tablet 3     Warfarin Sodium (COUMADIN PO) Take 5 mg by mouth daily 7.5 mg on Monday and Friday, 5 mg all other days         ALLERGIES     Allergies   Allergen Reactions     Metoprolol      Fatigue and bradycardia     Niacin Other (See Comments)     Other reaction(s): Flushing         Norvasc [Amlodipine] Rash     Septra [Sulfamethoxazole W-Trimethoprim] Rash       PAST  MEDICAL HISTORY:  Past Medical History:   Diagnosis Date     Asthma      CAD (coronary artery disease)     1996 angioplasty and stent to the prox LAD, PTCA with intracoronary stent placement of RCA, 70%OM; 10/24/19 Cath: Occluded RCA, prox circumflex lesion - pd/pa not significant, advised medical therapy first by Interventional cardiologist     DVT (deep venous thrombosis) (H)      Factor 5 Leiden mutation, heterozygous (H)      GERD (gastroesophageal reflux disease)      HTN (hypertension)      Hyperlipidemia      Neuropathy     wears brace R foot for drop foot     PRIMITIVO (obstructive sleep apnea)     cpap     Protein C deficiency (H)      Spinal stenosis        PAST SURGICAL HISTORY:  Past Surgical History:   Procedure Laterality Date     APPENDECTOMY OPEN  1958     ARTHROPLASTY HIP Left 1997     ARTHROPLASTY REVISION HIP Left 2013     AS SPINAL FUSION,ANT,EA ADNL LEVEL  2010    L4-L5     C TOTAL HIP ARTHROPLASTY Right 2015    THR     CV CORONARY ANGIOGRAM N/A 10/24/2019    Procedure: Coronary Angiogram;  Surgeon: Valdemar Hinojosa MD;  Location:  HEART CARDIAC CATH LAB     CV CORONARY ANGIOGRAM  1996 1996 angioplasty and stent to the prox LAD, PTCA with intracoronary stent placement of RCA, 70%OM     CV FRACTIONAL FLOW RESERVE N/A 10/24/2019    Procedure: Fractional Flow [place];  Surgeon: Valdemar Hinojosa MD;  Location:  HEART CARDIAC CATH LAB     CV LEFT HEART CATH Bilateral 1/16/2020    Procedure: Left Heart Cath w/wo Left Ventriculogram  ANGIOPLASTY WITH B[place]ON SCI REP PRESENT;  Surgeon: Gurvinder Yun MD;  Location:  HEART CARDIAC CATH LAB       FAMILY HISTORY:  Family History   Problem Relation Age of Onset     Myocardial Infarction Father      Heart Disease Maternal Grandfather        SOCIAL HISTORY:  Social History     Socioeconomic History     Marital status:      Spouse name: None     Number of children: None     Years of education: None     Highest education level: None    Occupational History     None   Social Needs     Financial resource strain: None     Food insecurity:     Worry: None     Inability: None     Transportation needs:     Medical: None     Non-medical: None   Tobacco Use     Smoking status: Former Smoker     Types: Cigarettes     Last attempt to quit: 3/23/1987     Years since quittin.9     Smokeless tobacco: Never Used     Tobacco comment: Smokes cigars once every 2-3 years   Substance and Sexual Activity     Alcohol use: Yes     Alcohol/week: 0.0 standard drinks     Comment: once every two weeks if that     Drug use: Never     Sexual activity: None   Lifestyle     Physical activity:     Days per week: None     Minutes per session: None     Stress: None   Relationships     Social connections:     Talks on phone: None     Gets together: None     Attends Restorationism service: None     Active member of club or organization: None     Attends meetings of clubs or organizations: None     Relationship status: None     Intimate partner violence:     Fear of current or ex partner: None     Emotionally abused: None     Physically abused: None     Forced sexual activity: None   Other Topics Concern     Parent/sibling w/ CABG, MI or angioplasty before 65F 55M? Not Asked      Service Not Asked     Blood Transfusions Not Asked     Caffeine Concern No     Comment: 1 -2 cups daily      Occupational Exposure Not Asked     Hobby Hazards Not Asked     Sleep Concern Not Asked     Stress Concern Not Asked     Weight Concern Not Asked     Special Diet No     Back Care Not Asked     Exercise No     Bike Helmet Not Asked     Seat Belt Not Asked     Self-Exams Not Asked   Social History Narrative     None       Review of Systems:  Skin:  Negative       Eyes:  Negative      ENT:  Negative      Respiratory:  Positive for sleep apnea;CPAP     Cardiovascular:  Negative      Gastroenterology: Negative      Genitourinary:  Positive for nocturia    Musculoskeletal:  Negative     "  Neurologic:  Positive for numbness or tingling of hands;numbness or tingling of feet    Psychiatric:  Negative      Heme/Lymph/Imm:  Positive for allergies    Endocrine:  Negative        Physical Exam:  Vitals: /70 (BP Location: Right arm, Patient Position: Sitting, Cuff Size: Adult Regular)   Pulse 64   Ht 1.727 m (5' 8\")   Wt 85.3 kg (188 lb)   BMI 28.59 kg/m       Constitutional:  cooperative, alert and oriented, well developed, well nourished, in no acute distress   thin,frail and fatigued    Skin:  warm and dry to the touch, no apparent skin lesions or masses noted          Head:  normocephalic, no masses or lesions        Eyes:  pupils equal and round, conjunctivae and lids unremarkable, sclera white, no xanthalasma, EOMS intact, no nystagmus        Lymph:No Cervical lymphadenopathy present     ENT:  no pallor or cyanosis, dentition good        Neck:  carotid pulses are full and equal bilaterally, JVP normal, no carotid bruit        Respiratory:  normal breath sounds, clear to auscultation, normal A-P diameter, normal symmetry, normal respiratory excursion, no use of accessory muscles         Cardiac: regular rhythm, normal S1/S2, no S3 or S4, apical impulse not displaced, no murmurs, gallops or rubs occasional premature beats S4                2+;3+           1+ 2+           2+   left femoral bruit (+) no residual left femoral bruit; minimal ecchymosis at medial right thigh just above the knee    GI:  abdomen soft, non-tender, BS normoactive, no mass, no HSM, no bruits obese difficult exam    Extremities and Muscular Skeletal:  no edema         brace on R leg--foot drop    Neurological:  no gross motor deficits   foot drop on R-wears brace    Psych:  Alert and Oriented x 3      Recent Lab Results:  LIPID RESULTS:  Lab Results   Component Value Date    CHOL 99 01/16/2020    HDL 40 01/16/2020    LDL 39 01/16/2020    TRIG 101 01/16/2020    CHOLHDLRATIO 3.5 04/21/2014       LIVER ENZYME RESULTS:  Lab " Results   Component Value Date    AST 21 10/22/2019    ALT 20 12/20/2019       CBC RESULTS:  Lab Results   Component Value Date    WBC 5.8 01/20/2020    RBC 3.88 (L) 01/20/2020    HGB 12.3 (L) 01/20/2020    HCT 36.2 (L) 01/20/2020    MCV 93 01/20/2020    MCH 31.7 01/20/2020    MCHC 34.0 01/20/2020    RDW 13.2 01/20/2020     01/20/2020       BMP RESULTS:  Lab Results   Component Value Date     01/17/2020    POTASSIUM 4.1 01/17/2020    CHLORIDE 106 01/17/2020    CO2 28 01/17/2020    ANIONGAP 3 01/17/2020     (H) 01/17/2020    BUN 11 01/17/2020    CR 0.79 01/17/2020    GFRESTIMATED 89 01/17/2020    GFRESTBLACK >90 01/17/2020    CODI 8.8 01/17/2020      A1C RESULTS:  No results found for: A1C    INR RESULTS:  Lab Results   Component Value Date    INR 1.52 (H) 01/20/2020    INR 1.17 (H) 01/17/2020     Thank you for allowing me to participate in the care of your patient.    Sincerely,     BRENDAN Mccrary [facility]

## 2020-02-07 NOTE — PATIENT INSTRUCTIONS
Last dose of warfarin on 2-    INR check on Friday 2-14 at our [facility] Office and we will tell you when to start Lovenox.    Last dose of Lovenox 80mg is 7pm dose on 2- before procedure 2-      David  [phone]

## 2020-02-12 ENCOUNTER — DOCUMENTATION ONLY (OUTPATIENT)
Dept: CARDIOLOGY | Facility: CLINIC | Age: 75
End: 2020-02-12

## 2020-02-12 DIAGNOSIS — D68.59 HYPERCOAGULATION SYNDROME (H): Primary | ICD-10-CM

## 2020-02-12 NOTE — PROGRESS NOTES
Called pt on cell phone, no answer, LVM requesting return call to review new instructions for warfarin hold due to angiogram procedure being rescheduled.    Called pt on home phone, no answer and no option to LVM.    ZOHRA Durham 11:29 AM 2/12/2020    _____________________________________    Received return VM from pt.    Called pt, no answer, LVM requesting return call.    ZOHRA Durham 1:44 PM 2/12/2020

## 2020-02-12 NOTE — PROGRESS NOTES
His  was rescheduled to 2-28 and this is complex    Call him please    Last dose warfarin now will be 2-23  Set up INR 2-26 at [facility] please--venous draw to be back same day (order in)    I will decide after that when lovenox starts so can you help me watch for those results please    Then he is on for 3-4 follow up with me but I may push this back     Stay tuned

## 2020-02-12 NOTE — PROGRESS NOTES
Received return call from pt. Discussed that since  is now scheduled for 2/28/20 and NORI Carroll and Dr. Yun are aware of the schedule change. Reviewed that NORI Carroll states his last dose of warfarin will be on 2/23/20 and he agreed to INR lab draw at [facility] on 2/26/20 at 7:45am.  Discussed with pt that will call him after NORI Carroll reviews INR and determines when he should start lovenox.  Pt verbalized understanding and agrees with this plan.    ZOHRA Durham 2:09 PM 2/12/2020

## 2020-02-17 NOTE — PROGRESS NOTES
Received call from pt who reports he received letter in the mail with dates/times of all upcoming appts and procedures.  Reviewed with pt that dates/times he received in the mail are correct and discussed with pt that will plan to call him on 2/26 after INR results has been reviewed by NORI Carroll.  Pt verbalized understanding and agrees with this plan.    ZOHRA Durham 11:43 AM 2/17/2020

## 2020-02-25 NOTE — PROGRESS NOTES
Called and spoke with pt and confirmed that he took his last dose of warfarin on 2/23/20 and has been holding his warfarin since.  Also confirmed with pt that he plans to have INR drawn at [facility] tomorrow AM.  Discussed with pt that NORI Carroll will watch for INR results and call him with Lovonox instructions.  Pt verbalized understanding and agrees with this plan.    ZOHRA Durham 9:07 AM 2/25/2020

## 2020-02-26 DIAGNOSIS — D68.59 HYPERCOAGULATION SYNDROME (H): ICD-10-CM

## 2020-02-26 LAB — INR PPP: 1.36 (ref 0.86–1.14)

## 2020-02-26 PROCEDURE — 36415 COLL VENOUS BLD VENIPUNCTURE: CPT | Performed by: NURSE PRACTITIONER

## 2020-02-26 PROCEDURE — 85610 PROTHROMBIN TIME: CPT | Performed by: NURSE PRACTITIONER

## 2020-02-27 DIAGNOSIS — I25.10 CORONARY ARTERY DISEASE INVOLVING NATIVE CORONARY ARTERY OF NATIVE HEART WITHOUT ANGINA PECTORIS: Primary | ICD-10-CM

## 2020-02-27 RX ORDER — POTASSIUM CHLORIDE 1500 MG/1
20 TABLET, EXTENDED RELEASE ORAL
Status: CANCELLED | OUTPATIENT
Start: 2020-02-27

## 2020-02-27 RX ORDER — SODIUM CHLORIDE 9 MG/ML
INJECTION, SOLUTION INTRAVENOUS CONTINUOUS
Status: CANCELLED | OUTPATIENT
Start: 2020-02-27

## 2020-02-27 RX ORDER — LIDOCAINE 40 MG/G
CREAM TOPICAL
Status: CANCELLED | OUTPATIENT
Start: 2020-02-27

## 2020-02-27 NOTE — PROGRESS NOTES
Hospital: [facility]  Arrival Time: 6:30am  NPO starting at midnight (except allowable meds morning of procedure with small sip of water) reviewed with patient? yes  Diabetic? no  On Anticoagulants? Yes, coumadin on hold starting 2/24/20.  Had INR on 2/26 and, per NORI Carroll's recommendation, pt taking lovenox BID on 2/26 and 2/27. Pt will not take any lovenox on 2/28/20  Contrast Dye Allergy? no  On Aspirin? No, pt to take 325mg Aspirin on 2/28/20 in AM prior to arrival.   Any Meds to Hold? none   to/from procedure? Yes, pt's wife.   Responsible adult with patient 24hrs post procedure? Yes, pt's wife.     ZOHRA Durham 11:30 AM 2/27/2020

## 2020-02-28 ENCOUNTER — SURGERY (OUTPATIENT)
Age: 75
End: 2020-02-28
Payer: MEDICARE

## 2020-02-28 ENCOUNTER — HOSPITAL ENCOUNTER (OUTPATIENT)
Facility: CLINIC | Age: 75
Discharge: HOME | End: 2020-02-28
Admitting: INTERNAL MEDICINE
Payer: MEDICARE

## 2020-02-28 VITALS
SYSTOLIC BLOOD PRESSURE: 113 MMHG | TEMPERATURE: 98.3 F | RESPIRATION RATE: 20 BRPM | WEIGHT: 185.3 LBS | DIASTOLIC BLOOD PRESSURE: 75 MMHG | OXYGEN SATURATION: 95 % | BODY MASS INDEX: 28.08 KG/M2 | HEIGHT: 68 IN | HEART RATE: 58 BPM

## 2020-02-28 DIAGNOSIS — I20.89 STABLE ANGINA (H): ICD-10-CM

## 2020-02-28 DIAGNOSIS — I25.10 CORONARY ARTERY DISEASE INVOLVING NATIVE CORONARY ARTERY OF NATIVE HEART WITHOUT ANGINA PECTORIS: ICD-10-CM

## 2020-02-28 DIAGNOSIS — Z98.61 POSTSURGICAL PERCUTANEOUS TRANSLUMINAL CORONARY ANGIOPLASTY STATUS: Primary | ICD-10-CM

## 2020-02-28 PROBLEM — Z98.890 STATUS POST CORONARY ANGIOGRAM: Status: ACTIVE | Noted: 2020-02-28

## 2020-02-28 LAB
ANION GAP SERPL CALCULATED.3IONS-SCNC: <1 MMOL/L (ref 3–14)
APTT PPP: 36 SEC (ref 22–37)
BUN SERPL-MCNC: 12 MG/DL (ref 7–30)
CALCIUM SERPL-MCNC: 9.1 MG/DL (ref 8.5–10.1)
CHLORIDE SERPL-SCNC: 106 MMOL/L (ref 94–109)
CO2 SERPL-SCNC: 32 MMOL/L (ref 20–32)
CREAT SERPL-MCNC: 0.86 MG/DL (ref 0.66–1.25)
ERYTHROCYTE [DISTWIDTH] IN BLOOD BY AUTOMATED COUNT: 12.9 % (ref 10–15)
GFR SERPL CREATININE-BSD FRML MDRD: 86 ML/MIN/{1.73_M2}
GLUCOSE SERPL-MCNC: 87 MG/DL (ref 70–99)
HCT VFR BLD AUTO: 36.7 % (ref 40–53)
HGB BLD-MCNC: 12.4 G/DL (ref 13.3–17.7)
INR PPP: 1.12 (ref 0.86–1.14)
KCT BLD-ACNC: 271 SEC (ref 75–150)
KCT BLD-ACNC: 284 SEC (ref 75–150)
KCT BLD-ACNC: 413 SEC (ref 75–150)
MCH RBC QN AUTO: 31.6 PG (ref 26.5–33)
MCHC RBC AUTO-ENTMCNC: 33.8 G/DL (ref 31.5–36.5)
MCV RBC AUTO: 94 FL (ref 78–100)
PLATELET # BLD AUTO: 232 10E9/L (ref 150–450)
POTASSIUM SERPL-SCNC: 4.1 MMOL/L (ref 3.4–5.3)
RBC # BLD AUTO: 3.92 10E12/L (ref 4.4–5.9)
SODIUM SERPL-SCNC: 138 MMOL/L (ref 133–144)
WBC # BLD AUTO: 4.7 10E9/L (ref 4–11)

## 2020-02-28 PROCEDURE — 85730 THROMBOPLASTIN TIME PARTIAL: CPT

## 2020-02-28 PROCEDURE — C1760 CLOSURE DEV, VASC: HCPCS | Performed by: INTERNAL MEDICINE

## 2020-02-28 PROCEDURE — 40000852 ZZH STATISTIC HEART CATH LAB OR EP LAB

## 2020-02-28 PROCEDURE — 36415 COLL VENOUS BLD VENIPUNCTURE: CPT

## 2020-02-28 PROCEDURE — 85027 COMPLETE CBC AUTOMATED: CPT

## 2020-02-28 PROCEDURE — 85610 PROTHROMBIN TIME: CPT

## 2020-02-28 PROCEDURE — C1887 CATHETER, GUIDING: HCPCS | Performed by: INTERNAL MEDICINE

## 2020-02-28 PROCEDURE — 25800030 ZZH RX IP 258 OP 636: Performed by: INTERNAL MEDICINE

## 2020-02-28 PROCEDURE — 99152 MOD SED SAME PHYS/QHP 5/>YRS: CPT | Performed by: INTERNAL MEDICINE

## 2020-02-28 PROCEDURE — 25000132 ZZH RX MED GY IP 250 OP 250 PS 637: Mod: GY | Performed by: INTERNAL MEDICINE

## 2020-02-28 PROCEDURE — C1725 CATH, TRANSLUMIN NON-LASER: HCPCS | Performed by: INTERNAL MEDICINE

## 2020-02-28 PROCEDURE — 85347 COAGULATION TIME ACTIVATED: CPT

## 2020-02-28 PROCEDURE — C1874 STENT, COATED/COV W/DEL SYS: HCPCS | Performed by: INTERNAL MEDICINE

## 2020-02-28 PROCEDURE — 99153 MOD SED SAME PHYS/QHP EA: CPT | Performed by: INTERNAL MEDICINE

## 2020-02-28 PROCEDURE — 25000128 H RX IP 250 OP 636: Performed by: INTERNAL MEDICINE

## 2020-02-28 PROCEDURE — 93010 ELECTROCARDIOGRAM REPORT: CPT | Mod: 76 | Performed by: INTERNAL MEDICINE

## 2020-02-28 PROCEDURE — 25000125 ZZHC RX 250: Performed by: INTERNAL MEDICINE

## 2020-02-28 PROCEDURE — 40000065 ZZH STATISTIC EKG NON-CHARGEABLE

## 2020-02-28 PROCEDURE — 93005 ELECTROCARDIOGRAM TRACING: CPT

## 2020-02-28 PROCEDURE — C1894 INTRO/SHEATH, NON-LASER: HCPCS | Performed by: INTERNAL MEDICINE

## 2020-02-28 PROCEDURE — 27210794 ZZH OR GENERAL SUPPLY STERILE: Performed by: INTERNAL MEDICINE

## 2020-02-28 PROCEDURE — 92943 PRQ TRLUML REVSC CH OCC ANT: CPT | Mod: RC | Performed by: INTERNAL MEDICINE

## 2020-02-28 PROCEDURE — C9607 PERC D-E COR REVASC CHRO SIN: HCPCS | Performed by: INTERNAL MEDICINE

## 2020-02-28 PROCEDURE — C1769 GUIDE WIRE: HCPCS | Performed by: INTERNAL MEDICINE

## 2020-02-28 PROCEDURE — 80048 BASIC METABOLIC PNL TOTAL CA: CPT

## 2020-02-28 PROCEDURE — 27210788 ZZH MANIFOLD CR3: Performed by: INTERNAL MEDICINE

## 2020-02-28 PROCEDURE — 40000235 ZZH STATISTIC TELEMETRY

## 2020-02-28 DEVICE — STENT SYNERGY DRUG ELUTING 3.50X16MM  H7493926016350: Type: IMPLANTABLE DEVICE | Status: FUNCTIONAL

## 2020-02-28 RX ORDER — NALOXONE HYDROCHLORIDE 0.4 MG/ML
.1-.4 INJECTION, SOLUTION INTRAMUSCULAR; INTRAVENOUS; SUBCUTANEOUS
Status: DISCONTINUED | OUTPATIENT
Start: 2020-02-28 | End: 2020-02-28 | Stop reason: HOSPADM

## 2020-02-28 RX ORDER — ALUMINA, MAGNESIA, AND SIMETHICONE 2400; 2400; 240 MG/30ML; MG/30ML; MG/30ML
30 SUSPENSION ORAL EVERY 4 HOURS PRN
Status: DISCONTINUED | OUTPATIENT
Start: 2020-02-28 | End: 2020-02-28 | Stop reason: HOSPADM

## 2020-02-28 RX ORDER — FENTANYL CITRATE 50 UG/ML
25-50 INJECTION, SOLUTION INTRAMUSCULAR; INTRAVENOUS
Status: DISCONTINUED | OUTPATIENT
Start: 2020-02-28 | End: 2020-02-28 | Stop reason: HOSPADM

## 2020-02-28 RX ORDER — FLUMAZENIL 0.1 MG/ML
0.2 INJECTION, SOLUTION INTRAVENOUS
Status: DISCONTINUED | OUTPATIENT
Start: 2020-02-28 | End: 2020-02-28 | Stop reason: HOSPADM

## 2020-02-28 RX ORDER — ASPIRIN 81 MG/1
81 TABLET ORAL DAILY
COMMUNITY
End: 2020-03-04

## 2020-02-28 RX ORDER — IOPAMIDOL 755 MG/ML
INJECTION, SOLUTION INTRAVASCULAR
Status: DISCONTINUED | OUTPATIENT
Start: 2020-02-28 | End: 2020-02-28 | Stop reason: HOSPADM

## 2020-02-28 RX ORDER — ACETAMINOPHEN 325 MG/1
650 TABLET ORAL EVERY 4 HOURS PRN
Status: DISCONTINUED | OUTPATIENT
Start: 2020-02-28 | End: 2020-02-28 | Stop reason: HOSPADM

## 2020-02-28 RX ORDER — CLOPIDOGREL BISULFATE 75 MG/1
75 TABLET ORAL DAILY
Status: DISCONTINUED | OUTPATIENT
Start: 2020-02-29 | End: 2020-02-28 | Stop reason: HOSPADM

## 2020-02-28 RX ORDER — CLOPIDOGREL BISULFATE 75 MG/1
TABLET ORAL
Status: DISCONTINUED | OUTPATIENT
Start: 2020-02-28 | End: 2020-02-28 | Stop reason: HOSPADM

## 2020-02-28 RX ORDER — HEPARIN SODIUM 1000 [USP'U]/ML
INJECTION, SOLUTION INTRAVENOUS; SUBCUTANEOUS
Status: DISCONTINUED | OUTPATIENT
Start: 2020-02-28 | End: 2020-02-28 | Stop reason: HOSPADM

## 2020-02-28 RX ORDER — SODIUM CHLORIDE 9 MG/ML
INJECTION, SOLUTION INTRAVENOUS CONTINUOUS
Status: DISCONTINUED | OUTPATIENT
Start: 2020-02-28 | End: 2020-02-28 | Stop reason: HOSPADM

## 2020-02-28 RX ORDER — ASPIRIN 81 MG/1
81 TABLET ORAL DAILY
Status: DISCONTINUED | OUTPATIENT
Start: 2020-02-28 | End: 2020-02-28 | Stop reason: HOSPADM

## 2020-02-28 RX ORDER — LIDOCAINE 40 MG/G
CREAM TOPICAL
Status: DISCONTINUED | OUTPATIENT
Start: 2020-02-28 | End: 2020-02-28 | Stop reason: HOSPADM

## 2020-02-28 RX ORDER — NALOXONE HYDROCHLORIDE 0.4 MG/ML
.2-.4 INJECTION, SOLUTION INTRAMUSCULAR; INTRAVENOUS; SUBCUTANEOUS
Status: DISCONTINUED | OUTPATIENT
Start: 2020-02-28 | End: 2020-02-28 | Stop reason: HOSPADM

## 2020-02-28 RX ORDER — ATROPINE SULFATE 0.1 MG/ML
0.5 INJECTION INTRAVENOUS EVERY 5 MIN PRN
Status: DISCONTINUED | OUTPATIENT
Start: 2020-02-28 | End: 2020-02-28 | Stop reason: HOSPADM

## 2020-02-28 RX ORDER — NITROGLYCERIN 0.4 MG/1
0.4 TABLET SUBLINGUAL EVERY 5 MIN PRN
Status: DISCONTINUED | OUTPATIENT
Start: 2020-02-28 | End: 2020-02-28 | Stop reason: HOSPADM

## 2020-02-28 RX ORDER — POTASSIUM CHLORIDE 1500 MG/1
20 TABLET, EXTENDED RELEASE ORAL
Status: DISCONTINUED | OUTPATIENT
Start: 2020-02-28 | End: 2020-02-28 | Stop reason: HOSPADM

## 2020-02-28 RX ORDER — FENTANYL CITRATE 50 UG/ML
INJECTION, SOLUTION INTRAMUSCULAR; INTRAVENOUS
Status: DISCONTINUED | OUTPATIENT
Start: 2020-02-28 | End: 2020-02-28 | Stop reason: HOSPADM

## 2020-02-28 RX ADMIN — FENTANYL CITRATE 25 MCG: 50 INJECTION, SOLUTION INTRAMUSCULAR; INTRAVENOUS at 10:09

## 2020-02-28 RX ADMIN — FENTANYL CITRATE 50 MCG: 50 INJECTION, SOLUTION INTRAMUSCULAR; INTRAVENOUS at 08:47

## 2020-02-28 RX ADMIN — ALUMINUM HYDROXIDE, MAGNESIUM HYDROXIDE, AND DIMETHICONE 30 ML: 400; 400; 40 SUSPENSION ORAL at 11:15

## 2020-02-28 RX ADMIN — MIDAZOLAM 1 MG: 1 INJECTION INTRAMUSCULAR; INTRAVENOUS at 08:48

## 2020-02-28 RX ADMIN — LIDOCAINE HYDROCHLORIDE 10 ML: 10 INJECTION, SOLUTION EPIDURAL; INFILTRATION; INTRACAUDAL; PERINEURAL at 08:48

## 2020-02-28 RX ADMIN — MIDAZOLAM 0.5 MG: 1 INJECTION INTRAMUSCULAR; INTRAVENOUS at 10:10

## 2020-02-28 RX ADMIN — MIDAZOLAM 1 MG: 1 INJECTION INTRAMUSCULAR; INTRAVENOUS at 09:48

## 2020-02-28 RX ADMIN — HEPARIN SODIUM 3000 UNITS: 1000 INJECTION INTRAVENOUS; SUBCUTANEOUS at 09:34

## 2020-02-28 RX ADMIN — MIDAZOLAM 1 MG: 1 INJECTION INTRAMUSCULAR; INTRAVENOUS at 08:42

## 2020-02-28 RX ADMIN — IOPAMIDOL 135 ML: 755 INJECTION, SOLUTION INTRAVENOUS at 10:17

## 2020-02-28 RX ADMIN — CLOPIDOGREL BISULFATE 150 MG: 75 TABLET ORAL at 10:19

## 2020-02-28 RX ADMIN — HEPARIN SODIUM 8000 UNITS: 1000 INJECTION INTRAVENOUS; SUBCUTANEOUS at 09:07

## 2020-02-28 RX ADMIN — MIDAZOLAM 1 MG: 1 INJECTION INTRAMUSCULAR; INTRAVENOUS at 09:30

## 2020-02-28 RX ADMIN — FENTANYL CITRATE 25 MCG: 50 INJECTION, SOLUTION INTRAMUSCULAR; INTRAVENOUS at 09:48

## 2020-02-28 RX ADMIN — FENTANYL CITRATE 50 MCG: 50 INJECTION, SOLUTION INTRAMUSCULAR; INTRAVENOUS at 09:30

## 2020-02-28 RX ADMIN — HEPARIN SODIUM 3000 UNITS: 1000 INJECTION INTRAVENOUS; SUBCUTANEOUS at 09:21

## 2020-02-28 RX ADMIN — MIDAZOLAM 1 MG: 1 INJECTION INTRAMUSCULAR; INTRAVENOUS at 08:53

## 2020-02-28 RX ADMIN — FENTANYL CITRATE 25 MCG: 50 INJECTION, SOLUTION INTRAMUSCULAR; INTRAVENOUS at 08:53

## 2020-02-28 RX ADMIN — SODIUM CHLORIDE: 9 INJECTION, SOLUTION INTRAVENOUS at 07:29

## 2020-02-28 RX ADMIN — FENTANYL CITRATE 50 MCG: 50 INJECTION, SOLUTION INTRAMUSCULAR; INTRAVENOUS at 08:42

## 2020-02-28 ASSESSMENT — MIFFLIN-ST. JEOR: SCORE: 1565.02

## 2020-02-28 NOTE — PRE-PROCEDURE
GENERAL PRE-PROCEDURE:   Procedure:   angioplasty and other cardiac intervention  Date/Time:  2/28/2020 7:39 AM    Written consent obtained?: Yes    Risks and benefits: Risks, benefits and alternatives were discussed    Consent given by:  Patient  Patient states understanding of procedure being performed: Yes    Patient's understanding of procedure matches consent: Yes    Procedure consent matches procedure scheduled: Yes    Expected level of sedation:  Moderate  Appropriately NPO:  Yes  ASA Class:  Class 3- Severe systemic disease, definite functional limitations  Mallampati  :  Grade 2- soft palate, base of uvula, tonsillar pillars, and portion of posterior pharyngeal wall visible  Lungs:  Lungs clear with good breath sounds bilaterally  Heart:  Normal heart sounds and rate  History & Physical reviewed:  History and physical reviewed and no updates needed  Statement of review:  I have reviewed the lab findings, diagnostic data, medications, and the plan for sedation

## 2020-02-28 NOTE — Clinical Note
The first balloon was inserted into the right coronary artery and proximal right coronary artery.Max pressure = 14 soniya. Total duration = 30 seconds.     Max pressure = 12 soniya. Total duration = 30 seconds.    Balloon reinflated a second time: Max pressure = 12 soniya. Total duration = 30 seconds.

## 2020-02-28 NOTE — PROGRESS NOTES
Care Suites Post Procedure Note    Patient Information  Name: Jose Tejada  Age: 74 year old    Post Procedure  Procedure: Coronary angiogram with Stent to RCA  Time patient returned to Care Suites: 1030  Concerns/abnormal assessment: Groin site CDI soft and flat on return to dept.  Shortly after developed hematoma with a quarter size amount of drainage on dressing.  Pressure was placed to site with good results.  Site remians unchanged at this time.  Reported no pain on return to dept.  Shortly after at approx 1100 reports burning in chest, not like his normal angina feeling.  Dr Yun here at this time and ordered Maalox for pt.  Reports decrease in discomfort after Maalox.  Sleeping now.  Food tray ordered  If abnormal assessment, provider notified: Yes  Plan/Other: cont to monitor.    Iva Hui RN

## 2020-02-28 NOTE — Clinical Note
Stent deployed in the proximal right coronary artery. Max pressure = 14 soniya. Total duration = 50 seconds.

## 2020-02-28 NOTE — Clinical Note
The first balloon was inserted into the right coronary artery and proximal right coronary artery.Max pressure = 22 soniya. Total duration = 35 seconds.     Max pressure = 22 soniya. Total duration = 20 seconds.    Balloon reinflated a second time: Max pressure = 22 soniya. Total duration = 20 seconds.  Balloon reinflated a third time: Max pressure = 22 soniya. Total duration = 30 seconds.

## 2020-02-28 NOTE — Clinical Note
The first balloon was inserted into the right coronary artery and proximal right coronary artery.Max pressure = 20 soniya. Total duration = 30 seconds.     Max pressure = 24 soniya. Total duration = 10 seconds.    Balloon reinflated a second time: Max pressure = 24 soniya. Total duration = 10 seconds.

## 2020-02-28 NOTE — Clinical Note
The first balloon was inserted into the right coronary artery and proximal right coronary artery.Max pressure = 15 soniya. Total duration = 30 seconds.     Max pressure = 15 soniya. Total duration = 30 seconds.    Balloon reinflated a second time: Max pressure = 15 soniya. Total duration = 30 seconds.

## 2020-02-28 NOTE — Clinical Note
The first balloon was inserted into the right coronary artery and proximal right coronary artery.Max pressure = 22 soniya. Total duration = 10 seconds.     Max pressure = 22 soniya. Total duration = 30 seconds.    Balloon reinflated a second time: Max pressure = 22 soniya. Total duration = 30 seconds.  Balloon reinflated a third time: Max pressure = 20 soniya. Total duration = 10 seconds.

## 2020-02-28 NOTE — Clinical Note
The first balloon was inserted into the right coronary artery and proximal right coronary artery.Max pressure = 18 soniya. Total duration = 40 seconds.     Max pressure = 18 soniya. Total duration = 15 seconds.    Balloon reinflated a second time: Max pressure = 18 soniya. Total duration = 15 seconds.  Balloon reinflated a third time: Max pressure = 20 soniya. Total duration = 40 seconds.

## 2020-02-28 NOTE — DISCHARGE INSTRUCTIONS
Cardiac Angioplasty/Stent Discharge Instructions - Femoral    After you go home:      Have an adult stay with you until tomorrow.    Drink extra fluids for 2 days.    You may resume your normal diet.    No smoking       For 24 hours - due to the sedation you received:    Relax and take it easy.    Do NOT make any important or legal decisions.    Do NOT drive or operate machines at home or at work.    Do NOT drink alcohol.    Care of Groin Puncture Site:      For the first 24 hrs - check the puncture site every 1-2 hours while awake.    For 2 days, when you cough, sneeze, laugh or move your bowels, hold your hand over the puncture site and press firmly.    Remove the bandaid after 24 hours. If there is minor oozing, apply another bandaid and remove it after 12 hours.    It is normal to have a small bruise or pea size lump at the site.    You may shower tomorrow. Do NOT take a bath, or use a hot tub or pool for at least 3 days. Do NOT scrub the site. Do not use lotion or powder near the puncture site.     Activity:            For 2 days:    No stooping or squatting    Do NOT do any heavy activity such as exercise, lifting, or straining.     No housework, yard work or any activity that make you sweat    Do NOT lift more than 10 pounds    Bleeding:      If you start bleeding from the site in your groin, lie down flat and press firmly on/above the site for 10 minutes.     Once bleeding stops, lay flat for 2 hours.     Call [facility] as soon as you can.       Call 911 right away if you have heavy bleeding or bleeding that does not stop.      Medicines:      If you are taking an antiplatelet medication such as Plavix, Brilinta or Effient, do not stop taking it until you talk to your cardiologist.        If you are on Metformin (Glucophage), do not restart it until you have blood tests (within 2 to 3 days after discharge).  After you have your blood drawn, you may restart the Metformin.     Take your medications, including  blood thinners, unless your provider tells you not to.  If you take Coumadin (Warfarin), have your INR checked by your provider in  3-5 days. Call your clinic to schedule this.    If you have stopped any medicines, check with your provider about when to restart them.    Follow Up Appointments:      Follow up with [facility] Nurse Practitioner at [facility] of patient preference in 7-10 days.    Cardiac Rehab will contact you for follow up care.    Call the clinic if:      You have increased pain or a large or growing hard lump around the site.    The site is red, swollen, hot or tender.    Blood or fluid is draining from the site.    You have chills or a fever greater than 101 F (38 C).    Your leg feels numb, cool or changes color.    You have hives, a rash or unusual itching.    New pain in the back or belly that you cannot control with Tylenol.    Any questions or concerns.      Other Instructions:      If you received a stent - carry your stent card with you at all times.      [facility] [place]:    [phone] [facility] (7 days a week)

## 2020-02-28 NOTE — Clinical Note
The first balloon was inserted into the right coronary artery and proximal right coronary artery.Max pressure = 22 soniya. Total duration = 40 seconds.

## 2020-02-28 NOTE — PROGRESS NOTES
Care Suites Admission Nursing Note    Patient Information  Name: Jose Tejada  Age: 74 year old  Reason for admission:  Angiogram  Care Suites arrival time: 0650    Patient Admission/Assessment   Pre-procedure assessment complete: Yes  If abnormal assessment/labs, provider notified: No  NPO: Yes  Medications held per instructions/orders: Yes  Consent: obtained  Patient oriented to room: Yes  Education/questions answered: Yes  Plan/other: pt states possible admit overnight.     Discharge Planning  Accompanied by: wife- Vinicio  Discharge name/phone number: cell [phone]  Overnight post sedation caregiver: wife  Discharge location: reports possible overnight.  Will wait for plan/orders post procedure.    Iva Hui RN

## 2020-02-28 NOTE — PROGRESS NOTES
Pt requesting po juice and crackers post procedure. States chest discomfort slightly better after Maalox. Will continue to observe and monitor.

## 2020-02-28 NOTE — Clinical Note
The first balloon was inserted into the right coronary artery and proximal right coronary artery.Max pressure = 22 soniya. Total duration = 30 seconds.     Max pressure = 23 soniya. Total duration = 25 seconds.    Balloon reinflated a second time: Max pressure = 23 soniya. Total duration = 25 seconds.  Balloon reinflated a third time: Max pressure = 23 soniya. Total duration = 42 seconds.

## 2020-02-28 NOTE — PROGRESS NOTES
Care Suites Discharge Nursing Note    Patient Information  Name: Jose Tejada  Age: 74 year old    Discharge Education:  Discharge instructions reviewed: Yes  Additional education/resources provided: Stent card   Patient/patient representative verbalizes understanding: Yes  Patient discharging on new medications: No, pt will cont to take aspirin for 5 days then discontinue.  Pt has aspirin at home.  Medication education completed: Yes      Discharge Plans:   Discharge location: home  Discharge ride contacted: Yes  Approximate discharge time: 1405    Discharge Criteria:  Discharge criteria met and vital signs stable: Yes    Patient Belongs:  Patient belongings returned to patient: Yes    Iva Hui RN

## 2020-02-28 NOTE — Clinical Note
The first balloon was inserted into the right coronary artery and proximal right coronary artery.Max pressure = 15 soniya. Total duration = 25 seconds.     Max pressure = 15 soniya. Total duration = 20 seconds.    Balloon reinflated a second time: Max pressure = 15 soniya. Total duration = 20 seconds.

## 2020-03-02 ENCOUNTER — APPOINTMENT (OUTPATIENT)
Dept: ULTRASOUND IMAGING | Facility: CLINIC | Age: 75
End: 2020-03-02
Attending: EMERGENCY MEDICINE
Payer: MEDICARE

## 2020-03-02 ENCOUNTER — TELEPHONE (OUTPATIENT)
Dept: CARDIOLOGY | Facility: CLINIC | Age: 75
End: 2020-03-02

## 2020-03-02 ENCOUNTER — HOSPITAL ENCOUNTER (EMERGENCY)
Facility: CLINIC | Age: 75
Discharge: HOME | End: 2020-03-02
Attending: EMERGENCY MEDICINE | Admitting: EMERGENCY MEDICINE
Payer: MEDICARE

## 2020-03-02 VITALS
SYSTOLIC BLOOD PRESSURE: 126 MMHG | HEART RATE: 62 BPM | WEIGHT: 185 LBS | TEMPERATURE: 98.1 F | BODY MASS INDEX: 28.04 KG/M2 | OXYGEN SATURATION: 96 % | HEIGHT: 68 IN | DIASTOLIC BLOOD PRESSURE: 88 MMHG

## 2020-03-02 DIAGNOSIS — T14.8XXA HEMATOMA: ICD-10-CM

## 2020-03-02 LAB
INR PPP: 1.2 (ref 0.86–1.14)
INTERPRETATION ECG - MUSE: NORMAL

## 2020-03-02 PROCEDURE — 85610 PROTHROMBIN TIME: CPT | Performed by: EMERGENCY MEDICINE

## 2020-03-02 PROCEDURE — 93926 LOWER EXTREMITY STUDY: CPT | Mod: LT

## 2020-03-02 PROCEDURE — 99284 EMERGENCY DEPT VISIT MOD MDM: CPT | Mod: 25

## 2020-03-02 ASSESSMENT — MIFFLIN-ST. JEOR: SCORE: 1563.65

## 2020-03-02 NOTE — TELEPHONE ENCOUNTER
"Patient was here for scheduled attempt of RCA -second attempt-first attempt was 1/16/20. 2/28/20: PCI via RFA with successful stenting of proximal RCA CT- initally 100% to 0% post stent. Pt with factor 5 leiden and prothrombin mutation. Last time we bridged with lovenox post procedure but pt developed LFA pseudoaneurysm--this time will not bridge. Restarted on warfarin and PTA Plavix. Called patient to discuss any post hospital d/c questions, review medication changes, and confirm f/u appts. Pt has an Rx for PRN SL Nitroglycerin. Pt states he removed bandage to RFA access site and noticed that access site is \"bulging\" but without bleeding. States \"Im on my way to the ER.\" Denies lightheadedness. Writer encouraged pt to have someone drive him, but he is just getting into car to leave now. LISA Lund RN.    "

## 2020-03-02 NOTE — ED TRIAGE NOTES
Had angio on Friday, took bandage off this morning and has large hematoma on R groin now. Had this happen last month as well.

## 2020-03-02 NOTE — ED AVS SNAPSHOT
Emergency Department  [address] [facility] [address]  Phone:  [phone]  Fax:  [phone]                                    Jose Tejada   MRN: [id number]    Department:   Emergency Department   Date of Visit:  3/2/2020           After Visit Summary Signature Page    I have received my discharge instructions, and my questions have been answered. I have discussed any challenges I see with this plan with the nurse or doctor.    ..........................................................................................................................................  Patient/Patient Representative Signature      ..........................................................................................................................................  Patient Representative Print Name and Relationship to Patient    ..................................................               ................................................  Date                                   Time    ..........................................................................................................................................  Reviewed by Signature/Title    ...................................................              ..............................................  Date                                               Time          22EPIC Rev 08/18

## 2020-03-03 LAB — INTERPRETATION ECG - MUSE: NORMAL

## 2020-03-03 NOTE — ED PROVIDER NOTES
"  History     Chief Complaint:  Wound Check       HPI   Jose Tejada is a 74 year old male on coumadin with a history of coronary artery disease, DVT, factor 5 leiden, and protein C deficiency who presents with wound check. The patient recently had a coronary angiogram done by Dr. Yun 3 days ago and was started on coumadin. He states that he took the bandage off the surgical site in the right lower quadrant of the abdomen and noticed bruising that gradually got worse throughout the day.     Allergies:  Metoprolol  Niacin  Norvasc [Amlodipine]  Septra [Sulfamethoxazole W-Trimethoprim]     Medications:    aspirin 81 MG EC tablet  clopidogrel  gabapentin  isosorbide mononitrate  lisinopril  nortriptyline   omeprazole   rosuvastatin   Warfarin Sodium    Past Medical History:    Asthma   CAD (coronary artery disease)   DVT (deep venous thrombosis) (H)   Factor 5 Leiden mutation, heterozygous (H)   GERD (gastroesophageal reflux disease)   HTN (hypertension)   Hyperlipidemia   Neuropathy   PRIMITIVO (obstructive sleep apnea)   Protein C deficiency (H)   Spinal stenosis    Past Surgical History:    Appendectomy   arthroplasty hip   Spinal fusion   Coronary angiogram   CV fractional flow reserve   CV left heart cath      Family History:    MI    Social History:  Smoking Status: former smoker  Smokeless Tobacco: Never Used  Alcohol Use: Yes  Drug Use: No  PCP: Sara Jhaveri     Review of Systems   Skin:        Bruising to the right lower quadrant    All other systems reviewed and are negative.      Physical Exam     Patient Vitals for the past 24 hrs:   BP Temp Temp src Pulse SpO2 Height Weight   03/02/20 2025 -- -- -- -- 96 % -- --   03/02/20 2024 126/88 -- -- 62 -- -- --   03/02/20 1738 133/75 98.1  F (36.7  C) Oral 71 96 % 1.727 m (5' 8\") 83.9 kg (185 lb)        Physical Exam  Vitals: reviewed by me  General: Pt seen on [facility], pleasant, cooperative, and alert to conversation  Eyes: Tracking well, " clear conjunctiva BL  ENT: MMM, midline trachea.   Lungs:   No tachypnea, no accessory muscle use. No respiratory distress.   CV: Rate as above, regular rhythm.    Abd: Soft, non tender, no guarding, no rebound. Non distended  Right groin does have resolving ecchymoses, roughly 10 to 12 cm wide in its widest part.  Small amount of induration, no bleeding noted from the needle hole, no acute signs of trauma or bleeding.  Minimally tender to palpation.  MSK: no peripheral edema or joint effusion.  No evidence of trauma  Skin: No rash, normal turgor and temperature  Neuro: Clear speech and no facial droop.  Psych: Not RIS, no e/o AH/VH      Emergency Department Course     Imaging:  Radiology findings were communicated with the patient who voiced understanding of the findings.    US Lower Extremity Arterial Duplex Left    (Results Pending)   Read sent to the emergency room by Valdemar Castro shows no evidence for pseudoaneurysm or AVF.  Small right inguinal fluid collection has similar appearance to 1/20/2020.    Laboratory:  Laboratory findings were communicated with the patient who voiced understanding of the findings.    INR: 1.20 (H)    Emergency Department Course:  Past medical records, nursing notes, and vitals reviewed.    1851 I performed an exam of the patient as documented above.    IV was inserted and blood was drawn for laboratory testing, results above.      2130 Patient rechecked and updated.       Findings and plan explained to the Patient. Patient discharged home with instructions regarding supportive care, medications, and reasons to return. The importance of close follow-up was reviewed.      Impression & Plan     Medical Decision Making:  Jose Tejada is a 74 year old male who presents to the emergency department today with what appears to be a small hematoma to his right groin after being cathed in that site. I see no active bleeding. His ultrasound shows no evidence of aneurysm or active  bleeding, and his ecchymosis does look to be resolving and at least a couple days old. He has good pulses distally, otherwise is doing well. He knows to come back with any changes, and has a follow up appointment in 48 hours with Dr. Yun. I do think that he is stable for close up patient management as long as he knows to come back with any changes. Will be discharged as noted above.           Discharge Diagnosis:    ICD-10-CM    1. Hematoma T14.8XXA        Disposition:  Discharged to home.    Scribe Disclosure:  I, Keon Abarca, am serving as a scribe at 6:51 PM on 3/2/2020 to document services personally performed by Valdemar Kirkpatrick MD based on my observations and the provider's statements to me.      3/2/2020   Valdemar Kirkpatrick MD Fitzgerald, Michael Maxwell Kreofsky, MD  03/02/20 4617

## 2020-03-03 NOTE — PROGRESS NOTES
HISTORY OF PRESENT ILLNESS:         Jose Tejada is a delightful 74-year-old gentleman followed here for many years by Dr. Yun.  He is here today following a successful KEITH to RCA  (synergy stent 3.5 x 16mm).     He has a history of coronary artery disease with stents to his LAD in the past as well as his right coronary.  This occurred in 1996 with a known 70% circumflex lesion.      He has a history of protein C deficiency and factor V Leiden mutation (heterozygous) with a history of DVT.  He is on chronic warfarin therapy and needs bridging prior to any procedures.        He has developed peripheral neuropathy with right footdrop and wears a brace.  He has not had frequent falls.  In addition, he has hypertension, dyslipidemia, gastroesophageal reflux disease, asthma and spinal stenosis.  His primary care and warfarin typically are followed by Dr. Meyer through the [facility].        In the fall of last year, he developed exertional chest tightness and was admitted to [facility].  Coronary angiogram was performed, noting his LAD was open, circumflex had a 70% stenosis, but the flow reserve was normal.  The right coronary artery was totally occluded shortly after its origin with robust collaterals from the LAD apical epicardial to the PDA vessels.  There were a smaller amounts of collateral from the left circumflex to the posterolateral branch.  The proximal right coronary artery has a bit of an ambiguous takeoff.  There was also heavy calcification present in the right coronary artery.  Medical therapy was begun.        He was taken off the beta blockers due to fatigue and bradycardia.  He tried nitrates with Ranexa and he continued to have chest discomfort.  His LV function was preserved with RV enlargement and decreased function, probably suspicious for some ischemia to the right ventricle or cor pulmonale with some lung disease.  Following his recent first attempt at   to RCA he developed a pseudoaneurysm of the right SFA which was compressed. He had been bridged then for his Factor V Leiden deficiency for which he takes chronic Warfarin. He was on ASA/Plavix for one week; ASA is due to be stopped now. He has restarted his Warfarin and INR is 1.4 so I increased his dose today from 5mg to 7.5mg. I will get and INR in 2 days. I again reviewed need for bridging with Dr. Mccall as patient has moderate ecchymosis in right groin again without bruit and we will not bridge for this reason.      Jose complains of no chest pain since our last visit. Heart rate off of beta-blockers is 70.     He continues on Ranexa 500 mg twice daily and Isosorbide and has interest in stopping Isosorbide. I will reduce to 1/2 pill and if pain free, ask Dr. Yun to decide if this can stop after next office visit..      Lipid profile 01/16 showed a total cholesterol of 99, HDL 40, LDL 39, drawn on Crestor 20 mg per day.     Renal function has been normal prior to his angiogram and after.       PHYSICAL EXAMINATION- outlined below  VITAL SIGNS:  Blood pressure today is 106/67.  His weight is stable.      right groin is moderately ecchymosis with no hum or bruit.        IMPRESSION AND PLAN:         1.  Coronary artery disease with previous remote LAD stent which appears patent.    -successful PCI to  of RCA(synergy stent 3.5 x 16mm).  -stay off of ASA   -continue Plavix  -reduce Isosorbide to 15mg daily     2.  Hypercoagulable state-on warfarin therapy.  -no post procedure bridging due to previous Pseudoanerysm  -Increase todays warfarin dose to 7.5mg with INR On Friday  Last dose of warfarin 2-    3.  Dyslipidemia, controlled.   4.  Hypertension, controlled.   5.  Bradycardia and fatigue on metoprolol in the past; this has been discontinued.   6.  Neuropathy with right footdrop.  He wears a brace.  He has not had frequent falls.         It has been a pleasure seeing Jose Tejada in follow  up. We will see him one month  Greater than 50% of this 30 minute visit was spent in counseling/collaboration    Jaya Flores, MSN, APRN-BC, CNP  Cardiology    No orders of the defined types were placed in this encounter.    Orders Placed This Encounter   Medications     isosorbide mononitrate (IMDUR) 30 MG 24 hr tablet     Sig: Take 0.5 tablets (15 mg) by mouth daily     Dispense:  30 tablet     Refill:  0     Medications Discontinued During This Encounter   Medication Reason     aspirin (ASA) 81 MG EC tablet      aspirin 81 MG EC tablet      isosorbide mononitrate (IMDUR) 30 MG 24 hr tablet Reorder         Encounter Diagnoses   Name Primary?     Coronary artery disease involving native coronary artery of native heart without angina pectoris      Hypercoagulation syndrome (H) Yes     Paroxysmal atrial fibrillation (H)      Coronary artery disease of native artery of native heart with stable angina pectoris (H)        CURRENT MEDICATIONS:  Current Outpatient Medications   Medication Sig Dispense Refill     albuterol (PROAIR HFA/PROVENTIL HFA/VENTOLIN HFA) 108 (90 Base) MCG/ACT inhaler Inhale 2 puffs into the lungs every 4 hours as needed for shortness of breath / dyspnea or wheezing       clopidogrel (PLAVIX) 75 MG tablet Take 1 tablet (75 mg) by mouth daily 30 tablet 0     gabapentin (NEURONTIN) 400 MG capsule Take 1,200 mg by mouth 3 times daily       isosorbide mononitrate (IMDUR) 30 MG 24 hr tablet Take 0.5 tablets (15 mg) by mouth daily 30 tablet 0     lidocaine (LIDODERM) 5 % patch Place 1 patch onto the skin every 24 hours       lisinopril (PRINIVIL/ZESTRIL) 20 MG tablet Take 1 tablet (20 mg) by mouth daily 30 tablet 0     nitroGLYcerin (NITROSTAT) 0.4 MG sublingual tablet For chest pain place 1 tablet under the tongue every 5 minutes for 3 doses. If symptoms persist 5 minutes after 1st dose call 911. 20 tablet 0     nortriptyline (PAMELOR) 25 MG capsule Take 25 mg by mouth At Bedtime         omeprazole (PRILOSEC) 20 MG CR capsule Take 20 mg by mouth At Bedtime        oxyCODONE-acetaminophen (PERCOCET) 5-325 MG per tablet Take 2 tablets by mouth 3 times daily as needed Patient takes 2 tablets TID Scheduled       rosuvastatin (CRESTOR) 20 MG tablet Take 1 tablet (20 mg) by mouth daily 90 tablet 3     Warfarin Sodium (COUMADIN PO) Take 5 mg by mouth daily 7.5 mg on Monday and Friday, 5 mg all other days         ALLERGIES     Allergies   Allergen Reactions     Metoprolol      Fatigue and bradycardia     Niacin Other (See Comments)     Other reaction(s): Flushing         Norvasc [Amlodipine] Rash     Septra [Sulfamethoxazole W-Trimethoprim] Rash       PAST MEDICAL HISTORY:  Past Medical History:   Diagnosis Date     Asthma      CAD (coronary artery disease)     1996 angioplasty and stent to the prox LAD, PTCA with intracoronary stent placement of RCA, 70%OM; 10/24/19 Cath: Occluded RCA, prox circumflex lesion - pd/pa not significant, advised medical therapy first by Interventional cardiologist     DVT (deep venous thrombosis) (H)      Factor 5 Leiden mutation, heterozygous (H)      GERD (gastroesophageal reflux disease)      HTN (hypertension)      Hyperlipidemia      Neuropathy     wears brace R foot for drop foot     PRIMITIVO (obstructive sleep apnea)     cpap     Protein C deficiency (H)      Spinal stenosis        PAST SURGICAL HISTORY:  Past Surgical History:   Procedure Laterality Date     APPENDECTOMY OPEN  1958     ARTHROPLASTY HIP Left 1997     ARTHROPLASTY REVISION HIP Left 2013     AS SPINAL FUSION,ANT,EA ADNL LEVEL  2010    L4-L5     C TOTAL HIP ARTHROPLASTY Right 2015    THR     CV CORONARY ANGIOGRAM N/A 10/24/2019    Procedure: Coronary Angiogram;  Surgeon: Valdemar Hinojosa MD;  Location:  [facility]     CV CORONARY ANGIOGRAM  1996 1996 angioplasty and stent to the prox LAD, PTCA with intracoronary stent placement of RCA, 70%OM     CV CORONARY ANGIOGRAM N/A 2/28/2020     Procedure: Coronary Angiogram  ;  Surgeon: Gurvinder Yun MD;  Location:  [facility]     CV FRACTIONAL FLOW RESERVE N/A 10/24/2019    Procedure: Fractional Flow [place];  Surgeon: Valdemar Hinojosa MD;  Location:  [facility]     CV LEFT HEART CATH Bilateral 2020    Procedure: Left Heart Cath w/wo Left Ventriculogram  ANGIOPLASTY WITH [place] SCI REP PRESENT;  Surgeon: Gurvinder Yun MD;  Location:  [facility]       FAMILY HISTORY:  Family History   Problem Relation Age of Onset     Myocardial Infarction Father      Heart Disease Maternal Grandfather        SOCIAL HISTORY:  Social History     Socioeconomic History     Marital status:      Spouse name: Not on file     Number of children: Not on file     Years of education: Not on file     Highest education level: Not on file   Occupational History     Not on file   Social Needs     Financial resource strain: Not on file     Food insecurity     Worry: Not on file     Inability: Not on file     Transportation needs     Medical: Not on file     Non-medical: Not on file   Tobacco Use     Smoking status: Former Smoker     Types: Cigarettes     Last attempt to quit: 3/23/1987     Years since quittin.9     Smokeless tobacco: Never Used     Tobacco comment: Smokes cigars once every 2-3 years   Substance and Sexual Activity     Alcohol use: Yes     Alcohol/week: 0.0 standard drinks     Comment: once every two weeks if that     Drug use: Never     Sexual activity: Not on file   Lifestyle     Physical activity     Days per week: Not on file     Minutes per session: Not on file     Stress: Not on file   Relationships     Social connections     Talks on phone: Not on file     Gets together: Not on file     Attends Evangelical service: Not on file     Active member of club or organization: Not on file     Attends meetings of clubs or organizations: Not on file     Relationship status: Not on file     Intimate  "partner violence     Fear of current or ex partner: Not on file     Emotionally abused: Not on file     Physically abused: Not on file     Forced sexual activity: Not on file   Other Topics Concern     Parent/sibling w/ CABG, MI or angioplasty before 65F 55M? Not Asked      Service Not Asked     Blood Transfusions Not Asked     Caffeine Concern No     Comment: 1 -2 cups daily      Occupational Exposure Not Asked     Hobby Hazards Not Asked     Sleep Concern Not Asked     Stress Concern Not Asked     Weight Concern Not Asked     Special Diet No     Back Care Not Asked     Exercise No     Bike Helmet Not Asked     Seat Belt Not Asked     Self-Exams Not Asked   Social History Narrative     Not on file       Review of Systems:  Skin:  Negative       Eyes:  Negative      ENT:  Negative      Respiratory:  Positive for sleep apnea;CPAP     Cardiovascular:  Negative Positive for;fatigue;lightheadedness occ.energy level has improved  Gastroenterology: Negative      Genitourinary:  Positive for nocturia 1x per night  Musculoskeletal:  Negative nocturnal cramping muscle pain  Neurologic:  Positive for numbness or tingling of hands;numbness or tingling of feet occ headaches  Psychiatric:  Negative      Heme/Lymph/Imm:  Positive for allergies    Endocrine:  Negative        Physical Exam:  Vitals: /67   Pulse 70   Ht 1.727 m (5' 8\")   Wt 85.4 kg (188 lb 3.2 oz)   BMI 28.62 kg/m      Constitutional:  cooperative, alert and oriented, well developed, well nourished, in no acute distress   thin,frail and fatigued    Skin:  warm and dry to the touch, no apparent skin lesions or masses noted          Head:  normocephalic, no masses or lesions        Eyes:  pupils equal and round, conjunctivae and lids unremarkable, sclera white, no xanthalasma, EOMS intact, no nystagmus        Lymph:No Cervical lymphadenopathy present     ENT:  no pallor or cyanosis, dentition good        Neck:  carotid pulses are full and equal " bilaterally, JVP normal, no carotid bruit        Respiratory:  normal breath sounds, clear to auscultation, normal A-P diameter, normal symmetry, normal respiratory excursion, no use of accessory muscles         Cardiac: regular rhythm, normal S1/S2, no S3 or S4, apical impulse not displaced, no murmurs, gallops or rubs occasional premature beats S4            pulses full and equal, no bruits auscultated   2+;3+           1+ 2+           2+     moderate ecchymosis right femoral artery    GI:  abdomen soft, non-tender, BS normoactive, no mass, no HSM, no bruits obese difficult exam    Extremities and Muscular Skeletal:  no edema         brace on R leg--foot drop    Neurological:  no gross motor deficits   foot drop on R-wears brace    Psych:  Alert and Oriented x 3      Recent Lab Results:  LIPID RESULTS:  Lab Results   Component Value Date    CHOL 99 01/16/2020    HDL 40 01/16/2020    LDL 39 01/16/2020    TRIG 101 01/16/2020    CHOLHDLRATIO 3.5 04/21/2014       LIVER ENZYME RESULTS:  Lab Results   Component Value Date    AST 21 10/22/2019    ALT 20 12/20/2019       CBC RESULTS:  Lab Results   Component Value Date    WBC 4.7 02/28/2020    RBC 3.92 (L) 02/28/2020    HGB 12.4 (L) 02/28/2020    HCT 36.7 (L) 02/28/2020    MCV 94 02/28/2020    MCH 31.6 02/28/2020    MCHC 33.8 02/28/2020    RDW 12.9 02/28/2020     02/28/2020       BMP RESULTS:  Lab Results   Component Value Date     (L) 03/04/2020    POTASSIUM 4.7 03/04/2020    CHLORIDE 100 03/04/2020    CO2 28 03/04/2020    ANIONGAP 10.7 03/04/2020     03/04/2020    BUN 7 03/04/2020    CR 0.89 03/04/2020    GFRESTIMATED 84 03/04/2020    GFRESTBLACK >90 03/04/2020    CODI 9.7 03/04/2020        A1C RESULTS:  No results found for: A1C    INR RESULTS:  Lab Results   Component Value Date    INR 2.00 (H) 03/06/2020    INR 1.48 (H) 03/04/2020           CC  Jaya Flores, APRN CNP  [id number]  [place] [address]

## 2020-03-03 NOTE — ED NOTES
Bed: ED25  Expected date:   Expected time:   Means of arrival:   Comments:  RN going upstairs with patient.

## 2020-03-04 ENCOUNTER — OFFICE VISIT (OUTPATIENT)
Dept: CARDIOLOGY | Facility: CLINIC | Age: 75
End: 2020-03-04
Attending: NURSE PRACTITIONER
Payer: MEDICARE

## 2020-03-04 ENCOUNTER — DOCUMENTATION ONLY (OUTPATIENT)
Dept: CARDIOLOGY | Facility: CLINIC | Age: 75
End: 2020-03-04

## 2020-03-04 ENCOUNTER — CARE COORDINATION (OUTPATIENT)
Dept: CARDIOLOGY | Facility: CLINIC | Age: 75
End: 2020-03-04

## 2020-03-04 VITALS
HEART RATE: 70 BPM | BODY MASS INDEX: 28.52 KG/M2 | HEIGHT: 68 IN | DIASTOLIC BLOOD PRESSURE: 67 MMHG | WEIGHT: 188.2 LBS | SYSTOLIC BLOOD PRESSURE: 106 MMHG

## 2020-03-04 DIAGNOSIS — I25.118 CORONARY ARTERY DISEASE OF NATIVE ARTERY OF NATIVE HEART WITH STABLE ANGINA PECTORIS (H): ICD-10-CM

## 2020-03-04 DIAGNOSIS — I48.0 PAROXYSMAL ATRIAL FIBRILLATION (H): ICD-10-CM

## 2020-03-04 DIAGNOSIS — D68.59 HYPERCOAGULATION SYNDROME (H): Primary | ICD-10-CM

## 2020-03-04 DIAGNOSIS — I25.10 CORONARY ARTERY DISEASE INVOLVING NATIVE CORONARY ARTERY OF NATIVE HEART WITHOUT ANGINA PECTORIS: ICD-10-CM

## 2020-03-04 LAB
ANION GAP SERPL CALCULATED.3IONS-SCNC: 10.7 MMOL/L (ref 6–17)
BUN SERPL-MCNC: 7 MG/DL (ref 7–30)
CALCIUM SERPL-MCNC: 9.7 MG/DL (ref 8.5–10.5)
CHLORIDE SERPL-SCNC: 100 MMOL/L (ref 98–107)
CO2 SERPL-SCNC: 28 MMOL/L (ref 23–29)
CREAT SERPL-MCNC: 0.89 MG/DL (ref 0.7–1.3)
GFR SERPL CREATININE-BSD FRML MDRD: 84 ML/MIN/{1.73_M2}
GLUCOSE SERPL-MCNC: 103 MG/DL (ref 70–105)
INR PPP: 1.48 (ref 0.86–1.14)
POTASSIUM SERPL-SCNC: 4.7 MMOL/L (ref 3.5–5.1)
SODIUM SERPL-SCNC: 134 MMOL/L (ref 136–145)

## 2020-03-04 PROCEDURE — 99214 OFFICE O/P EST MOD 30 MIN: CPT | Performed by: NURSE PRACTITIONER

## 2020-03-04 PROCEDURE — 80048 BASIC METABOLIC PNL TOTAL CA: CPT | Performed by: INTERNAL MEDICINE

## 2020-03-04 PROCEDURE — 85610 PROTHROMBIN TIME: CPT | Performed by: NURSE PRACTITIONER

## 2020-03-04 PROCEDURE — 36415 COLL VENOUS BLD VENIPUNCTURE: CPT | Performed by: INTERNAL MEDICINE

## 2020-03-04 RX ORDER — ISOSORBIDE MONONITRATE 30 MG/1
15 TABLET, EXTENDED RELEASE ORAL DAILY
Qty: 30 TABLET | Refills: 0 | COMMUNITY
Start: 2020-03-04 | End: 2020-04-28

## 2020-03-04 ASSESSMENT — MIFFLIN-ST. JEOR: SCORE: 1578.17

## 2020-03-04 NOTE — PATIENT INSTRUCTIONS
Check INR today-if low we may need to use some lovenox    Try to reduce your Isosorbide to 1/2 pill    Reduce your water intake by 8 ounces per day

## 2020-03-04 NOTE — LETTER
3/4/2020    Sara Jhaveri, [facility] [address]  [facility] [place] [address]    RE: Jose Tejada       Dear Colleague,    I had the pleasure of seeing Jose Tejada in the [facility].    HISTORY OF PRESENT ILLNESS:         Jose Tejada is a delightful 74-year-old gentleman followed here for many years by Dr. Yun.  He is here today following a successful KEITH to RCA  (synergy stent 3.5 x 16mm).     He has a history of coronary artery disease with stents to his LAD in the past as well as his right coronary.  This occurred in 1996 with a known 70% circumflex lesion.      He has a history of protein C deficiency and factor V Leiden mutation (heterozygous) with a history of DVT.  He is on chronic warfarin therapy and needs bridging prior to any procedures.        He has developed peripheral neuropathy with right footdrop and wears a brace.  He has not had frequent falls.  In addition, he has hypertension, dyslipidemia, gastroesophageal reflux disease, asthma and spinal stenosis.  His primary care and warfarin typically are followed by Dr. Meyer through the [facility].        In the fall of last year, he developed exertional chest tightness and was admitted to [facility].  Coronary angiogram was performed, noting his LAD was open, circumflex had a 70% stenosis, but the flow reserve was normal.  The right coronary artery was totally occluded shortly after its origin with robust collaterals from the LAD apical epicardial to the PDA vessels.  There were a smaller amounts of collateral from the left circumflex to the posterolateral branch.  The proximal right coronary artery has a bit of an ambiguous takeoff.  There was also heavy calcification present in the right coronary artery.  Medical therapy was begun.        He was taken off the beta blockers due to fatigue and bradycardia.  He tried nitrates with Ranexa and he  continued to have chest discomfort.  His LV function was preserved with RV enlargement and decreased function, probably suspicious for some ischemia to the right ventricle or cor pulmonale with some lung disease.  Following his recent first attempt at  to RCA he developed a pseudoaneurysm of the right SFA which was compressed. He had been bridged then for his Factor V Leiden deficiency for which he takes chronic Warfarin. He was on ASA/Plavix for one week; ASA is due to be stopped now. He has restarted his Warfarin and INR is 1.4 so I increased his dose today from 5mg to 7.5mg. I will get and INR in 2 days. I again reviewed need for bridging with Dr. Mccall as patient has moderate ecchymosis in right groin again without bruit and we will not bridge for this reason.      Jose complains of no chest pain since our last visit. Heart rate off of beta-blockers is 70.     He continues on Ranexa 500 mg twice daily and Isosorbide and has interest in stopping Isosorbide. I will reduce to 1/2 pill and if pain free, ask Dr. Yun to decide if this can stop after next office visit..      Lipid profile 01/16 showed a total cholesterol of 99, HDL 40, LDL 39, drawn on Crestor 20 mg per day.     Renal function has been normal prior to his angiogram and after.       PHYSICAL EXAMINATION- outlined below  VITAL SIGNS:  Blood pressure today is 106/67.  His weight is stable.      right groin is moderately ecchymosis with no hum or bruit.        IMPRESSION AND PLAN:         1.  Coronary artery disease with previous remote LAD stent which appears patent.    -successful PCI to  of RCA(synergy stent 3.5 x 16mm).  -stay off of ASA   -continue Plavix  -reduce Isosorbide to 15mg daily     2.  Hypercoagulable state-on warfarin therapy.  -no post procedure bridging due to previous Pseudoanerysm  -Increase todays warfarin dose to 7.5mg with INR On Friday  Last dose of warfarin 2-    3.  Dyslipidemia, controlled.   4.   Hypertension, controlled.   5.  Bradycardia and fatigue on metoprolol in the past; this has been discontinued.   6.  Neuropathy with right footdrop.  He wears a brace.  He has not had frequent falls.         It has been a pleasure seeing Jose CANDY Noblemoon in follow up. We will see him one month  Greater than 50% of this 30 minute visit was spent in counseling/collaboration    Jaya Flores, MSN, APRN-BC, CNP  Cardiology    No orders of the defined types were placed in this encounter.    Orders Placed This Encounter   Medications     isosorbide mononitrate (IMDUR) 30 MG 24 hr tablet     Sig: Take 0.5 tablets (15 mg) by mouth daily     Dispense:  30 tablet     Refill:  0     Medications Discontinued During This Encounter   Medication Reason     aspirin (ASA) 81 MG EC tablet      aspirin 81 MG EC tablet      isosorbide mononitrate (IMDUR) 30 MG 24 hr tablet Reorder         Encounter Diagnoses   Name Primary?     Coronary artery disease involving native coronary artery of native heart without angina pectoris      Hypercoagulation syndrome (H) Yes     Paroxysmal atrial fibrillation (H)      Coronary artery disease of native artery of native heart with stable angina pectoris (H)        CURRENT MEDICATIONS:  Current Outpatient Medications   Medication Sig Dispense Refill     albuterol (PROAIR HFA/PROVENTIL HFA/VENTOLIN HFA) 108 (90 Base) MCG/ACT inhaler Inhale 2 puffs into the lungs every 4 hours as needed for shortness of breath / dyspnea or wheezing       clopidogrel (PLAVIX) 75 MG tablet Take 1 tablet (75 mg) by mouth daily 30 tablet 0     gabapentin (NEURONTIN) 400 MG capsule Take 1,200 mg by mouth 3 times daily       isosorbide mononitrate (IMDUR) 30 MG 24 hr tablet Take 0.5 tablets (15 mg) by mouth daily 30 tablet 0     lidocaine (LIDODERM) 5 % patch Place 1 patch onto the skin every 24 hours       lisinopril (PRINIVIL/ZESTRIL) 20 MG tablet Take 1 tablet (20 mg) by mouth daily 30 tablet 0     nitroGLYcerin  (NITROSTAT) 0.4 MG sublingual tablet For chest pain place 1 tablet under the tongue every 5 minutes for 3 doses. If symptoms persist 5 minutes after 1st dose call 911. 20 tablet 0     nortriptyline (PAMELOR) 25 MG capsule Take 25 mg by mouth At Bedtime        omeprazole (PRILOSEC) 20 MG CR capsule Take 20 mg by mouth At Bedtime        oxyCODONE-acetaminophen (PERCOCET) 5-325 MG per tablet Take 2 tablets by mouth 3 times daily as needed Patient takes 2 tablets TID Scheduled       rosuvastatin (CRESTOR) 20 MG tablet Take 1 tablet (20 mg) by mouth daily 90 tablet 3     Warfarin Sodium (COUMADIN PO) Take 5 mg by mouth daily 7.5 mg on Monday and Friday, 5 mg all other days         ALLERGIES     Allergies   Allergen Reactions     Metoprolol      Fatigue and bradycardia     Niacin Other (See Comments)     Other reaction(s): Flushing         Norvasc [Amlodipine] Rash     Septra [Sulfamethoxazole W-Trimethoprim] Rash       PAST MEDICAL HISTORY:  Past Medical History:   Diagnosis Date     Asthma      CAD (coronary artery disease)     1996 angioplasty and stent to the prox LAD, PTCA with intracoronary stent placement of RCA, 70%OM; 10/24/19 Cath: Occluded RCA, prox circumflex lesion - pd/pa not significant, advised medical therapy first by Interventional cardiologist     DVT (deep venous thrombosis) (H)      Factor 5 Leiden mutation, heterozygous (H)      GERD (gastroesophageal reflux disease)      HTN (hypertension)      Hyperlipidemia      Neuropathy     wears brace R foot for drop foot     PRIMITIVO (obstructive sleep apnea)     cpap     Protein C deficiency (H)      Spinal stenosis        PAST SURGICAL HISTORY:  Past Surgical History:   Procedure Laterality Date     APPENDECTOMY OPEN  1958     ARTHROPLASTY HIP Left 1997     ARTHROPLASTY REVISION HIP Left 2013     AS SPINAL FUSION,ANT,EA ADNL LEVEL  2010    L4-L5     C TOTAL HIP ARTHROPLASTY Right 2015    THR     CV CORONARY ANGIOGRAM N/A 10/24/2019    Procedure: Coronary  Angiogram;  Surgeon: Valdemar Hinojosa MD;  Location: [facility] CARDIAC CATH LAB     CV CORONARY ANGIOGRAM  1996 angioplasty and stent to the prox LAD, PTCA with intracoronary stent placement of RCA, 70%OM     CV CORONARY ANGIOGRAM N/A 2020    Procedure: Coronary Angiogram  ;  Surgeon: Gurvinder Yun MD;  Location:  HEART CARDIAC CATH LAB     CV FRACTIONAL FLOW RESERVE N/A 10/24/2019    Procedure: Fractional Flow Holmes;  Surgeon: Valdemar Hinojosa MD;  Location: [facility] CARDIAC CATH LAB     CV LEFT HEART CATH Bilateral 2020    Procedure: Left Heart Cath w/wo Left Ventriculogram  ANGIOPLASTY WITH BOSTON SCI REP PRESENT;  Surgeon: Gurvinder Yun MD;  Location: [facility] CARDIAC CATH LAB       FAMILY HISTORY:  Family History   Problem Relation Age of Onset     Myocardial Infarction Father      Heart Disease Maternal Grandfather        SOCIAL HISTORY:  Social History     Socioeconomic History     Marital status:      Spouse name: Not on file     Number of children: Not on file     Years of education: Not on file     Highest education level: Not on file   Occupational History     Not on file   Social Needs     Financial resource strain: Not on file     Food insecurity     Worry: Not on file     Inability: Not on file     Transportation needs     Medical: Not on file     Non-medical: Not on file   Tobacco Use     Smoking status: Former Smoker     Types: Cigarettes     Last attempt to quit: 3/23/1987     Years since quittin.9     Smokeless tobacco: Never Used     Tobacco comment: Smokes cigars once every 2-3 years   Substance and Sexual Activity     Alcohol use: Yes     Alcohol/week: 0.0 standard drinks     Comment: once every two weeks if that     Drug use: Never     Sexual activity: Not on file   Lifestyle     Physical activity     Days per week: Not on file     Minutes per session: Not on file     Stress: Not on file   Relationships     Social connections     Talks  "on phone: Not on file     Gets together: Not on file     Attends Mu-ism service: Not on file     Active member of club or organization: Not on file     Attends meetings of clubs or organizations: Not on file     Relationship status: Not on file     Intimate partner violence     Fear of current or ex partner: Not on file     Emotionally abused: Not on file     Physically abused: Not on file     Forced sexual activity: Not on file   Other Topics Concern     Parent/sibling w/ CABG, MI or angioplasty before 65F 55M? Not Asked      Service Not Asked     Blood Transfusions Not Asked     Caffeine Concern No     Comment: 1 -2 cups daily      Occupational Exposure Not Asked     Hobby Hazards Not Asked     Sleep Concern Not Asked     Stress Concern Not Asked     Weight Concern Not Asked     Special Diet No     Back Care Not Asked     Exercise No     Bike Helmet Not Asked     Seat Belt Not Asked     Self-Exams Not Asked   Social History Narrative     Not on file       Review of Systems:  Skin:  Negative       Eyes:  Negative      ENT:  Negative      Respiratory:  Positive for sleep apnea;CPAP     Cardiovascular:  Negative Positive for;fatigue;lightheadedness occ.energy level has improved  Gastroenterology: Negative      Genitourinary:  Positive for nocturia 1x per night  Musculoskeletal:  Negative nocturnal cramping muscle pain  Neurologic:  Positive for numbness or tingling of hands;numbness or tingling of feet occ headaches  Psychiatric:  Negative      Heme/Lymph/Imm:  Positive for allergies    Endocrine:  Negative        Physical Exam:  Vitals: /67   Pulse 70   Ht 1.727 m (5' 8\")   Wt 85.4 kg (188 lb 3.2 oz)   BMI 28.62 kg/m      Constitutional:  cooperative, alert and oriented, well developed, well nourished, in no acute distress   thin,frail and fatigued    Skin:  warm and dry to the touch, no apparent skin lesions or masses noted          Head:  normocephalic, no masses or lesions        Eyes:  " pupils equal and round, conjunctivae and lids unremarkable, sclera white, no xanthalasma, EOMS intact, no nystagmus        Lymph:No Cervical lymphadenopathy present     ENT:  no pallor or cyanosis, dentition good        Neck:  carotid pulses are full and equal bilaterally, JVP normal, no carotid bruit        Respiratory:  normal breath sounds, clear to auscultation, normal A-P diameter, normal symmetry, normal respiratory excursion, no use of accessory muscles         Cardiac: regular rhythm, normal S1/S2, no S3 or S4, apical impulse not displaced, no murmurs, gallops or rubs occasional premature beats S4            pulses full and equal, no bruits auscultated   2+;3+           1+ 2+           2+     moderate ecchymosis right femoral artery    GI:  abdomen soft, non-tender, BS normoactive, no mass, no HSM, no bruits obese difficult exam    Extremities and Muscular Skeletal:  no edema         brace on R leg--foot drop    Neurological:  no gross motor deficits   foot drop on R-wears brace    Psych:  Alert and Oriented x 3      Recent Lab Results:  LIPID RESULTS:  Lab Results   Component Value Date    CHOL 99 01/16/2020    HDL 40 01/16/2020    LDL 39 01/16/2020    TRIG 101 01/16/2020    CHOLHDLRATIO 3.5 04/21/2014       LIVER ENZYME RESULTS:  Lab Results   Component Value Date    AST 21 10/22/2019    ALT 20 12/20/2019       CBC RESULTS:  Lab Results   Component Value Date    WBC 4.7 02/28/2020    RBC 3.92 (L) 02/28/2020    HGB 12.4 (L) 02/28/2020    HCT 36.7 (L) 02/28/2020    MCV 94 02/28/2020    MCH 31.6 02/28/2020    MCHC 33.8 02/28/2020    RDW 12.9 02/28/2020     02/28/2020       BMP RESULTS:  Lab Results   Component Value Date     (L) 03/04/2020    POTASSIUM 4.7 03/04/2020    CHLORIDE 100 03/04/2020    CO2 28 03/04/2020    ANIONGAP 10.7 03/04/2020     03/04/2020    BUN 7 03/04/2020    CR 0.89 03/04/2020    GFRESTIMATED 84 03/04/2020    GFRESTBLACK >90 03/04/2020    CODI 9.7 03/04/2020        A1C  RESULTS:  No results found for: A1C    INR RESULTS:  Lab Results   Component Value Date    INR 2.00 (H) 03/06/2020    INR 1.48 (H) 03/04/2020         Thank you for allowing me to participate in the care of your patient.    Sincerely,     BRENDAN Mccrary [facility]

## 2020-03-04 NOTE — PROGRESS NOTES
He has not had an INR done since his heart cath    I ordered a venous draw as we skipped the bridging and [facility]    Let me know when it is back and if still low--he may need a couple shots of lovenox    Call me when back if I have left

## 2020-03-04 NOTE — PROGRESS NOTES
Called and spoke with pt.  Reviewed his INR today is 1.48 and NORI Carroll and Dr. Yun would like to avoid bridging him with Lovenox.  Pt reports he is scheduled to take 5mg coumadin today and tomorrow- he also reports he usually takes 7.5mg coumadin on Mondays and Fridays.       Reviewed above information with NORI Carroll who recommends that he take 7.5mg coumadin tonight, usual 5mg dose tomorrow and have venous INR checked in [place] on 3/6/20.      Called and spoke with pt and reviewed above recommendations from NORI Carroll.  He verbalized understanding and agrees to lab appt in [place] on 11:15am.  Reminder sent to watch for INR results and review with NORI Carroll RN 1:24 PM 3/4/2020

## 2020-03-06 DIAGNOSIS — D68.59 HYPERCOAGULATION SYNDROME (H): ICD-10-CM

## 2020-03-06 LAB — INR PPP: 2 (ref 0.86–1.14)

## 2020-03-06 PROCEDURE — 85610 PROTHROMBIN TIME: CPT | Performed by: NURSE PRACTITIONER

## 2020-03-06 PROCEDURE — 36415 COLL VENOUS BLD VENIPUNCTURE: CPT | Performed by: NURSE PRACTITIONER

## 2020-04-17 PROBLEM — I25.82 CORONARY ARTERY CHRONIC TOTAL OCCLUSION: Status: ACTIVE | Noted: 2020-02-28

## 2020-04-28 ENCOUNTER — VIRTUAL VISIT (OUTPATIENT)
Dept: CARDIOLOGY | Facility: CLINIC | Age: 75
End: 2020-04-28
Attending: INTERNAL MEDICINE
Payer: MEDICARE

## 2020-04-28 DIAGNOSIS — I25.10 CORONARY ARTERY DISEASE INVOLVING NATIVE CORONARY ARTERY OF NATIVE HEART WITHOUT ANGINA PECTORIS: ICD-10-CM

## 2020-04-28 PROCEDURE — 99442 ZZC PHYSICIAN TELEPHONE EVALUATION 11-20 MIN: CPT | Performed by: INTERNAL MEDICINE

## 2020-04-28 RX ORDER — ETODOLAC 300 MG
CAPSULE ORAL
COMMUNITY
End: 2021-11-17

## 2020-04-28 NOTE — PROGRESS NOTES
"Jose Tejada is a 75 year old male who is being evaluated via a billable telephone visit.      The patient has been notified of following:     \"This telephone visit will be conducted via a call between you and your physician/provider. We have found that certain health care needs can be provided without the need for a physical exam.  This service lets us provide the care you need with a short phone conversation.  If a prescription is necessary we can send it directly to your pharmacy.  If lab work is needed we can place an order for that and you can then stop by our lab to have the test done at a later time.    Telephone visits are billed at different rates depending on your insurance coverage. During this emergency period, for some insurers they may be billed the same as an in-person visit.  Please reach out to your insurance provider with any questions.    If during the course of the call the physician/provider feels a telephone visit is not appropriate, you will not be charged for this service.\"    Patient has given verbal consent for Telephone visit?  Yes    How would you like to obtain your AVS? Mail a copy    Patient reported vitals:  BP: 117/72  Heart rate: 54  Weight: 185lb    Review Of Systems  Skin: negative  Eyes: negative  Ears/Nose/Throat: negative  Respiratory: No shortness of breath and No dyspnea on exertion Sleep Apnea  Cardiovascular: lower extremity edema  Gastrointestinal: GERD  Genitourinary: negative  Musculoskeletal: negative  Neurologic: Neuropathy  Psychiatric: negative  Hematologic/Lymphatic/Immunologic: factor 5  Endocrine: negative  Sugar Tomas CMA      Phone call duration: 18   Minutes ([id number])    madalyn telephone visit        "

## 2020-04-28 NOTE — LETTER
4/28/2020    Sara Jhaveri, CNP, APRN   [facility]   [address] [place]  [address]     RE: Jose Tejada  [address]  [facility] [place] [address]       Dear Colleague,    Thank you for the opportunity to participate in the care of your patient, Jose Tejada, at the [facility]. Please see a copy of my visit note below.    Jose Tejada is interviewed on the phone today for an office visit due to COVID restrictions.  The telephone time was from 9:15 a.m. to 9:33 a.m.  Please see my nurse practitioner's note from last month.  This patient has coronary disease dating back to 1996.  He had stenting of the LAD and right coronary artery.  His last angiogram was 02/2020.  At that time, the LAD stent was open.  The left circumflex had a 70% narrowing which was stable and the IFR flow reserve was normal.  The right coronary artery had a , and in February of this year was our second attempt at trying to get the  opened we were successful.  The patient reports no further angina since we got the right coronary artery open.  He is very thankful for that.  At this juncture then, I think we can stop the Imdur since he is not having any chest pain.  He is not on beta blocker because of previous bradycardia and fatigue on it.  He runs a heart rate at 54-57.  We will continue the lisinopril.  We will continue the Crestor.  His cholesterol numbers in January of this year were excellent.  He will continue Coumadin because of his factor V Leiden and his protein C deficiency with history of DVT.  He is not on aspirin.  The Plavix will at least continue for 1 year and we might want to do it definitely versus consider at 1-year payal continuing Coumadin and switch Plavix over to baby aspirin.  We will make that decision next year.  The patient has a little bit of ankle swelling at  the end of the day.  He actually talked more about his peripheral neuropathy, and of course, I am going [facility]  He is currently on gabapentin 3 times a day, but he notes the pain is worse at night.  I told him that really I am the cardiologist and do not have that much input, but perhaps they can change the dose so it is less in the morning and afternoon dose and more in the evening dose or consider addition of Tegretol looking for any drug interactions or consider addition of Lyrica.  He was prescribed etodolac; however, I cautioned that is still somewhat of a blood thinner in the nonsteroidal family.  He is already on Plavix and Coumadin and to watch for bruising.  Overall, though the patient is doing great and he has been thankful there is no further problem with his femoral arteries.  He did have a pseudoaneurysm after the first attempt at angiogram and angioplasty when he was put on Lovenox.  We did not bridge with Lovenox a second time and there was no problem with bleeding the second time.  Today's visit then is 17 minutes.  I will see the patient back in 1 year for lipids, electrolytes, office visit and we will probably do a stress test in 2 years unless there is a change in symptoms.     Please do not hesitate to contact me if you have any questions/concerns.     Sincerely,     Gurvinder Yun MD    cc:    [facility]   Medical Records Department   [address] [facility]    [place]  [address]

## 2020-04-28 NOTE — PROGRESS NOTES
Service Date: 04/28/2020      TELEPHONE OFFICE VISIT      Jose Tejada is interviewed on the phone today for an office visit due to COVID restrictions.  The telephone time was from 9:15 a.m. to 9:33 a.m.  Please see my nurse practitioner's note from last month.  This patient has coronary disease dating back to 1996.  He had stenting of the LAD and right coronary artery.  His last angiogram was 02/2020.  At that time, the LAD stent was open.  The left circumflex had a 70% narrowing which was stable and the IFR flow reserve was normal.  The right coronary artery had a , and in February of this year was our second attempt at trying to get the  opened we were successful.  The patient reports no further angina since we got the right coronary artery open.  He is very thankful for that.  At this juncture then, I think we can stop the Imdur since he is not having any chest pain.  He is not on beta blocker because of previous bradycardia and fatigue on it.  He runs a heart rate at 54-57.  We will continue the lisinopril.  We will continue the Crestor.  His cholesterol numbers in January of this year were excellent.  He will continue Coumadin because of his factor V Leiden and his protein C deficiency with history of DVT.  He is not on aspirin.  The Plavix will at least continue for 1 year and we might want to do it definitely versus consider at 1-year payal continuing Coumadin and switch Plavix over to baby aspirin.  We will make that decision next year.  The patient has a little bit of ankle swelling at the end of the day.  He actually talked more about his peripheral neuropathy, and of course, I am going [facility].  He is currently on gabapentin 3 times a day, but he notes the pain is worse at night.  I told him that really I am the cardiologist and do not have that much input, but perhaps they can change the dose so it is less in the morning and afternoon dose and more in the  evening dose or consider addition of Tegretol looking for any drug interactions or consider addition of Lyrica.  He was prescribed etodolac; however, I cautioned that is still somewhat of a blood thinner in the nonsteroidal family.  He is already on Plavix and Coumadin and to watch for bruising.  Overall, though the patient is doing great and he has been thankful there is no further problem with his femoral arteries.  He did have a pseudoaneurysm after the first attempt at angiogram and angioplasty when he was put on Lovenox.  We did not bridge with Lovenox a second time and there was no problem with bleeding the second time.  Today's visit then is 17 minutes.  I will see the patient back in 1 year for lipids, electrolytes, office visit and we will probably do a stress test in 2 years unless there is a change in symptoms.      cc:   Sara Jhaveri, CNP, APRN   [facility]   [address] [place]  [address]      [facility]   Medical [place]  [address]         MELIA VENCES MD             D: 2020   T: 2020   MT: elvira      Name:     ELIE FONTANEZ   MRN:      [id number]        Account:      [id number]   :      1945           Service Date: 2020      Document: [id number]

## 2021-11-10 DIAGNOSIS — Z11.59 ENCOUNTER FOR SCREENING FOR OTHER VIRAL DISEASES: ICD-10-CM

## 2021-11-11 ENCOUNTER — APPOINTMENT (OUTPATIENT)
Dept: MRI IMAGING | Facility: CLINIC | Age: 76
End: 2021-11-11
Attending: EMERGENCY MEDICINE
Payer: MEDICARE

## 2021-11-11 ENCOUNTER — HOSPITAL ENCOUNTER (EMERGENCY)
Facility: CLINIC | Age: 76
Discharge: HOME | End: 2021-11-11
Attending: EMERGENCY MEDICINE | Admitting: EMERGENCY MEDICINE
Payer: MEDICARE

## 2021-11-11 VITALS
HEART RATE: 69 BPM | DIASTOLIC BLOOD PRESSURE: 96 MMHG | SYSTOLIC BLOOD PRESSURE: 132 MMHG | TEMPERATURE: 97.7 F | BODY MASS INDEX: 30.41 KG/M2 | OXYGEN SATURATION: 97 % | WEIGHT: 200 LBS

## 2021-11-11 DIAGNOSIS — R42 VERTIGO: ICD-10-CM

## 2021-11-11 LAB
ALBUMIN UR-MCNC: NEGATIVE MG/DL
ANION GAP SERPL CALCULATED.3IONS-SCNC: 8 MMOL/L (ref 5–18)
APPEARANCE UR: CLEAR
BASOPHILS # BLD AUTO: 0.1 10E3/UL (ref 0–0.2)
BASOPHILS NFR BLD AUTO: 1 %
BILIRUB UR QL STRIP: NEGATIVE
BUN SERPL-MCNC: 9 MG/DL (ref 8–28)
CALCIUM SERPL-MCNC: 9.7 MG/DL (ref 8.5–10.5)
CHLORIDE BLD-SCNC: 102 MMOL/L (ref 98–107)
CO2 SERPL-SCNC: 26 MMOL/L (ref 22–31)
COLOR UR AUTO: NORMAL
CREAT SERPL-MCNC: 0.83 MG/DL (ref 0.7–1.3)
EOSINOPHIL # BLD AUTO: 0.4 10E3/UL (ref 0–0.7)
EOSINOPHIL NFR BLD AUTO: 6 %
ERYTHROCYTE [DISTWIDTH] IN BLOOD BY AUTOMATED COUNT: 13 % (ref 10–15)
GFR SERPL CREATININE-BSD FRML MDRD: 87 ML/MIN/1.73M2
GLUCOSE BLD-MCNC: 98 MG/DL (ref 70–125)
GLUCOSE UR STRIP-MCNC: NEGATIVE MG/DL
HCT VFR BLD AUTO: 44.2 % (ref 40–53)
HGB BLD-MCNC: 14.8 G/DL (ref 13.3–17.7)
HGB UR QL STRIP: NEGATIVE
IMM GRANULOCYTES # BLD: 0 10E3/UL
IMM GRANULOCYTES NFR BLD: 0 %
INR PPP: 1.8 (ref 0.85–1.15)
KETONES UR STRIP-MCNC: NEGATIVE MG/DL
LEUKOCYTE ESTERASE UR QL STRIP: NEGATIVE
LYMPHOCYTES # BLD AUTO: 1.6 10E3/UL (ref 0.8–5.3)
LYMPHOCYTES NFR BLD AUTO: 24 %
MCH RBC QN AUTO: 30.9 PG (ref 26.5–33)
MCHC RBC AUTO-ENTMCNC: 33.5 G/DL (ref 31.5–36.5)
MCV RBC AUTO: 92 FL (ref 78–100)
MONOCYTES # BLD AUTO: 0.7 10E3/UL (ref 0–1.3)
MONOCYTES NFR BLD AUTO: 10 %
NEUTROPHILS # BLD AUTO: 4.1 10E3/UL (ref 1.6–8.3)
NEUTROPHILS NFR BLD AUTO: 59 %
NITRATE UR QL: NEGATIVE
NRBC # BLD AUTO: 0 10E3/UL
NRBC BLD AUTO-RTO: 0 /100
PH UR STRIP: 7 [PH] (ref 5–7)
PLATELET # BLD AUTO: 264 10E3/UL (ref 150–450)
POTASSIUM BLD-SCNC: 4.2 MMOL/L (ref 3.5–5)
RBC # BLD AUTO: 4.79 10E6/UL (ref 4.4–5.9)
RBC URINE: 1 /HPF
SODIUM SERPL-SCNC: 136 MMOL/L (ref 136–145)
SP GR UR STRIP: 1.01 (ref 1–1.03)
TROPONIN I SERPL-MCNC: 0.06 NG/ML (ref 0–0.29)
UROBILINOGEN UR STRIP-MCNC: <2 MG/DL
WBC # BLD AUTO: 6.8 10E3/UL (ref 4–11)
WBC URINE: 1 /HPF

## 2021-11-11 PROCEDURE — 85025 COMPLETE CBC W/AUTO DIFF WBC: CPT | Performed by: EMERGENCY MEDICINE

## 2021-11-11 PROCEDURE — 96374 THER/PROPH/DIAG INJ IV PUSH: CPT

## 2021-11-11 PROCEDURE — 85610 PROTHROMBIN TIME: CPT | Performed by: EMERGENCY MEDICINE

## 2021-11-11 PROCEDURE — 250N000011 HC RX IP 250 OP 636: Performed by: EMERGENCY MEDICINE

## 2021-11-11 PROCEDURE — 80048 BASIC METABOLIC PNL TOTAL CA: CPT | Performed by: EMERGENCY MEDICINE

## 2021-11-11 PROCEDURE — 84484 ASSAY OF TROPONIN QUANT: CPT | Performed by: EMERGENCY MEDICINE

## 2021-11-11 PROCEDURE — 36415 COLL VENOUS BLD VENIPUNCTURE: CPT | Performed by: EMERGENCY MEDICINE

## 2021-11-11 PROCEDURE — 70551 MRI BRAIN STEM W/O DYE: CPT

## 2021-11-11 PROCEDURE — 81001 URINALYSIS AUTO W/SCOPE: CPT | Performed by: EMERGENCY MEDICINE

## 2021-11-11 PROCEDURE — 99285 EMERGENCY DEPT VISIT HI MDM: CPT | Mod: 25

## 2021-11-11 RX ORDER — LORAZEPAM 2 MG/ML
1 INJECTION INTRAMUSCULAR ONCE
Status: COMPLETED | OUTPATIENT
Start: 2021-11-11 | End: 2021-11-11

## 2021-11-11 RX ORDER — MECLIZINE HCL 12.5 MG 12.5 MG/1
25 TABLET ORAL 4 TIMES DAILY PRN
Qty: 15 TABLET | Refills: 0 | Status: SHIPPED | OUTPATIENT
Start: 2021-11-11

## 2021-11-11 RX ADMIN — LORAZEPAM 1 MG: 2 INJECTION INTRAMUSCULAR; INTRAVENOUS at 11:08

## 2021-11-11 ASSESSMENT — MIFFLIN-ST. JEOR: SCORE: 1621.69

## 2021-11-11 NOTE — ED PROVIDER NOTES
EMERGENCY DEPARTMENT ENCOUNTER      NAME: Jose Tejada  AGE: 76 year old male  YOB: 1945  MRN: [id number]  EVALUATION DATE & TIME: No admission date for patient encounter.    PCP: Sara Jhaveri    ED PROVIDER: Darius Mak M.D.      Chief Complaint   Patient presents with     Dizziness       FINAL IMPRESSION:  1. Vertigo        ED COURSE & MEDICAL DECISION MAKIN year old male presents to the Emergency Department for evaluation of dizziness.  He is vitally stable when he arrives to the emergency department.  I met the patient in triage to discuss an episode of dizziness which had started around 3 AM this morning.  At this point he says his symptoms are significantly improving and he was able to walk.  He is not currently a thrombolytic candidate given his duration of symptoms which are also significantly improving and did not seem diagnostic of CVA.  With his age and other risk factors, elected to proceed with noncontrast brain MRI which was negative for acute ischemic changes, hemorrhage, or other intracranial process to account for his vertigo.  EKG shows sinus rhythm, other basic lab evaluations generally stable and reassuring with nothing to account for dizziness.  I do think his symptoms seem consistent with peripheral vertigo as he had one episode previously.  I strongly urged the patient to follow-up as soon as possible primary care to review this recurrent episode.  In the meantime he will take meclizine to manage symptoms.  He was comfortable with all this and left the emergency department in stable condition.        MEDICATIONS GIVEN IN THE EMERGENCY:  Medications   LORazepam (ATIVAN) injection 1 mg (1 mg Intravenous Given 21 1108)       NEW PRESCRIPTIONS STARTED AT TODAY'S ER VISIT  Discharge Medication List as of 2021 12:12 PM      START taking these medications    Details   meclizine (ANTIVERT) 12.5 MG tablet Take 2 tablets (25 mg) by mouth 4 times  daily as needed for dizziness, Disp-15 tablet, R-0, Local Print                =================================================================    HPI    Patient information was obtained from: patient    Use of : N/A      Jose Tejada is a 76 year old male with a pertinent history of hypertension, CAD s/p stents, PRIMITIVO, DVT, asthma, and neuropathy who presents to this ED via walk in for evaluation of dizziness.    Patient woke up this morning around 3 AM with room spinning dizziness that was made worse when he was moving around. He states that he has had this before and was told it was vertigo. He presented to urgent care and was sent to the ED for further workup. He states that his dizziness has been gradually improving. Otherwise denies any weakness, numbness, or any other complaints.    REVIEW OF SYSTEMS   All systems reviewed and negative except as noted in HPI.    PAST MEDICAL HISTORY:  Past Medical History:   Diagnosis Date     Arthritis      Asthma      CAD (coronary artery disease)     1996 angioplasty and stent to the prox LAD, PTCA with intracoronary stent placement of RCA, 70%OM; 10/24/19 Cath: Occluded RCA, prox circumflex lesion - pd/pa not significant, advised medical therapy first by Interventional cardiologist     DVT (deep venous thrombosis) (H)      Factor 5 Leiden mutation, heterozygous (H)      GERD (gastroesophageal reflux disease)      HTN (hypertension)      Hyperlipidemia      Neuropathy     wears brace R foot for drop foot     Neuropathy      Obese      PRIMITIVO (obstructive sleep apnea)     cpap     Protein C deficiency (H)      Spinal stenosis      Stented coronary artery        PAST SURGICAL HISTORY:  Past Surgical History:   Procedure Laterality Date     APPENDECTOMY       APPENDECTOMY OPEN  1958     ARTHROPLASTY HIP Left 1997     ARTHROPLASTY REVISION HIP Left 2013     ARTHROPLASTY REVISION HIP Left      AS SPINAL FUSION,ANT,EA ADNL LEVEL  2010    L4-L5     C TOTAL HIP  ARTHROPLASTY Right 2015    THR     CORONARY STENT PLACEMENT  1996     CV CORONARY ANGIOGRAM N/A 10/24/2019    Procedure: Coronary Angiogram;  Surgeon: Valdemar Hinojosa MD;  Location: [facility] CARDIAC CATH LAB     CV CORONARY ANGIOGRAM  1996 1996 angioplasty and stent to the prox LAD, PTCA with intracoronary stent placement of RCA, 70%OM     CV CORONARY ANGIOGRAM N/A 2/28/2020    Procedure: Coronary Angiogram 2nd attempt of RCA  successful--(lad stent ok, Lcx 70% with normal iFR)  Surgeon: Gurvinder Yun MD;  Location:  HEART CARDIAC CATH LAB     CV FRACTIONAL FLOW RATIO WIRE N/A 10/24/2019    Procedure: Fractional Flow [place];  Surgeon: Valdemar Hinojosa MD;  Location:  HEART CARDIAC CATH LAB     CV LEFT HEART CATH Bilateral 1/16/2020    Procedure: Left Heart Cath w/wo Left Ventriculogram  ANGIOPLASTY WITH BOSTON SCI REP PRESENT;  Surgeon: Gurvinder Yun MD;  Location:  HEART CARDIAC CATH LAB     SPINAL FUSION  2010    L4-5     TOTAL HIP ARTHROPLASTY Left      TOTAL HIP ARTHROPLASTY Right 2/10/2016    Procedure:  RIGHT HIP TOTAL ARTHROPLASTY;  Surgeon: Jose Gaytan MD;  Location: [facility] OR;  Service:            CURRENT MEDICATIONS:    No current facility-administered medications for this encounter.     Current Outpatient Medications   Medication     meclizine (ANTIVERT) 12.5 MG tablet     albuterol (PROAIR HFA/PROVENTIL HFA/VENTOLIN HFA) 108 (90 Base) MCG/ACT inhaler     clopidogrel (PLAVIX) 75 MG tablet     etodolac (LODINE) 300 MG capsule     gabapentin (NEURONTIN) 400 MG capsule     lidocaine (LIDODERM) 5 % patch     lisinopril (PRINIVIL/ZESTRIL) 20 MG tablet     nitroGLYcerin (NITROSTAT) 0.4 MG sublingual tablet     nortriptyline (PAMELOR) 25 MG capsule     omeprazole (PRILOSEC) 20 MG CR capsule     oxyCODONE-acetaminophen (PERCOCET) 5-325 MG per tablet     rosuvastatin (CRESTOR) 20 MG tablet     Warfarin Sodium (COUMADIN PO)         ALLERGIES:  Allergies   Allergen  Reactions     Metoprolol      Fatigue and bradycardia     Niacin Other (See Comments)     Other reaction(s): Flushing         Norvasc [Amlodipine] Rash     Septra [Sulfamethoxazole W-Trimethoprim] Rash       FAMILY HISTORY:  Family History   Problem Relation Age of Onset     Myocardial Infarction Father      Heart Disease Maternal Grandfather        SOCIAL HISTORY:   Social History     Socioeconomic History     Marital status:      Spouse name: Not on file     Number of children: Not on file     Years of education: Not on file     Highest education level: Not on file   Occupational History     Not on file   Tobacco Use     Smoking status: Former Smoker     Types: Cigarettes     Quit date: 3/23/1987     Years since quittin.6     Smokeless tobacco: Never Used     Tobacco comment: Smokes cigars once every 2-3 years   Substance and Sexual Activity     Alcohol use: Yes     Alcohol/week: 0.0 standard drinks     Comment: once every two weeks if that     Drug use: Never     Sexual activity: Not on file   Other Topics Concern     Parent/sibling w/ CABG, MI or angioplasty before 65F 55M? Not Asked      Service Not Asked     Blood Transfusions Not Asked     Caffeine Concern No     Comment: 1 -2 cups daily      Occupational Exposure Not Asked     Hobby Hazards Not Asked     Sleep Concern Not Asked     Stress Concern Not Asked     Weight Concern Not Asked     Special Diet No     Back Care Not Asked     Exercise No     Bike Helmet Not Asked     Seat Belt Not Asked     Self-Exams Not Asked   Social History Narrative     Not on file     Social Determinants of Health     Financial Resource Strain: Not on file   Food Insecurity: Not on file   Transportation Needs: Not on file   Physical Activity: Not on file   Stress: Not on file   Social Connections: Not on file   Intimate Partner Violence: Not on file   Housing Stability: Not on file       VITALS:  BP (!) 132/96   Pulse 69   Temp 97.7  F (36.5  C) (Oral)   Wt  90.7 kg (200 lb)   SpO2 97%   BMI 30.41 kg/m      PHYSICAL EXAM    Constitutional: Well developed, Well nourished, NAD.  HENT: Normocephalic, Atraumatic. Neck Supple.  Eyes: EOMI, Conjunctiva normal.  Respiratory: Breathing comfortably on room air. Speaks full sentences easily. Lungs clear to ascultation.  Cardiovascular: Normal heart rate, Regular rhythm. No peripheral edema.  Abdomen: Soft, nontender.  Musculoskeletal: Good range of motion in all major joints. No major deformities noted.  Integument: Warm, Dry.  Neurologic: Fully awake, alert, oriented.  Face is symmetric.  Speech is normal.  Strength is 5 out of 5 throughout bilateral upper and lower extremities.  Sensation is intact x4.  Gross coordination is preserved.  Patient is ambulatory.  Psychiatric: Cooperative. Affect appropriate.     LAB:  All pertinent labs reviewed and interpreted.  Labs Ordered and Resulted from Time of ED Arrival to Time of ED Departure   INR - Abnormal       Result Value    INR 1.80 (*)    ROUTINE UA WITH MICROSCOPIC REFLEX TO CULTURE - Normal    Color Urine Light Yellow      Appearance Urine Clear      Glucose Urine Negative      Bilirubin Urine Negative      Ketones Urine Negative      Specific Gravity Urine 1.007      Blood Urine Negative      pH Urine 7.0      Protein Albumin Urine Negative      Urobilinogen Urine <2.0      Nitrite Urine Negative      Leukocyte Esterase Urine Negative      RBC Urine 1      WBC Urine 1     BASIC METABOLIC PANEL - Normal    Sodium 136      Potassium 4.2      Chloride 102      Carbon Dioxide (CO2) 26      Anion Gap 8      Urea Nitrogen 9      Creatinine 0.83      Calcium 9.7      Glucose 98      GFR Estimate 87     TROPONIN I - Normal    Troponin I 0.06     CBC WITH PLATELETS AND DIFFERENTIAL    WBC Count 6.8      RBC Count 4.79      Hemoglobin 14.8      Hematocrit 44.2      MCV 92      MCH 30.9      MCHC 33.5      RDW 13.0      Platelet Count 264      % Neutrophils 59      % Lymphocytes 24       % Monocytes 10      % Eosinophils 6      % Basophils 1      % Immature Granulocytes 0      NRBCs per 100 WBC 0      Absolute Neutrophils 4.1      Absolute Lymphocytes 1.6      Absolute Monocytes 0.7      Absolute Eosinophils 0.4      Absolute Basophils 0.1      Absolute Immature Granulocytes 0.0      Absolute NRBCs 0.0         RADIOLOGY:  Reviewed all pertinent imaging. Please see official radiology report.  MR Brain w/o Contrast   Final Result   IMPRESSION:   1.  No acute intracranial finding. No evidence for recent ischemia, intracranial hemorrhage, or mass.   2.  Mild to moderate volume loss and minor age-related white matter changes.          EKG:    Reviewed EKG from urgent care which shows sinus rhythm with occasional PVCs.  No ischemic changes.      I, Jane Wild, am serving as a scribe to document services personally performed by Dr. Darius Mak, based on my observation and the provider's statements to me. I, Darius Mak MD attest that Jane Wild is acting in a scribe capacity, has observed my performance of the services and has documented them in accordance with my direction.    Darius Mak M.D.  Emergency Medicine  [facility] EMERGENCY ROOM  [address] [facility] [address] [facility] [address]  [phone]  Dept: [phone]     Darius Mak MD  11/11/21 5240

## 2021-11-11 NOTE — ED TRIAGE NOTES
Pt woke up at 3am with dizziness, pt states room is spinning and worse with moving around and moving head around. Pt states Hx of same about 2 months ago and at that time was told vertigo Pt was at [facility] urgent care EKG done and pt sent here. Denies chest pain, denies headache. Provider called to triage for eval of dizziness.

## 2021-11-11 NOTE — H&P (VIEW-ONLY)
EMERGENCY DEPARTMENT ENCOUNTER      NAME: Jose Tejada  AGE: 76 year old male  YOB: 1945  MRN: [id number]  EVALUATION DATE & TIME: No admission date for patient encounter.    PCP: Sara Jhaveri    ED PROVIDER: Darius Mak M.D.      Chief Complaint   Patient presents with     Dizziness       FINAL IMPRESSION:  1. Vertigo        ED COURSE & MEDICAL DECISION MAKIN year old male presents to the Emergency Department for evaluation of dizziness.  He is vitally stable when he arrives to the emergency department.  I met the patient in triage to discuss an episode of dizziness which had started around 3 AM this morning.  At this point he says his symptoms are significantly improving and he was able to walk.  He is not currently a thrombolytic candidate given his duration of symptoms which are also significantly improving and did not seem diagnostic of CVA.  With his age and other risk factors, elected to proceed with noncontrast brain MRI which was negative for acute ischemic changes, hemorrhage, or other intracranial process to account for his vertigo.  EKG shows sinus rhythm, other basic lab evaluations generally stable and reassuring with nothing to account for dizziness.  I do think his symptoms seem consistent with peripheral vertigo as he had one episode previously.  I strongly urged the patient to follow-up as soon as possible primary care to review this recurrent episode.  In the meantime he will take meclizine to manage symptoms.  He was comfortable with all this and left the emergency department in stable condition.        MEDICATIONS GIVEN IN THE EMERGENCY:  Medications   LORazepam (ATIVAN) injection 1 mg (1 mg Intravenous Given 21 1108)       NEW PRESCRIPTIONS STARTED AT TODAY'S ER VISIT  Discharge Medication List as of 2021 12:12 PM      START taking these medications    Details   meclizine (ANTIVERT) 12.5 MG tablet Take 2 tablets (25 mg) by mouth 4 times  daily as needed for dizziness, Disp-15 tablet, R-0, Local Print                =================================================================    HPI    Patient information was obtained from: patient    Use of : N/A      Jose Tejada is a 76 year old male with a pertinent history of hypertension, CAD s/p stents, PRIMITIVO, DVT, asthma, and neuropathy who presents to this ED via walk in for evaluation of dizziness.    Patient woke up this morning around 3 AM with room spinning dizziness that was made worse when he was moving around. He states that he has had this before and was told it was vertigo. He presented to urgent care and was sent to the ED for further workup. He states that his dizziness has been gradually improving. Otherwise denies any weakness, numbness, or any other complaints.    REVIEW OF SYSTEMS   All systems reviewed and negative except as noted in HPI.    PAST MEDICAL HISTORY:  Past Medical History:   Diagnosis Date     Arthritis      Asthma      CAD (coronary artery disease)     1996 angioplasty and stent to the prox LAD, PTCA with intracoronary stent placement of RCA, 70%OM; 10/24/19 Cath: Occluded RCA, prox circumflex lesion - pd/pa not significant, advised medical therapy first by Interventional cardiologist     DVT (deep venous thrombosis) (H)      Factor 5 Leiden mutation, heterozygous (H)      GERD (gastroesophageal reflux disease)      HTN (hypertension)      Hyperlipidemia      Neuropathy     wears brace R foot for drop foot     Neuropathy      Obese      PRIMITIVO (obstructive sleep apnea)     cpap     Protein C deficiency (H)      Spinal stenosis      Stented coronary artery        PAST SURGICAL HISTORY:  Past Surgical History:   Procedure Laterality Date     APPENDECTOMY       APPENDECTOMY OPEN  1958     ARTHROPLASTY HIP Left 1997     ARTHROPLASTY REVISION HIP Left 2013     ARTHROPLASTY REVISION HIP Left      AS SPINAL FUSION,ANT,EA ADNL LEVEL  2010    L4-L5     C TOTAL HIP  ARTHROPLASTY Right 2015    THR     CORONARY STENT PLACEMENT  1996     CV CORONARY ANGIOGRAM N/A 10/24/2019    Procedure: Coronary Angiogram;  Surgeon: Valdemar Hinojosa MD;  Location: [facility] CARDIAC CATH LAB     CV CORONARY ANGIOGRAM  1996 1996 angioplasty and stent to the prox LAD, PTCA with intracoronary stent placement of RCA, 70%OM     CV CORONARY ANGIOGRAM N/A 2/28/2020    Procedure: Coronary Angiogram 2nd attempt of RCA  successful--(lad stent ok, Lcx 70% with normal iFR)  Surgeon: Gurvinder Yun MD;  Location:  HEART CARDIAC CATH LAB     CV FRACTIONAL FLOW RATIO WIRE N/A 10/24/2019    Procedure: Fractional Flow [place];  Surgeon: Valdemar Hinojosa MD;  Location:  HEART CARDIAC CATH LAB     CV LEFT HEART CATH Bilateral 1/16/2020    Procedure: Left Heart Cath w/wo Left Ventriculogram  ANGIOPLASTY WITH B[place]ON SCI REP PRESENT;  Surgeon: Gurvinder Yun MD;  Location:  HEART CARDIAC CATH LAB     SPINAL FUSION  2010    L4-5     TOTAL HIP ARTHROPLASTY Left      TOTAL HIP ARTHROPLASTY Right 2/10/2016    Procedure:  RIGHT HIP TOTAL ARTHROPLASTY;  Surgeon: Jose Gaytan MD;  Location: [facility] OR;  Service:            CURRENT MEDICATIONS:    No current facility-administered medications for this encounter.     Current Outpatient Medications   Medication     meclizine (ANTIVERT) 12.5 MG tablet     albuterol (PROAIR HFA/PROVENTIL HFA/VENTOLIN HFA) 108 (90 Base) MCG/ACT inhaler     clopidogrel (PLAVIX) 75 MG tablet     etodolac (LODINE) 300 MG capsule     gabapentin (NEURONTIN) 400 MG capsule     lidocaine (LIDODERM) 5 % patch     lisinopril (PRINIVIL/ZESTRIL) 20 MG tablet     nitroGLYcerin (NITROSTAT) 0.4 MG sublingual tablet     nortriptyline (PAMELOR) 25 MG capsule     omeprazole (PRILOSEC) 20 MG CR capsule     oxyCODONE-acetaminophen (PERCOCET) 5-325 MG per tablet     rosuvastatin (CRESTOR) 20 MG tablet     Warfarin Sodium (COUMADIN PO)         ALLERGIES:  Allergies   Allergen  Reactions     Metoprolol      Fatigue and bradycardia     Niacin Other (See Comments)     Other reaction(s): Flushing         Norvasc [Amlodipine] Rash     Septra [Sulfamethoxazole W-Trimethoprim] Rash       FAMILY HISTORY:  Family History   Problem Relation Age of Onset     Myocardial Infarction Father      Heart Disease Maternal Grandfather        SOCIAL HISTORY:   Social History     Socioeconomic History     Marital status:      Spouse name: Not on file     Number of children: Not on file     Years of education: Not on file     Highest education level: Not on file   Occupational History     Not on file   Tobacco Use     Smoking status: Former Smoker     Types: Cigarettes     Quit date: 3/23/1987     Years since quittin.6     Smokeless tobacco: Never Used     Tobacco comment: Smokes cigars once every 2-3 years   Substance and Sexual Activity     Alcohol use: Yes     Alcohol/week: 0.0 standard drinks     Comment: once every two weeks if that     Drug use: Never     Sexual activity: Not on file   Other Topics Concern     Parent/sibling w/ CABG, MI or angioplasty before 65F 55M? Not Asked      Service Not Asked     Blood Transfusions Not Asked     Caffeine Concern No     Comment: 1 -2 cups daily      Occupational Exposure Not Asked     Hobby Hazards Not Asked     Sleep Concern Not Asked     Stress Concern Not Asked     Weight Concern Not Asked     Special Diet No     Back Care Not Asked     Exercise No     Bike Helmet Not Asked     Seat Belt Not Asked     Self-Exams Not Asked   Social History Narrative     Not on file     Social Determinants of Health     Financial Resource Strain: Not on file   Food Insecurity: Not on file   Transportation Needs: Not on file   Physical Activity: Not on file   Stress: Not on file   Social Connections: Not on file   Intimate Partner Violence: Not on file   Housing Stability: Not on file       VITALS:  BP (!) 132/96   Pulse 69   Temp 97.7  F (36.5  C) (Oral)   Wt  90.7 kg (200 lb)   SpO2 97%   BMI 30.41 kg/m      PHYSICAL EXAM    Constitutional: Well developed, Well nourished, NAD.  HENT: Normocephalic, Atraumatic. Neck Supple.  Eyes: EOMI, Conjunctiva normal.  Respiratory: Breathing comfortably on room air. Speaks full sentences easily. Lungs clear to ascultation.  Cardiovascular: Normal heart rate, Regular rhythm. No peripheral edema.  Abdomen: Soft, nontender.  Musculoskeletal: Good range of motion in all major joints. No major deformities noted.  Integument: Warm, Dry.  Neurologic: Fully awake, alert, oriented.  Face is symmetric.  Speech is normal.  Strength is 5 out of 5 throughout bilateral upper and lower extremities.  Sensation is intact x4.  Gross coordination is preserved.  Patient is ambulatory.  Psychiatric: Cooperative. Affect appropriate.     LAB:  All pertinent labs reviewed and interpreted.  Labs Ordered and Resulted from Time of ED Arrival to Time of ED Departure   INR - Abnormal       Result Value    INR 1.80 (*)    ROUTINE UA WITH MICROSCOPIC REFLEX TO CULTURE - Normal    Color Urine Light Yellow      Appearance Urine Clear      Glucose Urine Negative      Bilirubin Urine Negative      Ketones Urine Negative      Specific Gravity Urine 1.007      Blood Urine Negative      pH Urine 7.0      Protein Albumin Urine Negative      Urobilinogen Urine <2.0      Nitrite Urine Negative      Leukocyte Esterase Urine Negative      RBC Urine 1      WBC Urine 1     BASIC METABOLIC PANEL - Normal    Sodium 136      Potassium 4.2      Chloride 102      Carbon Dioxide (CO2) 26      Anion Gap 8      Urea Nitrogen 9      Creatinine 0.83      Calcium 9.7      Glucose 98      GFR Estimate 87     TROPONIN I - Normal    Troponin I 0.06     CBC WITH PLATELETS AND DIFFERENTIAL    WBC Count 6.8      RBC Count 4.79      Hemoglobin 14.8      Hematocrit 44.2      MCV 92      MCH 30.9      MCHC 33.5      RDW 13.0      Platelet Count 264      % Neutrophils 59      % Lymphocytes 24       % Monocytes 10      % Eosinophils 6      % Basophils 1      % Immature Granulocytes 0      NRBCs per 100 WBC 0      Absolute Neutrophils 4.1      Absolute Lymphocytes 1.6      Absolute Monocytes 0.7      Absolute Eosinophils 0.4      Absolute Basophils 0.1      Absolute Immature Granulocytes 0.0      Absolute NRBCs 0.0         RADIOLOGY:  Reviewed all pertinent imaging. Please see official radiology report.  MR Brain w/o Contrast   Final Result   IMPRESSION:   1.  No acute intracranial finding. No evidence for recent ischemia, intracranial hemorrhage, or mass.   2.  Mild to moderate volume loss and minor age-related white matter changes.          EKG:    Reviewed EKG from urgent care which shows sinus rhythm with occasional PVCs.  No ischemic changes.      I, Jane Wild, am serving as a scribe to document services personally performed by Dr. Darius Mak, based on my observation and the provider's statements to me. I, Darius Mak MD attest that Jane Wild is acting in a scribe capacity, has observed my performance of the services and has documented them in accordance with my direction.    Darius Mak M.D.  Emergency Medicine  [facility] EMERGENCY ROOM  [address] [address]  [phone]  Dept: [phone]     Darius Mak MD  11/11/21 9626

## 2021-11-11 NOTE — PROVIDER NOTIFICATION
Discharge plan according to [facility]:       11/11/21 0937   Discharge Planning   Patient/Family Anticipates Transition to home with family   Living Arrangements   People in home spouse   Type of Residence Private Residence   Is your private residence a single family home or apartment? Single family home   Number of Stairs, Within Home, Primary none   Once home, are you able to live on one level? Yes   Which level? Main Level   Bathroom Shower/Tub Walk-in shower   Equipment Currently Used at Home cane, straight   Medical Clearance   Clinic Name [facility]

## 2021-11-11 NOTE — DISCHARGE INSTRUCTIONS
You were seen today in the emergency department at [facility] for dizziness.  Your evaluation including a brain MRI was negative for any serious life-threatening process like bleeding on the brain or stroke.  Your other lab and EKG evaluation also looks stable.  We are going to prescribe you meclizine which you can take up to 4 times a day as needed for continued vertigo.  We would like you to follow-up with your doctor as soon as possible and they may recommend additional referrals such as vestibular physical therapy which can be helpful in correcting this problem.  If you have any other immediate concerns like severe inability to walk or intractable vomiting, we should reevaluate you in the ER.

## 2021-11-12 NOTE — PROGRESS NOTES
I am evaluating this patient for upcoming Revision Left Total Hip Arthroplasty with Dr. Mcfarland at [facility] on 11/19/21:    - Spoke to patient: his surgery was just recently added to the surgery schedule. He has a pre-op physical scheduled at [facility] on 11/16/21. CHG soap for pre-surgery showers was mailed to him on 11/10/21. He is on both Plavix and Warfarin (Factor V Leiden and Protein C Deficiency). Plan is for spinal anesthesia with this surgery so I instructed patient to please hold Plavix for 7 days before surgery (took last dose this am, 11/12/21, at 0800, then will hold until after surgery). I also asked patient to hold warfarin for 5 full days before surgery (take last dose on 11/13/21, then hold it). Patient voiced understanding of these directions and repeated them back to me accurately. Because of his Factor V Leiden and Protein C Deficiency he will likely need bridging with Lovenox before surgery while he is holding Plavix and warfarin. Patient states that he has done the Lovenox injections with previous surgeries. L/M for patient's PCP at [facility] asking if she would be willing to prescribe pre-surgery Lovenox injections to bridge patient while he is holding warfarin and Plavix for surgery. Typically, patient would start these injections the day after holding warfarin (start on 11/14/21) and we would ask them to take the last Lovenox injection dose at least 24 hours before surgery time (surgery currently scheduled to start at 1000 am on 11/19/21 so would ask patient to take last dose by 0800 on 11/18/21, then hold for surgery). If orthopedic surgeon wants patient to bridge with Lovenox after surgery, they will prescribe it at the hospital post-op.  Patient also had episode of vertigo/dizziness yesterday, 11/11/21, and was sent to [facility] ED for evaluation. ED workup was unremarkable and patient was discharged home with small quantity of meclizine which he reports has helped  with the vertigo. However, he will run out of supply of meclizine on 11/14/21. Patient is asking if PCP would consider prescribing him more meclizine to last him through next week. Left detailed message for patient's PCP detailing all the information above. Asked for call back today if possible, before 5 pm. Call me below with any questions.       - Update 11/12/21 at 2:20 pm: Received call back from [facility]. They were able to get patient in for pre-op physical today at 2:30 pm. They will address questions above and call me if any questions/concerns. They will fax pre-op H&P to [facility] pre-op fax # [phone].       BRENDAN Boateng, CNP   Advanced Practice Nurse Navigator- Orthopedics  [facility]   Office Phone: [phone]  Direct Fax: [phone]

## 2021-11-16 ENCOUNTER — LAB (OUTPATIENT)
Dept: LAB | Facility: CLINIC | Age: 76
End: 2021-11-16
Attending: ORTHOPAEDIC SURGERY
Payer: MEDICARE

## 2021-11-16 DIAGNOSIS — Z11.59 ENCOUNTER FOR SCREENING FOR OTHER VIRAL DISEASES: ICD-10-CM

## 2021-11-16 PROCEDURE — U0003 INFECTIOUS AGENT DETECTION BY NUCLEIC ACID (DNA OR RNA); SEVERE ACUTE RESPIRATORY SYNDROME CORONAVIRUS 2 (SARS-COV-2) (CORONAVIRUS DISEASE [COVID-19]), AMPLIFIED PROBE TECHNIQUE, MAKING USE OF HIGH THROUGHPUT TECHNOLOGIES AS DESCRIBED BY CMS-2020-01-R: HCPCS

## 2021-11-16 PROCEDURE — U0005 INFEC AGEN DETEC AMPLI PROBE: HCPCS

## 2021-11-17 LAB — SARS-COV-2 RNA RESP QL NAA+PROBE: NEGATIVE

## 2021-11-18 NOTE — TREATMENT PLAN
Orthopedic Surgery Pre-Op Plan: Jose Tejada  pre-op review. This is NOT an H&P   Surgeon: Dr. Mcfarland   [facility]: [facility]  Name of Surgery: Revision Left Total Hip Arthroplasty  Date of Surgery: 11/19/21   H&P: Completed 11/12/21 by Sara Jhaveri NP, at [facility].    History of ASA, NSAIDS, vitamin and/or herbal supplements within 10 days: No  History of blood thinners: Yes- on both Warfarin and Plavix chronically for CAD-S/P stents and Factor V Leiden and Protein C Deficiency. Instructed to hold warfarin for 5 days before surgery (last dose 11/13/21), and to hold Plavix for 7 days before surgery (last dose 11/12/21). Will bridge with Lovenox 100 mg subcutaneous every 12 hrs starting on 11/16/21 and take last Lovenox dose no later than 6 am on 11/18/21 (at least 24 hrs before surgery time, then hold).     Plan:   1) Discharge Plan: Home with assist of Spouse. Please see Discharge Planning section near bottom of this note for further details.     2) Coronary Artery Disease: S/P Stenting to LAD and RCA: Follows with Dr. Gurvinder Yun at [facility] in [place]. Reviewed last visit note from Dr. Yun from 4/28/2020: last coronary angiogram in Feb 2020 showed open LAD stent but chronic total occlusion of RCA. Second attempt at opening RCA was successful with placement of KEITH to RCA on 2/28/2020. No further angina since stenting of RCA in 2020. On Plavix and Coumadin (for Factor V Leiden and Protein C Deficiency with history of DVT). Patient has good exertional capacity and is able to achieve > 4 METS. Cleared by PCP to proceed with planned hip surgery at moderate risk for cardiovascular complication (3.5% risk of CV complication per RCRI). I recommend patient schedules yearly follow up visits with Cardiology since last visit was > 1 year ago in 2020.     3) Coagulopathy- Factor V Leiden and Protein C Deficiency with previous history of DVT:  On chronic anticoagulation with warfarin and Plavix (for CAD S/P stents). Instructed to hold warfarin for 5 days before surgery (last dose 11/13/21), and to hold Plavix for 7 days before surgery (last dose 11/12/21). Will bridge with Lovenox 100 mg subcutaneous every 12 hrs starting on 11/16/21 and take last Lovenox dose no later than 6 am on 11/18/21 (at least 24 hrs before surgery time, then hold). I will remind Dr. Mcfarland and his team of this patient's coagulopathy and history of DVT. He may want to consider bridging with Lovenox after surgery until INR is back therapeutic on warfarin but will defer to his judgement as patient is also on Plavix.     4) Hyperlipidemia: On rosuvastatin.    5) Hypertension: Well-controlled on lisinopril.  Instructed to hold lisinopril on the morning of surgery.    6) COPD: Denies any recent exacerbations.  Does not currently have a prescription for any inhalers.    7) Obstructive Sleep Apnea: On CPAP.  Reminded to bring CPAP machine to the hospital and use it whenever sleeping/napping.    8) Neuropathy with Right Foot Drop: On gabapentin and has brace for right foot drop.    9) Benign Paroxysmal Positional Vertigo (BPPV): Presented to ED on 11/11/2021 with episode of dizziness.  MRI of head was negative for acute ischemic changes, hemorrhage, or other intracranial process that would account for this vertigo.  EKG showed normal sinus rhythm and labs were stable.  Felt to be likely be BPPV and patient was discharged home on meclizine which helped.     Patient appears medically optimized for upcoming surgery. I would recommend Hospitalist Consult to assist with medical management. Please call me below with any questions on this patient.       Review of Systems Notable for: Coronary artery disease: S/p stenting to LAD and RCA, factor V Leiden and protein C deficiencies with previous history of DVT on chronic anticoagulation, hyperlipidemia, hypertension, COPD, obstructive sleep apnea-on  CPAP, neuropathy with right foot drop, benign paroxysmal positional vertigo.    Past Medical History:   Past Medical History:   Diagnosis Date     Arthritis      Asthma      CAD (coronary artery disease)     1996 angioplasty and stent to the prox LAD, PTCA with intracoronary stent placement of RCA, 70%OM; 10/24/19 Cath: Occluded RCA, prox circumflex lesion - pd/pa not significant, advised medical therapy first by Interventional cardiologist     DVT (deep venous thrombosis) (H)      Factor 5 Leiden mutation, heterozygous (H)      GERD (gastroesophageal reflux disease)      HTN (hypertension)      Hyperlipidemia      Neuropathy     wears brace R foot for drop foot     Neuropathy      Obese      PRIMITIVO (obstructive sleep apnea)     cpap     Protein C deficiency (H)      Spinal stenosis      Stented coronary artery      Thrombosis      Past Surgical History:   Procedure Laterality Date     APPENDECTOMY       APPENDECTOMY OPEN  1958     ARTHROPLASTY HIP Left 1997     ARTHROPLASTY REVISION HIP Left 2013     ARTHROPLASTY REVISION HIP Left      AS SPINAL FUSION,ANT,EA ADNL LEVEL  2010    L4-L5     C TOTAL HIP ARTHROPLASTY Right 2015    THR     CORONARY STENT PLACEMENT  1996     CV CORONARY ANGIOGRAM N/A 10/24/2019    Procedure: Coronary Angiogram;  Surgeon: Valdemar Hinojosa MD;  Location:  HEART CARDI[facility] CATH LAB     CV CORONARY ANGIOGRAM  1996 1996 angioplasty and stent to the prox LAD, PTCA with intracoronary stent placement of RCA, 70%OM     CV CORONARY ANGIOGRAM N/A 2/28/2020    Procedure: Coronary Angiogram 2nd attempt of RCA  successful--(lad stent ok, Lcx 70% with normal iFR)  Surgeon: Gurvinder Yun MD;  Location:  HE[facility] CARDI[facility] CATH LAB     CV FRACTIONAL FLOW RATIO WIRE N/A 10/24/2019    Procedure: Fractional Flow [place];  Surgeon: Valdemar Hinojosa MD;  Location:  HE[facility] CARDI[facility] CATH LAB     CV LEFT HEART CATH Bilateral 1/16/2020    Procedure: Left Heart Cath w/wo Left Ventriculogram   ANGIOPLASTY WITH [place] SCI REP PRESENT;  Surgeon: Gurvinder Yun MD;  Location:  HEART CARDIAC CATH LAB     SPINAL FUSION  2010    L4-5     TOTAL HIP ARTHROPLASTY Left      TOTAL HIP ARTHROPLASTY Right 2/10/2016    Procedure:  RIGHT HIP TOTAL ARTHROPLASTY;  Surgeon: Jose Gaytan MD;  Location: [facility];  Service:        Current Medications:  Patient's Medications   New Prescriptions    No medications on file   Previous Medications    ALBUTEROL (PROAIR HFA/PROVENTIL HFA/VENTOLIN HFA) 108 (90 BASE) MCG/ACT INHALER    Inhale 2 puffs into the lungs every 4 hours as needed for shortness of breath / dyspnea or wheezing doesn't have current RX    CLOPIDOGREL (PLAVIX) 75 MG TABLET    Take 1 tablet (75 mg) by mouth daily    ENOXAPARIN ANTICOAGULANT (LOVENOX) 100 MG/ML SYRINGE    Inject 100 mg Subcutaneous    GABAPENTIN (NEURONTIN) 400 MG CAPSULE    Take 1,200 mg by mouth 3 times daily    LISINOPRIL (PRINIVIL/ZESTRIL) 20 MG TABLET    Take 1 tablet (20 mg) by mouth daily    MECLIZINE (ANTIVERT) 12.5 MG TABLET    Take 2 tablets (25 mg) by mouth 4 times daily as needed for dizziness    NITROGLYCERIN (NITROSTAT) 0.4 MG SUBLINGUAL TABLET    For chest pain place 1 tablet under the tongue every 5 minutes for 3 doses. If symptoms persist 5 minutes after 1st dose call 911.    NORTRIPTYLINE (PAMELOR) 25 MG CAPSULE    Take 25 mg by mouth At Bedtime     OMEPRAZOLE (PRILOSEC) 20 MG CR CAPSULE    Take 20 mg by mouth At Bedtime     ROSUVASTATIN (CRESTOR) 20 MG TABLET    Take 1 tablet (20 mg) by mouth daily    WARFARIN SODIUM (COUMADIN PO)    Take 5 mg by mouth daily 7.5 mg on Monday and Friday, 5 mg all other days   Modified Medications    No medications on file   Discontinued Medications    ETODOLAC (LODINE) 300 MG CAPSULE    TAKE 1 CAPSULE BY MOUTH TWICE A DAY    LIDOCAINE (LIDODERM) 5 % PATCH    Place 1 patch onto the skin every 24 hours    OXYCODONE-ACETAMINOPHEN (PERCOCET) 5-325 MG PER TABLET    Take 2 tablets by  mouth 3 times daily as needed Patient takes 2 tablets TID Scheduled       ALLERGIES:  Allergies   Allergen Reactions     Metoprolol      Fatigue and bradycardia     Niacin Other (See Comments)     Other reaction(s): Flushing         Norvasc [Amlodipine] Rash     Septra [Sulfamethoxazole W-Trimethoprim] Rash       Social History  Social History     Tobacco Use     Smoking status: Former Smoker     Types: Cigarettes     Quit date: 3/23/1987     Years since quittin.6     Smokeless tobacco: Never Used     Tobacco comment: Smokes cigars once every 2-3 years   Substance Use Topics     Alcohol use: Yes     Alcohol/week: 0.0 standard drinks     Comment: once every two weeks if that     Drug use: Never       Any Abnormal Recent Diagnostics? Yes  INR 1.80 on 2021: On chronic anticoagulation with warfarin for history of DVT and known factor V Leiden and protein C deficiencies.  Will hold warfarin for 5 days before surgery and bridge with Lovenox.  Will recheck INR on day of surgery.    Discharge Planning:   Discharge plan according to [facility]:       21 0960   Discharge Planning   Patient/Family Anticipates Transition to home with family   Living Arrangements   People in home spouse   Type of Residence Private Residence   Is your private residence a single family home or apartment? Single family home   Number of Stairs, Within Home, Primary none   Once home, are you able to live on one level? Yes   Which level? Main Level   Bathroom Shower/Tub Walk-in shower   Equipment Currently Used at Home cane, straight   Medical Clearance   Clinic Name BRENDAN Mackey, CNP   Advanced Practice Nurse Navigator- Orthopedics  [facility]   Phone: [phone]

## 2021-11-19 ENCOUNTER — APPOINTMENT (OUTPATIENT)
Dept: RADIOLOGY | Facility: CLINIC | Age: 76
End: 2021-11-19
Attending: ORTHOPAEDIC SURGERY
Payer: MEDICARE

## 2021-11-19 ENCOUNTER — HOSPITAL ENCOUNTER (OUTPATIENT)
Facility: CLINIC | Age: 76
Discharge: HOME | End: 2021-11-22
Attending: ORTHOPAEDIC SURGERY | Admitting: ORTHOPAEDIC SURGERY
Payer: MEDICARE

## 2021-11-19 ENCOUNTER — ANESTHESIA (OUTPATIENT)
Dept: SURGERY | Facility: CLINIC | Age: 76
End: 2021-11-19
Payer: MEDICARE

## 2021-11-19 ENCOUNTER — APPOINTMENT (OUTPATIENT)
Dept: PHYSICAL THERAPY | Facility: CLINIC | Age: 76
End: 2021-11-19
Attending: ORTHOPAEDIC SURGERY
Payer: MEDICARE

## 2021-11-19 ENCOUNTER — ANESTHESIA EVENT (OUTPATIENT)
Dept: SURGERY | Facility: CLINIC | Age: 76
End: 2021-11-19
Payer: MEDICARE

## 2021-11-19 DIAGNOSIS — D68.59 PROTEIN C DEFICIENCY (H): ICD-10-CM

## 2021-11-19 DIAGNOSIS — T84.011A FAILURE OF LEFT TOTAL HIP ARTHROPLASTY, INITIAL ENCOUNTER (H): Primary | ICD-10-CM

## 2021-11-19 DIAGNOSIS — I48.0 PAROXYSMAL ATRIAL FIBRILLATION (H): ICD-10-CM

## 2021-11-19 DIAGNOSIS — I82.4Z9 DEEP VEIN THROMBOSIS (DVT) OF DISTAL VEIN OF LOWER EXTREMITY, UNSPECIFIED CHRONICITY, UNSPECIFIED LATERALITY (H): ICD-10-CM

## 2021-11-19 DIAGNOSIS — Z96.642 STATUS POST TOTAL REPLACEMENT OF LEFT HIP: ICD-10-CM

## 2021-11-19 DIAGNOSIS — T84.011S FAILURE OF LEFT TOTAL HIP ARTHROPLASTY, SEQUELA: ICD-10-CM

## 2021-11-19 DIAGNOSIS — D68.51 FACTOR 5 LEIDEN MUTATION, HETEROZYGOUS (H): ICD-10-CM

## 2021-11-19 PROBLEM — H53.149 VISUAL DISCOMFORT: Status: ACTIVE | Noted: 2021-09-24

## 2021-11-19 PROBLEM — Z01.818 PRE-OP EXAM: Status: ACTIVE | Noted: 2019-05-24

## 2021-11-19 PROBLEM — F33.9 RECURRENT MAJOR DEPRESSIVE DISORDER, REMISSION STATUS UNSPECIFIED (H): Status: ACTIVE | Noted: 2021-09-24

## 2021-11-19 PROBLEM — M54.30 SCIATICA: Status: ACTIVE | Noted: 2021-09-24

## 2021-11-19 PROBLEM — F32.A DEPRESSION: Status: ACTIVE | Noted: 2021-11-19

## 2021-11-19 PROBLEM — I25.10 ATHEROSCLEROSIS OF CORONARY ARTERY: Status: ACTIVE | Noted: 2019-05-24

## 2021-11-19 PROBLEM — Z96.649 S/P TOTAL HIP ARTHROPLASTY: Status: ACTIVE | Noted: 2021-11-19

## 2021-11-19 PROBLEM — I10 BENIGN ESSENTIAL HYPERTENSION: Status: ACTIVE | Noted: 2021-09-24

## 2021-11-19 PROBLEM — G89.29 CHRONIC PAIN: Status: ACTIVE | Noted: 2021-11-19

## 2021-11-19 LAB
CREAT SERPL-MCNC: 0.94 MG/DL (ref 0.7–1.3)
GFR SERPL CREATININE-BSD FRML MDRD: 80 ML/MIN/1.73M2
INR PPP: 1.07 (ref 0.85–1.15)

## 2021-11-19 PROCEDURE — 36415 COLL VENOUS BLD VENIPUNCTURE: CPT | Performed by: NURSE PRACTITIONER

## 2021-11-19 PROCEDURE — C1713 ANCHOR/SCREW BN/BN,TIS/BN: HCPCS | Performed by: ORTHOPAEDIC SURGERY

## 2021-11-19 PROCEDURE — 999N000141 HC STATISTIC PRE-PROCEDURE NURSING ASSESSMENT: Performed by: ORTHOPAEDIC SURGERY

## 2021-11-19 PROCEDURE — 710N000010 HC RECOVERY PHASE 1, LEVEL 2, PER MIN: Performed by: ORTHOPAEDIC SURGERY

## 2021-11-19 PROCEDURE — 250N000009 HC RX 250: Performed by: PHYSICIAN ASSISTANT

## 2021-11-19 PROCEDURE — 250N000013 HC RX MED GY IP 250 OP 250 PS 637: Performed by: PHYSICIAN ASSISTANT

## 2021-11-19 PROCEDURE — 250N000011 HC RX IP 250 OP 636: Performed by: ORTHOPAEDIC SURGERY

## 2021-11-19 PROCEDURE — 99207 PR CDG-CODE CATEGORY CHANGED: CPT | Performed by: INTERNAL MEDICINE

## 2021-11-19 PROCEDURE — 272N000001 HC OR GENERAL SUPPLY STERILE: Performed by: ORTHOPAEDIC SURGERY

## 2021-11-19 PROCEDURE — 999N000157 HC STATISTIC RCP TIME EA 10 MIN

## 2021-11-19 PROCEDURE — 87070 CULTURE OTHR SPECIMN AEROBIC: CPT | Performed by: ORTHOPAEDIC SURGERY

## 2021-11-19 PROCEDURE — 97162 PT EVAL MOD COMPLEX 30 MIN: CPT | Mod: GP

## 2021-11-19 PROCEDURE — 999N000065 XR PELVIS AND HIP PORTABLE LEFT 2 VIEWS

## 2021-11-19 PROCEDURE — 250N000011 HC RX IP 250 OP 636: Performed by: NURSE ANESTHETIST, CERTIFIED REGISTERED

## 2021-11-19 PROCEDURE — 87075 CULTR BACTERIA EXCEPT BLOOD: CPT | Performed by: ORTHOPAEDIC SURGERY

## 2021-11-19 PROCEDURE — 250N000011 HC RX IP 250 OP 636: Performed by: PHYSICIAN ASSISTANT

## 2021-11-19 PROCEDURE — 85610 PROTHROMBIN TIME: CPT | Performed by: NURSE PRACTITIONER

## 2021-11-19 PROCEDURE — 36415 COLL VENOUS BLD VENIPUNCTURE: CPT | Performed by: ORTHOPAEDIC SURGERY

## 2021-11-19 PROCEDURE — 258N000001 HC RX 258: Performed by: ORTHOPAEDIC SURGERY

## 2021-11-19 PROCEDURE — 250N000009 HC RX 250: Performed by: NURSE ANESTHETIST, CERTIFIED REGISTERED

## 2021-11-19 PROCEDURE — 97530 THERAPEUTIC ACTIVITIES: CPT | Mod: GP

## 2021-11-19 PROCEDURE — 370N000017 HC ANESTHESIA TECHNICAL FEE, PER MIN: Performed by: ORTHOPAEDIC SURGERY

## 2021-11-19 PROCEDURE — 360N000078 HC SURGERY LEVEL 5, PER MIN: Performed by: ORTHOPAEDIC SURGERY

## 2021-11-19 PROCEDURE — P9041 ALBUMIN (HUMAN),5%, 50ML: HCPCS | Performed by: NURSE ANESTHETIST, CERTIFIED REGISTERED

## 2021-11-19 PROCEDURE — 250N000013 HC RX MED GY IP 250 OP 250 PS 637: Performed by: INTERNAL MEDICINE

## 2021-11-19 PROCEDURE — 250N000011 HC RX IP 250 OP 636: Performed by: ANESTHESIOLOGY

## 2021-11-19 PROCEDURE — 250N000009 HC RX 250: Performed by: ORTHOPAEDIC SURGERY

## 2021-11-19 PROCEDURE — 82565 ASSAY OF CREATININE: CPT | Performed by: ORTHOPAEDIC SURGERY

## 2021-11-19 PROCEDURE — C1776 JOINT DEVICE (IMPLANTABLE): HCPCS | Performed by: ORTHOPAEDIC SURGERY

## 2021-11-19 PROCEDURE — 258N000003 HC RX IP 258 OP 636: Performed by: NURSE ANESTHETIST, CERTIFIED REGISTERED

## 2021-11-19 PROCEDURE — 258N000003 HC RX IP 258 OP 636: Performed by: ORTHOPAEDIC SURGERY

## 2021-11-19 PROCEDURE — 99214 OFFICE O/P EST MOD 30 MIN: CPT | Performed by: INTERNAL MEDICINE

## 2021-11-19 PROCEDURE — 250N000013 HC RX MED GY IP 250 OP 250 PS 637: Performed by: ORTHOPAEDIC SURGERY

## 2021-11-19 DEVICE — IMP SLEEVE BIOM TYPE1 TPR FOR BIOLOX DELTA +3MM 650-1067: Type: IMPLANTABLE DEVICE | Site: HIP | Status: FUNCTIONAL

## 2021-11-19 DEVICE — IMPLANTABLE DEVICE
Type: IMPLANTABLE DEVICE | Site: HIP | Status: FUNCTIONAL
Brand: VIVACIT-E®

## 2021-11-19 DEVICE — IMPLANTABLE DEVICE: Type: IMPLANTABLE DEVICE | Site: HIP | Status: FUNCTIONAL

## 2021-11-19 DEVICE — IMP HEAD FEM BIOM BIOLOX DELTA OPTION 28MM 650-1055: Type: IMPLANTABLE DEVICE | Site: HIP | Status: FUNCTIONAL

## 2021-11-19 RX ORDER — FENTANYL CITRATE 50 UG/ML
50 INJECTION, SOLUTION INTRAMUSCULAR; INTRAVENOUS EVERY 5 MIN PRN
Status: DISCONTINUED | OUTPATIENT
Start: 2021-11-19 | End: 2021-11-19 | Stop reason: HOSPADM

## 2021-11-19 RX ORDER — SODIUM CHLORIDE, SODIUM LACTATE, POTASSIUM CHLORIDE, CALCIUM CHLORIDE 600; 310; 30; 20 MG/100ML; MG/100ML; MG/100ML; MG/100ML
INJECTION, SOLUTION INTRAVENOUS CONTINUOUS
Status: DISCONTINUED | OUTPATIENT
Start: 2021-11-19 | End: 2021-11-22 | Stop reason: HOSPADM

## 2021-11-19 RX ORDER — MECLIZINE HYDROCHLORIDE 25 MG/1
25 TABLET ORAL 4 TIMES DAILY PRN
Status: DISCONTINUED | OUTPATIENT
Start: 2021-11-19 | End: 2021-11-22 | Stop reason: HOSPADM

## 2021-11-19 RX ORDER — NITROGLYCERIN 0.4 MG/1
0.4 TABLET SUBLINGUAL EVERY 5 MIN PRN
Status: DISCONTINUED | OUTPATIENT
Start: 2021-11-19 | End: 2021-11-22 | Stop reason: HOSPADM

## 2021-11-19 RX ORDER — KETAMINE HYDROCHLORIDE 50 MG/ML
INJECTION, SOLUTION INTRAMUSCULAR; INTRAVENOUS PRN
Status: DISCONTINUED | OUTPATIENT
Start: 2021-11-19 | End: 2021-11-19

## 2021-11-19 RX ORDER — LIDOCAINE 40 MG/G
CREAM TOPICAL
Status: DISCONTINUED | OUTPATIENT
Start: 2021-11-19 | End: 2021-11-19 | Stop reason: HOSPADM

## 2021-11-19 RX ORDER — ACETAMINOPHEN 325 MG/1
975 TABLET ORAL EVERY 8 HOURS
Status: DISCONTINUED | OUTPATIENT
Start: 2021-11-19 | End: 2021-11-22 | Stop reason: HOSPADM

## 2021-11-19 RX ORDER — PANTOPRAZOLE SODIUM 20 MG/1
40 TABLET, DELAYED RELEASE ORAL AT BEDTIME
Status: DISCONTINUED | OUTPATIENT
Start: 2021-11-19 | End: 2021-11-22 | Stop reason: HOSPADM

## 2021-11-19 RX ORDER — OXYCODONE HYDROCHLORIDE 5 MG/1
10 TABLET ORAL EVERY 4 HOURS PRN
Status: DISCONTINUED | OUTPATIENT
Start: 2021-11-19 | End: 2021-11-19 | Stop reason: DRUGHIGH

## 2021-11-19 RX ORDER — PROPOFOL 10 MG/ML
INJECTION, EMULSION INTRAVENOUS CONTINUOUS PRN
Status: DISCONTINUED | OUTPATIENT
Start: 2021-11-19 | End: 2021-11-19

## 2021-11-19 RX ORDER — LIDOCAINE HYDROCHLORIDE 10 MG/ML
INJECTION, SOLUTION INFILTRATION; PERINEURAL PRN
Status: DISCONTINUED | OUTPATIENT
Start: 2021-11-19 | End: 2021-11-19

## 2021-11-19 RX ORDER — ACETAMINOPHEN 325 MG/1
650 TABLET ORAL EVERY 4 HOURS PRN
Status: DISCONTINUED | OUTPATIENT
Start: 2021-11-22 | End: 2021-11-22 | Stop reason: HOSPADM

## 2021-11-19 RX ORDER — POLYETHYLENE GLYCOL 3350 17 G/17G
17 POWDER, FOR SOLUTION ORAL DAILY
Status: DISCONTINUED | OUTPATIENT
Start: 2021-11-20 | End: 2021-11-22 | Stop reason: HOSPADM

## 2021-11-19 RX ORDER — GABAPENTIN 400 MG/1
1200 CAPSULE ORAL 3 TIMES DAILY
Status: DISCONTINUED | OUTPATIENT
Start: 2021-11-19 | End: 2021-11-22 | Stop reason: HOSPADM

## 2021-11-19 RX ORDER — ALBUTEROL SULFATE 90 UG/1
2 AEROSOL, METERED RESPIRATORY (INHALATION) EVERY 4 HOURS PRN
Status: DISCONTINUED | OUTPATIENT
Start: 2021-11-19 | End: 2021-11-22 | Stop reason: HOSPADM

## 2021-11-19 RX ORDER — SODIUM CHLORIDE, SODIUM LACTATE, POTASSIUM CHLORIDE, CALCIUM CHLORIDE 600; 310; 30; 20 MG/100ML; MG/100ML; MG/100ML; MG/100ML
INJECTION, SOLUTION INTRAVENOUS CONTINUOUS
Status: DISCONTINUED | OUTPATIENT
Start: 2021-11-19 | End: 2021-11-19 | Stop reason: HOSPADM

## 2021-11-19 RX ORDER — CEFAZOLIN SODIUM 2 G/100ML
2 INJECTION, SOLUTION INTRAVENOUS EVERY 8 HOURS
Status: COMPLETED | OUTPATIENT
Start: 2021-11-19 | End: 2021-11-20

## 2021-11-19 RX ORDER — NALOXONE HYDROCHLORIDE 0.4 MG/ML
0.4 INJECTION, SOLUTION INTRAMUSCULAR; INTRAVENOUS; SUBCUTANEOUS
Status: DISCONTINUED | OUTPATIENT
Start: 2021-11-19 | End: 2021-11-22 | Stop reason: HOSPADM

## 2021-11-19 RX ORDER — LIDOCAINE 40 MG/G
CREAM TOPICAL
Status: DISCONTINUED | OUTPATIENT
Start: 2021-11-19 | End: 2021-11-22 | Stop reason: HOSPADM

## 2021-11-19 RX ORDER — TRANEXAMIC ACID 650 MG/1
1950 TABLET ORAL ONCE
Status: COMPLETED | OUTPATIENT
Start: 2021-11-19 | End: 2021-11-19

## 2021-11-19 RX ORDER — ONDANSETRON 2 MG/ML
4 INJECTION INTRAMUSCULAR; INTRAVENOUS EVERY 6 HOURS PRN
Status: DISCONTINUED | OUTPATIENT
Start: 2021-11-19 | End: 2021-11-22 | Stop reason: HOSPADM

## 2021-11-19 RX ORDER — ALBUMIN, HUMAN INJ 5% 5 %
SOLUTION INTRAVENOUS CONTINUOUS PRN
Status: DISCONTINUED | OUTPATIENT
Start: 2021-11-19 | End: 2021-11-19

## 2021-11-19 RX ORDER — PROCHLORPERAZINE MALEATE 5 MG
5 TABLET ORAL EVERY 6 HOURS PRN
Status: DISCONTINUED | OUTPATIENT
Start: 2021-11-19 | End: 2021-11-22 | Stop reason: HOSPADM

## 2021-11-19 RX ORDER — WARFARIN SODIUM 5 MG/1
5 TABLET ORAL
Status: COMPLETED | OUTPATIENT
Start: 2021-11-19 | End: 2021-11-19

## 2021-11-19 RX ORDER — GLYCOPYRROLATE 0.2 MG/ML
INJECTION, SOLUTION INTRAMUSCULAR; INTRAVENOUS PRN
Status: DISCONTINUED | OUTPATIENT
Start: 2021-11-19 | End: 2021-11-19

## 2021-11-19 RX ORDER — EPHEDRINE SULFATE 50 MG/ML
INJECTION, SOLUTION INTRAMUSCULAR; INTRAVENOUS; SUBCUTANEOUS PRN
Status: DISCONTINUED | OUTPATIENT
Start: 2021-11-19 | End: 2021-11-19

## 2021-11-19 RX ORDER — PROPOFOL 10 MG/ML
INJECTION, EMULSION INTRAVENOUS PRN
Status: DISCONTINUED | OUTPATIENT
Start: 2021-11-19 | End: 2021-11-19

## 2021-11-19 RX ORDER — ONDANSETRON 2 MG/ML
INJECTION INTRAMUSCULAR; INTRAVENOUS PRN
Status: DISCONTINUED | OUTPATIENT
Start: 2021-11-19 | End: 2021-11-19

## 2021-11-19 RX ORDER — FENTANYL CITRATE 50 UG/ML
INJECTION, SOLUTION INTRAMUSCULAR; INTRAVENOUS PRN
Status: DISCONTINUED | OUTPATIENT
Start: 2021-11-19 | End: 2021-11-19

## 2021-11-19 RX ORDER — NALOXONE HYDROCHLORIDE 0.4 MG/ML
0.2 INJECTION, SOLUTION INTRAMUSCULAR; INTRAVENOUS; SUBCUTANEOUS
Status: DISCONTINUED | OUTPATIENT
Start: 2021-11-19 | End: 2021-11-22 | Stop reason: HOSPADM

## 2021-11-19 RX ORDER — CEFADROXIL 500 MG/1
500 CAPSULE ORAL 2 TIMES DAILY
Qty: 14 CAPSULE | Refills: 0 | Status: SHIPPED | OUTPATIENT
Start: 2021-11-20 | End: 2021-11-22

## 2021-11-19 RX ORDER — MAGNESIUM HYDROXIDE 1200 MG/15ML
LIQUID ORAL PRN
Status: DISCONTINUED | OUTPATIENT
Start: 2021-11-19 | End: 2021-11-19 | Stop reason: HOSPADM

## 2021-11-19 RX ORDER — LISINOPRIL 20 MG/1
20 TABLET ORAL DAILY
Status: DISCONTINUED | OUTPATIENT
Start: 2021-11-20 | End: 2021-11-22 | Stop reason: HOSPADM

## 2021-11-19 RX ORDER — HALOPERIDOL 5 MG/ML
1 INJECTION INTRAMUSCULAR
Status: DISCONTINUED | OUTPATIENT
Start: 2021-11-19 | End: 2021-11-19 | Stop reason: HOSPADM

## 2021-11-19 RX ORDER — CEFAZOLIN SODIUM 2 G/100ML
2 INJECTION, SOLUTION INTRAVENOUS
Status: COMPLETED | OUTPATIENT
Start: 2021-11-19 | End: 2021-11-19

## 2021-11-19 RX ORDER — CEFAZOLIN SODIUM 2 G/100ML
2 INJECTION, SOLUTION INTRAVENOUS SEE ADMIN INSTRUCTIONS
Status: DISCONTINUED | OUTPATIENT
Start: 2021-11-19 | End: 2021-11-19 | Stop reason: HOSPADM

## 2021-11-19 RX ORDER — HYDROMORPHONE HCL IN WATER/PF 6 MG/30 ML
0.2 PATIENT CONTROLLED ANALGESIA SYRINGE INTRAVENOUS EVERY 5 MIN PRN
Status: DISCONTINUED | OUTPATIENT
Start: 2021-11-19 | End: 2021-11-19 | Stop reason: HOSPADM

## 2021-11-19 RX ORDER — BISACODYL 10 MG
10 SUPPOSITORY, RECTAL RECTAL DAILY PRN
Status: DISCONTINUED | OUTPATIENT
Start: 2021-11-19 | End: 2021-11-22 | Stop reason: HOSPADM

## 2021-11-19 RX ORDER — AMOXICILLIN 250 MG
1 CAPSULE ORAL 2 TIMES DAILY
Status: DISCONTINUED | OUTPATIENT
Start: 2021-11-19 | End: 2021-11-22 | Stop reason: HOSPADM

## 2021-11-19 RX ORDER — SODIUM CHLORIDE, SODIUM LACTATE, POTASSIUM CHLORIDE, CALCIUM CHLORIDE 600; 310; 30; 20 MG/100ML; MG/100ML; MG/100ML; MG/100ML
INJECTION, SOLUTION INTRAVENOUS CONTINUOUS PRN
Status: DISCONTINUED | OUTPATIENT
Start: 2021-11-19 | End: 2021-11-19

## 2021-11-19 RX ORDER — ROSUVASTATIN CALCIUM 10 MG/1
20 TABLET, COATED ORAL AT BEDTIME
Status: DISCONTINUED | OUTPATIENT
Start: 2021-11-19 | End: 2021-11-22 | Stop reason: HOSPADM

## 2021-11-19 RX ORDER — CLOPIDOGREL BISULFATE 75 MG/1
75 TABLET ORAL DAILY
Status: DISCONTINUED | OUTPATIENT
Start: 2021-11-19 | End: 2021-11-22 | Stop reason: HOSPADM

## 2021-11-19 RX ORDER — VANCOMYCIN HYDROCHLORIDE 1 G/20ML
1 INJECTION, POWDER, LYOPHILIZED, FOR SOLUTION INTRAVENOUS ONCE
Status: DISCONTINUED | OUTPATIENT
Start: 2021-11-19 | End: 2021-11-19

## 2021-11-19 RX ORDER — BUPIVACAINE HYDROCHLORIDE 7.5 MG/ML
INJECTION, SOLUTION INTRASPINAL
Status: COMPLETED | OUTPATIENT
Start: 2021-11-19 | End: 2021-11-19

## 2021-11-19 RX ORDER — OXYCODONE HYDROCHLORIDE 5 MG/1
5 TABLET ORAL
Status: DISCONTINUED | OUTPATIENT
Start: 2021-11-19 | End: 2021-11-21

## 2021-11-19 RX ORDER — HYDROMORPHONE HCL IN WATER/PF 6 MG/30 ML
0.2 PATIENT CONTROLLED ANALGESIA SYRINGE INTRAVENOUS EVERY 4 HOURS PRN
Status: DISCONTINUED | OUTPATIENT
Start: 2021-11-19 | End: 2021-11-22 | Stop reason: HOSPADM

## 2021-11-19 RX ORDER — ONDANSETRON 2 MG/ML
4 INJECTION INTRAMUSCULAR; INTRAVENOUS EVERY 30 MIN PRN
Status: DISCONTINUED | OUTPATIENT
Start: 2021-11-19 | End: 2021-11-19 | Stop reason: HOSPADM

## 2021-11-19 RX ORDER — VANCOMYCIN HYDROCHLORIDE 1 G/20ML
INJECTION, POWDER, LYOPHILIZED, FOR SOLUTION INTRAVENOUS PRN
Status: DISCONTINUED | OUTPATIENT
Start: 2021-11-19 | End: 2021-11-19 | Stop reason: HOSPADM

## 2021-11-19 RX ORDER — OXYCODONE HYDROCHLORIDE 5 MG/1
5 TABLET ORAL EVERY 4 HOURS PRN
Status: DISCONTINUED | OUTPATIENT
Start: 2021-11-19 | End: 2021-11-19 | Stop reason: HOSPADM

## 2021-11-19 RX ORDER — ONDANSETRON 4 MG/1
4 TABLET, ORALLY DISINTEGRATING ORAL EVERY 6 HOURS PRN
Status: DISCONTINUED | OUTPATIENT
Start: 2021-11-19 | End: 2021-11-22 | Stop reason: HOSPADM

## 2021-11-19 RX ORDER — OXYCODONE HYDROCHLORIDE 5 MG/1
10 TABLET ORAL
Status: DISCONTINUED | OUTPATIENT
Start: 2021-11-19 | End: 2021-11-21

## 2021-11-19 RX ORDER — ONDANSETRON 4 MG/1
4 TABLET, ORALLY DISINTEGRATING ORAL EVERY 30 MIN PRN
Status: DISCONTINUED | OUTPATIENT
Start: 2021-11-19 | End: 2021-11-19 | Stop reason: HOSPADM

## 2021-11-19 RX ORDER — DEXAMETHASONE SODIUM PHOSPHATE 10 MG/ML
INJECTION, SOLUTION INTRAMUSCULAR; INTRAVENOUS PRN
Status: DISCONTINUED | OUTPATIENT
Start: 2021-11-19 | End: 2021-11-19

## 2021-11-19 RX ORDER — OXYCODONE HYDROCHLORIDE 5 MG/1
5 TABLET ORAL EVERY 4 HOURS PRN
Status: DISCONTINUED | OUTPATIENT
Start: 2021-11-19 | End: 2021-11-19 | Stop reason: DRUGHIGH

## 2021-11-19 RX ADMIN — SODIUM CHLORIDE, POTASSIUM CHLORIDE, SODIUM LACTATE AND CALCIUM CHLORIDE: 600; 310; 30; 20 INJECTION, SOLUTION INTRAVENOUS at 15:02

## 2021-11-19 RX ADMIN — CEFAZOLIN SODIUM 2 G: 2 INJECTION, SOLUTION INTRAVENOUS at 17:06

## 2021-11-19 RX ADMIN — GLYCOPYRROLATE 0.2 MG: 0.2 INJECTION, SOLUTION INTRAMUSCULAR; INTRAVENOUS at 11:55

## 2021-11-19 RX ADMIN — CEFAZOLIN SODIUM 2 G: 2 INJECTION, SOLUTION INTRAVENOUS at 10:29

## 2021-11-19 RX ADMIN — DEXAMETHASONE SODIUM PHOSPHATE 10 MG: 10 INJECTION, SOLUTION INTRAMUSCULAR; INTRAVENOUS at 10:30

## 2021-11-19 RX ADMIN — SENNOSIDES AND DOCUSATE SODIUM 1 TABLET: 50; 8.6 TABLET ORAL at 20:18

## 2021-11-19 RX ADMIN — SODIUM CHLORIDE, POTASSIUM CHLORIDE, SODIUM LACTATE AND CALCIUM CHLORIDE: 600; 310; 30; 20 INJECTION, SOLUTION INTRAVENOUS at 11:30

## 2021-11-19 RX ADMIN — ONDANSETRON 4 MG: 2 INJECTION INTRAMUSCULAR; INTRAVENOUS at 12:27

## 2021-11-19 RX ADMIN — OXYCODONE HYDROCHLORIDE 5 MG: 5 TABLET ORAL at 20:17

## 2021-11-19 RX ADMIN — Medication 5 MG: at 10:40

## 2021-11-19 RX ADMIN — PROPOFOL 150 MG: 10 INJECTION, EMULSION INTRAVENOUS at 10:22

## 2021-11-19 RX ADMIN — TRANEXAMIC ACID 1950 MG: 650 TABLET ORAL at 08:40

## 2021-11-19 RX ADMIN — PROPOFOL 150 MCG/KG/MIN: 10 INJECTION, EMULSION INTRAVENOUS at 10:25

## 2021-11-19 RX ADMIN — PHENYLEPHRINE HYDROCHLORIDE 100 MCG: 10 INJECTION INTRAVENOUS at 10:33

## 2021-11-19 RX ADMIN — LIDOCAINE HYDROCHLORIDE 5 ML: 10 INJECTION, SOLUTION INFILTRATION; PERINEURAL at 10:22

## 2021-11-19 RX ADMIN — HYDROMORPHONE HYDROCHLORIDE 0.2 MG: 0.2 INJECTION, SOLUTION INTRAMUSCULAR; INTRAVENOUS; SUBCUTANEOUS at 23:12

## 2021-11-19 RX ADMIN — PHENYLEPHRINE HYDROCHLORIDE 100 MCG: 10 INJECTION INTRAVENOUS at 10:30

## 2021-11-19 RX ADMIN — ALBUMIN HUMAN: 0.05 INJECTION, SOLUTION INTRAVENOUS at 10:14

## 2021-11-19 RX ADMIN — PANTOPRAZOLE SODIUM 40 MG: 20 TABLET, DELAYED RELEASE ORAL at 20:18

## 2021-11-19 RX ADMIN — ACETAMINOPHEN 975 MG: 325 TABLET ORAL at 17:06

## 2021-11-19 RX ADMIN — FENTANYL CITRATE 50 MCG: 50 INJECTION, SOLUTION INTRAMUSCULAR; INTRAVENOUS at 10:07

## 2021-11-19 RX ADMIN — KETAMINE HYDROCHLORIDE 50 MG: 50 INJECTION, SOLUTION INTRAMUSCULAR; INTRAVENOUS at 10:22

## 2021-11-19 RX ADMIN — Medication 5 MG: at 11:20

## 2021-11-19 RX ADMIN — MIDAZOLAM 1 MG: 1 INJECTION INTRAMUSCULAR; INTRAVENOUS at 10:07

## 2021-11-19 RX ADMIN — ROSUVASTATIN CALCIUM 20 MG: 10 TABLET, FILM COATED ORAL at 20:18

## 2021-11-19 RX ADMIN — WARFARIN SODIUM 5 MG: 5 TABLET ORAL at 20:17

## 2021-11-19 RX ADMIN — Medication 5 MG: at 10:58

## 2021-11-19 RX ADMIN — PHENYLEPHRINE HYDROCHLORIDE 0.4 MCG/KG/MIN: 10 INJECTION INTRAVENOUS at 10:19

## 2021-11-19 RX ADMIN — Medication 5 MG: at 10:36

## 2021-11-19 RX ADMIN — GLYCOPYRROLATE 0.2 MG: 0.2 INJECTION, SOLUTION INTRAMUSCULAR; INTRAVENOUS at 10:39

## 2021-11-19 RX ADMIN — SODIUM CHLORIDE, POTASSIUM CHLORIDE, SODIUM LACTATE AND CALCIUM CHLORIDE: 600; 310; 30; 20 INJECTION, SOLUTION INTRAVENOUS at 10:03

## 2021-11-19 RX ADMIN — BUPIVACAINE HYDROCHLORIDE IN DEXTROSE 1.4 ML: 7.5 INJECTION, SOLUTION SUBARACHNOID at 10:16

## 2021-11-19 RX ADMIN — CLOPIDOGREL BISULFATE 75 MG: 75 TABLET, FILM COATED ORAL at 20:18

## 2021-11-19 RX ADMIN — GABAPENTIN 1200 MG: 400 CAPSULE ORAL at 20:18

## 2021-11-19 RX ADMIN — Medication 5 MG: at 10:33

## 2021-11-19 ASSESSMENT — MIFFLIN-ST. JEOR: SCORE: 1667.05

## 2021-11-19 NOTE — OP NOTE
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    PROCEDURE PERFORMED   Revision left total hip arthroplasty, acetabular component only    IMPLANTS  Implant Name Type Inv. Item Serial No.  Lot No. LRB No. Used Action   acetabular liner    MIGEL  Left 1 Explanted   head componant  left    MIGEL  Left 1 Explanted   left  acetabular cup  size 60    MIGEL  Left 1 Explanted   g7  66mm cup    BIOMET [id number] Left 1 Wasted   g7  cup 68 mm    BIOMET [id number] Left 1 Implanted   IMP SCR ZIM 6.5X40MM ACET CUP SELF TAP -642-40 - [id number] Metallic Hardware/[place] IMP SCR ZIM 6.5X40MM ACET CUP SELF TAP -255-40  [employer]IG[employer] [id number] Left 1 Implanted   IMP SCR ZIM 6.5X40MM ACET CUP SELF TAP -474-40 - [id number] Metallic Hardware/[place] IMP SCR ZIM 6.5X40MM ACET CUP SELF TAP -264-40  [employer] [id number] Left 1 Implanted   IMP SCR ZIM 6.5X40MM ACET CUP SELF TAP -492-40 - [id number] Metallic Hardware/[place] IMP SCR ZIM 6.5X40MM ACET CUP SELF TAP -109-40  [employer] [id number] Left 1 Implanted   g7 dual mobiliy liner neutral    BIOMET [id number] Left 1 Implanted   dual mobility 28 x 54    BIOMET [id number] Left 1 Implanted   taper sleeve +3    BIOMET [id number] Left 1 Implanted   28 head  ceramic     BIOMET [id number] Left 1 Implanted   Dual Mobility Crosslinked Polyethylene Bearing    MIGEL [id number] Left 1 Implanted   IMP HEAD FEM BIOM BIOLOX DELTA OPTION 28MM 650-1055 - [id number] Total Joint Component/Insert IMP HEAD FEM BIOM BIOLOX DELTA OPTION 28MM 650-1055  [employer] [id number] Left 1 Implanted   IMP SLEEVE BIOM TYPE1 TPR FOR BIOLOX DELTA +3MM 650-1067 - [id number] Total Joint Component/Insert IMP SLEEVE BIOM TYPE1 TPR FOR BIOLOX DELTA +3MM 650-1067  [employer] [id number]  Left 1 Implanted   LINER ACETABLAR DUAL MOBILITY - [id number] Total Joint Component/Insert LINER ACETABLAR DUAL MOBILITY  [employer] [id number] Left 1 Implanted   Acetabular shell    BIOMET [id number] Left 1 Wasted   Acetabular Shell    BIOMET [id number] Left 1 Implanted       SURGEON  Alber Mcfarland MD    ASSISTANT   Roland Mccray MD: For assistance in exposure, reaming, accurate acetabular component placement.  Helena Ortiz PA-C; assistant was required for patient positioning, surgical assistance, wound closure and monitoring patient's safety throughout the case.    ANESTHESIA  Spinal      FINDINGS:  Grossly loose cemented acetabular component.  Femoral component well fixed, roughly 10 degrees of anteversion.    SPECIMENS:  Swab x1 sent for aerobic and anaerobic culture  Tissue specimens x2 sent for aerobic and anaerobic culture    ESTIMATED BLOOD LOSS:  300 cc    COMPLICATIONS   None.      INDICATION FOR PROCEDURE  Jose Tejada is a 76 year old male with history of total hip arthroplasty performed roughly 24 years ago.  This was cemented on both the femoral and acetabular side.  The femoral side was revised in 2013 for loosening.  He has gone on to develop groin pain.  X-rays are concerning for loosening as was a CT scan and bone scan.  Inflammatory markers were normal.  As such is felt to represent aseptic loosening of the cemented acetabular component.  He is debilitated by pain related to this and as such revision was offered he elected to proceed.    PREOPERATIVE EXAMINATION:   Well-healed surgical incision overlying the left hip, grossly neurovascular intact distally.    INFORMED CONSENT  Jose Tejada was identified in the preoperative holding area and was identified using medical record number, name, and date of birth, all of which were confirmed. The operative extremity was marked using an indelible marker. Once again, all risks and benefits as well as alternatives to surgical intervention were  discussed with the patient in detail and all their questions were answered. Risks discussed included but were not limited to: Instability, infection, failure of ingrowth, persistent pain, bleeding, scarring, stiffness, thromboembolic events, fracture, malalignment/malrotation, malunion/nonunion, implant complications, severe limb dysfunction, loss of limb, and loss of life. The patient signed informed consent and wished to proceed with surgery as scheduled.     DESCRIPTION OF PROCEDURE   Jose Tejada was brought back to the operating room.  Spinal with LMA anesthesia was achieved without difficulty.  The patient was then transferred to the OR table.  He was placed in the lateral decubitus position with the left side up.  Axillary roll was placed.  All bony prominences were well-padded. The patient was then prepped and draped in the usual sterile fashion.    A timeout was performed prior to the procedure.  Three separate staff members confirmed the patient's name, correct site and side of surgery and procedure being performed.  Antibiotics were confirmed to be given prior to incision.     Utilized his old incision.  Superiorly, his previous surgery had deviated his incision in a more lateral fashion and this was the arm of the incision that was used as this was done in 2013.  Came through the superficial soft tissues with electrocautery, identified the IT band and divided this.  Large brown fluid collection within the hip joint, and 1 swab was sent for aerobic anaerobic culture and this was evacuated.  Raised the posterior capsule remnant in 1 continuous sleeve and dislocated the hip and removed the femoral head.  Wide synovectomy was then performed, with 2 more of these tissue samples being sent for aerobic and anaerobic culture.  The femur was then gently subluxed forward.  I gently remove the soft tissue surrounding the acetabular component followed this was grossly loose.  This was gently removed.  I then  spent time irrigating and while is debriding all the loose tissue from the acetabulum.  We then began sequentially reaming.  The previous cup and then a 16 as such.  Began reaming at a 61.  Sequentially reamed up to a 65 and attempted to implant a 66 multihole cup however we did not get good pressfit with this and as such this was wasted.  We reamed to the 66 followed by 67 reamer and then opened a 68 mm multihole cup and implanted this with good press-fit into the appropriate anteversion and inclination.  3 screws were then placed, 40 mm each, each with good excellent purchase.  A trial liner was then placed and we placed a trial femoral head and tested her stability.  With the standard head, 36 mm, we had stability at 90 degrees of flexion to about 70 degrees internal rotation.  I was not able to impinge him anteriorly in extension and external rotation.  Very happy with the stability exam and as such dislocated the hip and removed the trial liner.  The cup was then thoroughly irrigated.  Then implanted the dual mobility liner and ensured this fully down circumferentially.  Dual mobility final head was opened on the back table and assembled.  We elected to go with a +3 mm internal sleeve.  This was then impacted gently onto a cleaned and dried trunnion.  The hip was then reduced.  With the final dual mobility articulation in place, stability exam revealed at 90 degrees of flexion that we had 70 to 80 degrees of internal rotation before impingement and dislocation.  Again, I was unable to dislocate him anteriorly and as such was very happy with this stability exam.  The wound was then thoroughly irrigated.  Sequential Betadine soaks were then performed.  The wound was then irrigated again.  1 g of vancomycin powder was then placed deep within the joint.  The posterior capsule was then closed with a #5 Ethibond as best we could with the remaining capsule.  The IT band was then closed with #5 Ethibond followed by a  #1 strata fix in a running fashion.  Subcutaneous tissues followed by the subcuticular tissues were closed with a 2-0 Monocryl followed by a running 3-0 Monocryl for the skin.  A roughly 30 cm negative pressure close incision Prevena dressing was then placed.    All sponge needle instrument counts are correct in the procedure and the was taken to recovery in stable condition.    POSTOPERATIVE EXAM:  Foot warm and well-perfused.    POSTOPERATIVE PLAN:  -Pain control  -24 hours routine IV antibiotics   -Follow up intra-operative cultures  -DVT ppx: Bridging with 30mg BID enoxaparin until therapeutic on home warfarin, enoxaparin to start tomorrow morning.  -Foot flat, 20 WB LLE, PT/OT  -Maintain Pravena dressing - please ensure that this is plugged in and charging while he is in bed, please ensure the  goes with him on discharge.  -XRs in PACU  -Dispo planning.    Alber Mcfarland MD  [facility]

## 2021-11-19 NOTE — ANESTHESIA PREPROCEDURE EVALUATION
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]    Procedure : Procedure(s):  REVISION LEFT TOTAL HIP ARTHROPLASTY          Past Medical History:   Diagnosis Date     Arthritis      Asthma      CAD (coronary artery disease)      angioplasty and stent to the prox LAD, PTCA with intracoronary stent placement of RCA, 70%OM; 10/24/19 Cath: Occluded RCA, prox circumflex lesion - pd/pa not significant, advised medical therapy first by Interventional cardiologist     DVT (deep venous thrombosis) (H)      Factor 5 Leiden mutation, heterozygous (H)      GERD (gastroesophageal reflux disease)      HTN (hypertension)      Hyperlipidemia      Neuropathy     wears brace R foot for drop foot     Neuropathy      Obese      PRIMITIVO (obstructive sleep apnea)     cpap     Protein C deficiency (H)      Spinal stenosis      Stented coronary artery      Thrombosis       Past Surgical History:   Procedure Laterality Date     APPENDECTOMY       APPENDECTOMY OPEN       ARTHROPLASTY HIP Left      ARTHROPLASTY REVISION HIP Left      ARTHROPLASTY REVISION HIP Left      AS SPINAL FUSION,ANT,EA ADNL LEVEL      L4-L5     C TOTAL HIP ARTHROPLASTY Right 2015    THR     CORONARY STENT PLACEMENT       CV CORONARY ANGIOGRAM N/A 10/24/2019    Procedure: Coronary Angiogram;  Surgeon: Valdemar Hinojosa MD;  Location: [facility] CARDIAC CATH LAB     CV CORONARY ANGIOGRAM  1996 angioplasty and stent to the prox LAD, PTCA with intracoronary stent placement of RCA, 70%OM     CV CORONARY ANGIOGRAM N/A 2020    Procedure: Coronary Angiogram 2nd attempt of RCA  successful--(lad stent ok, Lcx 70% with normal iFR)  Surgeon: Gurvinder Yun MD;  Location: [facility] CARDIAC CATH LAB     CV FRACTIONAL FLOW RATIO WIRE N/A 10/24/2019    Procedure: Fractional Flow [place];  Surgeon: Micaela  Valdemar Hyatt MD;  Location:  HEART CARDIAC CATH LAB     CV LEFT HEART CATH Bilateral 2020    Procedure: Left Heart Cath w/wo Left Ventriculogram  ANGIOPLASTY WITH [place] SCI REP PRESENT;  Surgeon: Gurvinder Yun MD;  Location:  HEART CARDIAC CATH LAB     SPINAL FUSION  2010    L4-5     TOTAL HIP ARTHROPLASTY Left      TOTAL HIP ARTHROPLASTY Right 2/10/2016    Procedure:  RIGHT HIP TOTAL ARTHROPLASTY;  Surgeon: Jose Gaytan MD;  Location: [facility] OR;  Service:       Allergies   Allergen Reactions     Metoprolol      Fatigue and bradycardia     Niacin Other (See Comments)     Other reaction(s): Flushing         Norvasc [Amlodipine] Rash     Septra [Sulfamethoxazole W-Trimethoprim] Rash      Social History     Tobacco Use     Smoking status: Former Smoker     Types: Cigarettes     Quit date: 3/23/1987     Years since quittin.6     Smokeless tobacco: Never Used     Tobacco comment: Smokes cigars once every 2-3 years   Substance Use Topics     Alcohol use: Yes     Alcohol/week: 0.0 standard drinks     Comment: once every two weeks if that      Wt Readings from Last 1 Encounters:   21 90.7 kg (200 lb)        Anesthesia Evaluation   Pt has had prior anesthetic. Type: General and Regional.    No history of anesthetic complications       ROS/MED HX  ENT/Pulmonary:     (+) sleep apnea, asthma     Neurologic: Comment: Recent vertigo resulting in ED visit - resolved      Cardiovascular:     (+) hypertension--CAD --stent-    METS/Exercise Tolerance:     Hematologic:     (+) History of blood clots (On Coumadin), pt is anticoagulated,     Musculoskeletal:       GI/Hepatic:     (+) GERD, Asymptomatic on medication,     Renal/Genitourinary:  - neg Renal ROS     Endo:     (+) Obesity,     Psychiatric/Substance Use:  - neg psychiatric ROS     Infectious Disease:       Malignancy:       Other:            Physical Exam    Airway        Mallampati: II   TM distance: > 3 FB   Neck ROM: full      Respiratory Devices and Support         Dental  no notable dental history         Cardiovascular   cardiovascular exam normal          Pulmonary   pulmonary exam normal                OUTSIDE LABS:  CBC:   Lab Results   Component Value Date    WBC 6.8 11/11/2021    WBC 4.7 02/28/2020    HGB 14.8 11/11/2021    HGB 12.4 (L) 02/28/2020    HCT 44.2 11/11/2021    HCT 36.7 (L) 02/28/2020     11/11/2021     02/28/2020     BMP:   Lab Results   Component Value Date     11/11/2021     (L) 03/04/2020    POTASSIUM 4.2 11/11/2021    POTASSIUM 4.7 03/04/2020    CHLORIDE 102 11/11/2021    CHLORIDE 100 03/04/2020    CO2 26 11/11/2021    CO2 28 03/04/2020    BUN 9 11/11/2021    BUN 7 03/04/2020    CR 0.83 11/11/2021    CR 0.89 03/04/2020    GLC 98 11/11/2021     03/04/2020     COAGS:   Lab Results   Component Value Date    PTT 36 02/28/2020    INR 1.80 (H) 11/11/2021     POC: No results found for: BGM, HCG, HCGS  HEPATIC:   Lab Results   Component Value Date    ALBUMIN 4.2 10/22/2019    PROTTOTAL 8.2 10/22/2019    ALT 20 12/20/2019    AST 21 10/22/2019    ALKPHOS 65 10/22/2019    BILITOTAL 0.6 10/22/2019     OTHER:   Lab Results   Component Value Date    CODI 9.7 11/11/2021    TSH 1.35 10/22/2019       Anesthesia Plan    ASA Status:  3   NPO Status:  NPO Appropriate    Anesthesia Type: General.     - Airway: LMA   Induction: Propofol, Intravenous.   Maintenance: TIVA.        Consents    Anesthesia Plan(s) and associated risks, benefits, and realistic alternatives discussed. Questions answered and patient/representative(s) expressed understanding.    - Discussed:     - Discussed with:  Patient         Postoperative Care    Pain management: Neuraxial analgesia, IV analgesics, Multi-modal analgesia.   PONV prophylaxis: Ondansetron (or other 5HT-3), Dexamethasone or Solumedrol     Comments:    Other Comments: SAB + general with LMA + TIVA (surgery scheduled ~3hrs given redo PAM)  Phenylephrine gtt,  albumin 250cc  Decadron 10, Zofran 4            Cherelle Adams MD

## 2021-11-19 NOTE — ANESTHESIA CARE TRANSFER NOTE
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    Anesthesia Type:   General     Note:    Oropharynx: oropharynx clear of all foreign objects  Level of Consciousness: drowsy  Oxygen Supplementation: face mask  Level of Supplemental Oxygen (L/min / FiO2): 8  Independent Airway: airway patency satisfactory and stable  Dentition: dentition unchanged  Vital Signs Stable: post-procedure vital signs reviewed and stable  Report to RN Given: handoff report given  Patient transferred to: PACU    Handoff Report: Identifed the Patient, Identified the Reponsible Provider, Reviewed the pertinent medical history, Discussed the surgical course, Reviewed Intra-OP anesthesia mangement and issues during anesthesia, Set expectations for post-procedure period and Allowed opportunity for questions and acknowledgement of understanding      Vitals:  Vitals Value Taken Time   /69 11/19/21 1323   Temp 36.7  C (98.1  F) 11/19/21 1323   Pulse 74 11/19/21 1324   Resp 37 11/19/21 1324   SpO2 96 % 11/19/21 1324   Vitals shown include unvalidated device data.    Electronically Signed By: BRENDAN Guido CRNA  November 19, 2021  1:26 PM

## 2021-11-19 NOTE — ANESTHESIA CARE TRANSFER NOTE
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    Anesthesia Type:   General     Note:    Oropharynx: oropharynx clear of all foreign objects  Level of Consciousness: drowsy  Oxygen Supplementation: face mask  Level of Supplemental Oxygen (L/min / FiO2): 8  Independent Airway: airway patency satisfactory and stable  Dentition: dentition unchanged  Vital Signs Stable: post-procedure vital signs reviewed and stable  Report to RN Given: handoff report given  Patient transferred to: PACU    Handoff Report: Identifed the Patient, Identified the Reponsible Provider, Reviewed the pertinent medical history, Discussed the surgical course, Reviewed Intra-OP anesthesia mangement and issues during anesthesia, Set expectations for post-procedure period and Allowed opportunity for questions and acknowledgement of understanding      Vitals:  Vitals Value Taken Time   BP     Temp     Pulse     Resp     SpO2         Electronically Signed By: BRENDAN Guido CRNA  November 19, 2021  1:23 PM

## 2021-11-19 NOTE — PROGRESS NOTES
11/19/21 1600   Quick Adds   Type of Visit Initial PT Evaluation   Living Environment   People in home spouse   Current Living Arrangements other (see comments)  ([facility])   Home Accessibility wheelchair accessible   Number of Stairs, Within Home, Primary none   Self-Care   Equipment Currently Used at Home cane, straight;walker, rolling   General Information   Onset of Illness/Injury or Date of Surgery 11/19/21   Referring Physician Dr. Alber Mcfarland   Patient/Family Therapy Goals Statement (PT) Walk and move without pain   Pertinent History of Current Problem (include personal factors and/or comorbidities that impact the POC) S/P revision L PAM   Existing Precautions/Restrictions other (see comments);no hip IR;no hip ADD past midline;90 degree hip flexion  (Wound vace)   Weight-Bearing Status - LLE other (see comments)  (Foot flat 20lbs)   Bed Mobility   Bed Mobility supine-sit   Supine-Sit [place] (Bed Mobility) moderate assist (50% patient effort);1 person to manage equipment;verbal cues   Impairments Contributing to Impaired Bed Mobility decreased strength;pain;impaired balance   Comment (Bed Mobility) complaints of vertigo, sleeps in recliner at home due to vertigo   Transfers   Transfers sit-stand transfer   Impairments Contributing to Impaired Transfers pain;decreased strength   Sit-Stand Transfer   Sit-Stand [place] (Transfers) minimum assist (75% patient effort);verbal cues;2 person assist   Assistive Device (Sit-Stand Transfers) walker, front-wheeled   Gait/Stairs (Locomotion)   [place] Level (Gait) minimum assist (75% patient effort);2 person assist;verbal cues   Assistive Device (Gait) walker, front-wheeled   Distance in Feet (Required for LE Total Joints) SPT left   Pattern (Gait) step-to   Deviations/Abnormal Patterns (Gait) antalgic;gait speed decreased   Maintains Weight-bearing Status (Gait) verbal cues to maintain;nonverbal cues (demo/gesture) to maintain   Clinical Impression    Criteria for Skilled Therapeutic Intervention yes, treatment indicated   PT Diagnosis (PT) Impaired functional mobility   Influenced by the following impairments weakness, pain   Functional limitations due to impairments transfers, gait   Clinical Presentation Evolving/Changing   Clinical Presentation Rationale Pt presents as medically diagnosed   Clinical Decision Making (Complexity) moderate complexity   Therapy Frequency (PT) 2x/day   Predicted Duration of Therapy Intervention (days/wks) 5 days   Planned Therapy Interventions (PT) gait training;home exercise program;transfer training;strengthening;patient/family education   Anticipated Equipment Needs at Discharge (PT) walker, rolling;walker, standard;gait belt   Risk & Benefits of therapy have been explained evaluation/treatment results reviewed;care plan/treatment goals reviewed;patient;spouse/significant other   PT Discharge Planning    PT Discharge Recommendation (DC Rec) Transitional Care Facility   PT Rationale for DC Rec Difficulty maintaining weight bearing status with transfers. If home: wheelchair, assist with all transfers   Total Evaluation Time   Total Evaluation Time (Minutes) 10

## 2021-11-19 NOTE — ANESTHESIA POSTPROCEDURE EVALUATION
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    Anesthesia Type:  General    Note:  Disposition: Inpatient   Postop Pain Control: Uneventful            Sign Out: Well controlled pain   PONV: No   Neuro/Psych: Uneventful            Sign Out: Acceptable/Baseline neuro status   Airway/Respiratory: Uneventful            Sign Out: Acceptable/Baseline resp. status   CV/Hemodynamics: Uneventful            Sign Out: Acceptable CV status; No obvious hypovolemia; No obvious fluid overload   Other NRE: NONE   DID A NON-ROUTINE EVENT OCCUR? No           Last vitals:  Vitals Value Taken Time   /72 11/19/21 1400   Temp 36.7  C (98.1  F) 11/19/21 1323   Pulse 67 11/19/21 1406   Resp 35 11/19/21 1406   SpO2 95 % 11/19/21 1406   Vitals shown include unvalidated device data.    Electronically Signed By: Cherelle Adams MD  November 19, 2021  2:07 PM

## 2021-11-19 NOTE — PHARMACY-ADMISSION MEDICATION HISTORY
Pharmacy Note - Admission Medication History    Pertinent Provider Information:    ______________________________________________________________________    Prior To Admission (PTA) med list completed and updated in EMR.       PTA Med List   Medication Sig Note Last Dose     albuterol (PROAIR HFA/PROVENTIL HFA/VENTOLIN HFA) 108 (90 Base) MCG/ACT inhaler Inhale 2 puffs into the lungs every 4 hours as needed for shortness of breath / dyspnea or wheezing doesn't have current RX  not with     clopidogrel (PLAVIX) 75 MG tablet Take 1 tablet (75 mg) by mouth daily  11/12/2021     enoxaparin ANTICOAGULANT (LOVENOX) 100 MG/ML syringe Inject 100 mg Subcutaneous every 12 hours  11/19/2021: Will need a refill if he needs after surgery 11/18/2021 at 0600     gabapentin (NEURONTIN) 400 MG capsule Take 1,200 mg by mouth 3 times daily  11/19/2021 at AM     lisinopril (PRINIVIL/ZESTRIL) 20 MG tablet Take 1 tablet (20 mg) by mouth daily  11/19/2021 at Unknown time     meclizine (ANTIVERT) 12.5 MG tablet Take 2 tablets (25 mg) by mouth 4 times daily as needed for dizziness  11/19/2021 at AM     nitroGLYcerin (NITROSTAT) 0.4 MG sublingual tablet For chest pain place 1 tablet under the tongue every 5 minutes for 3 doses. If symptoms persist 5 minutes after 1st dose call 911.       omeprazole (PRILOSEC) 20 MG CR capsule Take 20 mg by mouth At Bedtime   11/18/2021 at Unknown time     rosuvastatin (CRESTOR) 20 MG tablet Take 1 tablet (20 mg) by mouth daily  11/18/2021 at Unknown time     warfarin ANTICOAGULANT (COUMADIN ANTICOAGULANT) 5 MG tablet Take 5 mg by mouth daily   11/12/2021       Information source(s): Patient, Clinic records and [facility]    Method of interview communication: in-person    Patient was asked about OTC/herbal products specifically.  PTA med list reflects this.    Based on the pharmacist's assessment, the PTA med list information appears reliable    Allergies were reviewed, assessed, and updated  with the patient.      Patient does not anticipate needing any multi-use medications during admission.     Thank you for the opportunity to participate in the care of this patient.      Lila Torrez RPH     11/19/2021     9:47 AM

## 2021-11-20 ENCOUNTER — APPOINTMENT (OUTPATIENT)
Dept: OCCUPATIONAL THERAPY | Facility: CLINIC | Age: 76
End: 2021-11-20
Attending: ORTHOPAEDIC SURGERY
Payer: MEDICARE

## 2021-11-20 ENCOUNTER — APPOINTMENT (OUTPATIENT)
Dept: PHYSICAL THERAPY | Facility: CLINIC | Age: 76
End: 2021-11-20
Attending: ORTHOPAEDIC SURGERY
Payer: MEDICARE

## 2021-11-20 LAB
FASTING STATUS PATIENT QL REPORTED: YES
GLUCOSE BLD-MCNC: 134 MG/DL (ref 70–125)
HGB BLD-MCNC: 11 G/DL (ref 13.3–17.7)
INR PPP: 1.18 (ref 0.85–1.15)
PLATELET # BLD AUTO: 253 10E3/UL (ref 150–450)

## 2021-11-20 PROCEDURE — 250N000011 HC RX IP 250 OP 636: Performed by: ORTHOPAEDIC SURGERY

## 2021-11-20 PROCEDURE — 85610 PROTHROMBIN TIME: CPT | Performed by: INTERNAL MEDICINE

## 2021-11-20 PROCEDURE — 99225 PR SUBSEQUENT OBSERVATION CARE,LEVEL II: CPT | Performed by: FAMILY MEDICINE

## 2021-11-20 PROCEDURE — 96372 THER/PROPH/DIAG INJ SC/IM: CPT | Performed by: INTERNAL MEDICINE

## 2021-11-20 PROCEDURE — 97116 GAIT TRAINING THERAPY: CPT | Mod: GP

## 2021-11-20 PROCEDURE — 82947 ASSAY GLUCOSE BLOOD QUANT: CPT | Performed by: ORTHOPAEDIC SURGERY

## 2021-11-20 PROCEDURE — 97535 SELF CARE MNGMENT TRAINING: CPT | Mod: GO,59

## 2021-11-20 PROCEDURE — 85049 AUTOMATED PLATELET COUNT: CPT | Performed by: ORTHOPAEDIC SURGERY

## 2021-11-20 PROCEDURE — 250N000013 HC RX MED GY IP 250 OP 250 PS 637: Performed by: INTERNAL MEDICINE

## 2021-11-20 PROCEDURE — 85018 HEMOGLOBIN: CPT | Performed by: ORTHOPAEDIC SURGERY

## 2021-11-20 PROCEDURE — 250N000011 HC RX IP 250 OP 636: Performed by: PHYSICIAN ASSISTANT

## 2021-11-20 PROCEDURE — 250N000011 HC RX IP 250 OP 636: Performed by: INTERNAL MEDICINE

## 2021-11-20 PROCEDURE — 97166 OT EVAL MOD COMPLEX 45 MIN: CPT | Mod: GO

## 2021-11-20 PROCEDURE — 36415 COLL VENOUS BLD VENIPUNCTURE: CPT | Performed by: ORTHOPAEDIC SURGERY

## 2021-11-20 PROCEDURE — 97530 THERAPEUTIC ACTIVITIES: CPT | Mod: GP

## 2021-11-20 PROCEDURE — 250N000013 HC RX MED GY IP 250 OP 250 PS 637: Performed by: ORTHOPAEDIC SURGERY

## 2021-11-20 RX ORDER — WARFARIN SODIUM 5 MG/1
5 TABLET ORAL
Status: COMPLETED | OUTPATIENT
Start: 2021-11-20 | End: 2021-11-20

## 2021-11-20 RX ADMIN — PANTOPRAZOLE SODIUM 40 MG: 20 TABLET, DELAYED RELEASE ORAL at 20:33

## 2021-11-20 RX ADMIN — ENOXAPARIN SODIUM 100 MG: 100 INJECTION SUBCUTANEOUS at 08:15

## 2021-11-20 RX ADMIN — GABAPENTIN 1200 MG: 400 CAPSULE ORAL at 14:44

## 2021-11-20 RX ADMIN — LISINOPRIL 20 MG: 20 TABLET ORAL at 08:13

## 2021-11-20 RX ADMIN — SENNOSIDES AND DOCUSATE SODIUM 1 TABLET: 50; 8.6 TABLET ORAL at 20:33

## 2021-11-20 RX ADMIN — WARFARIN SODIUM 5 MG: 5 TABLET ORAL at 17:21

## 2021-11-20 RX ADMIN — HYDROMORPHONE HYDROCHLORIDE 0.2 MG: 0.2 INJECTION, SOLUTION INTRAMUSCULAR; INTRAVENOUS; SUBCUTANEOUS at 16:03

## 2021-11-20 RX ADMIN — CLOPIDOGREL BISULFATE 75 MG: 75 TABLET, FILM COATED ORAL at 08:13

## 2021-11-20 RX ADMIN — GABAPENTIN 1200 MG: 400 CAPSULE ORAL at 08:13

## 2021-11-20 RX ADMIN — MECLIZINE HYDROCHLORIDE 25 MG: 25 TABLET ORAL at 17:23

## 2021-11-20 RX ADMIN — CEFAZOLIN SODIUM 2 G: 2 INJECTION, SOLUTION INTRAVENOUS at 02:11

## 2021-11-20 RX ADMIN — ACETAMINOPHEN 975 MG: 325 TABLET ORAL at 08:13

## 2021-11-20 RX ADMIN — ENOXAPARIN SODIUM 100 MG: 100 INJECTION SUBCUTANEOUS at 20:32

## 2021-11-20 RX ADMIN — ROSUVASTATIN CALCIUM 20 MG: 10 TABLET, FILM COATED ORAL at 20:33

## 2021-11-20 RX ADMIN — GABAPENTIN 1200 MG: 400 CAPSULE ORAL at 20:33

## 2021-11-20 RX ADMIN — SENNOSIDES AND DOCUSATE SODIUM 1 TABLET: 50; 8.6 TABLET ORAL at 08:13

## 2021-11-20 ASSESSMENT — ACTIVITIES OF DAILY LIVING (ADL): DEPENDENT_IADLS:: INDEPENDENT

## 2021-11-20 NOTE — PROVIDER NOTIFICATION
Spoke with Helena FLORES on-call for [id number]. Pt complaining of pain, no other medications available and too early for Oxycodone. She ordered changed in frequency from 4 hrs to 3 hrs. Ordered IV Dilaudid see order for details if needed.

## 2021-11-20 NOTE — CONSULTS
Care Management Initial Consult    General Information  Assessment completed with: Patient,    Type of CM/SW Visit: Initial Assessment    Primary Care Provider verified and updated as needed: Yes   Readmission within the last 30 days: no previous admission in last 30 days         Advance Care Planning:            Communication Assessment  Patient's communication style: spoken language (English or Bilingual)    Hearing Difficulty or Deaf: no   Wear Glasses or Blind: no    Cognitive  Cognitive/Neuro/Behavioral: .WDL except,motor response                      Living Environment:   People in home: spouse     Current living Arrangements: house      Able to return to prior arrangements: yes       Family/Social Support:  Care provided by: self  Provides care for: no one  Marital Status:   Wife          Description of Support System: Supportive         Current Resources:   Patient receiving home care services: Yes  Skilled Home Care Services: Skilled Nursing  Community Resources: None  Equipment currently used at home: walker, rolling  Supplies currently used at home:      Employment/Financial:  Employment Status:          Financial Concerns:             Lifestyle & Psychosocial Needs:  Social Determinants of Health     Tobacco Use: Medium Risk     Smoking Tobacco Use: Former Smoker     Smokeless Tobacco Use: Never Used   Alcohol Use: Not on file   Financial Resource Strain: Not on file   Food Insecurity: Not on file   Transportation Needs: Not on file   Physical Activity: Not on file   Stress: Not on file   Social Connections: Not on file   Intimate Partner Violence: Not on file   Depression: Not on file   Housing Stability: Not on file       Functional Status:  Prior to admission patient needed assistance:   Dependent ADLs:: Independent  Dependent IADLs:: Independent          Additional Information:  Assessment completed with patient. Spouse present. Patient independent with ADLS. He receives home RN visits with  [facility]. Spouse can assist with transportation. He will discharge home with [place] dressing. Discussed option for TCU. Patient declines stating going home would be best for him. He would like PT/OT added to his current home care services. Referral faxed to [facility].     Patient to return home with support from spouse and resumption of [facility] RN; adding PT/OT. Spouse to transport home.    Radha White RN

## 2021-11-20 NOTE — PLAN OF CARE
Problem: Adult Inpatient Plan of Care  Goal: Absence of Hospital-Acquired Illness or Injury  Intervention: Identify and Manage Fall Risk  Recent Flowsheet Documentation  Taken 11/20/2021 0813 by Alix Cardenas RN  Safety Promotion/Fall Prevention:   activity supervised   assistive device/personal items within reach   bed alarm on   chair alarm on   clutter free environment maintained   fall prevention program maintained   nonskid shoes/slippers when out of bed   patient and family education     Problem: Mobility Impairment (Orthopaedic Rehabilitation)  Goal: Optimal Mobility [place] and Safety  Intervention: Support Mobility [place] and Safety  Flowsheets (Taken 11/20/2021 1509)  Mobility Safety Promotion:   assistive device utilized   close supervision for balance provided   physical assistance provided   close supervision for safety provided   weight-bearing restrictions maintained    ALIX CARDENAS RN

## 2021-11-20 NOTE — PROGRESS NOTES
11/20/21 1000   Quick Adds   Type of Visit Initial Occupational Therapy Evaluation   Living Environment   People in home significant other   Home Accessibility wheelchair accessible   Self-Care   Equipment Currently Used at Home   (standard, walk in shower,)   Instrumental Activities of Daily Living (IADL)   IADL Comments pt reports Independent w/all ADLs at baseline   General Information   Onset of Illness/Injury or Date of Surgery 11/19/21   Referring Physician Bruna   Patient/Family Therapy Goal Statement (OT) To go home   Existing Precautions/Restrictions 90 degree hip flexion;weight bearing  (no internal rotation, no adduction, wb-flat foot 20 lbs )   Left Lower Extremity (Weight-bearing Status)   (Flat foot 20lb)   Cognitive Status Examination   Orientation Status orientation to person, place and time   Sensory   Sensory Comments Reports variable numbness in R hand and decreased sensation in R LE    Pain Assessment   Patient Currently in Pain No   Strength Comprehensive (MMT)   Comment, General Manual Muscle Testing (MMT) Assessment Grossly WFL   Transfers   Transfer Comments pt requires vc and sba/cga for safe tech w/bed chair transfer   Clinical Impression   Criteria for Skilled Therapeutic Interventions Met (OT) yes   OT Diagnosis Decreased ADL independence   OT Problem List-Impairments impacting ADL activity tolerance impaired;balance;pain   Assessment of Occupational Performance 1-3 Performance Deficits   Identified Performance Deficits toileting, dressing, fx transfers   Planned Therapy Interventions (OT) ADL retraining   Clinical Decision Making Complexity (OT) moderate complexity   Therapy Frequency (OT) Daily   Predicted Duration of Therapy 4   Anticipated Equipment Needs Upon Discharge (OT) commode chair   Risk & Benefits of therapy have been explained evaluation/treatment results reviewed   OT Discharge Planning    OT Discharge Recommendation (DC Rec) Home with assist;home with home care  occupational therapy   OT Rationale for DC Rec Pt could benefit from TCU though reports he does not want. Needs assist w/LE dressing AFO w/precautions and Commode   Total Evaluation Time (Minutes)   Total Evaluation Time (Minutes) 10

## 2021-11-20 NOTE — PROGRESS NOTES
[facility] MEDICINE  PROGRESS NOTE     Code Status: Full Code  Procedure(s):  REVISION LEFT TOTAL HIP ARTHROPLASTY  1 Day Post-Op  Identification/Summary:   Jose Tejada is a 76 year old male with a PMH of CAD, stent, DVT history, HTN, PRIMITIVO and neuropathy.  11/19/2021 admitted for left total hip arthroplasty revision.  Surgical cultures NGTD.  Medically stable.     Assessment and Plan:    Failure of Left hip arthroplasty   S/P revision of Left total hip arthroplasty  11/19/2021  Pain control per orthopedics.  Surgical cultures no growth to date.  Post-operative management per orthopedic surgery service  Acute blood loss anemia  Preoperative hemoglobin 14.8 now down to 11.  No indication for transfusion at this time.  Follow per protocols.  CAD with history of myocardial infarction and multiple stent placements  Essential hypertension  Blood pressure under reasonable control.  Continue clopidogrel  Continue lisinopril with hold parameters.  Peripheral neuropathy  Continue his home gabapentin.  Discussed with patient he could qualify for medicinal cannabis.  Would just need to see a certifying provider.  PRIMITIVO  Continue to utilize CPAP.    Anticoagulation  History of DVT  Resume warfarin anticoagulation management per pharmacy.  Utilize therapeutic enoxaparin bridging until INR greater than 2.  COVID-19 PCR negative from 11/16/2021  Noted.  Standard precautions.    Fluids: Saline lock  Pain meds: Per orthopedics  Therapy: Per orthopedics  Avery:Not present  Current Diet  Orders Placed This Encounter      Advance Diet as Tolerated: Regular Diet Adult      Discharge Instruction - Regular Diet Adult    Supplements  None    Barriers to Discharge: Surgical cultures, pain control and orthopedic clearance    Disposition: Medically stable    Interval History/Subjective:  Patient doing well.  Pain under good control.  No chest pain.  No shortness of breath.  No nausea or vomiting.  Did  have some challenges but feels like Dilaudid is working better.  Notes that his peripheral neuropathy has been challenging for him.  Does get cannabis supplements from [place].  Questions answered to verbalized satisfaction.    Physical Exam/Objective:  Temp:  [97.1  F (36.2  C)-98.2  F (36.8  C)] 98.2  F (36.8  C)  Pulse:  [56-80] 77  Resp:  [17-24] 18  BP: ()/(56-75) 114/68  SpO2:  [90 %-99 %] 95 %  Wt Readings from Last 4 Encounters:   11/19/21 95.3 kg (210 lb)   03/04/20 85.4 kg (188 lb 3.2 oz)   03/02/20 83.9 kg (185 lb)   02/28/20 84.1 kg (185 lb 4.8 oz)     Body mass index is 31.93 kg/m .    Constitutional: awake, alert, cooperative, no apparent distress, and appears stated age  ENT: Normocephalic, without obvious abnormality, atraumatic, external ears without lesions, oral pharynx with moist mucous membranes, tonsils without erythema or exudates, gums normal and good dentition.  Respiratory: No increased work of breathing, good air exchange, clear to auscultation bilaterally, no crackles or wheezing  Cardiovascular: Normal apical impulse, regular rate and rhythm, normal S1 and S2, no S3 or S4, and no murmur noted  GI: No scars, normal bowel sounds, soft, non-distended, non-tender, no masses palpated, no hepatosplenomegally  Skin: normal skin color, texture, turgor, no redness, warmth, or swelling, and no rashes  Musculoskeletal: There is no redness, warmth, or swelling of the joints.  Motor strength is 5 out of 5 all extremities bilaterally.  Tone is normal. no lower extremity pitting edema present  Neurologic: Cranial nerves II-XII are grossly intact. Sensory:  Sensory intact  Neuropsychiatric: General: normal, calm and normal eye contact Level of consciousness: alert / normal Affect: normal Orientation: oriented to self, place, time and situation Memory and insight: normal, memory for past and recent events intact and thought process normal      Medications:   Personally Reviewed.  Medications      lactated ringers 75 mL/hr at 11/19/21 1502     Warfarin Therapy Reminder         acetaminophen  975 mg Oral Q8H     clopidogrel  75 mg Oral Daily     enoxaparin ANTICOAGULANT  100 mg Subcutaneous Q12H     gabapentin  1,200 mg Oral TID     lisinopril  20 mg Oral Daily     pantoprazole  40 mg Oral At Bedtime     polyethylene glycol  17 g Oral Daily     rosuvastatin  20 mg Oral At Bedtime     senna-docusate  1 tablet Oral BID     sodium chloride (PF)  3 mL Intracatheter Q8H     warfarin ANTICOAGULANT  5 mg Oral ONCE at 18:00       Data reviewed today: I personally reviewed all new medications, labs, imaging/diagnostics reports over the past 24 hours. Pertinent findings include:    Imaging:   Recent Results (from the past 24 hour(s))   XR Pelvis w Hip Port Left G/E 2 Views    Narrative    EXAM: XR PELVIS AND HIP PORTABLE LEFT 2 VIEWS  LOCATION: [facility]  DATE/TIME: 11/19/2021 1:37 PM    INDICATION: Post PAM revision.  COMPARISON: None.      Impression    IMPRESSION: Postoperative changes of bilateral total hip arthroplasty. Cerclage wire fixation about the proximal aspect of the left femur. Components appear well-seated. Pelvis negative for fracture. Diffuse demineralization.       Labs:  XR Pelvis w Hip Port Left G/E 2 Views   Final Result   IMPRESSION: Postoperative changes of bilateral total hip arthroplasty. Cerclage wire fixation about the proximal aspect of the left femur. Components appear well-seated. Pelvis negative for fracture. Diffuse demineralization.        Recent Results (from the past 24 hour(s))   Creatinine    Collection Time: 11/19/21  6:19 PM   Result Value Ref Range    Creatinine 0.94 0.70 - 1.30 mg/dL    GFR Estimate 80 >60 mL/min/1.73m2   Hemoglobin    Collection Time: 11/20/21  7:43 AM   Result Value Ref Range    Hemoglobin 11.0 (L) 13.3 - 17.7 g/dL   INR    Collection Time: 11/20/21  7:43 AM   Result Value Ref Range    INR 1.18 (H) 0.85 - 1.15   Platelet count     Collection Time: 11/20/21  7:43 AM   Result Value Ref Range    Platelet Count 253 150 - 450 10e3/uL   Glucose    Collection Time: 11/20/21  7:43 AM   Result Value Ref Range    Glucose 134 (H) 70 - 125 mg/dL    Patient Fasting > 8hrs? Yes        Pending Labs:  Unresulted Labs Ordered in the Past 30 Days of this Admission     Date and Time Order Name Status Description    11/19/2021 10:58 AM Tissue Aerobic Bacterial Culture Routine In process     11/19/2021 10:58 AM Anaerobic Bacterial Culture Routine In process     11/19/2021 10:57 AM Tissue Aerobic Bacterial Culture Routine In process     11/19/2021 10:57 AM Anaerobic Bacterial Culture Routine In process     11/19/2021 10:46 AM Wound Aerobic Bacterial Culture Routine In process     11/19/2021 10:46 AM Anaerobic Bacterial Culture Routine In process             Eulalio Jaimes MD  [facility]  [facility]  Phone: #[phone]

## 2021-11-20 NOTE — CONSULTS
[facility]  Consult Note - Hospitalist Service     Date of Admission:  11/19/2021  Consult Requested by: Alber Mcfarland MD  Reason for Consult: Post-operative management of coronary artery disease and other medical conditions      Assessment & Plan     Failure of Left hip arthroplasty S/P revision of Left total hip arthroplasty   -  Underwent operation on 11/19  -  Pain control  -  Post-operative management per orthopedic surgery service    CAD with history of myocardial infarction and multiple stent placements  -  Continue clopidogrel    History of DVT  -  Resume warfarin anticoagulation with therapeutic enoxaparin bridging    Primary hypertension  - Continue lisinopril with holding parameters      VTE risk reduction.  -  Resume warfarin anticoagulation with therapeutic enoxaparin bridging        The patient's care was discussed with the patient.      Duane Hodge MD  [facility]  Securely message with the Vocera Web Console (learn more here)  Text page via Perfect[employer]erve Paging/Directory        Clinically Significant Risk Factors Present on Admission             # Coagulation Defect: home medication list includes an anticoagulant medication  # Platelet Defect: home medication list includes an antiplatelet medication      ______________________________________________________________________    Chief Complaint  Left hip arthroplasty failure      History is obtained from the patient      History of Present Illness   Jose Tejada is a 76-year-old gentleman with a medical history significant for dyslipidemia, hypertension, coronary artery disease status post multiple stent placements, arthritis, asthma, anxiety and depression, thrombophilia, history of DVT and osteoarthritis who underwent revision of Left total hip arthroplasty for treatment of failed Left hip arthroplasty.  I evaluated pt following his operation.  Did not report chest pain, palpitations or shortness of  breath.    Patient has a history of coronary artery disease and has had a myocardial infarction and multiple stent placements.  Was taking clopidogrel prior to admission.  Did not report recent chest pain or palpitations or shortness of breath.    He has a history of thrombophilia and DVT.  Was on warfarin anticoagulation.  Was suspended prior to operation with enoxaparin bridging.        Review of Systems   The 10 point Review of Systems is negative other than noted in the HPI or here.     Past Medical History    I have reviewed this patient's medical history and updated it with pertinent information if needed.   Past Medical History:   Diagnosis Date     Arthritis      Asthma      CAD (coronary artery disease)     1996 angioplasty and stent to the prox LAD, PTCA with intracoronary stent placement of RCA, 70%OM; 10/24/19 Cath: Occluded RCA, prox circumflex lesion - pd/pa not significant, advised medical therapy first by Interventional cardiologist     Depressive disorder      DVT (deep venous thrombosis) (H)      Factor 5 Leiden mutation, heterozygous (H)      GERD (gastroesophageal reflux disease)      HTN (hypertension)      Hyperlipidemia      Neuropathy     wears brace R foot for drop foot     Neuropathy      Obese      PRIMITIVO (obstructive sleep apnea)     cpap     Protein C deficiency (H)      Spinal stenosis      Stented coronary artery      Thrombosis        Past Surgical History   I have reviewed this patient's surgical history and updated it with pertinent information if needed.  Past Surgical History:   Procedure Laterality Date     APPENDECTOMY       APPENDECTOMY OPEN  1958     ARTHROPLASTY HIP Left 1997     ARTHROPLASTY REVISION HIP Left 2013     ARTHROPLASTY REVISION HIP Left      AS SPINAL FUSION,ANT,EA ADNL LEVEL  2010    L4-L5     C TOTAL HIP ARTHROPLASTY Right 2015    THR     CORONARY STENT PLACEMENT  1996     CV CORONARY ANGIOGRAM N/A 10/24/2019    Procedure: Coronary Angiogram;  Surgeon: Micaela  Valdemar Hyatt MD;  Location: [facility] CARDIAC CATH LAB     CV CORONARY ANGIOGRAM  1996 angioplasty and stent to the prox LAD, PTCA with intracoronary stent placement of RCA, 70%OM     CV CORONARY ANGIOGRAM N/A 2020    Procedure: Coronary Angiogram 2nd attempt of RCA  successful--(lad stent ok, Lcx 70% with normal iFR)  Surgeon: Gurvinder Yun MD;  Location: [facility] CARDIAC CATH LAB     CV FRACTIONAL FLOW RATIO WIRE N/A 10/24/2019    Procedure: Fractional Flow [place];  Surgeon: Valdemar Hinojosa MD;  Location: [facility] CARDIAC CATH LAB     CV LEFT HEART CATH Bilateral 2020    Procedure: Left Heart Cath w/wo Left Ventriculogram  ANGIOPLASTY WITH [place] SCI REP PRESENT;  Surgeon: Gurvinder Yun MD;  Location: [facility] CARDIAC CATH LAB     SPINAL FUSION      L4-5     TOTAL HIP ARTHROPLASTY Left      TOTAL HIP ARTHROPLASTY Right 2/10/2016    Procedure:  RIGHT HIP TOTAL ARTHROPLASTY;  Surgeon: Jose Gaytan MD;  Location: [facility];  Service:        Social History   I have reviewed this patient's social history and updated it with pertinent information if needed.  Social History     Tobacco Use     Smoking status: Former Smoker     Types: Cigarettes     Quit date: 3/23/1987     Years since quittin.6     Smokeless tobacco: Never Used     Tobacco comment: Smokes cigars once every 2-3 years   Substance Use Topics     Alcohol use: Yes     Alcohol/week: 0.0 standard drinks     Comment: once every two weeks if that     Drug use: Never       Family History   I have reviewed this patient's family history and updated it with pertinent information if needed.  Family History   Problem Relation Age of Onset     Myocardial Infarction Father      Heart Disease Maternal Grandfather        Medications   Medications Prior to Admission   Medication Sig Dispense Refill Last Dose     albuterol (PROAIR HFA/PROVENTIL HFA/VENTOLIN HFA) 108 (90 Base) MCG/ACT inhaler Inhale 2 puffs into the  lungs every 4 hours as needed for shortness of breath / dyspnea or wheezing doesn't have current RX   not with     clopidogrel (PLAVIX) 75 MG tablet Take 1 tablet (75 mg) by mouth daily 30 tablet 0 11/12/2021     enoxaparin ANTICOAGULANT (LOVENOX) 100 MG/ML syringe Inject 100 mg Subcutaneous every 12 hours    11/18/2021 at 0600     gabapentin (NEURONTIN) 400 MG capsule Take 1,200 mg by mouth 3 times daily   11/19/2021 at AM     lisinopril (PRINIVIL/ZESTRIL) 20 MG tablet Take 1 tablet (20 mg) by mouth daily 30 tablet 0 11/19/2021 at Unknown time     meclizine (ANTIVERT) 12.5 MG tablet Take 2 tablets (25 mg) by mouth 4 times daily as needed for dizziness 15 tablet 0 11/19/2021 at AM     nitroGLYcerin (NITROSTAT) 0.4 MG sublingual tablet For chest pain place 1 tablet under the tongue every 5 minutes for 3 doses. If symptoms persist 5 minutes after 1st dose call 911. 20 tablet 0      omeprazole (PRILOSEC) 20 MG CR capsule Take 20 mg by mouth At Bedtime    11/18/2021 at Unknown time     rosuvastatin (CRESTOR) 20 MG tablet Take 1 tablet (20 mg) by mouth daily 90 tablet 3 11/18/2021 at takes at bedtime     warfarin ANTICOAGULANT (COUMADIN ANTICOAGULANT) 5 MG tablet Take 5 mg by mouth daily    11/12/2021       Allergies   Allergies   Allergen Reactions     Metoprolol      Fatigue and bradycardia     Niacin Other (See Comments)     Other reaction(s): Flushing         Norvasc [Amlodipine] Rash     Septra [Sulfamethoxazole W-Trimethoprim] Rash       Physical Exam   Vital Signs: Temp: 97.1  F (36.2  C) Temp src: Oral BP: 129/65 Pulse: 66   Resp: 20 SpO2: 94 % O2 Device: None (Room air) Oxygen Delivery: 2 LPM  Weight: 210 lbs 0 oz  General: Awake, alert, appropriately interactive.  Appeared comfortable.  HEENT: Sclera without redness or jaundice.  External ears appear normal  Cardiac: Heart rate controlled, lower extremities not edematous  Pulmonary: Clear to auscultation in bilateral lung fields  Abdomen: Not distended.  Not  tender to palpation.  Musculoskeletal: No joint abnormalities noted  Skin: Left hip incision with wound VAC applied  Neurologic: Awake, alert, appropriately interactive  Psychiatric: Calm        Data   Most Recent 3 CBC's:Recent Labs   Lab Test 11/11/21  1020 02/28/20  0740 01/20/20  0829   WBC 6.8 4.7 5.8   HGB 14.8 12.4* 12.3*   MCV 92 94 93    232 214     Most Recent 3 BMP's:Recent Labs   Lab Test 11/11/21  1020 03/04/20  0934 02/28/20  0740    134* 138   POTASSIUM 4.2 4.7 4.1   CHLORIDE 102 100 106   CO2 26 28 32   BUN 9 7 12   CR 0.83 0.89 0.86   ANIONGAP 8 10.7 <1*   CODI 9.7 9.7 9.1   GLC 98 103 87     Most Recent 2 LFT's:Recent Labs   Lab Test 12/20/19  0722 10/22/19  1602 04/21/14  0000 04/03/14  0000   AST  --  21  --  20   ALT 20 25   < >  --    ALKPHOS  --  65  --   --    BILITOTAL  --  0.6  --   --     < > = values in this interval not displayed.     Most Recent 3 INR's:Recent Labs   Lab Test 11/19/21  0849 11/11/21  1020 03/06/20  1110   INR 1.07 1.80* 2.00*

## 2021-11-20 NOTE — PLAN OF CARE
Patient vital signs are at baseline: Yes  Patient able to ambulate as they were prior to admission or with assist devices provided by therapies during their stay:  No,  Reason:  Pt has WB restrictions, needs direction to follow. Has difficulty ambulating, currently pivots to chair and commode.   Patient MUST void prior to discharge:  Yes  Patient able to tolerate oral intake:  Yes  Pain has adequate pain control using Oral analgesics:  No,  Reason:  Did get one dose of IV Dilaudid. Pt appeared physically uncomfortable and verbalized 10/10 pain, given PRN IV Dilaudid, did provide relief, pain was 2/10 during follow-up and he appeared calm and relaxed.

## 2021-11-20 NOTE — PHARMACY-ANTICOAGULATION SERVICE
Clinical Pharmacy - Warfarin Dosing Consult     Pharmacy has been consulted to manage this patient s warfarin therapy.  Indication: Atrial Fibrillation;DVT Treatment hx  Therapy Goal: INR 2-3  Provider/Team: NICHOLE  Warfarin Prior to Admission: Yes  Warfarin PTA Regimen: 5mg daily  Significant drug interactions: clopidogrel, Lovenox, rosuvastain, APAP, Ancef x2, omeprazole/pantoprazole  Recent documented change in oral intake/nutrition: Unknown  Dose Comments: Resume 5mg home dose    INR   Date Value Ref Range Status   11/19/2021 1.07 0.85 - 1.15 Final   11/11/2021 1.80 (H) 0.85 - 1.15 Final       Recommend warfarin 5 mg today.  Pharmacy will monitor Jose Tejada daily and order warfarin doses to achieve specified goal.      Please contact pharmacy as soon as possible if the warfarin needs to be held for a procedure or if the warfarin goals change.      Lorraine Huff, PabloD, BCPS

## 2021-11-20 NOTE — PROGRESS NOTES
"                  [facility] Progress Note      Post-operative Day: 1 Day Post-Op    Procedure(s):  REVISION LEFT TOTAL HIP ARTHROPLASTY      Subjective:  Patient doing well. Has not really been out of bed yet.   Bedside commode.  Needed some Dilaudid last night for acute pain.  Doing better now.    Chronic bilateral LE neuropathy , Left LE more painful then Right  Pain: minimal at rest in bed  Chest pain, SOB:  No      Objective:  Blood pressure 111/64, pulse 72, temperature 98.2  F (36.8  C), temperature source Oral, resp. rate 18, height 1.727 m (5' 8\"), weight 95.3 kg (210 lb), SpO2 90 %.    Patient Vitals for the past 24 hrs:   BP Temp Temp src Pulse Resp SpO2 Height Weight   11/20/21 0427 111/64 98.2  F (36.8  C) Oral 72 18 90 % -- --   11/19/21 2352 98/56 98.1  F (36.7  C) Oral 68 18 94 % -- --   11/19/21 2200 111/64 97.9  F (36.6  C) Oral 76 20 92 % -- --   11/19/21 1800 129/65 97.1  F (36.2  C) Oral 66 20 94 % -- --   11/19/21 1700 122/68 97.1  F (36.2  C) Oral 76 20 94 % -- --   11/19/21 1600 120/75 97.1  F (36.2  C) Oral 75 20 91 % -- --   11/19/21 1528 109/64 97.2  F (36.2  C) Oral 56 20 97 % -- --   11/19/21 1458 113/63 97.3  F (36.3  C) Oral 61 20 96 % -- --   11/19/21 1419 -- 97.1  F (36.2  C) Temporal 66 24 94 % -- --   11/19/21 1400 108/72 -- -- 64 20 99 % -- --   11/19/21 1350 114/69 -- -- 63 20 99 % -- --   11/19/21 1340 -- -- -- 67 17 97 % -- --   11/19/21 1323 110/69 98.1  F (36.7  C) Temporal 80 20 97 % -- --   11/19/21 0903 135/81 97.5  F (36.4  C) Temporal 68 18 95 % 1.727 m (5' 8\") 95.3 kg (210 lb)       Wt Readings from Last 4 Encounters:   11/19/21 95.3 kg (210 lb)   03/04/20 85.4 kg (188 lb 3.2 oz)   03/02/20 83.9 kg (185 lb)   02/28/20 84.1 kg (185 lb 4.8 oz)         Motor function, sensation, and circulation intact   Yes, can do SLR in bed.    Wound status: incisions are clean dry and intact. Yes. Wound vac intact and working.    Calf tenderness: Bilateral  No    Pertinent Labs "   Lab Results: personally reviewed.     Recent Labs   Lab Test 11/19/21  0849 11/11/21  1020 03/06/20  1110 03/04/20  1125 03/04/20  0934 03/02/20  1909 02/28/20  0740 02/26/20  0723 01/20/20  0829   INR 1.07 1.80* 2.00*   < >  --    < > 1.12   < > 1.52*   HGB  --  14.8  --   --   --   --  12.4*  --  12.3*   HCT  --  44.2  --   --   --   --  36.7*  --  36.2*   MCV  --  92  --   --   --   --  94  --  93   PLT  --  264  --   --   --   --  232  --  214   NA  --  136  --   --  134*  --  138  --   --     < > = values in this interval not displayed.       Plan: Anticoagulation protocol: lovenox 100 mg q 12 until INR therapeutic (start today).  Resume chronic plavix and warfarin            POSTOPERATIVE PLAN:  -Pain control- oxycodone and IV dilaudid if needed  -24 hours routine IV antibiotics              -Follow  intra-operative cultures- results still pending  --Foot flat, 20 WB LLE, PT/OT  -Maintain Pravena dressing - please ensure that this is plugged in and charging while he is in bed, please ensure the  goes with him on discharge.  Start PT today.  Likely discharge to home tomorrow if PT goals met, pain controlled, medically cleared by hospitalist    Report completed by:  Phoenix Velazquez PA-C  Date: 11/20/2021  Time: 7:49 AM

## 2021-11-21 ENCOUNTER — APPOINTMENT (OUTPATIENT)
Dept: PHYSICAL THERAPY | Facility: CLINIC | Age: 76
End: 2021-11-21
Attending: ORTHOPAEDIC SURGERY
Payer: MEDICARE

## 2021-11-21 ENCOUNTER — APPOINTMENT (OUTPATIENT)
Dept: OCCUPATIONAL THERAPY | Facility: CLINIC | Age: 76
End: 2021-11-21
Attending: ORTHOPAEDIC SURGERY
Payer: MEDICARE

## 2021-11-21 LAB
BACTERIA WND CULT: NO GROWTH
FASTING STATUS PATIENT QL REPORTED: NO
GLUCOSE BLD-MCNC: 96 MG/DL (ref 70–125)
HGB BLD-MCNC: 10.5 G/DL (ref 13.3–17.7)
INR PPP: 1.21 (ref 0.85–1.15)

## 2021-11-21 PROCEDURE — 36415 COLL VENOUS BLD VENIPUNCTURE: CPT | Performed by: ORTHOPAEDIC SURGERY

## 2021-11-21 PROCEDURE — 250N000013 HC RX MED GY IP 250 OP 250 PS 637: Performed by: PHYSICIAN ASSISTANT

## 2021-11-21 PROCEDURE — 250N000011 HC RX IP 250 OP 636: Performed by: INTERNAL MEDICINE

## 2021-11-21 PROCEDURE — 99207 PR CDG-CODE CATEGORY CHANGED: CPT | Performed by: FAMILY MEDICINE

## 2021-11-21 PROCEDURE — 97535 SELF CARE MNGMENT TRAINING: CPT | Mod: GO

## 2021-11-21 PROCEDURE — 85018 HEMOGLOBIN: CPT | Performed by: ORTHOPAEDIC SURGERY

## 2021-11-21 PROCEDURE — 99213 OFFICE O/P EST LOW 20 MIN: CPT | Performed by: FAMILY MEDICINE

## 2021-11-21 PROCEDURE — 82947 ASSAY GLUCOSE BLOOD QUANT: CPT | Performed by: ORTHOPAEDIC SURGERY

## 2021-11-21 PROCEDURE — 250N000013 HC RX MED GY IP 250 OP 250 PS 637: Performed by: ORTHOPAEDIC SURGERY

## 2021-11-21 PROCEDURE — 97110 THERAPEUTIC EXERCISES: CPT | Mod: GP

## 2021-11-21 PROCEDURE — 97530 THERAPEUTIC ACTIVITIES: CPT | Mod: GP

## 2021-11-21 PROCEDURE — 96372 THER/PROPH/DIAG INJ SC/IM: CPT | Performed by: INTERNAL MEDICINE

## 2021-11-21 PROCEDURE — 85610 PROTHROMBIN TIME: CPT | Performed by: INTERNAL MEDICINE

## 2021-11-21 PROCEDURE — 250N000013 HC RX MED GY IP 250 OP 250 PS 637: Performed by: INTERNAL MEDICINE

## 2021-11-21 RX ORDER — OXYCODONE HYDROCHLORIDE 5 MG/1
5-10 TABLET ORAL EVERY 4 HOURS PRN
Qty: 30 TABLET | Refills: 0 | Status: SHIPPED | OUTPATIENT
Start: 2021-11-21 | End: 2021-11-21

## 2021-11-21 RX ORDER — ACETAMINOPHEN 325 MG/1
650 TABLET ORAL EVERY 4 HOURS PRN
Qty: 100 TABLET | Refills: 0 | COMMUNITY
Start: 2021-11-22 | End: 2021-11-22

## 2021-11-21 RX ORDER — HYDROMORPHONE HYDROCHLORIDE 2 MG/1
2-4 TABLET ORAL
Status: DISCONTINUED | OUTPATIENT
Start: 2021-11-21 | End: 2021-11-22 | Stop reason: HOSPADM

## 2021-11-21 RX ORDER — WARFARIN SODIUM 7.5 MG/1
7.5 TABLET ORAL
Status: COMPLETED | OUTPATIENT
Start: 2021-11-21 | End: 2021-11-21

## 2021-11-21 RX ORDER — AMOXICILLIN 250 MG
1 CAPSULE ORAL 2 TIMES DAILY
Qty: 30 TABLET | Refills: 1 | Status: SHIPPED | OUTPATIENT
Start: 2021-11-21 | End: 2021-11-22

## 2021-11-21 RX ORDER — HYDROMORPHONE HYDROCHLORIDE 2 MG/1
2 TABLET ORAL EVERY 4 HOURS PRN
Qty: 30 TABLET | Refills: 0 | Status: SHIPPED | OUTPATIENT
Start: 2021-11-21 | End: 2021-11-22

## 2021-11-21 RX ADMIN — ENOXAPARIN SODIUM 100 MG: 100 INJECTION SUBCUTANEOUS at 21:40

## 2021-11-21 RX ADMIN — ACETAMINOPHEN 975 MG: 325 TABLET ORAL at 16:25

## 2021-11-21 RX ADMIN — GABAPENTIN 1200 MG: 400 CAPSULE ORAL at 21:41

## 2021-11-21 RX ADMIN — WARFARIN SODIUM 7.5 MG: 7.5 TABLET ORAL at 17:41

## 2021-11-21 RX ADMIN — SENNOSIDES AND DOCUSATE SODIUM 1 TABLET: 50; 8.6 TABLET ORAL at 21:41

## 2021-11-21 RX ADMIN — PANTOPRAZOLE SODIUM 40 MG: 20 TABLET, DELAYED RELEASE ORAL at 21:41

## 2021-11-21 RX ADMIN — GABAPENTIN 1200 MG: 400 CAPSULE ORAL at 09:28

## 2021-11-21 RX ADMIN — ROSUVASTATIN CALCIUM 20 MG: 10 TABLET, FILM COATED ORAL at 21:41

## 2021-11-21 RX ADMIN — SENNOSIDES AND DOCUSATE SODIUM 1 TABLET: 50; 8.6 TABLET ORAL at 10:55

## 2021-11-21 RX ADMIN — ENOXAPARIN SODIUM 100 MG: 100 INJECTION SUBCUTANEOUS at 10:55

## 2021-11-21 RX ADMIN — OXYCODONE HYDROCHLORIDE 10 MG: 5 TABLET ORAL at 09:28

## 2021-11-21 RX ADMIN — GABAPENTIN 1200 MG: 400 CAPSULE ORAL at 14:40

## 2021-11-21 RX ADMIN — ACETAMINOPHEN 975 MG: 325 TABLET ORAL at 09:28

## 2021-11-21 RX ADMIN — HYDROMORPHONE HYDROCHLORIDE 4 MG: 4 TABLET ORAL at 21:40

## 2021-11-21 RX ADMIN — HYDROMORPHONE HYDROCHLORIDE 2 MG: 4 TABLET ORAL at 16:25

## 2021-11-21 RX ADMIN — CLOPIDOGREL BISULFATE 75 MG: 75 TABLET, FILM COATED ORAL at 09:28

## 2021-11-21 NOTE — PROGRESS NOTES
"Ortho Progress Note      Post-operative Day: 2 Days Post-Op  S/P Procedure(s):  REVISION LEFT TOTAL HIP ARTHROPLASTY      Assessment: 2 days s/p L PAM revision. Doing well.    Plan:   Continue PT/OT - if does well today, may be able to discharge home today.  Keep dressing intact.   Weightbearing status:20 lb flat foot weight bearing on the LLE  Anticoagulation: Lovenox bridging to coumadin and plavix in addition to SCDs, winter stockings and early ambulation.  Discharge planning: Home with home care, possibly later today      Subjective:  Pain: moderate  Chest pain, SOB:  NO  Nausea, vomiting:  NO  Lightheadedness, dizziness:  NO  Neuro:  Patient denies new onset numbness or paresthesias    Objective:  /72 (BP Location: Right arm)   Pulse 65   Temp 97.8  F (36.6  C) (Oral)   Resp 16   Ht 1.727 m (5' 8\")   Wt 95.3 kg (210 lb)   SpO2 92%   BMI 31.93 kg/m    Gen: A&O x 3. NAD. Appears comfortable while seated in a chair.  Wound status: clean, dry and intact Pravena dressing.  Circulation, motion and sensation: Dorsiflexion/plantarflexion intact and equal bilaterally; distal lower extremity sensation is equal bilaterally and unchanged since prior to surgery.  Swelling: mild  Calf tenderness: calves are soft and non-tender bilaterally.    Pertinent Labs   Lab Results: personally reviewed.   Lab Results   Component Value Date    INR 1.21 (H) 11/21/2021    INR 1.18 (H) 11/20/2021    INR 1.07 11/19/2021     Lab Results   Component Value Date    WBC 6.8 11/11/2021    HGB 10.5 (L) 11/21/2021    HCT 44.2 11/11/2021    MCV 92 11/11/2021     11/20/2021     Lab Results   Component Value Date     11/11/2021    CO2 26 11/11/2021         Report completed by:  Yoko Delatorre PA-C  Date: 11/21/2021  Time: 8:31 AM    "

## 2021-11-21 NOTE — PROGRESS NOTES
Care Management Follow Up    Length of Stay (days): 0    Expected Discharge Date: 11/22/2021     Concerns to be Addressed:  Mobility, post procedure care and monitoring, pain       Patient plan of care discussed at interdisciplinary rounds: Yes    Anticipated Discharge Disposition:  home     Anticipated Discharge Services:  Home care RN/PT/OT  Anticipated Discharge DME: wheelchair     Education Provided on the Discharge Plan:  yes  Patient/Family in Agreement with the Plan:  yes    Referrals Placed by CM/SW:  Home care resumption         Additional Information:  Orthopedics following for revision of left hip POD 2. Patient will discharge with Prevana wound vac.    Recommendation is for home with home therapy and TCU and to have a standard wheelchair.    Assessment History: Patient independent with ADLS. He receives home RN visits with [facility]. Spouse can assist with transportation. He will discharge home with Pravena dressing. Discussed option for TCU. Patient declines stating going home would be best for him. He would like PT/OT added to his current home care services. Referral faxed to [facility].        Met with patient and spouse to discuss discharge plan. Again patient declines TCU. Patient to return home with support from spouse and resumption of [facility] Care RN; adding PT/OT. Spouse confirms either she or her daughter will be with patient to monitor and assist as needed. DME supply stores closed today. Patient states he has one he can borrow but would like to obtain his own.  He states he thinks [facility] would have wheelchairs. He would need a small one to fit in his house. Instructed he and spouse to call [facility] tomorrow. Hospitalist to provide wheelchair order. Spouse to transport home.               Radha White RN

## 2021-11-21 NOTE — PLAN OF CARE
Problem: Pain (Hip Arthroplasty)  Goal: Acceptable Pain Control  Intervention: Prevent or Manage Pain  Recent Flowsheet Documentation  Taken 11/20/2021 1603 by Martha Burns RN  Pain Management Interventions:   medication (see MAR)   cold applied   Pain managed with iv pain medication and ice packs this shift.  Patient refused all offerings of oral pain medication.    Patient vital signs are at baseline: Yes  Patient able to ambulate as they were prior to admission or with assist devices provided by therapies during their stay:  Yes  Patient MUST void prior to discharge:  Yes  Patient able to tolerate oral intake:  Yes  Pain has adequate pain control using Oral analgesics:  Yes

## 2021-11-21 NOTE — PROGRESS NOTES
[facility] MEDICINE  PROGRESS NOTE     Code Status: Full Code  Procedure(s):  REVISION LEFT TOTAL HIP ARTHROPLASTY  2 Days Post-Op  Identification/Summary:   76 year old male with a PMH of CAD, stent, DVT history, HTN, PRIMITIVO and neuropathy.  11/19/2021 admitted for left total hip arthroplasty revision.  Surgical cultures NGTD.  Medically stable.      Assessment and Plan:     Failure of Left hip arthroplasty   S/P revision of Left total hip arthroplasty  11/19/2021  Pain control per orthopedics.  Negative pressure wound therapy drain in place.  Surgical cultures no growth to date.  Post-operative management per orthopedic surgery service  Acute blood loss anemia  Preoperative hemoglobin 14.8 down to 11--> 10.5.  No indication for transfusion at this time.  Follow per protocols.  CAD with history of myocardial infarction and multiple stent placements  Essential hypertension  Blood pressure under reasonable control.  Continue clopidogrel  Continue lisinopril with hold parameters.  Peripheral neuropathy  Continue his home gabapentin.  Discussed with patient he could qualify for medicinal cannabis.  Would just need to see a certifying provider.  PRIMITIVO  Continue to utilize CPAP.     Chronic anticoagulation  Factor V Leiden heterozygote  Protein C deficiency  History of DVT  Resume warfarin anticoagulation management per pharmacy.  Utilize therapeutic enoxaparin bridging until INR greater than 2.  11/21 INR 1.21.    Will need prescription for Lovenox at discharge.  Would continue 5 mg daily.  INR check with primary in 2 days.  COVID-19 PCR negative from 11/16/2021  Noted.  Standard precautions.  Fluids: Saline lock  Pain meds: Per orthopedics  Therapy: Per orthopedics   Avery:Not present  Current Diet  Orders Placed This Encounter      Advance Diet as Tolerated: Regular Diet Adult      Discharge Instruction - Regular Diet Adult    Supplements  None    Barriers to Discharge: Orthopedic  clearance.  Medically stable for discharge.    Disposition: Discharge medication reconciliation reviewed and our area of responsibility was addressed.    Interval History/Subjective:  Patient doing well.  Pain under good control.  No chest pain.  No shortness of breath.  No nausea or vomiting.  No lightheadedness or dizziness.  Hoping to discharge home later today.  Questions answered to verbalized satisfaction.    Physical Exam/Objective:  Temp:  [97.2  F (36.2  C)-98.3  F (36.8  C)] 97.8  F (36.6  C)  Pulse:  [65-78] 65  Resp:  [16] 16  BP: ()/(59-75) 110/72  SpO2:  [91 %-93 %] 92 %  Wt Readings from Last 4 Encounters:   11/19/21 95.3 kg (210 lb)   03/04/20 85.4 kg (188 lb 3.2 oz)   03/02/20 83.9 kg (185 lb)   02/28/20 84.1 kg (185 lb 4.8 oz)     Body mass index is 31.93 kg/m .    Constitutional: awake, alert, cooperative, no apparent distress, and appears stated age  ENT: Normocephalic, without obvious abnormality, atraumatic, external ears without lesions, oral pharynx with moist mucous membranes, tonsils without erythema or exudates, gums normal and good dentition.  Respiratory: No increased work of breathing, good air exchange, clear to auscultation bilaterally, no crackles or wheezing  Cardiovascular: Normal apical impulse, regular rate and rhythm, normal S1 and S2, no S3 or S4, and no murmur noted  GI: No scars, normal bowel sounds, soft, non-distended, non-tender, no masses palpated, no hepatosplenomegally  Skin: normal skin color, texture, turgor, no redness, warmth, or swelling, and no rashes  Musculoskeletal: There is no redness, warmth, or swelling of the joints.  Motor strength is 5 out of 5 all extremities bilaterally.  Tone is normal. no lower extremity pitting edema present  Neurologic: Cranial nerves II-XII are grossly intact. Sensory:  Sensory intact  Neuropsychiatric: General: normal, calm and normal eye contact Level of consciousness: alert / normal Affect: normal Orientation: oriented to  self, place, time and situation Memory and insight: normal, memory for past and recent events intact and thought process normal      Medications:   Personally Reviewed.  Medications     lactated ringers 75 mL/hr at 11/19/21 1502     Warfarin Therapy Reminder         acetaminophen  975 mg Oral Q8H     clopidogrel  75 mg Oral Daily     enoxaparin ANTICOAGULANT  100 mg Subcutaneous Q12H     gabapentin  1,200 mg Oral TID     lisinopril  20 mg Oral Daily     pantoprazole  40 mg Oral At Bedtime     polyethylene glycol  17 g Oral Daily     rosuvastatin  20 mg Oral At Bedtime     senna-docusate  1 tablet Oral BID     sodium chloride (PF)  3 mL Intracatheter Q8H       Data reviewed today: I personally reviewed all new medications, labs, imaging/diagnostics reports over the past 24 hours. Pertinent findings include:    Imaging:   No results found for this or any previous visit (from the past 24 hour(s)).    Labs:  XR Pelvis w Hip Port Left G/E 2 Views   Final Result   IMPRESSION: Postoperative changes of bilateral total hip arthroplasty. Cerclage wire fixation about the proximal aspect of the left femur. Components appear well-seated. Pelvis negative for fracture. Diffuse demineralization.        Recent Results (from the past 24 hour(s))   Hemoglobin    Collection Time: 11/21/21  8:10 AM   Result Value Ref Range    Hemoglobin 10.5 (L) 13.3 - 17.7 g/dL   INR    Collection Time: 11/21/21  8:10 AM   Result Value Ref Range    INR 1.21 (H) 0.85 - 1.15   Glucose    Collection Time: 11/21/21  8:10 AM   Result Value Ref Range    Glucose 96 70 - 125 mg/dL    Patient Fasting > 8hrs? No        Pending Labs:  Unresulted Labs Ordered in the Past 30 Days of this Admission     Date and Time Order Name Status Description    11/19/2021 10:58 AM Tissue Aerobic Bacterial Culture Routine Preliminary     11/19/2021 10:58 AM Anaerobic Bacterial Culture Routine Preliminary     11/19/2021 10:57 AM Tissue Aerobic Bacterial Culture Routine Preliminary      11/19/2021 10:57 AM Anaerobic Bacterial Culture Routine Preliminary     11/19/2021 10:46 AM Wound Aerobic Bacterial Culture Routine Preliminary     11/19/2021 10:46 AM Anaerobic Bacterial Culture Routine Preliminary           Eulalio Jaimes MD  [facility]  Phone: #[phone]

## 2021-11-21 NOTE — PLAN OF CARE
Patient vital signs are at baseline: Yes  Patient able to ambulate as they were prior to admission or with assist devices provided by therapies during their stay:  Yes  Patient MUST void prior to discharge:  Yes  Patient able to tolerate oral intake:  Yes  Pain has adequate pain control using Oral analgesics:  Yes    Pt reports that the tylenol and oxy isn't doing anything for his pain. Rated his hip pain a 5/10 overnight, declined need for pain medications at this time. Pt has been staying in bed this shift. Wound vac intact, plugged in. Strength in the left foot intact, chronic right foot drop noted.     Plan for him to discharge today to home with home health. Pt has been already been using these services.

## 2021-11-21 NOTE — UTILIZATION REVIEW
Concurrent stay review; Secondary Review Determination     Under the authority of the Utilization Management Committee, the utilization review process indicated a secondary review on Jose Tejada.  The review outcome is based on review of the medical records, discussions with staff, and applying clinical experience noted on the date of the review.        (x) Post Procedure Status Appropriate - Concurrent stay review    RATIONALE FOR DETERMINATION   76 yr old male s/p L PAM revision surgery on 11/19/21.  Cultures negative to date and no suspected infection at time of procedure.  CAD with HTN and on plavix as well as warfarin for prior DVT thus hgb trending from 14.8 to 11 to 10.5 but no transfusion indicated.  Medically stable with [facility] consult and following.  Working with therapy.    Patient is clinically improving and there is no clear indication to change patient's status to inpatient. The severity of illness, intensity of service provided, expected LOS and risk for adverse outcome make the care appropriate for observation.    The information on this document is developed by the utilization review team in order for the business office to ensure compliance.  This only denotes the appropriateness of proper admission status and does not reflect the quality of care rendered.         The definitions of Inpatient Status and Observation Status used in making the determination above are those provided in the CMS Coverage Manual, Chapter 1 and Chapter 6, section 70.4.      Sincerely,   Valentine Rojo MD  Utilization Review  Physician Advisor  [facility]

## 2021-11-22 ENCOUNTER — APPOINTMENT (OUTPATIENT)
Dept: PHYSICAL THERAPY | Facility: CLINIC | Age: 76
End: 2021-11-22
Attending: ORTHOPAEDIC SURGERY
Payer: MEDICARE

## 2021-11-22 ENCOUNTER — APPOINTMENT (OUTPATIENT)
Dept: OCCUPATIONAL THERAPY | Facility: CLINIC | Age: 76
End: 2021-11-22
Attending: ORTHOPAEDIC SURGERY
Payer: MEDICARE

## 2021-11-22 VITALS
HEART RATE: 62 BPM | SYSTOLIC BLOOD PRESSURE: 122 MMHG | DIASTOLIC BLOOD PRESSURE: 76 MMHG | HEIGHT: 68 IN | RESPIRATION RATE: 16 BRPM | BODY MASS INDEX: 31.83 KG/M2 | WEIGHT: 210 LBS | OXYGEN SATURATION: 96 % | TEMPERATURE: 98.4 F

## 2021-11-22 LAB
INR PPP: 1.34 (ref 0.85–1.15)
PLATELET # BLD AUTO: 243 10E3/UL (ref 150–450)

## 2021-11-22 PROCEDURE — 36415 COLL VENOUS BLD VENIPUNCTURE: CPT | Performed by: INTERNAL MEDICINE

## 2021-11-22 PROCEDURE — 99212 OFFICE O/P EST SF 10 MIN: CPT | Performed by: FAMILY MEDICINE

## 2021-11-22 PROCEDURE — 96372 THER/PROPH/DIAG INJ SC/IM: CPT | Performed by: INTERNAL MEDICINE

## 2021-11-22 PROCEDURE — 250N000011 HC RX IP 250 OP 636: Performed by: INTERNAL MEDICINE

## 2021-11-22 PROCEDURE — 97535 SELF CARE MNGMENT TRAINING: CPT | Mod: GO

## 2021-11-22 PROCEDURE — 250N000013 HC RX MED GY IP 250 OP 250 PS 637: Performed by: ORTHOPAEDIC SURGERY

## 2021-11-22 PROCEDURE — 85610 PROTHROMBIN TIME: CPT | Performed by: INTERNAL MEDICINE

## 2021-11-22 PROCEDURE — 250N000013 HC RX MED GY IP 250 OP 250 PS 637: Performed by: INTERNAL MEDICINE

## 2021-11-22 PROCEDURE — 97530 THERAPEUTIC ACTIVITIES: CPT | Mod: GP

## 2021-11-22 PROCEDURE — 250N000013 HC RX MED GY IP 250 OP 250 PS 637: Performed by: PHYSICIAN ASSISTANT

## 2021-11-22 PROCEDURE — 97129 THER IVNTJ 1ST 15 MIN: CPT | Mod: GO

## 2021-11-22 PROCEDURE — 99207 PR CDG-CODE CATEGORY CHANGED: CPT | Performed by: FAMILY MEDICINE

## 2021-11-22 PROCEDURE — 85049 AUTOMATED PLATELET COUNT: CPT | Performed by: ORTHOPAEDIC SURGERY

## 2021-11-22 RX ORDER — CEFADROXIL 500 MG/1
500 CAPSULE ORAL 2 TIMES DAILY
Qty: 14 CAPSULE | Refills: 0 | Status: SHIPPED | OUTPATIENT
Start: 2021-11-22 | End: 2023-04-14

## 2021-11-22 RX ORDER — ACETAMINOPHEN 325 MG/1
975 TABLET ORAL EVERY 8 HOURS PRN
Qty: 100 TABLET | Refills: 0 | Status: ON HOLD | OUTPATIENT
Start: 2021-11-22 | End: 2024-02-16

## 2021-11-22 RX ORDER — AMOXICILLIN 250 MG
1 CAPSULE ORAL 2 TIMES DAILY
Qty: 30 TABLET | Refills: 0 | Status: ON HOLD | OUTPATIENT
Start: 2021-11-22 | End: 2023-05-17

## 2021-11-22 RX ORDER — HYDROMORPHONE HYDROCHLORIDE 2 MG/1
2-4 TABLET ORAL EVERY 4 HOURS PRN
Qty: 30 TABLET | Refills: 0 | Status: SHIPPED | OUTPATIENT
Start: 2021-11-22 | End: 2023-05-21

## 2021-11-22 RX ADMIN — HYDROMORPHONE HYDROCHLORIDE 2 MG: 4 TABLET ORAL at 01:02

## 2021-11-22 RX ADMIN — ACETAMINOPHEN 975 MG: 325 TABLET ORAL at 09:38

## 2021-11-22 RX ADMIN — CLOPIDOGREL BISULFATE 75 MG: 75 TABLET, FILM COATED ORAL at 09:37

## 2021-11-22 RX ADMIN — SENNOSIDES AND DOCUSATE SODIUM 1 TABLET: 50; 8.6 TABLET ORAL at 09:38

## 2021-11-22 RX ADMIN — GABAPENTIN 1200 MG: 400 CAPSULE ORAL at 09:36

## 2021-11-22 RX ADMIN — HYDROMORPHONE HYDROCHLORIDE 2 MG: 4 TABLET ORAL at 06:46

## 2021-11-22 RX ADMIN — ACETAMINOPHEN 975 MG: 325 TABLET ORAL at 00:07

## 2021-11-22 RX ADMIN — ENOXAPARIN SODIUM 100 MG: 100 INJECTION SUBCUTANEOUS at 09:47

## 2021-11-22 RX ADMIN — HYDROMORPHONE HYDROCHLORIDE 2 MG: 4 TABLET ORAL at 12:46

## 2021-11-22 NOTE — PROGRESS NOTES
11/22/21 1045   Cognitive Assessments   Cognitive Assessments Completed [facility] Mental Status Exam ([facility]):  Total Score out of /30 25/30   Norms 21-26 equals mild neurocognitive disorder   Domains assessed: orientation, memory, attention, executive functions       Mild impairment noted.  Pt does have a high school education.  Wife will be present at home to assist Pt.  Recommend further cognitive testing once out of the acute care hospital and medically stable.    Nicolas Lopez, OTR/L  11/22/2021

## 2021-11-22 NOTE — DISCHARGE INSTRUCTIONS
Home Care Services have been arranged for you.  Home Care Agency:  [facility]  Home Care Agency Telephone:  [phone]  Services: resumption of Skilled Nursing visits, and addition of Physical Therapy and Occupational Therapy  Instructions:

## 2021-11-22 NOTE — PLAN OF CARE
Patient vital signs are at baseline: Yes   Patient able to ambulate as they were prior to admission or with assist devices provided by therapies during their stay:  Yes  Patient MUST void prior to discharge:  Yes  Patient able to tolerate oral intake:  Yes  Pain has adequate pain control using Oral analgesics:  Yes     Pt accepting PRN PO dilaudid and tylenol overnight. Pain has been well controlled. Bled 2L into CPAP due to POX dropping into the 80's. Pox will go up when awakened but to avoid the alarm, 2L was put on.     Plan for discharge home today with home PT/OT/RN.

## 2021-11-22 NOTE — PLAN OF CARE
Occupational Therapy Discharge Summary    Reason for therapy discharge:    Discharged to home.  With Home Care for OT    Progress towards therapy goal(s). [facility] for goal details.  Goals partially met.  Barriers to achieving goals:   discharge from facility.    Therapy recommendation(s):    Continued therapy is recommended.  Rationale/Recommendations:  Would benefit from Home OT for further therapy to increase indep with ADLs and trsfs..

## 2021-11-22 NOTE — PLAN OF CARE
Physical Therapy Discharge Summary    Reason for therapy discharge:    Discharged to home with home therapy.    Progress towards therapy goal(s). See goals on Care Plan in [facility] electronic health record for goal details.  Goals partially met.  Barriers to achieving goals:   limited tolerance for therapy.    Therapy recommendation(s):    Continued therapy is recommended.  Rationale/Recommendations:  Paitent not at functional mobiliiy baseline due to weight limit restricition..

## 2021-11-22 NOTE — PROGRESS NOTES
[facility] MEDICINE  PROGRESS NOTE     Code Status: Full Code  Procedure(s):  REVISION LEFT TOTAL HIP ARTHROPLASTY  3 Days Post-Op  Identification/Summary:   76 year old male with a PMH of CAD, stent, DVT history, HTN, PRIMITIVO and neuropathy.  11/19/2021 admitted for left total hip arthroplasty revision.  Surgical cultures NGTD.  Medically stable.      Assessment and Plan:     Failure of Left hip arthroplasty   S/P revision of Left total hip arthroplasty  11/19/2021  Pain control per orthopedics.  Negative pressure wound therapy drain in place.  Surgical cultures no growth to date.  Post-operative management per orthopedic surgery service  Acute blood loss anemia  Preoperative hemoglobin 14.8 down to 11--> 10.5.  No indication for transfusion at this time.  Follow per protocols.  CAD with history of myocardial infarction and multiple stent placements  Essential hypertension  Blood pressure under reasonable control.  Continue clopidogrel  Continue lisinopril with hold parameters.  Peripheral neuropathy  Continue his home gabapentin.  Discussed with patient he could qualify for medicinal cannabis.  Would just need to see a certifying provider.  PRIMITIVO  Continue to utilize CPAP.     Chronic anticoagulation  Factor V Leiden heterozygote  Protein C deficiency  History of DVT  Resume warfarin anticoagulation management per pharmacy.  Utilize therapeutic enoxaparin bridging until INR greater than 2.  11/21 INR 1.21.    Given prescription for Lovenox at discharge.  Would continue 5 mg daily.  INR check with primary in 2 days.  COVID-19 PCR negative from 11/16/2021  Noted.  Standard precautions.  Fluids: Saline lock  Pain meds: Per orthopedics  Therapy: Per orthopedics   Avery:Not present  Current Diet  Orders Placed This Encounter      Advance Diet as Tolerated: Regular Diet Adult      Discharge Instruction - Regular Diet Adult      Discharge Instruction - Regular Diet  Adult    Supplements  None    Barriers to Discharge: Medically cleared for discharge.  Discharge medication reconciliation reviewed and our area of responsibility was addressed.    Disposition: Anticipate departure today 11/22    Interval History/Subjective:  Doing well.  No active concerns.  No chest pain.  No shortness of breath.  No nausea or vomiting.  Ready for discharge home later today.  Questions answered to verbalized satisfaction.    Physical Exam/Objective:  Temp:  [97.3  F (36.3  C)-98.2  F (36.8  C)] 98.2  F (36.8  C)  Pulse:  [57-64] 58  Resp:  [16-18] 18  BP: (107-130)/(61-75) 114/70  FiO2 (%):  [2 %] 2 %  SpO2:  [86 %-96 %] 96 %  Wt Readings from Last 4 Encounters:   11/19/21 95.3 kg (210 lb)   03/04/20 85.4 kg (188 lb 3.2 oz)   03/02/20 83.9 kg (185 lb)   02/28/20 84.1 kg (185 lb 4.8 oz)     Body mass index is 31.93 kg/m .    Constitutional: awake, alert, cooperative, no apparent distress, and appears stated age  ENT: Normocephalic, without obvious abnormality, atraumatic, external ears without lesions, oral pharynx with moist mucous membranes, tonsils without erythema or exudates, gums normal and good dentition.  Respiratory: No increased work of breathing, good air exchange, clear to auscultation bilaterally, no crackles or wheezing  Cardiovascular: Normal apical impulse, regular rate and rhythm, normal S1 and S2, no S3 or S4, and no murmur noted  GI: No scars, normal bowel sounds, soft, non-distended, non-tender, no masses palpated, no hepatosplenomegally  Skin: normal skin color, texture, turgor, no redness, warmth, or swelling, and no rashes  Musculoskeletal: Right boot left in place.  There is no redness, warmth, or swelling of the joints.  Motor strength is 5 out of 5 all extremities bilaterally.  Tone is normal. no lower extremity pitting edema present  Neurologic: Cranial nerves II-XII are grossly intact. Sensory:  Sensory intact  Neuropsychiatric: General: normal, calm and normal eye  contact Level of consciousness: alert / normal Affect: normal Orientation: oriented to self, place, time and situation Memory and insight: normal, memory for past and recent events intact and thought process normal      Medications:   Personally Reviewed.  Medications     lactated ringers Stopped (11/22/21 0700)     Warfarin Therapy Reminder         acetaminophen  975 mg Oral Q8H     clopidogrel  75 mg Oral Daily     enoxaparin ANTICOAGULANT  100 mg Subcutaneous Q12H     gabapentin  1,200 mg Oral TID     lisinopril  20 mg Oral Daily     pantoprazole  40 mg Oral At Bedtime     polyethylene glycol  17 g Oral Daily     rosuvastatin  20 mg Oral At Bedtime     senna-docusate  1 tablet Oral BID     sodium chloride (PF)  3 mL Intracatheter Q8H       Data reviewed today: I personally reviewed all new medications, labs, imaging/diagnostics reports over the past 24 hours. Pertinent findings include:    Imaging:   No results found for this or any previous visit (from the past 24 hour(s)).    Labs:  XR Pelvis w Hip Port Left G/E 2 Views   Final Result   IMPRESSION: Postoperative changes of bilateral total hip arthroplasty. Cerclage wire fixation about the proximal aspect of the left femur. Components appear well-seated. Pelvis negative for fracture. Diffuse demineralization.        Recent Results (from the past 24 hour(s))   Platelet count    Collection Time: 11/22/21  7:10 AM   Result Value Ref Range    Platelet Count 243 150 - 450 10e3/uL   INR    Collection Time: 11/22/21  7:10 AM   Result Value Ref Range    INR 1.34 (H) 0.85 - 1.15       Pending Labs:  Unresulted Labs Ordered in the Past 30 Days of this Admission     Date and Time Order Name Status Description    11/19/2021 10:58 AM Tissue Aerobic Bacterial Culture Routine Preliminary     11/19/2021 10:58 AM Anaerobic Bacterial Culture Routine Preliminary     11/19/2021 10:57 AM Tissue Aerobic Bacterial Culture Routine Preliminary     11/19/2021 10:57 AM Anaerobic Bacterial  Culture Routine Preliminary     11/19/2021 10:46 AM Anaerobic Bacterial Culture Routine Preliminary             Eulalio Jaimes MD  [facility]  Phone: #[phone]

## 2021-11-22 NOTE — PLAN OF CARE
Problem: Adult Inpatient Plan of Care  Goal: Absence of Hospital-Acquired Illness or Injury  Intervention: Identify and Manage Fall Risk  Recent Flowsheet Documentation  Taken 11/21/2021 1625 by Ander Capellan RN  Safety Promotion/Fall Prevention: bed alarm on     Problem: Bowel Motility Impaired (Hip Arthroplasty)  Goal: Effective Bowel Elimination  Outcome: Improving     Problem: Pain (Hip Arthroplasty)  Goal: Acceptable Pain Control  Outcome: Improving  Intervention: Prevent or Manage Pain  Recent Flowsheet Documentation  Taken 11/21/2021 1700 by Ander Capellan, RN  Pain Management Interventions: cold applied  Taken 11/21/2021 1625 by Ander Capellan RN  Pain Management Interventions:   medication (see MAR)   cold applied   Patient alert and oriented. Up assist of one with walker and gait belt PWB LLE. Tolerating regular diet. Voiding spontaneously. Surgical site clean dry and intact. Wound vac secure patent with no drainage this shift. Neuro at baseline. Pain controlled with available pain medication. Plan discharge home pending progression. Will monitor and continue plan of care.     Patient vital signs are at baseline: Yes  Patient able to ambulate as they were prior to admission or with assist devices provided by therapies during their stay:  Yes  Patient MUST void prior to discharge:  Yes  Patient able to tolerate oral intake:  Yes  Pain has adequate pain control using Oral analgesics:  Yes

## 2021-11-22 NOTE — DISCHARGE SUMMARY
ORTHOPEDIC DISCHARGE SUMMARY       Jose Tejada,  1945, MRN [id number]    Admission Date: 2021  Admission Diagnoses: Failure of left total hip arthroplasty (H) [T84.011A]  S/P total hip arthroplasty [Z96.649]     Discharge Date:  21    Post-operative Day:  3 Days Post-Op    Reason for Admission: The patient was admitted for the following:  Procedure(s):  REVISION LEFT TOTAL HIP ARTHROPLASTY      BRIEF HOSPITAL COURSE   This 76 year old male underwent the aforementioned procedure with Dr. Mcfarland on 21. There were no intraoperative complications and the patient was transferred to the recovery room and later the orthopedic unit in stable condition. Once the patient reached the orthopedic floor our orthopedic pain protocol was implemented along with the following:    Anticoagulation Medications: Warfarin and Lovenox until therapeutic INR  Therapy: PT and OT  Activity: Flat foot 20 pounds weight bearing of LLE  Bracing: None    Consultations during Admission: Hospitalist service for medical management     COMPLICATIONS/SIGNIFICANT FINDINGS    None    DISCHARGE INFORMATION   Condition at discharge: Good  Discharge destination: Home with home cares  Patient was seen by myself on the date of discharge.    FOLLOW UP CARE   Follow up with orthopedics in 2 weeks or sooner should the need arise. Ortho will continue to manage pain control, post op anticoagulation and incision care.     Follow up with your PCP for management of chronic medical problems and to evaluate for post op medical complications including constipation, nausea/vomiting, DVT/PE, anemia, changes in blood pressure, fevers/chills, urinary retention and atelectasis/pneumonia.     Subjective   Patient is doing well today. He plans to discharge home with home PT/OT/RN. He has the Prevena which will stay in place for 2 weeks. We discussed making his follow up appointment for next Friday 12/3/21. He is managing his pain with Dilaudid  "which he is tolerating well. He feels ready to discharge home. No other complaints. All questions answered.    Physical Exam   The patient is A&Ox3.  Sensation is intact.  Dorsiflexion and plantar flexion is intact.  Dorsalis pedis pulse intact.  Calves are soft and non-tender.   The incision is covered. Dressing C/D/I. Prevena in place.      /70 (BP Location: Right arm)   Pulse 58   Temp 98.2  F (36.8  C) (Oral)   Resp 18   Ht 1.727 m (5' 8\")   Wt 95.3 kg (210 lb)   SpO2 96%   BMI 31.93 kg/m      Pertinent Results at Discharge     Hemoglobin   Date/Time Value Ref Range Status   11/21/2021 08:10 AM 10.5 (L) 13.3 - 17.7 g/dL Final   11/20/2021 07:43 AM 11.0 (L) 13.3 - 17.7 g/dL Final   11/11/2021 10:20 AM 14.8 13.3 - 17.7 g/dL Final   02/28/2020 07:40 AM 12.4 (L) 13.3 - 17.7 g/dL Final   01/20/2020 08:29 AM 12.3 (L) 13.3 - 17.7 g/dL Final   01/17/2020 05:45 AM 12.4 (L) 13.3 - 17.7 g/dL Final     INR   Date/Time Value Ref Range Status   11/22/2021 07:10 AM 1.34 (H) 0.85 - 1.15 Final   11/21/2021 08:10 AM 1.21 (H) 0.85 - 1.15 Final   11/20/2021 07:43 AM 1.18 (H) 0.85 - 1.15 Final   03/06/2020 11:10 AM 2.00 (H) 0.86 - 1.14 Final   03/04/2020 11:25 AM 1.48 (H) 0.86 - 1.14 Final   03/02/2020 07:09 PM 1.20 (H) 0.86 - 1.14 Final     Platelet Count   Date/Time Value Ref Range Status   11/22/2021 07:10  150 - 450 10e3/uL Final   11/20/2021 07:43  150 - 450 10e3/uL Final   11/11/2021 10:20  150 - 450 10e3/uL Final   02/28/2020 07:40  150 - 450 10e9/L Final   01/20/2020 08:29  150 - 450 10e9/L Final   01/17/2020 05:45  150 - 450 10e9/L Final       Medications at Discharge   [unfilled]    Problem List   Active Problems:    Dyslipidemia    Coronary artery disease involving native coronary artery of native heart without angina pectoris    PRIMITIVO (obstructive sleep apnea)    Neuropathy    DVT (deep venous thrombosis) (H)    Asthma    S/P total hip arthroplasty    Depression    " Benign essential hypertension    Paroxysmal atrial fibrillation (H)    Recurrent major depressive disorder, remission status unspecified (H)    Restless leg syndrome    CAD (coronary artery disease)    Arteriosclerosis of coronary artery    Chronic pain    Failure of left total hip arthroplasty (H)        Dorota Burnett PA-C, [facility]  Date: 11/22/2021  Time: 9:32 AM

## 2021-11-22 NOTE — PLAN OF CARE
Problem: Adult Inpatient Plan of Care  Goal: Optimal Comfort and Wellbeing  Outcome: Adequate for Discharge     Problem: Adjustment to Decreased Mobility and Helm (Orthopaedic Rehabilitation)  Goal: Optimal Coping  Outcome: Adequate for Discharge     Patient vital signs are at baseline: Yes  Patient able to ambulate as they were prior to admission or with assist devices provided by therapies during their stay:  Yes  Patient MUST void prior to discharge:  Yes  Patient able to tolerate oral intake:  Yes  Pain has adequate pain control using Oral analgesics:  Yes    Wound vac in place on left hip. Patient planning to discharge to home today with family support, home care services and in home therapy.

## 2021-11-22 NOTE — PROGRESS NOTES
Care Management Discharge Note    Discharge Date: 11/22/2021     Discharge Disposition: Home,Home Care    Discharge Services:  Home Care, OP f/u    Discharge DME: None    Discharge Transportation: family or friend will provide    Private pay costs discussed: Not applicable    PAS Confirmation Code:  Not applicable  Patient/family educated on Medicare website which has current facility and service quality ratings:      Education Provided on the Discharge Plan:  yes  Persons Notified of Discharge Plans: yes, pt  Patient/Family in Agreement with the Plan: yes    Handoff Referral Completed: Yes    Additional Information:  Call placed to [facility] and spoke with Faye. Updated on plans to discharge today. Orders faxed. They will plan to resume Skilled Nursing visits and add PT and OT visits    Met with pt to discuss discharge plans. He verbalizes agreement with current plan. He wants to make sure [facility].  He plans to work on obtaining w/c after discharge. He states his spouse or daughter will transport home.    Call placed to Faye and provided update of pt's concerns regarding INR checks. She states they will resume all previous orders, which includes INR checks.    Fina Puga RN

## 2021-11-24 LAB
BACTERIA TISS BX CULT: NO GROWTH
BACTERIA TISS BX CULT: NO GROWTH

## 2021-11-26 LAB
BACTERIA TISS BX CULT: NORMAL
BACTERIA TISS BX CULT: NORMAL
BACTERIA WND CULT: NORMAL

## 2021-11-28 ENCOUNTER — NURSE TRIAGE (OUTPATIENT)
Dept: NURSING | Facility: CLINIC | Age: 76
End: 2021-11-28
Payer: MEDICARE

## 2021-11-29 NOTE — TELEPHONE ENCOUNTER
"\"I have a drain tube coming out of my hip from the surgery. There is a machine that was supposed to keep running. It is buzzing and beeping. Wound vac\".  Surgery 11/19/2021. Discharged 11/22/2021. The machine stopped working about 45 min ago.  Left total hip revision. His machine =Prevena \"which will stay in place for 2 weeks.\" He states there is not much coming out into the container.   [facility] [phone]  Advised patient to call [facility] and request the oncall nurse. He agrees to do this.     Alberta Philippe RN Triage Nurse Advisor 10:10 PM 11/28/2021           "

## 2023-04-14 ENCOUNTER — OFFICE VISIT (OUTPATIENT)
Dept: CARDIOLOGY | Facility: CLINIC | Age: 78
End: 2023-04-14
Payer: MEDICARE

## 2023-04-14 VITALS
OXYGEN SATURATION: 93 % | BODY MASS INDEX: 35.03 KG/M2 | WEIGHT: 231.1 LBS | HEART RATE: 55 BPM | DIASTOLIC BLOOD PRESSURE: 70 MMHG | SYSTOLIC BLOOD PRESSURE: 110 MMHG | HEIGHT: 68 IN

## 2023-04-14 DIAGNOSIS — E66.01 MORBID OBESITY (H): ICD-10-CM

## 2023-04-14 DIAGNOSIS — I20.0 UNSTABLE ANGINA (H): Primary | ICD-10-CM

## 2023-04-14 PROCEDURE — 99204 OFFICE O/P NEW MOD 45 MIN: CPT | Performed by: INTERNAL MEDICINE

## 2023-04-14 PROCEDURE — 93000 ELECTROCARDIOGRAM COMPLETE: CPT | Performed by: INTERNAL MEDICINE

## 2023-04-14 RX ORDER — IPRATROPIUM BROMIDE AND ALBUTEROL SULFATE 2.5; .5 MG/3ML; MG/3ML
3 SOLUTION RESPIRATORY (INHALATION) DAILY PRN
Status: ON HOLD | COMMUNITY
Start: 2023-03-03 | End: 2024-02-16

## 2023-04-14 RX ORDER — HYDROCORTISONE CREAM 1% 10 MG/G
CREAM TOPICAL
Status: ON HOLD | COMMUNITY
Start: 2022-05-19 | End: 2024-02-16

## 2023-04-14 RX ORDER — CLOTRIMAZOLE 1 %
CREAM (GRAM) TOPICAL
COMMUNITY
Start: 2022-05-19 | End: 2023-05-20

## 2023-04-14 NOTE — PROGRESS NOTES
HPI and Plan:   See dictation    Orders Placed This Encounter   Procedures     EKG 12-lead complete w/read - Clinics (performed today)     Orders Placed This Encounter   Medications     clotrimazole (LOTRIMIN) 1 % external cream     Sig: APPLY THIN LAYER TOPICALLY TWICE A DAY AS NEEDED **EXTERNAL USE ONLY** FOR ATHLETE'S FOOT     Emollient (VANICREAM) LOTN     Sig: APPLY THIN LAYER TOPICALLY EVERY DAY FOR DRY SKIN, FOR DERMATITIS  TO AREAS OF DRY SKIN, IDEALLY WITHIN 3 MINUTES AFTER BATH OR SHOWER. FOR DRY SKIN, FOR DERMATITIS  TO AREAS OF DRY SKIN, IDEALLY WITHIN 3 MINUTES AFTER BATH OR SHOWER.     hydrocortisone acetate 1 % CREA     Sig: APPLY THIN LAYER TOPICALLY EVERY DAY FOR DRY SKIN, FOR DERMATITIS  TO AREAS OF DRY SKIN, IDEALLY WITHIN 3 MINUTES AFTER BATH OR SHOWER. FOR DRY SKIN, FOR DERMATITIS  TO AREAS OF DRY SKIN, IDEALLY WITHIN 3 MINUTES AFTER BATH OR SHOWER.     ipratropium - albuterol 0.5 mg/2.5 mg/3 mL (DUONEB) 0.5-2.5 (3) MG/3ML neb solution     Sig: Inhale 3 mLs into the lungs daily as needed     Medications Discontinued During This Encounter   Medication Reason     cefadroxil (DURICEF) 500 MG capsule          Encounter Diagnosis   Name Primary?     Chest pain Yes       CURRENT MEDICATIONS:  Current Outpatient Medications   Medication Sig Dispense Refill     acetaminophen (TYLENOL) 325 MG tablet Take 3 tablets (975 mg) by mouth every 8 hours as needed for other (For optimal non-opioid multimodal pain management to improve pain control.) Max 3000mg/24 hours. 100 tablet 0     albuterol (PROAIR HFA/PROVENTIL HFA/VENTOLIN HFA) 108 (90 Base) MCG/ACT inhaler Inhale 2 puffs into the lungs every 4 hours as needed for shortness of breath / dyspnea or wheezing doesn't have current RX       clopidogrel (PLAVIX) 75 MG tablet Take 1 tablet (75 mg) by mouth daily 30 tablet 0     clotrimazole (LOTRIMIN) 1 % external cream APPLY THIN LAYER TOPICALLY TWICE A DAY AS NEEDED **EXTERNAL USE ONLY** FOR ATHLETE'S FOOT        Emollient (VANICREAM) LOTN APPLY THIN LAYER TOPICALLY EVERY DAY FOR DRY SKIN, FOR DERMATITIS  TO AREAS OF DRY SKIN, IDEALLY WITHIN 3 MINUTES AFTER BATH OR SHOWER. FOR DRY SKIN, FOR DERMATITIS  TO AREAS OF DRY SKIN, IDEALLY WITHIN 3 MINUTES AFTER BATH OR SHOWER.       gabapentin (NEURONTIN) 400 MG capsule Take 1,200 mg by mouth 3 times daily       hydrocortisone acetate 1 % CREA APPLY THIN LAYER TOPICALLY EVERY DAY FOR DRY SKIN, FOR DERMATITIS  TO AREAS OF DRY SKIN, IDEALLY WITHIN 3 MINUTES AFTER BATH OR SHOWER. FOR DRY SKIN, FOR DERMATITIS  TO AREAS OF DRY SKIN, IDEALLY WITHIN 3 MINUTES AFTER BATH OR SHOWER.       ipratropium - albuterol 0.5 mg/2.5 mg/3 mL (DUONEB) 0.5-2.5 (3) MG/3ML neb solution Inhale 3 mLs into the lungs daily as needed       lisinopril (PRINIVIL/ZESTRIL) 20 MG tablet Take 1 tablet (20 mg) by mouth daily 30 tablet 0     meclizine (ANTIVERT) 12.5 MG tablet Take 2 tablets (25 mg) by mouth 4 times daily as needed for dizziness 15 tablet 0     nitroGLYcerin (NITROSTAT) 0.4 MG sublingual tablet For chest pain place 1 tablet under the tongue every 5 minutes for 3 doses. If symptoms persist 5 minutes after 1st dose call 911. 20 tablet 0     omeprazole (PRILOSEC) 20 MG CR capsule Take 20 mg by mouth At Bedtime        rosuvastatin (CRESTOR) 20 MG tablet Take 1 tablet (20 mg) by mouth daily 90 tablet 3     warfarin ANTICOAGULANT (COUMADIN ANTICOAGULANT) 5 MG tablet Take 5 mg by mouth daily        enoxaparin ANTICOAGULANT (LOVENOX) 100 MG/ML syringe Inject 1 mL (100 mg) Subcutaneous every 12 hours Continue until INR greater than 2. (Patient not taking: Reported on 4/14/2023) 10 mL 0     HYDROmorphone (DILAUDID) 2 MG tablet Take 1-2 tablets (2-4 mg) by mouth every 4 hours as needed for moderate to severe pain (Patient not taking: Reported on 4/14/2023) 30 tablet 0     senna-docusate (SENOKOT-S/PERICOLACE) 8.6-50 MG tablet Take 1 tablet by mouth 2 times daily (Patient not taking: Reported on 4/14/2023) 30  tablet 0       ALLERGIES     Allergies   Allergen Reactions     Metoprolol      Fatigue and bradycardia     Niacin Other (See Comments)     Other reaction(s): Flushing         Norvasc [Amlodipine] Rash     Septra [Sulfamethoxazole W-Trimethoprim] Rash       PAST MEDICAL HISTORY:  Past Medical History:   Diagnosis Date     Arthritis      Asthma      CAD (coronary artery disease)     1996 angioplasty and stent to the prox LAD, PTCA with intracoronary stent placement of RCA, 70%OM; 10/24/19 Cath: Occluded RCA, prox circumflex lesion - pd/pa not significant, advised medical therapy first by Interventional cardiologist     Depressive disorder      DVT (deep venous thrombosis) (H)      Factor 5 Leiden mutation, heterozygous (H)      GERD (gastroesophageal reflux disease)      HTN (hypertension)      Hyperlipidemia      Neuropathy     wears brace R foot for drop foot     Neuropathy      Obese      PRIMITIVO (obstructive sleep apnea)     cpap     Protein C deficiency (H)      Spinal stenosis      Stented coronary artery      Thrombosis        PAST SURGICAL HISTORY:  Past Surgical History:   Procedure Laterality Date     APPENDECTOMY       APPENDECTOMY OPEN  1958     ARTHROPLASTY HIP Left 1997     ARTHROPLASTY REVISION HIP Left 2013     ARTHROPLASTY REVISION HIP Left      ARTHROPLASTY REVISION HIP Left 11/19/2021    Procedure: REVISION LEFT TOTAL HIP ARTHROPLASTY;  Surgeon: Alber Mcfarland MD;  Location: [facility] OR     AS SPINAL FUSION,ANT,EA ADNL LEVEL  2010    L4-L5     CORONARY STENT PLACEMENT  1996     CV CORONARY ANGIOGRAM N/A 10/24/2019    Procedure: Coronary Angiogram;  Surgeon: Valdemar Hinojosa MD;  Location: [facility] CARDIAC CATH LAB     CV CORONARY ANGIOGRAM  1996 1996 angioplasty and stent to the prox LAD, PTCA with intracoronary stent placement of RCA, 70%OM     CV CORONARY ANGIOGRAM N/A 2/28/2020    Procedure: Coronary Angiogram 2nd attempt of RCA  successful--(lad stent ok, Lcx 70% with normal iFR)   Surgeon: Gurvinder Yun MD;  Location:  HEART CARDIAC CATH LAB     CV FRACTIONAL FLOW RATIO WIRE N/A 10/24/2019    Procedure: Fractional Flow [place];  Surgeon: Valdemar Hinojosa MD;  Location:  HEART CARDIAC CATH LAB     CV LEFT HEART CATH Bilateral 2020    Procedure: Left Heart Cath w/wo Left Ventriculogram  ANGIOPLASTY WITH [place] SCI REP PRESENT;  Surgeon: Gurvinder Yun MD;  Location:  HEART CARDIAC CATH LAB     SPINAL FUSION  2010    L4-5     TOTAL HIP ARTHROPLASTY Left      TOTAL HIP ARTHROPLASTY Right 2/10/2016    Procedure:  RIGHT HIP TOTAL ARTHROPLASTY;  Surgeon: Jose Gaytan MD;  Location: [facility];  Service:      [facility] TOTAL HIP ARTHROPLASTY Right     THR       FAMILY HISTORY:  Family History   Problem Relation Age of Onset     Myocardial Infarction Father      Heart Disease Maternal Grandfather        SOCIAL HISTORY:  Social History     Socioeconomic History     Marital status:      Spouse name: None     Number of children: None     Years of education: None     Highest education level: None   Tobacco Use     Smoking status: Former     Types: Cigarettes     Quit date: 3/23/1987     Years since quittin.0     Smokeless tobacco: Never     Tobacco comments:     Smokes cigars once every 2-3 years   Substance and Sexual Activity     Alcohol use: Yes     Comment: Rarely     Drug use: Never   Other Topics Concern     Caffeine Concern No     Comment: 1 -2 cups daily      Special Diet No     Exercise No       Review of Systems:  Skin:        Eyes:       ENT:       Respiratory:  Positive for sleep apnea;CPAP  Cardiovascular:    Positive for;chest pain;lightheadedness;dizziness;fatigue  Gastroenterology:      Genitourinary:       Musculoskeletal:       Neurologic:       Psychiatric:       Heme/Lymph/Imm:       Endocrine:         Physical Exam:  Vitals: /70 (BP Location: Right arm, Patient Position: Sitting, Cuff Size: Adult Large)   Pulse 55   Ht 1.727 m  "(5' 8\")   Wt 104.8 kg (231 lb 1.6 oz)   SpO2 93%   BMI 35.14 kg/m      Constitutional:  cooperative, alert and oriented, well developed, well nourished, in no acute distress        Skin:  warm and dry to the touch, no apparent skin lesions or masses noted          Head:  normocephalic, no masses or lesions        Eyes:  pupils equal and round, conjunctivae and lids unremarkable, sclera white, no xanthalasma, EOMS intact, no nystagmus        Lymph:No Cervical lymphadenopathy present;No thyromegaly     ENT:           Neck:  carotid pulses are full and equal bilaterally, JVP normal, no carotid bruit        Respiratory:  normal breath sounds, clear to auscultation, normal A-P diameter, normal symmetry, normal respiratory excursion, no use of accessory muscles         Cardiac: regular rhythm, normal S1/S2, no S3 or S4, apical impulse not displaced, no murmurs, gallops or rubs             distant HT so hard to hear      1+         0   1+         1+       hard to feel leg pulses due to body habitus and brace on R leg    GI:  abdomen soft, non-tender, BS normoactive, no mass, no HSM, no bruits obese      Extremities and Muscular Skeletal:  no deformities, clubbing, cyanosis, erythema observed     RLE edema;trace   brace on right leg    Neurological:  no gross motor deficits   known neuropathy of R leg see's neuro    Psych:  Alert and Oriented x 3      Recent Lab Results:  LIPID RESULTS:  Lab Results   Component Value Date    CHOL 99 01/16/2020    HDL 40 01/16/2020    LDL 39 01/16/2020    TRIG 101 01/16/2020    CHOLHDLRATIO 3.5 04/21/2014       LIVER ENZYME RESULTS:  Lab Results   Component Value Date    AST 21 10/22/2019    ALT 20 12/20/2019       CBC RESULTS:  Lab Results   Component Value Date    WBC 6.8 11/11/2021    WBC 4.7 02/28/2020    RBC 4.79 11/11/2021    RBC 3.92 (L) 02/28/2020    HGB 10.5 (L) 11/21/2021    HGB 12.4 (L) 02/28/2020    HCT 44.2 11/11/2021    HCT 36.7 (L) 02/28/2020    MCV 92 11/11/2021    MCV 94 " 02/28/2020    MCH 30.9 11/11/2021    MCH 31.6 02/28/2020    MCHC 33.5 11/11/2021    MCHC 33.8 02/28/2020    RDW 13.0 11/11/2021    RDW 12.9 02/28/2020     11/22/2021     02/28/2020       BMP RESULTS:  Lab Results   Component Value Date     11/11/2021     (L) 03/04/2020    POTASSIUM 4.2 11/11/2021    POTASSIUM 4.7 03/04/2020    CHLORIDE 102 11/11/2021    CHLORIDE 100 03/04/2020    CO2 26 11/11/2021    CO2 28 03/04/2020    ANIONGAP 8 11/11/2021    ANIONGAP 10.7 03/04/2020    GLC 96 11/21/2021     03/04/2020    BUN 9 11/11/2021    BUN 7 03/04/2020    CR 0.94 11/19/2021    CR 0.89 03/04/2020    GFRESTIMATED 80 11/19/2021    GFRESTIMATED 84 03/04/2020    GFRESTBLACK >90 03/04/2020    CODI 9.7 11/11/2021    CODI 9.7 03/04/2020        A1C RESULTS:  No results found for: A1C    INR RESULTS:  Lab Results   Component Value Date    INR 1.34 (H) 11/22/2021    INR 1.21 (H) 11/21/2021    INR 2.00 (H) 03/06/2020    INR 1.48 (H) 03/04/2020           CC  No referring provider defined for this encounter.

## 2023-04-14 NOTE — LETTER
4/14/2023    Sara Jhaveri, [facility] [address]  [facility] [place] [address]    RE: Jose Tejada       Dear Colleague,     I had the pleasure of seeing Jose Tejada in the [facility].  HPI and Plan:   See dictation    Orders Placed This Encounter   Procedures    EKG 12-lead complete w/read - Clinics (performed today)     Orders Placed This Encounter   Medications    clotrimazole (LOTRIMIN) 1 % external cream     Sig: APPLY THIN LAYER TOPICALLY TWICE A DAY AS NEEDED **EXTERNAL USE ONLY** FOR ATHLETE'S FOOT    Emollient (VANICREAM) LOTN     Sig: APPLY THIN LAYER TOPICALLY EVERY DAY FOR DRY SKIN, FOR DERMATITIS  TO AREAS OF DRY SKIN, IDEALLY WITHIN 3 MINUTES AFTER BATH OR SHOWER. FOR DRY SKIN, FOR DERMATITIS  TO AREAS OF DRY SKIN, IDEALLY WITHIN 3 MINUTES AFTER BATH OR SHOWER.    hydrocortisone acetate 1 % CREA     Sig: APPLY THIN LAYER TOPICALLY EVERY DAY FOR DRY SKIN, FOR DERMATITIS  TO AREAS OF DRY SKIN, IDEALLY WITHIN 3 MINUTES AFTER BATH OR SHOWER. FOR DRY SKIN, FOR DERMATITIS  TO AREAS OF DRY SKIN, IDEALLY WITHIN 3 MINUTES AFTER BATH OR SHOWER.    ipratropium - albuterol 0.5 mg/2.5 mg/3 mL (DUONEB) 0.5-2.5 (3) MG/3ML neb solution     Sig: Inhale 3 mLs into the lungs daily as needed     Medications Discontinued During This Encounter   Medication Reason    cefadroxil (DURICEF) 500 MG capsule          Encounter Diagnosis   Name Primary?    Chest pain Yes       CURRENT MEDICATIONS:  Current Outpatient Medications   Medication Sig Dispense Refill    acetaminophen (TYLENOL) 325 MG tablet Take 3 tablets (975 mg) by mouth every 8 hours as needed for other (For optimal non-opioid multimodal pain management to improve pain control.) Max 3000mg/24 hours. 100 tablet 0    albuterol (PROAIR HFA/PROVENTIL HFA/VENTOLIN HFA) 108 (90 Base) MCG/ACT inhaler Inhale 2 puffs into the lungs every 4 hours as needed for shortness of breath / dyspnea or wheezing doesn't have  current RX      clopidogrel (PLAVIX) 75 MG tablet Take 1 tablet (75 mg) by mouth daily 30 tablet 0    clotrimazole (LOTRIMIN) 1 % external cream APPLY THIN LAYER TOPICALLY TWICE A DAY AS NEEDED **EXTERNAL USE ONLY** FOR ATHLETE'S FOOT      Emollient (VANICREAM) LOTN APPLY THIN LAYER TOPICALLY EVERY DAY FOR DRY SKIN, FOR DERMATITIS  TO AREAS OF DRY SKIN, IDEALLY WITHIN 3 MINUTES AFTER BATH OR SHOWER. FOR DRY SKIN, FOR DERMATITIS  TO AREAS OF DRY SKIN, IDEALLY WITHIN 3 MINUTES AFTER BATH OR SHOWER.      gabapentin (NEURONTIN) 400 MG capsule Take 1,200 mg by mouth 3 times daily      hydrocortisone acetate 1 % CREA APPLY THIN LAYER TOPICALLY EVERY DAY FOR DRY SKIN, FOR DERMATITIS  TO AREAS OF DRY SKIN, IDEALLY WITHIN 3 MINUTES AFTER BATH OR SHOWER. FOR DRY SKIN, FOR DERMATITIS  TO AREAS OF DRY SKIN, IDEALLY WITHIN 3 MINUTES AFTER BATH OR SHOWER.      ipratropium - albuterol 0.5 mg/2.5 mg/3 mL (DUONEB) 0.5-2.5 (3) MG/3ML neb solution Inhale 3 mLs into the lungs daily as needed      lisinopril (PRINIVIL/ZESTRIL) 20 MG tablet Take 1 tablet (20 mg) by mouth daily 30 tablet 0    meclizine (ANTIVERT) 12.5 MG tablet Take 2 tablets (25 mg) by mouth 4 times daily as needed for dizziness 15 tablet 0    nitroGLYcerin (NITROSTAT) 0.4 MG sublingual tablet For chest pain place 1 tablet under the tongue every 5 minutes for 3 doses. If symptoms persist 5 minutes after 1st dose call 911. 20 tablet 0    omeprazole (PRILOSEC) 20 MG CR capsule Take 20 mg by mouth At Bedtime       rosuvastatin (CRESTOR) 20 MG tablet Take 1 tablet (20 mg) by mouth daily 90 tablet 3    warfarin ANTICOAGULANT (COUMADIN ANTICOAGULANT) 5 MG tablet Take 5 mg by mouth daily       enoxaparin ANTICOAGULANT (LOVENOX) 100 MG/ML syringe Inject 1 mL (100 mg) Subcutaneous every 12 hours Continue until INR greater than 2. (Patient not taking: Reported on 4/14/2023) 10 mL 0    HYDROmorphone (DILAUDID) 2 MG tablet Take 1-2 tablets (2-4 mg) by mouth every 4 hours as needed  for moderate to severe pain (Patient not taking: Reported on 4/14/2023) 30 tablet 0    senna-docusate (SENOKOT-S/PERICOLACE) 8.6-50 MG tablet Take 1 tablet by mouth 2 times daily (Patient not taking: Reported on 4/14/2023) 30 tablet 0       ALLERGIES     Allergies   Allergen Reactions    Metoprolol      Fatigue and bradycardia    Niacin Other (See Comments)     Other reaction(s): Flushing        Norvasc [Amlodipine] Rash    Septra [Sulfamethoxazole W-Trimethoprim] Rash       PAST MEDICAL HISTORY:  Past Medical History:   Diagnosis Date    Arthritis     Asthma     CAD (coronary artery disease)     1996 angioplasty and stent to the prox LAD, PTCA with intracoronary stent placement of RCA, 70%OM; 10/24/19 Cath: Occluded RCA, prox circumflex lesion - pd/pa not significant, advised medical therapy first by Interventional cardiologist    Depressive disorder     DVT (deep venous thrombosis) (H)     Factor 5 Leiden mutation, heterozygous (H)     GERD (gastroesophageal reflux disease)     HTN (hypertension)     Hyperlipidemia     Neuropathy     wears brace R foot for drop foot    Neuropathy     Obese     PRIMITIVO (obstructive sleep apnea)     cpap    Protein C deficiency (H)     Spinal stenosis     Stented coronary artery     Thrombosis        PAST SURGICAL HISTORY:  Past Surgical History:   Procedure Laterality Date    APPENDECTOMY      APPENDECTOMY OPEN  1958    ARTHROPLASTY HIP Left 1997    ARTHROPLASTY REVISION HIP Left 2013    ARTHROPLASTY REVISION HIP Left     ARTHROPLASTY REVISION HIP Left 11/19/2021    Procedure: REVISION LEFT TOTAL HIP ARTHROPLASTY;  Surgeon: Alber Mcfarland MD;  Location: [facility] OR    AS SPINAL FUSION,ANT,EA ADNL LEVEL  2010    L4-L5    CORONARY STENT PLACEMENT  1996    CV CORONARY ANGIOGRAM N/A 10/24/2019    Procedure: Coronary Angiogram;  Surgeon: Valdemar Hinojosa MD;  Location: [facility] CARDIAC CATH LAB    CV CORONARY ANGIOGRAM  1996 1996 angioplasty and stent to the prox LAD, PTCA  with intracoronary stent placement of RCA, 70%OM    CV CORONARY ANGIOGRAM N/A 2020    Procedure: Coronary Angiogram 2nd attempt of RCA  successful--(lad stent ok, Lcx 70% with normal iFR)  Surgeon: Gurvinder Yun MD;  Location:  HEART CARDIAC CATH LAB    CV FRACTIONAL FLOW RATIO WIRE N/A 10/24/2019    Procedure: Fractional Flow [place];  Surgeon: Valdemar Hinojosa MD;  Location:  HEART CARDIAC CATH LAB    CV LEFT HEART CATH Bilateral 2020    Procedure: Left Heart Cath w/wo Left Ventriculogram  ANGIOPLASTY WITH BOSTON SCI REP PRESENT;  Surgeon: Gurvinder Yun MD;  Location:  HEART CARDIAC CATH LAB    SPINAL FUSION      L4-5    TOTAL HIP ARTHROPLASTY Left     TOTAL HIP ARTHROPLASTY Right 2/10/2016    Procedure:  RIGHT HIP TOTAL ARTHROPLASTY;  Surgeon: Jose Gaytan MD;  Location: [facility] OR;  Service:     [facility] TOTAL HIP ARTHROPLASTY Right     THR       FAMILY HISTORY:  Family History   Problem Relation Age of Onset    Myocardial Infarction Father     Heart Disease Maternal Grandfather        SOCIAL HISTORY:  Social History     Socioeconomic History    Marital status:      Spouse name: None    Number of children: None    Years of education: None    Highest education level: None   Tobacco Use    Smoking status: Former     Types: Cigarettes     Quit date: 3/23/1987     Years since quittin.0    Smokeless tobacco: Never    Tobacco comments:     Smokes cigars once every 2-3 years   Substance and Sexual Activity    Alcohol use: Yes     Comment: Rarely    Drug use: Never   Other Topics Concern    Caffeine Concern No     Comment: 1 -2 cups daily     Special Diet No    Exercise No       Review of Systems:  Skin:        Eyes:       ENT:       Respiratory:  Positive for sleep apnea;CPAP  Cardiovascular:    Positive for;chest pain;lightheadedness;dizziness;fatigue  Gastroenterology:      Genitourinary:       Musculoskeletal:       Neurologic:       Psychiatric:      "  Heme/Lymph/Imm:       Endocrine:         Physical Exam:  Vitals: /70 (BP Location: Right arm, Patient Position: Sitting, Cuff Size: Adult Large)   Pulse 55   Ht 1.727 m (5' 8\")   Wt 104.8 kg (231 lb 1.6 oz)   SpO2 93%   BMI 35.14 kg/m      Constitutional:  cooperative, alert and oriented, well developed, well nourished, in no acute distress        Skin:  warm and dry to the touch, no apparent skin lesions or masses noted          Head:  normocephalic, no masses or lesions        Eyes:  pupils equal and round, conjunctivae and lids unremarkable, sclera white, no xanthalasma, EOMS intact, no nystagmus        Lymph:No Cervical lymphadenopathy present;No thyromegaly     ENT:           Neck:  carotid pulses are full and equal bilaterally, JVP normal, no carotid bruit        Respiratory:  normal breath sounds, clear to auscultation, normal A-P diameter, normal symmetry, normal respiratory excursion, no use of accessory muscles         Cardiac: regular rhythm, normal S1/S2, no S3 or S4, apical impulse not displaced, no murmurs, gallops or rubs             distant HT so hard to hear      1+         0   1+         1+       hard to feel leg pulses due to body habitus and brace on R leg    GI:  abdomen soft, non-tender, BS normoactive, no mass, no HSM, no bruits obese      Extremities and Muscular Skeletal:  no deformities, clubbing, cyanosis, erythema observed     RLE edema;trace   brace on right leg    Neurological:  no gross motor deficits   known neuropathy of R leg see's neuro    Psych:  Alert and Oriented x 3      Recent Lab Results:  LIPID RESULTS:  Lab Results   Component Value Date    CHOL 99 01/16/2020    HDL 40 01/16/2020    LDL 39 01/16/2020    TRIG 101 01/16/2020    CHOLHDLRATIO 3.5 04/21/2014       LIVER ENZYME RESULTS:  Lab Results   Component Value Date    AST 21 10/22/2019    ALT 20 12/20/2019       CBC RESULTS:  Lab Results   Component Value Date    WBC 6.8 11/11/2021    WBC 4.7 02/28/2020    RBC " 4.79 11/11/2021    RBC 3.92 (L) 02/28/2020    HGB 10.5 (L) 11/21/2021    HGB 12.4 (L) 02/28/2020    HCT 44.2 11/11/2021    HCT 36.7 (L) 02/28/2020    MCV 92 11/11/2021    MCV 94 02/28/2020    MCH 30.9 11/11/2021    MCH 31.6 02/28/2020    MCHC 33.5 11/11/2021    MCHC 33.8 02/28/2020    RDW 13.0 11/11/2021    RDW 12.9 02/28/2020     11/22/2021     02/28/2020       BMP RESULTS:  Lab Results   Component Value Date     11/11/2021     (L) 03/04/2020    POTASSIUM 4.2 11/11/2021    POTASSIUM 4.7 03/04/2020    CHLORIDE 102 11/11/2021    CHLORIDE 100 03/04/2020    CO2 26 11/11/2021    CO2 28 03/04/2020    ANIONGAP 8 11/11/2021    ANIONGAP 10.7 03/04/2020    GLC 96 11/21/2021     03/04/2020    BUN 9 11/11/2021    BUN 7 03/04/2020    CR 0.94 11/19/2021    CR 0.89 03/04/2020    GFRESTIMATED 80 11/19/2021    GFRESTIMATED 84 03/04/2020    GFRESTBLACK >90 03/04/2020    CODI 9.7 11/11/2021    CODI 9.7 03/04/2020        A1C RESULTS:  No results found for: A1C    INR RESULTS:  Lab Results   Component Value Date    INR 1.34 (H) 11/22/2021    INR 1.21 (H) 11/21/2021    INR 2.00 (H) 03/06/2020    INR 1.48 (H) 03/04/2020           CC  No referring provider defined for this encounter.    Service Date: 04/14/2023    Jose Tejada is a 78-year-old gentleman.  He is listed as a new consult for me.  I have not seen him in 3 years.  He is a most delightful gentleman and I have cared for him since 1996.  The reason that he has not been here in 3 years is because of COVID.  He was afraid to come to office visits.  He himself actually did get COVID during that time span, but it was mild.  His cardiac history goes back to 1996.  We stented the LAD and the right coronary artery. In 2020, he had symptoms so a heart catheterization was performed.  It showed that the LAD stent was open.  The circumflex had a 70% narrowing, which was stable from 1996 and the flow reserve was normal.  The right coronary artery had closed  and now was a .  In 02/2020, I was able to open the right coronary artery again and he has had no further angina since that time.  We actually even stopped his Imdur.  The patient is not on a beta blocker because he runs chronic bradycardia.  He does note some fatigue.  He has not had a cholesterol checked since I think 2019.  He had some blood work just a month ago which we reviewed from an outside source.  Electrolyte panel was normal.  Blood sugar had trivial elevation.  Hemoglobin was normal.  This patient has factor V Leiden and protein C deficiency with a history of DVT.  He has been maintained on Plavix without aspirin and Coumadin and has had no recurrence.  He has been complaining about some chest pain. However, he does not know if it is exactly like he had in 1996 or not because it was so long ago.  He has not used nitroglycerin.  He does not exercise much because he has neuropathy with a brace on his leg, so it is not clear if his chest pain is angina or not.  His last stress test was a few years ago.  It showed a very mild area of ischemia in the lateral wall, which was new from a test 5 years before that, so that probably represents some progression of the left circumflex.  At this juncture, I was going to do a stress test anyways.  Now, he is complaining about chest pain.  I will go ahead and do a Lexiscan nuclear test. If it still shows just mild ischemia, I would be willing to watch it further.  If there is worsening on the stress test, he will need another angiogram, I am going to go ahead and order a lipid profile.  I am not going to change his medications at this time.  We reviewed his EKG today.  It is sinus bradycardia but otherwise normal.  I reviewed the outside labs I could find through [employer], and there were some older labs even back at [facility].  We updated the chart, of course, since it has been 3 years.    Today's visit was 42 minutes.    Gurvinder Yun MD    cc:  Sara RENAE  NORI Jhaveri, APRN  [facility]  [address] [place]  [address]    [facility]  [place]  [address]      Gurvinder Yun MD        D: 2023   T: 2023   MT: elvira    Name:     ELIE FONTANEZ  MRN:      [id number]        Account:      [id number]   :      1945           Service Date: 2023       Document: [id number]     Thank you for allowing me to participate in the care of your patient.      Sincerely,   Gurvinder Yun MD   [facility]  cc: No referring provider defined for this encounter.

## 2023-04-14 NOTE — PROGRESS NOTES
Service Date: 04/14/2023    Jose Tejada is a 78-year-old gentleman.  He is listed as a new consult for me.  I have not seen him in 3 years.  He is a most delightful gentleman and I have cared for him since 1996.  The reason that he has not been here in 3 years is because of COVID.  He was afraid to come to office visits.  He himself actually did get COVID during that time span, but it was mild.  His cardiac history goes back to 1996.  We stented the LAD and the right coronary artery. In 2020, he had symptoms so a heart catheterization was performed.  It showed that the LAD stent was open.  The circumflex had a 70% narrowing, which was stable from 1996 and the flow reserve was normal.  The right coronary artery had closed and now was a .  In 02/2020, I was able to open the right coronary artery again and he has had no further angina since that time.  We actually even stopped his Imdur.  The patient is not on a beta blocker because he runs chronic bradycardia.  He does note some fatigue.  He has not had a cholesterol checked since I think 2019.  He had some blood work just a month ago which we reviewed from an outside source.  Electrolyte panel was normal.  Blood sugar had trivial elevation.  Hemoglobin was normal.  This patient has factor V Leiden and protein C deficiency with a history of DVT.  He has been maintained on Plavix without aspirin and Coumadin and has had no recurrence.  He has been complaining about some chest pain. However, he does not know if it is exactly like he had in 1996 or not because it was so long ago.  He has not used nitroglycerin.  He does not exercise much because he has neuropathy with a brace on his leg, so it is not clear if his chest pain is angina or not.  His last stress test was a few years ago.  It showed a very mild area of ischemia in the lateral wall, which was new from a test 5 years before that, so that probably represents some progression of the left circumflex.  At  this juncture, I was going to do a stress test anyways.  Now, he is complaining about chest pain.  I will go ahead and do a Lexiscan nuclear test. If it still shows just mild ischemia, I would be willing to watch it further.  If there is worsening on the stress test, he will need another angiogram, I am going to go ahead and order a lipid profile.  I am not going to change his medications at this time.  We reviewed his EKG today.  It is sinus bradycardia but otherwise normal.  I reviewed the outside labs I could find through [facility], and there were some older labs even back at [facility].  We updated the chart, of course, since it has been 3 years.    Today's visit was 42 minutes.    Gurvinder Yun MD    cc:  Sara Jhaveri, NORI, [facility]  [address] [place] [address]  [place]  [address]      Gurvinder Yun MD        D: 2023   T: 2023   MT: elvira    Name:     ELIE FONTANEZ  MRN:      [id number]        Account:      [id number]   :      1945           Service Date: 2023       Document: [id number]

## 2023-05-02 ENCOUNTER — HOSPITAL ENCOUNTER (OUTPATIENT)
Dept: CARDIOLOGY | Facility: CLINIC | Age: 78
Setting detail: NUCLEAR MEDICINE
Discharge: HOME | End: 2023-05-02
Attending: INTERNAL MEDICINE
Payer: MEDICARE

## 2023-05-02 VITALS
BODY MASS INDEX: 34.43 KG/M2 | OXYGEN SATURATION: 94 % | SYSTOLIC BLOOD PRESSURE: 120 MMHG | WEIGHT: 227.2 LBS | DIASTOLIC BLOOD PRESSURE: 68 MMHG | HEIGHT: 68 IN | HEART RATE: 60 BPM

## 2023-05-02 DIAGNOSIS — I20.0 UNSTABLE ANGINA (H): ICD-10-CM

## 2023-05-02 PROCEDURE — 78452 HT MUSCLE IMAGE SPECT MULT: CPT | Mod: ME

## 2023-05-02 PROCEDURE — G1010 CDSM STANSON: HCPCS | Performed by: INTERNAL MEDICINE

## 2023-05-02 PROCEDURE — 93016 CV STRESS TEST SUPVJ ONLY: CPT | Performed by: INTERNAL MEDICINE

## 2023-05-02 PROCEDURE — A9502 TC99M TETROFOSMIN: HCPCS | Performed by: INTERNAL MEDICINE

## 2023-05-02 PROCEDURE — 93017 CV STRESS TEST TRACING ONLY: CPT

## 2023-05-02 PROCEDURE — 250N000011 HC RX IP 250 OP 636: Performed by: INTERNAL MEDICINE

## 2023-05-02 PROCEDURE — 78452 HT MUSCLE IMAGE SPECT MULT: CPT | Mod: 26 | Performed by: INTERNAL MEDICINE

## 2023-05-02 PROCEDURE — 343N000001 HC RX 343: Performed by: INTERNAL MEDICINE

## 2023-05-02 PROCEDURE — 93018 CV STRESS TEST I&R ONLY: CPT | Performed by: INTERNAL MEDICINE

## 2023-05-02 RX ORDER — AMINOPHYLLINE 25 MG/ML
50-100 INJECTION, SOLUTION INTRAVENOUS
Status: DISCONTINUED | OUTPATIENT
Start: 2023-05-02 | End: 2023-05-03 | Stop reason: HOSPADM

## 2023-05-02 RX ORDER — CAFFEINE CITRATE 20 MG/ML
60 SOLUTION INTRAVENOUS
Status: DISCONTINUED | OUTPATIENT
Start: 2023-05-02 | End: 2023-05-03 | Stop reason: HOSPADM

## 2023-05-02 RX ORDER — ALBUTEROL SULFATE 90 UG/1
2 AEROSOL, METERED RESPIRATORY (INHALATION) EVERY 5 MIN PRN
Status: DISCONTINUED | OUTPATIENT
Start: 2023-05-02 | End: 2023-05-03 | Stop reason: HOSPADM

## 2023-05-02 RX ORDER — ACYCLOVIR 200 MG/1
0-1 CAPSULE ORAL
Status: DISCONTINUED | OUTPATIENT
Start: 2023-05-02 | End: 2023-05-03 | Stop reason: HOSPADM

## 2023-05-02 RX ORDER — REGADENOSON 0.08 MG/ML
0.4 INJECTION, SOLUTION INTRAVENOUS ONCE
Status: COMPLETED | OUTPATIENT
Start: 2023-05-02 | End: 2023-05-02

## 2023-05-02 RX ADMIN — TETROFOSMIN 5.93 MILLICURIE: 1.38 INJECTION, POWDER, LYOPHILIZED, FOR SOLUTION INTRAVENOUS at 08:52

## 2023-05-02 RX ADMIN — TETROFOSMIN 18.77 MILLICURIE: 1.38 INJECTION, POWDER, LYOPHILIZED, FOR SOLUTION INTRAVENOUS at 10:23

## 2023-05-02 RX ADMIN — REGADENOSON 0.4 MG: 0.08 INJECTION, SOLUTION INTRAVENOUS at 10:20

## 2023-05-03 LAB
CV STRESS MAX HR HE: 66
NUC STRESS EJECTION FRACTION: 53 %
RATE PRESSURE PRODUCT: 8184
STRESS ECHO BASELINE DIASTOLIC HE: 68
STRESS ECHO BASELINE HR: 60 BPM
STRESS ECHO BASELINE SYSTOLIC BP: 120
STRESS ECHO CALCULATED PERCENT HR: 46 %
STRESS ECHO LAST STRESS DIASTOLIC BP: 70
STRESS ECHO LAST STRESS SYSTOLIC BP: 124
STRESS ECHO TARGET HR: 144

## 2023-05-05 ENCOUNTER — TELEPHONE (OUTPATIENT)
Dept: CARDIOLOGY | Facility: CLINIC | Age: 78
End: 2023-05-05

## 2023-05-05 ENCOUNTER — LAB (OUTPATIENT)
Dept: LAB | Facility: CLINIC | Age: 78
End: 2023-05-05
Payer: MEDICARE

## 2023-05-05 DIAGNOSIS — I20.0 UNSTABLE ANGINA (H): ICD-10-CM

## 2023-05-05 DIAGNOSIS — E66.01 MORBID OBESITY (H): ICD-10-CM

## 2023-05-05 LAB
ALT SERPL W P-5'-P-CCNC: 18 U/L (ref 10–50)
CHOLEST SERPL-MCNC: 97 MG/DL
HDLC SERPL-MCNC: 32 MG/DL
LDLC SERPL CALC-MCNC: 46 MG/DL
NONHDLC SERPL-MCNC: 65 MG/DL
TRIGL SERPL-MCNC: 93 MG/DL

## 2023-05-05 PROCEDURE — 84460 ALANINE AMINO (ALT) (SGPT): CPT | Performed by: INTERNAL MEDICINE

## 2023-05-05 PROCEDURE — 80061 LIPID PANEL: CPT | Performed by: INTERNAL MEDICINE

## 2023-05-05 PROCEDURE — 36415 COLL VENOUS BLD VENIPUNCTURE: CPT | Performed by: INTERNAL MEDICINE

## 2023-05-05 NOTE — TELEPHONE ENCOUNTER
Team received results of Lipid profile 5\5.  Patient has an apmnt with Melissa MOORE On 5\9 to go over all testing results.        Renetta Powers RN on 5/5/2023 at 3:37 PM

## 2023-05-08 NOTE — PROGRESS NOTES
Cardiology Clinic Progress Note  Jose Tejada MRN# [id number]   YOB: 1945 Age: 78 year old   Primary Cardiologist: Dr. Yun Reason for visit: Follow-up after testing            Assessment and Plan:   Jose Tejada is a very pleasant 78 year old male who is here today for follow-up after testing.    Coronary artery disease  -Status post stent to LAD and the RCA in 1996  -Recurrent chest discomfort in 2020.  -Cor angio 1/2020 revealed patent LAD stent, 70% stenosis in the circumflex, and  RCA  -Cor angio 2/2020 status post successful stenting of proximal RCA  with resolution in patient's chest discomfort  -Recurrent chest pain noted at 4/14/2023 visit  -Lexiscan stress completed 5/2/2023 was abnormal.  There is a small to moderate size fixed defect of the basal to mid inferior and inferior lateral segments  -On Plavix 75 mg daily, warfarin, lisinopril 20 mg daily, rosuvastatin 20 mg daily  -Not on beta-blocker secondary to chronic sinus bradycardia  Hyperlipidemia  -LDL 46 5/2023  -On rosuvastatin 20 mg daily  Factor V Leiden  Protein C deficiency  History of DVT  -On chronic warfarin therapy  Obstrutive sleep apnea  -on CPAP     Changes today:   Given abnormal stress test results as well as ongoing but rare episodes of atypical chest discomfort, favor discussion with Dr. Yun about how to proceed (ongoing watchful waiting versus coronary angio +/- PCI).  I told the patient I will discuss his case with Dr. Yun and call him later this afternoon with our plan.    ADDENDUM at 1309: Discussed the above with Dr. Yun. He recommends proceeding with cor angio +/- PCI.     Called patient to discuss Dr. Yun's recommendations.  He is agreeable to proceed with coronary angiogram +/- PCI next week.     Risks and benefits of coronary angiogram +/- percutaneous coronary intervention (PCI) were discussed with the patient including but not limited to bleeding, bruising, infection,  allergic reaction, kidney damage (including need for dialysis), stroke, heart attack, vascular damage, emergency open heart surgery, and death.  Patient verbalized understanding and wishes to proceed.      Patient has a history of protein C deficiency, factor V Leiden mutation (heterozygous) with a prior history of DVT.  He is on chronic warfarin therapy and needs Lovenox bridging prior to any procedure.    Stop warfarin 5 days prior to procedure.   Two days after stopping warfarin, initiate Lovenox 1mg/kg every 12 hours.  Last dose of Lovenox to be given the evening prior to coronary angiogram.   Plan to resume warfarin and Lovenox post procedure (timing per procedural MD) assuming there are no bleeding issues. Continue Lovenox post procedure until INR reaches 2.0.     The bridging plan above was discussed with Dr. Yun.    NPO 8 hours prior to procedure  Pt has transportation and 24 hours post procedure monitoring set up.   Pt aware of no driving for 24 hours post procedure.     Melissa Lawson PA-C  [facility]  Pager: [phone]          History of Presenting Illness:    Jose Tejada is a very pleasant 78 year old male with a history of coronary artery disease status post stent to LAD and RCA in 1996, status post successful stenting of proximal RCA  2/2020, hyperlipidemia, factor V Leiden, protein C deficiency, and history of DVT.    Mr. Tejada did well until earlier this year.  At that time, he began developing intermittent episodes of chest discomfort.  These were nonexertional, lasting 10 to 15 minutes in duration.  Described as a sharp left-sided pain.  At the time of his last appointment with Dr. Yun 4/2023, he described these episodes of chest discomfort.  Further evaluation was recommended with a Lexiscan stress test.  Additionally, it has been several years since his cholesterol has been checked.    Lipid panel completed 5/5/2023 revealed TC 97, HDL 32, triglyceride  93, LDL 46.  Lexiscan stress completed 2023 was abnormal revealing a small to moderate size fixed defect of the basal to mid inferior and inferolateral segments.  This was suspected to be some degree of infarct but a possible component of diaphragmatic attenuation as well.  There was no evidence of ischemia.    Patient is here today for follow-up to discuss the above test results.  Overall, he has been feeling well.  Continues to have rare, intermittent episodes of nonexertional chest discomfort.  He describes his pain as sharp, left-sided, lasting 10 to 15 minutes before dissipating.  This chest discomfort is not similar to what he experienced prior to needing stenting in  or .  Denies shortness of breath, orthopnea, PND.  No palpitations, near-syncope or syncope.  Intermittent lightheadedness with changing positions.  This is chronic and stable.  Takes all medications daily as prescribed.    Blood pressure 106/70 and heart rate 65 in clinic today.  Does not check his blood pressure at home.  Lives at home with his wife who does most of his cooking.  She watches his sodium intake closely.  Does not perform routine exercise.  Rarely consumes alcohol.  No tobacco use.      Social History      Social History     Socioeconomic History     Marital status:      Spouse name: Not on file     Number of children: Not on file     Years of education: Not on file     Highest education level: Not on file   Occupational History     Not on file   Tobacco Use     Smoking status: Former     Types: Cigarettes     Quit date: 3/23/1987     Years since quittin.1     Smokeless tobacco: Never     Tobacco comments:     Smokes cigars once every 2-3 years   Vaping Use     Vaping status: Not on file   Substance and Sexual Activity     Alcohol use: Yes     Comment: Rarely     Drug use: Never     Sexual activity: Not on file   Other Topics Concern     Parent/sibling w/ CABG, MI or angioplasty before 65F 55M? Not Asked  "     Service Not Asked     Blood Transfusions Not Asked     Caffeine Concern No     Comment: 1 -2 cups daily      Occupational Exposure Not Asked     Hobby Hazards Not Asked     Sleep Concern Not Asked     Stress Concern Not Asked     Weight Concern Not Asked     Special Diet No     Back Care Not Asked     Exercise No     Bike Helmet Not Asked     Seat Belt Not Asked     Self-Exams Not Asked   Social History Narrative     Not on file     Social Determinants of Health     Financial Resource Strain: Not on file   Food Insecurity: Not on file   Transportation Needs: Not on file   Physical Activity: Not on file   Stress: Not on file   Social Connections: Not on file   Intimate Partner Violence: Not on file   Housing Stability: Not on file            Review of Systems:   Please see HPI         Physical Exam:   Vitals: /70 (BP Location: Right arm)   Pulse 65   Ht 1.727 m (5' 8\")   Wt 104.3 kg (230 lb)   BMI 34.97 kg/m     Wt Readings from Last 4 Encounters:   05/02/23 103.1 kg (227 lb 3.2 oz)   04/14/23 104.8 kg (231 lb 1.6 oz)   11/19/21 95.3 kg (210 lb)   03/04/20 85.4 kg (188 lb 3.2 oz)     GEN: well nourished, in no acute distress.  HEENT:  Pupils equal, round. Sclerae nonicteric.   NECK: Supple, no masses appreciated.  Unable to assess JVP secondary to body habitus  C/V:  Regular rate and rhythm, no murmur, rub or gallop.   RESP: Respirations are unlabored. Clear to auscultation bilaterally without wheezing, rales, or rhonchi.  GI: Abdomen soft, nontender.  EXTREM: No LE edema.  NEURO: Alert and oriented, cooperative.  SKIN: Warm and dry.        Data:   LIPID RESULTS:  Lab Results   Component Value Date    CHOL 97 05/05/2023    CHOL 99 01/16/2020    HDL 32 (L) 05/05/2023    HDL 40 01/16/2020    LDL 46 05/05/2023    LDL 39 01/16/2020    TRIG 93 05/05/2023    TRIG 101 01/16/2020    CHOLHDLRATIO 3.5 04/21/2014     LIVER ENZYME RESULTS:  Lab Results   Component Value Date    AST 21 10/22/2019    ALT 18 " 05/05/2023    ALT 20 12/20/2019     CBC RESULTS:  Lab Results   Component Value Date    WBC 6.8 11/11/2021    WBC 4.7 02/28/2020    RBC 4.79 11/11/2021    RBC 3.92 (L) 02/28/2020    HGB 10.5 (L) 11/21/2021    HGB 12.4 (L) 02/28/2020    HCT 44.2 11/11/2021    HCT 36.7 (L) 02/28/2020    MCV 92 11/11/2021    MCV 94 02/28/2020    MCH 30.9 11/11/2021    MCH 31.6 02/28/2020    MCHC 33.5 11/11/2021    MCHC 33.8 02/28/2020    RDW 13.0 11/11/2021    RDW 12.9 02/28/2020     11/22/2021     02/28/2020     BMP RESULTS:  Lab Results   Component Value Date     11/11/2021     (L) 03/04/2020    POTASSIUM 4.2 11/11/2021    POTASSIUM 4.7 03/04/2020    CHLORIDE 102 11/11/2021    CHLORIDE 100 03/04/2020    CO2 26 11/11/2021    CO2 28 03/04/2020    ANIONGAP 8 11/11/2021    ANIONGAP 10.7 03/04/2020    GLC 96 11/21/2021     03/04/2020    BUN 9 11/11/2021    BUN 7 03/04/2020    CR 0.94 11/19/2021    CR 0.89 03/04/2020    GFRESTIMATED 80 11/19/2021    GFRESTIMATED 84 03/04/2020    GFRESTBLACK >90 03/04/2020    CODI 9.7 11/11/2021    CODI 9.7 03/04/2020      A1C RESULTS:  No results found for: A1C  INR RESULTS:  Lab Results   Component Value Date    INR 1.34 (H) 11/22/2021    INR 1.21 (H) 11/21/2021    INR 2.00 (H) 03/06/2020    INR 1.48 (H) 03/04/2020            Medications     Current Outpatient Medications   Medication Sig Dispense Refill     acetaminophen (TYLENOL) 325 MG tablet Take 3 tablets (975 mg) by mouth every 8 hours as needed for other (For optimal non-opioid multimodal pain management to improve pain control.) Max 3000mg/24 hours. 100 tablet 0     albuterol (PROAIR HFA/PROVENTIL HFA/VENTOLIN HFA) 108 (90 Base) MCG/ACT inhaler Inhale 2 puffs into the lungs every 4 hours as needed for shortness of breath / dyspnea or wheezing doesn't have current RX       clopidogrel (PLAVIX) 75 MG tablet Take 1 tablet (75 mg) by mouth daily 30 tablet 0     clotrimazole (LOTRIMIN) 1 % external cream APPLY THIN LAYER  TOPICALLY TWICE A DAY AS NEEDED **EXTERNAL USE ONLY** FOR ATHLETE'S FOOT       Emollient (VANICREAM) LOTN APPLY THIN LAYER TOPICALLY EVERY DAY FOR DRY SKIN, FOR DERMATITIS  TO AREAS OF DRY SKIN, IDEALLY WITHIN 3 MINUTES AFTER BATH OR SHOWER. FOR DRY SKIN, FOR DERMATITIS  TO AREAS OF DRY SKIN, IDEALLY WITHIN 3 MINUTES AFTER BATH OR SHOWER.       enoxaparin ANTICOAGULANT (LOVENOX) 100 MG/ML syringe Inject 1 mL (100 mg) Subcutaneous every 12 hours Continue until INR greater than 2. (Patient not taking: Reported on 4/14/2023) 10 mL 0     gabapentin (NEURONTIN) 400 MG capsule Take 1,200 mg by mouth 3 times daily       hydrocortisone acetate 1 % CREA APPLY THIN LAYER TOPICALLY EVERY DAY FOR DRY SKIN, FOR DERMATITIS  TO AREAS OF DRY SKIN, IDEALLY WITHIN 3 MINUTES AFTER BATH OR SHOWER. FOR DRY SKIN, FOR DERMATITIS  TO AREAS OF DRY SKIN, IDEALLY WITHIN 3 MINUTES AFTER BATH OR SHOWER.       HYDROmorphone (DILAUDID) 2 MG tablet Take 1-2 tablets (2-4 mg) by mouth every 4 hours as needed for moderate to severe pain (Patient not taking: Reported on 4/14/2023) 30 tablet 0     ipratropium - albuterol 0.5 mg/2.5 mg/3 mL (DUONEB) 0.5-2.5 (3) MG/3ML neb solution Inhale 3 mLs into the lungs daily as needed       lisinopril (PRINIVIL/ZESTRIL) 20 MG tablet Take 1 tablet (20 mg) by mouth daily 30 tablet 0     meclizine (ANTIVERT) 12.5 MG tablet Take 2 tablets (25 mg) by mouth 4 times daily as needed for dizziness 15 tablet 0     nitroGLYcerin (NITROSTAT) 0.4 MG sublingual tablet For chest pain place 1 tablet under the tongue every 5 minutes for 3 doses. If symptoms persist 5 minutes after 1st dose call 911. 20 tablet 0     omeprazole (PRILOSEC) 20 MG CR capsule Take 20 mg by mouth At Bedtime        rosuvastatin (CRESTOR) 20 MG tablet Take 1 tablet (20 mg) by mouth daily 90 tablet 3     senna-docusate (SENOKOT-S/PERICOLACE) 8.6-50 MG tablet Take 1 tablet by mouth 2 times daily (Patient not taking: Reported on 4/14/2023) 30 tablet 0      warfarin ANTICOAGULANT (COUMADIN ANTICOAGULANT) 5 MG tablet Take 5 mg by mouth daily             Past Medical History     Past Medical History:   Diagnosis Date     Arthritis      Asthma      CAD (coronary artery disease)     1996 angioplasty and stent to the prox LAD, PTCA with intracoronary stent placement of RCA, 70%OM; 10/24/19 Cath: Occluded RCA, prox circumflex lesion - pd/pa not significant, advised medical therapy first by Interventional cardiologist     Depressive disorder      DVT (deep venous thrombosis) (H)      Factor 5 Leiden mutation, heterozygous (H)      GERD (gastroesophageal reflux disease)      HTN (hypertension)      Hyperlipidemia      Neuropathy     wears brace R foot for drop foot     Neuropathy      Obese      PRIMITIVO (obstructive sleep apnea)     cpap     Protein C deficiency (H)      Spinal stenosis      Stented coronary artery      Thrombosis      Past Surgical History:   Procedure Laterality Date     APPENDECTOMY       APPENDECTOMY OPEN  1958     ARTHROPLASTY HIP Left 1997     ARTHROPLASTY REVISION HIP Left 2013     ARTHROPLASTY REVISION HIP Left      ARTHROPLASTY REVISION HIP Left 11/19/2021    Procedure: REVISION LEFT TOTAL HIP ARTHROPLASTY;  Surgeon: Alber Mcfarland MD;  Location: [facility] OR     AS SPINAL FUSION,ANT,EA ADNL LEVEL  2010    L4-L5     CORONARY STENT PLACEMENT  1996     CV CORONARY ANGIOGRAM N/A 10/24/2019    Procedure: Coronary Angiogram;  Surgeon: Valdemar Hinojosa MD;  Location: [facility] CARDIAC CATH LAB     CV CORONARY ANGIOGRAM  1996 1996 angioplasty and stent to the prox LAD, PTCA with intracoronary stent placement of RCA, 70%OM     CV CORONARY ANGIOGRAM N/A 2/28/2020    Procedure: Coronary Angiogram 2nd attempt of RCA  successful--(lad stent ok, Lcx 70% with normal iFR)  Surgeon: Gurvinder Yun MD;  Location: [facility] CARDIAC CATH LAB     CV FRACTIONAL FLOW RATIO WIRE N/A 10/24/2019    Procedure: Fractional Flow [place];  Surgeon: Valdemar Hinojosa  MD [place];  Location:  HEART CARDIAC CATH LAB     CV LEFT HEART CATH Bilateral 1/16/2020    Procedure: Left Heart Cath w/wo Left Ventriculogram  ANGIOPLASTY WITH [place] SCI REP PRESENT;  Surgeon: Gurvinder Yun MD;  Location:  HEART CARDIAC CATH LAB     SPINAL FUSION  2010    L4-5     TOTAL HIP ARTHROPLASTY Left      TOTAL HIP ARTHROPLASTY Right 2/10/2016    Procedure:  RIGHT HIP TOTAL ARTHROPLASTY;  Surgeon: Jose Gaytan MD;  Location: [facility] OR;  Service:      [facility] TOTAL HIP ARTHROPLASTY Right 2015    THR     Family History   Problem Relation Age of Onset     Myocardial Infarction Father      Heart Disease Maternal Grandfather             Allergies   Metoprolol, Niacin, Norvasc [amlodipine], and Septra [sulfamethoxazole w-trimethoprim]      40 minutes spent on the date of the encounter doing chart review, history and exam, documentation and further activities as noted above    Melissa Lawson PA-C  [facility]  Pager: [phone]

## 2023-05-09 ENCOUNTER — OFFICE VISIT (OUTPATIENT)
Dept: CARDIOLOGY | Facility: CLINIC | Age: 78
End: 2023-05-09
Attending: INTERNAL MEDICINE
Payer: MEDICARE

## 2023-05-09 VITALS
WEIGHT: 230 LBS | BODY MASS INDEX: 34.86 KG/M2 | SYSTOLIC BLOOD PRESSURE: 106 MMHG | HEART RATE: 65 BPM | DIASTOLIC BLOOD PRESSURE: 70 MMHG | HEIGHT: 68 IN

## 2023-05-09 DIAGNOSIS — I20.0 UNSTABLE ANGINA (H): ICD-10-CM

## 2023-05-09 DIAGNOSIS — R94.39 ABNORMAL CARDIOVASCULAR STRESS TEST: ICD-10-CM

## 2023-05-09 DIAGNOSIS — I25.10 CORONARY ARTERY DISEASE INVOLVING NATIVE CORONARY ARTERY OF NATIVE HEART, UNSPECIFIED WHETHER ANGINA PRESENT: Primary | ICD-10-CM

## 2023-05-09 PROCEDURE — 99215 OFFICE O/P EST HI 40 MIN: CPT | Performed by: INTERNAL MEDICINE

## 2023-05-09 RX ORDER — ENOXAPARIN SODIUM 150 MG/ML
INJECTION SUBCUTANEOUS DAILY
Status: CANCELLED | OUTPATIENT
Start: 2023-05-09

## 2023-05-09 NOTE — PATIENT INSTRUCTIONS
Call the nurse for any questions or concerns at [phone]    Plan:  1. Medication changes: None    2. Follow up with Dr. Yun in 6 months, sooner if needed   -Scheduling phone number: [phone]    It was great seeing you today!    Melissa Lawson PA-C  Physician Assistant  [facility]

## 2023-05-09 NOTE — LETTER
5/9/2023    Sara Jhaveri, [facility] [address]  [facility] [place] [address]    RE: oJse Tejada       Dear Colleague,     I had the pleasure of seeing Jose Tejada in the [facility].  Cardiology Clinic Progress Note  Jose Tejada MRN# [id number]   YOB: 1945 Age: 78 year old   Primary Cardiologist: Dr. Yun Reason for visit: Follow-up after testing            Assessment and Plan:   Jose Tejada is a very pleasant 78 year old male who is here today for follow-up after testing.    Coronary artery disease  -Status post stent to LAD and the RCA in 1996  -Recurrent chest discomfort in 2020.  -Cor angio 1/2020 revealed patent LAD stent, 70% stenosis in the circumflex, and  RCA  -Cor angio 2/2020 status post successful stenting of proximal RCA  with resolution in patient's chest discomfort  -Recurrent chest pain noted at 4/14/2023 visit  -Lexiscan stress completed 5/2/2023 was abnormal.  There is a small to moderate size fixed defect of the basal to mid inferior and inferior lateral segments  -On Plavix 75 mg daily, warfarin, lisinopril 20 mg daily, rosuvastatin 20 mg daily  -Not on beta-blocker secondary to chronic sinus bradycardia  Hyperlipidemia  -LDL 46 5/2023  -On rosuvastatin 20 mg daily  Factor V Leiden  Protein C deficiency  History of DVT  -On chronic warfarin therapy  Obstrutive sleep apnea  -on CPAP     Changes today:   Given abnormal stress test results as well as ongoing but rare episodes of atypical chest discomfort, favor discussion with Dr. Yun about how to proceed (ongoing watchful waiting versus coronary angio +/- PCI).  I told the patient I will discuss his case with Dr. Yun and call him later this afternoon with our plan.    ADDENDUM at 1309: Discussed the above with Dr. Yun. He recommends proceeding with cor angio +/- PCI.     Called patient to discuss Dr. Yun's recommendations.  He is  agreeable to proceed with coronary angiogram +/- PCI next week.     Risks and benefits of coronary angiogram +/- percutaneous coronary intervention (PCI) were discussed with the patient including but not limited to bleeding, bruising, infection, allergic reaction, kidney damage (including need for dialysis), stroke, heart attack, vascular damage, emergency open heart surgery, and death.  Patient verbalized understanding and wishes to proceed.      Patient has a history of protein C deficiency, factor V Leiden mutation (heterozygous) with a prior history of DVT.  He is on chronic warfarin therapy and needs Lovenox bridging prior to any procedure.    Stop warfarin 5 days prior to procedure.   Two days after stopping warfarin, initiate Lovenox 1mg/kg every 12 hours.  Last dose of Lovenox to be given the evening prior to coronary angiogram.   Plan to resume warfarin and Lovenox post procedure (timing per procedural MD) assuming there are no bleeding issues. Continue Lovenox post procedure until INR reaches 2.0.     The bridging plan above was discussed with Dr. Yun.    NPO 8 hours prior to procedure  Pt has transportation and 24 hours post procedure monitoring set up.   Pt aware of no driving for 24 hours post procedure.     Melissa Lawson PA-C  [facility]  Pager: [phone]          History of Presenting Illness:    Jose Tejada is a very pleasant 78 year old male with a history of coronary artery disease status post stent to LAD and RCA in 1996, status post successful stenting of proximal RCA  2/2020, hyperlipidemia, factor V Leiden, protein C deficiency, and history of DVT.    Mr. Tejada did well until earlier this year.  At that time, he began developing intermittent episodes of chest discomfort.  These were nonexertional, lasting 10 to 15 minutes in duration.  Described as a sharp left-sided pain.  At the time of his last appointment with Dr. Yun 4/2023, he described these  episodes of chest discomfort.  Further evaluation was recommended with a Lexiscan stress test.  Additionally, it has been several years since his cholesterol has been checked.    Lipid panel completed 2023 revealed TC 97, HDL 32, triglyceride 93, LDL 46.  Lexiscan stress completed 2023 was abnormal revealing a small to moderate size fixed defect of the basal to mid inferior and inferolateral segments.  This was suspected to be some degree of infarct but a possible component of diaphragmatic attenuation as well.  There was no evidence of ischemia.    Patient is here today for follow-up to discuss the above test results.  Overall, he has been feeling well.  Continues to have rare, intermittent episodes of nonexertional chest discomfort.  He describes his pain as sharp, left-sided, lasting 10 to 15 minutes before dissipating.  This chest discomfort is not similar to what he experienced prior to needing stenting in  or .  Denies shortness of breath, orthopnea, PND.  No palpitations, near-syncope or syncope.  Intermittent lightheadedness with changing positions.  This is chronic and stable.  Takes all medications daily as prescribed.    Blood pressure 106/70 and heart rate 65 in clinic today.  Does not check his blood pressure at home.  Lives at home with his wife who does most of his cooking.  She watches his sodium intake closely.  Does not perform routine exercise.  Rarely consumes alcohol.  No tobacco use.      Social History      Social History     Socioeconomic History    Marital status:      Spouse name: Not on file    Number of children: Not on file    Years of education: Not on file    Highest education level: Not on file   Occupational History    Not on file   Tobacco Use    Smoking status: Former     Types: Cigarettes     Quit date: 3/23/1987     Years since quittin.1    Smokeless tobacco: Never    Tobacco comments:     Smokes cigars once every 2-3 years   Vaping Use    Vaping  "status: Not on file   Substance and Sexual Activity    Alcohol use: Yes     Comment: Rarely    Drug use: Never    Sexual activity: Not on file   Other Topics Concern    Parent/sibling w/ CABG, MI or angioplasty before 65F 55M? Not Asked     Service Not Asked    Blood Transfusions Not Asked    Caffeine Concern No     Comment: 1 -2 cups daily     Occupational Exposure Not Asked    Hobby Hazards Not Asked    Sleep Concern Not Asked    Stress Concern Not Asked    Weight Concern Not Asked    Special Diet No    Back Care Not Asked    Exercise No    Bike Helmet Not Asked    Seat Belt Not Asked    Self-Exams Not Asked   Social History Narrative    Not on file     Social Determinants of Health     Financial Resource Strain: Not on file   Food Insecurity: Not on file   Transportation Needs: Not on file   Physical Activity: Not on file   Stress: Not on file   Social Connections: Not on file   Intimate Partner Violence: Not on file   Housing Stability: Not on file            Review of Systems:   Please see HPI         Physical Exam:   Vitals: /70 (BP Location: Right arm)   Pulse 65   Ht 1.727 m (5' 8\")   Wt 104.3 kg (230 lb)   BMI 34.97 kg/m     Wt Readings from Last 4 Encounters:   05/02/23 103.1 kg (227 lb 3.2 oz)   04/14/23 104.8 kg (231 lb 1.6 oz)   11/19/21 95.3 kg (210 lb)   03/04/20 85.4 kg (188 lb 3.2 oz)     GEN: well nourished, in no acute distress.  HEENT:  Pupils equal, round. Sclerae nonicteric.   NECK: Supple, no masses appreciated.  Unable to assess JVP secondary to body habitus  C/V:  Regular rate and rhythm, no murmur, rub or gallop.   RESP: Respirations are unlabored. Clear to auscultation bilaterally without wheezing, rales, or rhonchi.  GI: Abdomen soft, nontender.  EXTREM: No LE edema.  NEURO: Alert and oriented, cooperative.  SKIN: Warm and dry.        Data:   LIPID RESULTS:  Lab Results   Component Value Date    CHOL 97 05/05/2023    CHOL 99 01/16/2020    HDL 32 (L) 05/05/2023    HDL 40 " 01/16/2020    LDL 46 05/05/2023    LDL 39 01/16/2020    TRIG 93 05/05/2023    TRIG 101 01/16/2020    CHOLHDLRATIO 3.5 04/21/2014     LIVER ENZYME RESULTS:  Lab Results   Component Value Date    AST 21 10/22/2019    ALT 18 05/05/2023    ALT 20 12/20/2019     CBC RESULTS:  Lab Results   Component Value Date    WBC 6.8 11/11/2021    WBC 4.7 02/28/2020    RBC 4.79 11/11/2021    RBC 3.92 (L) 02/28/2020    HGB 10.5 (L) 11/21/2021    HGB 12.4 (L) 02/28/2020    HCT 44.2 11/11/2021    HCT 36.7 (L) 02/28/2020    MCV 92 11/11/2021    MCV 94 02/28/2020    MCH 30.9 11/11/2021    MCH 31.6 02/28/2020    MCHC 33.5 11/11/2021    MCHC 33.8 02/28/2020    RDW 13.0 11/11/2021    RDW 12.9 02/28/2020     11/22/2021     02/28/2020     BMP RESULTS:  Lab Results   Component Value Date     11/11/2021     (L) 03/04/2020    POTASSIUM 4.2 11/11/2021    POTASSIUM 4.7 03/04/2020    CHLORIDE 102 11/11/2021    CHLORIDE 100 03/04/2020    CO2 26 11/11/2021    CO2 28 03/04/2020    ANIONGAP 8 11/11/2021    ANIONGAP 10.7 03/04/2020    GLC 96 11/21/2021     03/04/2020    BUN 9 11/11/2021    BUN 7 03/04/2020    CR 0.94 11/19/2021    CR 0.89 03/04/2020    GFRESTIMATED 80 11/19/2021    GFRESTIMATED 84 03/04/2020    GFRESTBLACK >90 03/04/2020    CODI 9.7 11/11/2021    CODI 9.7 03/04/2020      A1C RESULTS:  No results found for: A1C  INR RESULTS:  Lab Results   Component Value Date    INR 1.34 (H) 11/22/2021    INR 1.21 (H) 11/21/2021    INR 2.00 (H) 03/06/2020    INR 1.48 (H) 03/04/2020            Medications     Current Outpatient Medications   Medication Sig Dispense Refill    acetaminophen (TYLENOL) 325 MG tablet Take 3 tablets (975 mg) by mouth every 8 hours as needed for other (For optimal non-opioid multimodal pain management to improve pain control.) Max 3000mg/24 hours. 100 tablet 0    albuterol (PROAIR HFA/PROVENTIL HFA/VENTOLIN HFA) 108 (90 Base) MCG/ACT inhaler Inhale 2 puffs into the lungs every 4 hours as needed for  shortness of breath / dyspnea or wheezing doesn't have current RX      clopidogrel (PLAVIX) 75 MG tablet Take 1 tablet (75 mg) by mouth daily 30 tablet 0    clotrimazole (LOTRIMIN) 1 % external cream APPLY THIN LAYER TOPICALLY TWICE A DAY AS NEEDED **EXTERNAL USE ONLY** FOR ATHLETE'S FOOT      Emollient (VANICREAM) LOTN APPLY THIN LAYER TOPICALLY EVERY DAY FOR DRY SKIN, FOR DERMATITIS  TO AREAS OF DRY SKIN, IDEALLY WITHIN 3 MINUTES AFTER BATH OR SHOWER. FOR DRY SKIN, FOR DERMATITIS  TO AREAS OF DRY SKIN, IDEALLY WITHIN 3 MINUTES AFTER BATH OR SHOWER.      enoxaparin ANTICOAGULANT (LOVENOX) 100 MG/ML syringe Inject 1 mL (100 mg) Subcutaneous every 12 hours Continue until INR greater than 2. (Patient not taking: Reported on 4/14/2023) 10 mL 0    gabapentin (NEURONTIN) 400 MG capsule Take 1,200 mg by mouth 3 times daily      hydrocortisone acetate 1 % CREA APPLY THIN LAYER TOPICALLY EVERY DAY FOR DRY SKIN, FOR DERMATITIS  TO AREAS OF DRY SKIN, IDEALLY WITHIN 3 MINUTES AFTER BATH OR SHOWER. FOR DRY SKIN, FOR DERMATITIS  TO AREAS OF DRY SKIN, IDEALLY WITHIN 3 MINUTES AFTER BATH OR SHOWER.      HYDROmorphone (DILAUDID) 2 MG tablet Take 1-2 tablets (2-4 mg) by mouth every 4 hours as needed for moderate to severe pain (Patient not taking: Reported on 4/14/2023) 30 tablet 0    ipratropium - albuterol 0.5 mg/2.5 mg/3 mL (DUONEB) 0.5-2.5 (3) MG/3ML neb solution Inhale 3 mLs into the lungs daily as needed      lisinopril (PRINIVIL/ZESTRIL) 20 MG tablet Take 1 tablet (20 mg) by mouth daily 30 tablet 0    meclizine (ANTIVERT) 12.5 MG tablet Take 2 tablets (25 mg) by mouth 4 times daily as needed for dizziness 15 tablet 0    nitroGLYcerin (NITROSTAT) 0.4 MG sublingual tablet For chest pain place 1 tablet under the tongue every 5 minutes for 3 doses. If symptoms persist 5 minutes after 1st dose call 911. 20 tablet 0    omeprazole (PRILOSEC) 20 MG CR capsule Take 20 mg by mouth At Bedtime       rosuvastatin (CRESTOR) 20 MG tablet  Take 1 tablet (20 mg) by mouth daily 90 tablet 3    senna-docusate (SENOKOT-S/PERICOLACE) 8.6-50 MG tablet Take 1 tablet by mouth 2 times daily (Patient not taking: Reported on 4/14/2023) 30 tablet 0    warfarin ANTICOAGULANT (COUMADIN ANTICOAGULANT) 5 MG tablet Take 5 mg by mouth daily             Past Medical History     Past Medical History:   Diagnosis Date    Arthritis     Asthma     CAD (coronary artery disease)     1996 angioplasty and stent to the prox LAD, PTCA with intracoronary stent placement of RCA, 70%OM; 10/24/19 Cath: Occluded RCA, prox circumflex lesion - pd/pa not significant, advised medical therapy first by Interventional cardiologist    Depressive disorder     DVT (deep venous thrombosis) (H)     Factor 5 Leiden mutation, heterozygous (H)     GERD (gastroesophageal reflux disease)     HTN (hypertension)     Hyperlipidemia     Neuropathy     wears brace R foot for drop foot    Neuropathy     Obese     PRIMITIVO (obstructive sleep apnea)     cpap    Protein C deficiency (H)     Spinal stenosis     Stented coronary artery     Thrombosis      Past Surgical History:   Procedure Laterality Date    APPENDECTOMY      APPENDECTOMY OPEN  1958    ARTHROPLASTY HIP Left 1997    ARTHROPLASTY REVISION HIP Left 2013    ARTHROPLASTY REVISION HIP Left     ARTHROPLASTY REVISION HIP Left 11/19/2021    Procedure: REVISION LEFT TOTAL HIP ARTHROPLASTY;  Surgeon: Alber Mcfarland MD;  Location: [facility] OR    AS SPINAL FUSION,ANT,EA ADNL LEVEL  2010    L4-L5    CORONARY STENT PLACEMENT  1996    CV CORONARY ANGIOGRAM N/A 10/24/2019    Procedure: Coronary Angiogram;  Surgeon: Valdemar Hinojosa MD;  Location: [facility] CARDIAC CATH LAB    CV CORONARY ANGIOGRAM  1996 1996 angioplasty and stent to the prox LAD, PTCA with intracoronary stent placement of RCA, 70%OM    CV CORONARY ANGIOGRAM N/A 2/28/2020    Procedure: Coronary Angiogram 2nd attempt of RCA  successful--(lad stent ok, Lcx 70% with normal iFR)  Surgeon:  Gurvinder Yun MD;  Location:  HEART CARDIAC CATH LAB    CV FRACTIONAL FLOW RATIO WIRE N/A 10/24/2019    Procedure: Fractional Flow [place];  Surgeon: Valdemar Hinojosa MD;  Location:  HEART CARDIAC CATH LAB    CV LEFT HEART CATH Bilateral 1/16/2020    Procedure: Left Heart Cath w/wo Left Ventriculogram  ANGIOPLASTY WITH [place] SCI REP PRESENT;  Surgeon: Gurvinder Yun MD;  Location:  HEART CARDIAC CATH LAB    SPINAL FUSION  2010    L4-5    TOTAL HIP ARTHROPLASTY Left     TOTAL HIP ARTHROPLASTY Right 2/10/2016    Procedure:  RIGHT HIP TOTAL ARTHROPLASTY;  Surgeon: Jose Gaytan MD;  Location: [facility] Main OR;  Service:     [facility] TOTAL HIP ARTHROPLASTY Right 2015    THR     Family History   Problem Relation Age of Onset    Myocardial Infarction Father     Heart Disease Maternal Grandfather             Allergies   Metoprolol, Niacin, Norvasc [amlodipine], and Septra [sulfamethoxazole w-trimethoprim]      40 minutes spent on the date of the encounter doing chart review, history and exam, documentation and further activities as noted above    BAYRON Rios [facility]  Pager: [phone]          Thank you for allowing me to participate in the care of your patient.      Sincerely,     BAYRON Rios [facility]  cc:   Gurvinder Yun MD  [address] [address]

## 2023-05-10 ENCOUNTER — TRANSFERRED RECORDS (OUTPATIENT)
Dept: HEALTH INFORMATION MANAGEMENT | Facility: CLINIC | Age: 78
End: 2023-05-10
Payer: MEDICARE

## 2023-05-10 ENCOUNTER — TELEPHONE (OUTPATIENT)
Dept: CARDIOLOGY | Facility: CLINIC | Age: 78
End: 2023-05-10
Payer: MEDICARE

## 2023-05-10 NOTE — TELEPHONE ENCOUNTER
Please send us the date of the angiogram once it has been scheduled. Thanks!   ----- Message -----   From: Melissa Lawson PA-C   Sent: 5/10/2023   8:03 AM CDT   To: [facility] Team 7   Subject: holding warfarin/bridging recs for angio         Hi,     I saw this patient in clinic yesterday.  Plan is for outpatient coronary angiogram sometime next week.  It does not look like scheduling has contacted the patient to set up the procedure yet.  Once we know the procedure date, we can implement the following hold/bridging plan below:     Stop warfarin 5 days prior to procedure.   Two days after stopping warfarin, initiate Lovenox 1mg/kg every 12 hours.   Last dose of Lovenox to be given the evening prior to coronary angiogram.   Plan to resume warfarin and Lovenox post procedure (timing per procedural MD) assuming there are no bleeding issues. Continue Lovenox post procedure until INR reaches 2.0.     Thanks for your help!   Melissa Lawson PA-C on 5/10/2023 at 8:01 AM      [facility]. [facility] out to [facility] for return call [phone]. Pt has home care nurse also and her number is [phone]. Pt knows to hold warfarin 5 days before procedure date, and will start bridging 2 days after stopping, and last dose night before angiogram. Awaiting [facility] call from [facility]. MARIA R Redd RN

## 2023-05-10 NOTE — TELEPHONE ENCOUNTER
Spoke with Carolina MEZA at [facility] INR clinic. Went over Pt instructions, and  hold of warfarin. They will provide Pt's lovenox and review instructions with him fo rthese medications before angiogram. Pt also informed of this information, and told to contact this nurse if does not have lovenox 3 days from now. MARIA R Redd RN

## 2023-05-12 DIAGNOSIS — I25.10 CORONARY ARTERY DISEASE INVOLVING NATIVE CORONARY ARTERY OF NATIVE HEART WITHOUT ANGINA PECTORIS: Primary | ICD-10-CM

## 2023-05-12 NOTE — TELEPHONE ENCOUNTER
Will message provider Pt is to do lovenox before angiogram, and is on Plavix 75 mg. What should aspirin instructions be pre cath?  MARIA R Redd RN

## 2023-05-12 NOTE — PROGRESS NOTES
Coronary angiogram/PCI/Right Heart Cath prep instructions.     Patient is scheduled for a Coronary Angio w/possible PCI at [facility] [place] [address] [place] [address] - Main Entrance of the Hospital on 5/17/23.  Check in time is at 730 and procedure to follow.    Patient instructed to remain NPO for solid foods 8 hours prior to arrival and may have clear liquids up to 2 hours prior to arrival.    Patient Patient does not require extra fluids prior to procedure.    Patient is not diabetic.    Warfarin:Stop warfarin 5 days prior to procedure. On 5/12/23  Two days after stopping warfarin, initiate Lovenox 1mg/kg every 12 hours.   Last dose of Lovenox to be given the evening prior to coronary angiogram.   Plan to resume warfarin and Lovenox post procedure (timing per procedural MD) assuming there are no bleeding issues. Continue Lovenox post procedure until INR reaches 2.0.    Patient is not on diuretics.         Per DR Yun 325 mg aspirin morning of procedure only.    Pt is not on a SGLT2 inhibitor.    Patient advised to take their other daily medications the morning of the procedure with small sips of water.     Verified patient does not have a contrast allergy.    Verified patient has someone available to drive them home from the hospital and can stay with them for 24 hours after the procedure.     Patient advised to notify care team with any new COVID like symptoms prior to procedure.    Patient will check their temperature the morning of procedure and call [facility] at [phone] if temp is >100.0.    Patient is aware of visitor policy.    Patient expresses understanding of above instructions and denies further questions at this time.      MARIA R Redd RN  [facility]

## 2023-05-16 RX ORDER — SODIUM CHLORIDE 9 MG/ML
INJECTION, SOLUTION INTRAVENOUS CONTINUOUS
Status: CANCELLED | OUTPATIENT
Start: 2023-05-16

## 2023-05-16 RX ORDER — ASPIRIN 81 MG/1
243 TABLET, CHEWABLE ORAL ONCE
Status: CANCELLED | OUTPATIENT
Start: 2023-05-17

## 2023-05-16 RX ORDER — LIDOCAINE 40 MG/G
CREAM TOPICAL
Status: CANCELLED | OUTPATIENT
Start: 2023-05-16

## 2023-05-16 RX ORDER — ASPIRIN 325 MG
325 TABLET ORAL ONCE
Status: CANCELLED | OUTPATIENT
Start: 2023-05-17

## 2023-05-16 RX ORDER — POTASSIUM CHLORIDE 1500 MG/1
20 TABLET, EXTENDED RELEASE ORAL
Status: CANCELLED | OUTPATIENT
Start: 2023-05-16

## 2023-05-17 ENCOUNTER — HOSPITAL ENCOUNTER (OUTPATIENT)
Facility: CLINIC | Age: 78
Discharge: HOME | End: 2023-05-18
Admitting: INTERNAL MEDICINE
Payer: MEDICARE

## 2023-05-17 DIAGNOSIS — R94.39 ABNORMAL CARDIOVASCULAR STRESS TEST: ICD-10-CM

## 2023-05-17 DIAGNOSIS — I25.10 CORONARY ARTERY DISEASE INVOLVING NATIVE CORONARY ARTERY OF NATIVE HEART, UNSPECIFIED WHETHER ANGINA PRESENT: ICD-10-CM

## 2023-05-17 DIAGNOSIS — I25.10 CORONARY ARTERY DISEASE INVOLVING NATIVE CORONARY ARTERY OF NATIVE HEART WITHOUT ANGINA PECTORIS: ICD-10-CM

## 2023-05-17 LAB
ANION GAP SERPL CALCULATED.3IONS-SCNC: 12 MMOL/L (ref 7–15)
BUN SERPL-MCNC: 12.9 MG/DL (ref 8–23)
CALCIUM SERPL-MCNC: 9.2 MG/DL (ref 8.8–10.2)
CHLORIDE SERPL-SCNC: 105 MMOL/L (ref 98–107)
CREAT SERPL-MCNC: 1 MG/DL (ref 0.67–1.17)
DEPRECATED HCO3 PLAS-SCNC: 23 MMOL/L (ref 22–29)
ERYTHROCYTE [DISTWIDTH] IN BLOOD BY AUTOMATED COUNT: 13 % (ref 10–15)
GFR SERPL CREATININE-BSD FRML MDRD: 78 ML/MIN/1.73M2
GLUCOSE SERPL-MCNC: 98 MG/DL (ref 70–99)
HCT VFR BLD AUTO: 42.9 % (ref 40–53)
HGB BLD-MCNC: 14.5 G/DL (ref 13.3–17.7)
INR PPP: 1.06 (ref 0.85–1.15)
MCH RBC QN AUTO: 31.4 PG (ref 26.5–33)
MCHC RBC AUTO-ENTMCNC: 33.8 G/DL (ref 31.5–36.5)
MCV RBC AUTO: 93 FL (ref 78–100)
PLATELET # BLD AUTO: 249 10E3/UL (ref 150–450)
POTASSIUM SERPL-SCNC: 4 MMOL/L (ref 3.4–5.3)
RBC # BLD AUTO: 4.62 10E6/UL (ref 4.4–5.9)
SODIUM SERPL-SCNC: 140 MMOL/L (ref 136–145)
WBC # BLD AUTO: 5.6 10E3/UL (ref 4–11)

## 2023-05-17 PROCEDURE — 36591 DRAW BLOOD OFF VENOUS DEVICE: CPT

## 2023-05-17 PROCEDURE — 93454 CORONARY ARTERY ANGIO S&I: CPT | Performed by: INTERNAL MEDICINE

## 2023-05-17 PROCEDURE — 85610 PROTHROMBIN TIME: CPT | Performed by: INTERNAL MEDICINE

## 2023-05-17 PROCEDURE — C1887 CATHETER, GUIDING: HCPCS | Performed by: INTERNAL MEDICINE

## 2023-05-17 PROCEDURE — 99152 MOD SED SAME PHYS/QHP 5/>YRS: CPT | Performed by: INTERNAL MEDICINE

## 2023-05-17 PROCEDURE — 93454 CORONARY ARTERY ANGIO S&I: CPT | Mod: 26 | Performed by: INTERNAL MEDICINE

## 2023-05-17 PROCEDURE — 99152 MOD SED SAME PHYS/QHP 5/>YRS: CPT | Mod: GC | Performed by: INTERNAL MEDICINE

## 2023-05-17 PROCEDURE — 250N000011 HC RX IP 250 OP 636: Performed by: INTERNAL MEDICINE

## 2023-05-17 PROCEDURE — 85027 COMPLETE CBC AUTOMATED: CPT | Performed by: INTERNAL MEDICINE

## 2023-05-17 PROCEDURE — 999N000184 HC STATISTIC TELEMETRY

## 2023-05-17 PROCEDURE — 250N000013 HC RX MED GY IP 250 OP 250 PS 637: Performed by: INTERNAL MEDICINE

## 2023-05-17 PROCEDURE — 250N000009 HC RX 250: Performed by: INTERNAL MEDICINE

## 2023-05-17 PROCEDURE — 258N000003 HC RX IP 258 OP 636: Performed by: INTERNAL MEDICINE

## 2023-05-17 PROCEDURE — 36415 COLL VENOUS BLD VENIPUNCTURE: CPT | Performed by: INTERNAL MEDICINE

## 2023-05-17 PROCEDURE — 272N000001 HC OR GENERAL SUPPLY STERILE: Performed by: INTERNAL MEDICINE

## 2023-05-17 PROCEDURE — 82310 ASSAY OF CALCIUM: CPT | Performed by: INTERNAL MEDICINE

## 2023-05-17 PROCEDURE — 93005 ELECTROCARDIOGRAM TRACING: CPT

## 2023-05-17 PROCEDURE — 250N000013 HC RX MED GY IP 250 OP 250 PS 637: Performed by: STUDENT IN AN ORGANIZED HEALTH CARE EDUCATION/TRAINING PROGRAM

## 2023-05-17 PROCEDURE — 999N000054 HC STATISTIC EKG NON-CHARGEABLE

## 2023-05-17 PROCEDURE — 999N000071 HC STATISTIC HEART CATH LAB OR EP LAB

## 2023-05-17 PROCEDURE — C1894 INTRO/SHEATH, NON-LASER: HCPCS | Performed by: INTERNAL MEDICINE

## 2023-05-17 PROCEDURE — C1769 GUIDE WIRE: HCPCS | Performed by: INTERNAL MEDICINE

## 2023-05-17 RX ORDER — SODIUM CHLORIDE 9 MG/ML
INJECTION, SOLUTION INTRAVENOUS CONTINUOUS
Status: DISCONTINUED | OUTPATIENT
Start: 2023-05-17 | End: 2023-05-17 | Stop reason: HOSPADM

## 2023-05-17 RX ORDER — IOPAMIDOL 755 MG/ML
INJECTION, SOLUTION INTRAVASCULAR
Status: DISCONTINUED | OUTPATIENT
Start: 2023-05-17 | End: 2023-05-17 | Stop reason: HOSPADM

## 2023-05-17 RX ORDER — LISINOPRIL 20 MG/1
20 TABLET ORAL DAILY
Status: DISCONTINUED | OUTPATIENT
Start: 2023-05-18 | End: 2023-05-18 | Stop reason: HOSPADM

## 2023-05-17 RX ORDER — NALOXONE HYDROCHLORIDE 0.4 MG/ML
0.4 INJECTION, SOLUTION INTRAMUSCULAR; INTRAVENOUS; SUBCUTANEOUS
Status: DISCONTINUED | OUTPATIENT
Start: 2023-05-17 | End: 2023-05-17

## 2023-05-17 RX ORDER — ROSUVASTATIN CALCIUM 20 MG/1
20 TABLET, COATED ORAL AT BEDTIME
Status: DISCONTINUED | OUTPATIENT
Start: 2023-05-17 | End: 2023-05-18 | Stop reason: HOSPADM

## 2023-05-17 RX ORDER — ALBUTEROL SULFATE 90 UG/1
2 AEROSOL, METERED RESPIRATORY (INHALATION) EVERY 4 HOURS PRN
Status: DISCONTINUED | OUTPATIENT
Start: 2023-05-17 | End: 2023-05-18 | Stop reason: HOSPADM

## 2023-05-17 RX ORDER — LIDOCAINE 40 MG/G
CREAM TOPICAL
Status: DISCONTINUED | OUTPATIENT
Start: 2023-05-17 | End: 2023-05-18 | Stop reason: HOSPADM

## 2023-05-17 RX ORDER — ATROPINE SULFATE 0.1 MG/ML
0.5 INJECTION INTRAVENOUS
Status: DISCONTINUED | OUTPATIENT
Start: 2023-05-17 | End: 2023-05-17

## 2023-05-17 RX ORDER — NITROGLYCERIN 5 MG/ML
VIAL (ML) INTRAVENOUS
Status: DISCONTINUED | OUTPATIENT
Start: 2023-05-17 | End: 2023-05-17 | Stop reason: HOSPADM

## 2023-05-17 RX ORDER — NALOXONE HYDROCHLORIDE 0.4 MG/ML
0.2 INJECTION, SOLUTION INTRAMUSCULAR; INTRAVENOUS; SUBCUTANEOUS
Status: DISCONTINUED | OUTPATIENT
Start: 2023-05-17 | End: 2023-05-17

## 2023-05-17 RX ORDER — GABAPENTIN 600 MG/1
1200 TABLET ORAL 3 TIMES DAILY
Status: DISCONTINUED | OUTPATIENT
Start: 2023-05-17 | End: 2023-05-18 | Stop reason: HOSPADM

## 2023-05-17 RX ORDER — WARFARIN SODIUM 5 MG/1
5 TABLET ORAL ONCE
Status: COMPLETED | OUTPATIENT
Start: 2023-05-17 | End: 2023-05-17

## 2023-05-17 RX ORDER — POTASSIUM CHLORIDE 1500 MG/1
20 TABLET, EXTENDED RELEASE ORAL
Status: DISCONTINUED | OUTPATIENT
Start: 2023-05-17 | End: 2023-05-17 | Stop reason: HOSPADM

## 2023-05-17 RX ORDER — ASPIRIN 325 MG
325 TABLET ORAL ONCE
Status: COMPLETED | OUTPATIENT
Start: 2023-05-17 | End: 2023-05-17

## 2023-05-17 RX ORDER — HEPARIN SODIUM 1000 [USP'U]/ML
INJECTION, SOLUTION INTRAVENOUS; SUBCUTANEOUS
Status: DISCONTINUED | OUTPATIENT
Start: 2023-05-17 | End: 2023-05-17 | Stop reason: HOSPADM

## 2023-05-17 RX ORDER — ACETAMINOPHEN 325 MG/1
650 TABLET ORAL EVERY 4 HOURS PRN
Status: DISCONTINUED | OUTPATIENT
Start: 2023-05-17 | End: 2023-05-18 | Stop reason: HOSPADM

## 2023-05-17 RX ORDER — CLOPIDOGREL BISULFATE 75 MG/1
75 TABLET ORAL DAILY
Status: DISCONTINUED | OUTPATIENT
Start: 2023-05-18 | End: 2023-05-18 | Stop reason: HOSPADM

## 2023-05-17 RX ORDER — FLUMAZENIL 0.1 MG/ML
0.2 INJECTION, SOLUTION INTRAVENOUS
Status: DISCONTINUED | OUTPATIENT
Start: 2023-05-17 | End: 2023-05-17

## 2023-05-17 RX ORDER — LIDOCAINE 40 MG/G
CREAM TOPICAL
Status: DISCONTINUED | OUTPATIENT
Start: 2023-05-17 | End: 2023-05-17 | Stop reason: HOSPADM

## 2023-05-17 RX ORDER — ENOXAPARIN SODIUM 100 MG/ML
100 INJECTION SUBCUTANEOUS EVERY 12 HOURS
Status: DISCONTINUED | OUTPATIENT
Start: 2023-05-18 | End: 2023-05-18 | Stop reason: HOSPADM

## 2023-05-17 RX ORDER — ASPIRIN 81 MG/1
243 TABLET, CHEWABLE ORAL ONCE
Status: COMPLETED | OUTPATIENT
Start: 2023-05-17 | End: 2023-05-17

## 2023-05-17 RX ORDER — FENTANYL CITRATE 50 UG/ML
25 INJECTION, SOLUTION INTRAMUSCULAR; INTRAVENOUS
Status: DISCONTINUED | OUTPATIENT
Start: 2023-05-17 | End: 2023-05-17

## 2023-05-17 RX ORDER — PANTOPRAZOLE SODIUM 40 MG/1
40 TABLET, DELAYED RELEASE ORAL AT BEDTIME
Status: DISCONTINUED | OUTPATIENT
Start: 2023-05-17 | End: 2023-05-18 | Stop reason: HOSPADM

## 2023-05-17 RX ORDER — FENTANYL CITRATE 50 UG/ML
INJECTION, SOLUTION INTRAMUSCULAR; INTRAVENOUS
Status: DISCONTINUED | OUTPATIENT
Start: 2023-05-17 | End: 2023-05-17 | Stop reason: HOSPADM

## 2023-05-17 RX ORDER — SODIUM CHLORIDE 9 MG/ML
INJECTION, SOLUTION INTRAVENOUS CONTINUOUS
Status: DISCONTINUED | OUTPATIENT
Start: 2023-05-17 | End: 2023-05-17

## 2023-05-17 RX ADMIN — ACETAMINOPHEN 650 MG: 325 TABLET ORAL at 21:59

## 2023-05-17 RX ADMIN — SODIUM CHLORIDE: 9 INJECTION, SOLUTION INTRAVENOUS at 07:55

## 2023-05-17 RX ADMIN — GABAPENTIN 1200 MG: 600 TABLET, FILM COATED ORAL at 23:38

## 2023-05-17 RX ADMIN — PANTOPRAZOLE SODIUM 40 MG: 40 TABLET, DELAYED RELEASE ORAL at 23:38

## 2023-05-17 RX ADMIN — ACETAMINOPHEN 650 MG: 325 TABLET ORAL at 15:38

## 2023-05-17 RX ADMIN — WARFARIN SODIUM 5 MG: 5 TABLET ORAL at 20:41

## 2023-05-17 RX ADMIN — GABAPENTIN 1200 MG: 600 TABLET, FILM COATED ORAL at 17:44

## 2023-05-17 RX ADMIN — ROSUVASTATIN CALCIUM 20 MG: 20 TABLET, FILM COATED ORAL at 23:38

## 2023-05-17 ASSESSMENT — ACTIVITIES OF DAILY LIVING (ADL)
ADLS_ACUITY_SCORE: 35

## 2023-05-17 NOTE — PROGRESS NOTES
1105 Report received from Danisha Hughes RN. Pt in procedure.  1120 Pt returned from Cath Lab. A/O. TR band intact to right wrist. Hematoma noted proximal to TR band. Air increased in TR band. Manual pressure applied x 10 min. Armboard intact. Right arm elevated on pillows. Pt denies pain. Pt instructed on activity restrictions with right wrist/arm. Verbal understanding received from pt. No family present.  1130 Area proximal to TR band softer but feels thicker. Denies pain.  1145 Area above TR band (proximal) unchanged.   1200 Area proximal to TR band firmer extending up toward AC. Manual pressure held.   1203 Report given to Danisha Hughes RN.   1207 Dr Rodriguez notified of situation. Will come to bedside to assess.

## 2023-05-17 NOTE — PROGRESS NOTES
Care Suites Admission Nursing Note    Patient Information  Name: Jose Tejada  Age: 78 year old  Reason for admission: [facility]  Care Suites arrival time: 0730    Visitor Information  Name: Yesenia hooper  Informed of visitor restrictions: Yes  2 visitor allowed per patient   Visitor must wear a mask    Patient Admission/Assessment   Pre-procedure assessment complete: Yes  If abnormal assessment/labs, provider notified: N/A  NPO: Yes  Medications held per instructions/orders: Yes  Consent: deferred  If applicable, pregnancy test status: deferred  Patient oriented to room: Yes  Education/questions answered: Yes  Plan/other: proceed as scheduled    Discharge Planning  Discharge name/phone number: [phone]  Overnight post sedation caregiver: wife  Discharge location: [place]    Danisha Hughes RN

## 2023-05-17 NOTE — Clinical Note
Hemodynamic equipment used: 5 lead ECG, TrendPoK With 3 Leads, Machine BP Cuff and pulse oximeter probe.

## 2023-05-17 NOTE — DISCHARGE INSTRUCTIONS
Cardiac Angiogram Discharge Instructions - Radial    After you go home:    Have an adult stay with you until tomorrow.  Drink extra fluids for 2 days.  You may resume your normal diet.  No smoking       For 24 hours - due to the sedation you received:  Relax and take it easy.  Do NOT make any important or legal decisions.  Do NOT drive or operate machines at home or at work.  Do NOT drink alcohol.    Care of Wrist Puncture Site:    For the first 24 hrs - check the puncture site every 1-2 hours while awake.  It is normal to have soreness at the puncture site and mild tingling in your hand for up to 3 days.  Remove the bandaid after 24 hours. If there is minor oozing, apply another bandaid and remove it after 12 hours.  You may shower tomorrow.  Do NOT take a bath, or use a hot tub or pool for at least 3 days. Do NOT scrub the site. Do not use lotion or powder near the puncture site.           Activity:        For 2 days:   do not use your hand or arm to support your weight (such as rising from a chair)   do not bend your wrist (such as lifting a garage door).  do not lift more than 5 pounds or exercise your arm (such as tennis, golf or bowling).  Do NOT do any heavy activity such as exercise, lifting, or straining.     Bleeding:    If you start bleeding from the site in your wrist, sit down and press firmly on/above the site for 10 minutes.   Once bleeding stops, keep arm still for 2 hours.   Call [facility] as soon as you can.       Call 911 right away if you have heavy bleeding or bleeding that does not stop.      Medicines:    You are taking an antiplatelet medication - Plavix - do not stop taking it until you talk to your cardiologist.      Take your medications, including blood thinners, unless your provider tells you not to.    If you take Coumadin (Warfarin), have your INR checked by your provider in  3-5 days. Call your clinic to schedule this.  If you have stopped any medicines, check with your provider  about when to restart them.    Follow Up Appointments:    Follow up with [facility] Nurse Practitioner at [facility] of patient preference in 7-10 days.    Call the clinic if:    You have a large or growing hard lump around the site.  The site is red, swollen, hot or tender.  Blood or fluid is draining from the site.  You have chills or a fever greater than 101 F (38 C).  Your arm feels numb, cool or changes color.  You have hives, a rash or unusual itching.  Any questions or concerns.          [facility] [place]:    [phone] [facility] (7 days a week)

## 2023-05-17 NOTE — PROGRESS NOTES
1208 - RN noticed pt's right forearm was firm and swollen. Manual BP cuff applied to right forearm, Dr. Rodriguez paged.    1215 - Dr. Rodriguez at bedside, BP cuff moved to upper right arm and manual pressure held to RFA x 10 min per MD. RFA remains firm but tightness/hematoma is better.    1230 - RFA continues to be firm but is stable.    1245 - Dr. Rodriguez at bedside to assess - firm but stable RFA, has not worsened. Will continue to monitor.    1345 - Dr. Rodriguez at bedside again to assess, RFA remains unchanged.   TR Band removal per protocol, see flowsheet.

## 2023-05-17 NOTE — PROGRESS NOTES
1500 Report received from Danisha Hughes RN. Right forearm remains firm but unchanged. TR band remains intact to right wrist.   1505 OOB - fairly steady on feet. Ambulated in halls to bathroom with assistance with good shikha.   1510 Pt returned from bathroom. No change to right forearm or puncture site with activity. Pt A/O.  Armboard intact. Right arm elevated on pillows. Pt reports level 3/10 soreness to posterior (underneath area) of right forearm. Activity restrictions with right wrist/arm reinforced with pt.   1515 Air released from TR band per protocol. Immediate oozing noted. Air replaced.  1535 Dr Rodriguez paged. Detailed update given. Orders obtained for pt to stay overnight.  1540 Pt medicated for right forearm soreness.   1600 Air released from TR band - slowly. No oozing noted. No change in forearm firmness.  1610 Right forearm measurements done in 3 different levels & marked on skin.           Level #1 - directly above (proximal) to TR band - 23 cm.            Level #2 - mid forearm - 29 cm.            Level #3 - upper forearm (3 cm below AC level) - 31.5 cm.  1655 Dr Rodriguez paged for medication orders. Orders obtained.  1830 Pt taking diet & flds well. No complaints.  1840 Detailed report called to ZOHRA James in CICU..  1850 Right forearm measurements unchanged from initial readings. Air released from TR band very slowly. Right forearm remains firm but unchanged.   1910 [place] [place]. All personal belongings sent w/ pt. Site check done with receiving nurse. Armboard sent with pt.

## 2023-05-18 ENCOUNTER — APPOINTMENT (OUTPATIENT)
Dept: ULTRASOUND IMAGING | Facility: CLINIC | Age: 78
End: 2023-05-18
Attending: PHYSICIAN ASSISTANT
Payer: MEDICARE

## 2023-05-18 VITALS
DIASTOLIC BLOOD PRESSURE: 73 MMHG | SYSTOLIC BLOOD PRESSURE: 116 MMHG | HEIGHT: 68 IN | BODY MASS INDEX: 34.16 KG/M2 | HEART RATE: 52 BPM | RESPIRATION RATE: 20 BRPM | TEMPERATURE: 97.7 F | WEIGHT: 225.4 LBS | OXYGEN SATURATION: 94 %

## 2023-05-18 LAB
ANION GAP SERPL CALCULATED.3IONS-SCNC: 11 MMOL/L (ref 7–15)
BUN SERPL-MCNC: 9.5 MG/DL (ref 8–23)
CALCIUM SERPL-MCNC: 9.2 MG/DL (ref 8.8–10.2)
CHLORIDE SERPL-SCNC: 106 MMOL/L (ref 98–107)
CREAT SERPL-MCNC: 0.9 MG/DL (ref 0.67–1.17)
DEPRECATED HCO3 PLAS-SCNC: 22 MMOL/L (ref 22–29)
ERYTHROCYTE [DISTWIDTH] IN BLOOD BY AUTOMATED COUNT: 13 % (ref 10–15)
GFR SERPL CREATININE-BSD FRML MDRD: 89 ML/MIN/1.73M2
GLUCOSE SERPL-MCNC: 115 MG/DL (ref 70–99)
HCT VFR BLD AUTO: 41.3 % (ref 40–53)
HGB BLD-MCNC: 13.8 G/DL (ref 13.3–17.7)
MCH RBC QN AUTO: 31.3 PG (ref 26.5–33)
MCHC RBC AUTO-ENTMCNC: 33.4 G/DL (ref 31.5–36.5)
MCV RBC AUTO: 94 FL (ref 78–100)
PLATELET # BLD AUTO: 221 10E3/UL (ref 150–450)
POTASSIUM SERPL-SCNC: 4.2 MMOL/L (ref 3.4–5.3)
RBC # BLD AUTO: 4.41 10E6/UL (ref 4.4–5.9)
SODIUM SERPL-SCNC: 139 MMOL/L (ref 136–145)
WBC # BLD AUTO: 5.6 10E3/UL (ref 4–11)

## 2023-05-18 PROCEDURE — 96372 THER/PROPH/DIAG INJ SC/IM: CPT | Performed by: INTERNAL MEDICINE

## 2023-05-18 PROCEDURE — 80048 BASIC METABOLIC PNL TOTAL CA: CPT | Performed by: PHYSICIAN ASSISTANT

## 2023-05-18 PROCEDURE — 93931 UPPER EXTREMITY STUDY: CPT | Mod: RT

## 2023-05-18 PROCEDURE — 250N000013 HC RX MED GY IP 250 OP 250 PS 637: Performed by: INTERNAL MEDICINE

## 2023-05-18 PROCEDURE — 250N000011 HC RX IP 250 OP 636: Performed by: INTERNAL MEDICINE

## 2023-05-18 PROCEDURE — 36415 COLL VENOUS BLD VENIPUNCTURE: CPT | Performed by: PHYSICIAN ASSISTANT

## 2023-05-18 PROCEDURE — 99214 OFFICE O/P EST MOD 30 MIN: CPT | Performed by: PHYSICIAN ASSISTANT

## 2023-05-18 PROCEDURE — 85018 HEMOGLOBIN: CPT | Performed by: PHYSICIAN ASSISTANT

## 2023-05-18 RX ADMIN — CLOPIDOGREL BISULFATE 75 MG: 75 TABLET ORAL at 09:01

## 2023-05-18 RX ADMIN — GABAPENTIN 1200 MG: 600 TABLET, FILM COATED ORAL at 09:02

## 2023-05-18 RX ADMIN — LISINOPRIL 20 MG: 20 TABLET ORAL at 09:01

## 2023-05-18 RX ADMIN — ENOXAPARIN SODIUM 100 MG: 100 INJECTION SUBCUTANEOUS at 09:04

## 2023-05-18 ASSESSMENT — ACTIVITIES OF DAILY LIVING (ADL)
ADLS_ACUITY_SCORE: 35

## 2023-05-18 NOTE — PROVIDER NOTIFICATION
MD Notification    Notified Person: MD    Notified Person Name:  Pradip Daniel    Notification Date/Time: 5/17 2030    Notification Interaction: Page    Purpose of Notification: Pt here for outpatient angio, TR band hematoma. Has hx of factor v- Has scheduled warfarin tonight, do you still want to give?       Orders Received: okay to give Warfarin

## 2023-05-18 NOTE — PROGRESS NOTES
[facility]  Cardiology Progress Note    Date of Service (when I saw the patient): 05/18/2023  Primary Cardiologist: Dr. Yun       Interval History:   Reports pain is better at the radial site but he continues to have discomfort and some swelling.  No numbness or tingling shooting down to his hand.  He denies chest discomfort or shortness of breath.    ----------------------------------------------------------------------------------------    Assessment:  Jose Tejada is a 78 year old male who presented on 5/17/2023 for elective coronary angiogram.    #CAD  - Dates back to 1996 with history of LAD and RCA stenting  - Coronary angiogram in 2020 demonstrated  of RCA but his LAD stent was patent, he was noted to have 70% narrowing in the circumflex  - In 2/2022 he had PCI of his RCA  that was performed by Dr. Yun  - He was recently seen by his primary cardiologist, Dr. Yun, with reports of chest discomfort.  He was set up for nuclear stress study which showed small to moderate-sized fixed defect of the basal to mid inferior/inferolateral segments with no ischemia.  He was ultimately referred to coronary angiogram for further evaluation.  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  - He requires bridging therapy with Lovenox    #Peripheral  neuropathy    ----------------------------------------------------------------------------------------    Plan:  - Continue with Plavix and warfarin as well as Lovenox injections for bridging (he has INR nurse coming early next week to check his levels  - We will plan for right upper extremity arterial ultrasound to rule out pseudoaneurysm today prior to his discharge  - Check CBC and BMP today prior to discharge  - He will be discharged home later today assuming his ultrasound results are stable with follow-up in cardiology clinic which has been already established    Addendum: Arterial US without evidence of pseudoaneurysm or hematoma. CBC is stable. BMP is pending but this can be reviewed as outpatient. Patient is OK for discharge to home.      ----------------------------------------------------------------------------------------  Physical Exam   Temp: 97.7  F (36.5  C) Temp src: Oral BP: 116/73 Pulse: 52   Resp: 20 SpO2: 94 % O2 Device: None (Room air)    Vitals:    05/17/23 0715 05/18/23 0409   Weight: 102.5 kg (226 lb) 102.2 kg (225 lb 6.4 oz)     GEN: NAD.  Oxygen on room air.  HEENT: Mucous membranes moist.  NECK: No JVD.  C/V:  Regular rate and rhythm, no murmur, rub or gallop.   RESP: Clear to auscultation bilaterally  GI: Abdomen soft, nontender, nondistended.    EXTREM: No pitting LE edema.   NEURO: Alert and oriented, cooperative.   PSYCH: Normal affect.  SKIN: Moderate ecchymosis at the right radial arthrotomy site with small size hematoma  VASC: 2+ radial pulse on the right side      Medications       clopidogrel  75 mg Oral Daily     enoxaparin ANTICOAGULANT  100 mg Subcutaneous Q12H     gabapentin  1,200 mg Oral TID     lisinopril  20 mg Oral Daily     pantoprazole  40 mg Oral At Bedtime     rosuvastatin  20 mg Oral At Bedtime     sodium chloride (PF)  3 mL Intracatheter Q8H       Data   Reviewed.       Cynthia Spencer PA-C   5/18/2023  Pager: [phone]

## 2023-05-18 NOTE — PLAN OF CARE
[id number]    A&O x 4. VSS, on RA. Pt arrived to unit with TR band in place w/5mL of air. R forearm hematoma swollen and firm. Unchanged in size from care suits. Pt reports mild 2/10 pain.  Provided manual pressure on forearm for 5 minutes & , applied BP cuff to lower forearm for one hour with minimal change, softer but still frim. Pt reported pain with cuff on, removed and gave PRN tylenol & applied ice, now does not report pain.     0000- TR band off. Band aid applied. R arm elevated with ice, arm board intact. Hematoma unchanged arm still firm, but does not report pain, and has remained the same size. Unchanged overnight. Swelling decreased this morning.     Tele SB. Alarms on. Plan for discharge home with arm board for 24 hours. Continue with plan of care.

## 2023-05-18 NOTE — PROGRESS NOTES
US completed on R arm post TR band removal. R arm is stable. Discharge instructions reviewed, understood, and signed by patient. VSS, PIV removed, new medications reviewed and understood, patient has all belongings. Patient left unit via wheelchair with family.

## 2023-05-19 LAB
ATRIAL RATE - MUSE: 57 BPM
DIASTOLIC BLOOD PRESSURE - MUSE: NORMAL MMHG
INTERPRETATION ECG - MUSE: NORMAL
P AXIS - MUSE: 19 DEGREES
PR INTERVAL - MUSE: 170 MS
QRS DURATION - MUSE: 102 MS
QT - MUSE: 426 MS
QTC - MUSE: 414 MS
R AXIS - MUSE: -27 DEGREES
SYSTOLIC BLOOD PRESSURE - MUSE: NORMAL MMHG
T AXIS - MUSE: 7 DEGREES
VENTRICULAR RATE- MUSE: 57 BPM

## 2023-05-21 ENCOUNTER — HOSPITAL ENCOUNTER (EMERGENCY)
Facility: CLINIC | Age: 78
Discharge: HOME | End: 2023-05-21
Attending: EMERGENCY MEDICINE | Admitting: EMERGENCY MEDICINE
Payer: MEDICARE

## 2023-05-21 ENCOUNTER — APPOINTMENT (OUTPATIENT)
Dept: ULTRASOUND IMAGING | Facility: CLINIC | Age: 78
End: 2023-05-21
Attending: EMERGENCY MEDICINE
Payer: MEDICARE

## 2023-05-21 VITALS
WEIGHT: 220 LBS | TEMPERATURE: 97.7 F | SYSTOLIC BLOOD PRESSURE: 126 MMHG | HEIGHT: 66 IN | OXYGEN SATURATION: 96 % | RESPIRATION RATE: 20 BRPM | DIASTOLIC BLOOD PRESSURE: 85 MMHG | HEART RATE: 47 BPM | BODY MASS INDEX: 35.36 KG/M2

## 2023-05-21 DIAGNOSIS — T14.8XXA HEMATOMA: ICD-10-CM

## 2023-05-21 DIAGNOSIS — M79.631 PAIN OF RIGHT FOREARM: ICD-10-CM

## 2023-05-21 LAB
ANION GAP SERPL CALCULATED.3IONS-SCNC: 10 MMOL/L (ref 7–15)
BASOPHILS # BLD AUTO: 0.1 10E3/UL (ref 0–0.2)
BASOPHILS NFR BLD AUTO: 1 %
BUN SERPL-MCNC: 10.4 MG/DL (ref 8–23)
CALCIUM SERPL-MCNC: 9.2 MG/DL (ref 8.8–10.2)
CHLORIDE SERPL-SCNC: 103 MMOL/L (ref 98–107)
CREAT SERPL-MCNC: 0.88 MG/DL (ref 0.67–1.17)
DEPRECATED HCO3 PLAS-SCNC: 23 MMOL/L (ref 22–29)
EOSINOPHIL # BLD AUTO: 0.4 10E3/UL (ref 0–0.7)
EOSINOPHIL NFR BLD AUTO: 7 %
ERYTHROCYTE [DISTWIDTH] IN BLOOD BY AUTOMATED COUNT: 13 % (ref 10–15)
GFR SERPL CREATININE-BSD FRML MDRD: 89 ML/MIN/1.73M2
GLUCOSE SERPL-MCNC: 109 MG/DL (ref 70–99)
HCT VFR BLD AUTO: 40.7 % (ref 40–53)
HGB BLD-MCNC: 13.8 G/DL (ref 13.3–17.7)
HOLD SPECIMEN: NORMAL
IMM GRANULOCYTES # BLD: 0 10E3/UL
IMM GRANULOCYTES NFR BLD: 0 %
INR PPP: 1.29 (ref 0.85–1.15)
LYMPHOCYTES # BLD AUTO: 1.3 10E3/UL (ref 0.8–5.3)
LYMPHOCYTES NFR BLD AUTO: 21 %
MCH RBC QN AUTO: 31.3 PG (ref 26.5–33)
MCHC RBC AUTO-ENTMCNC: 33.9 G/DL (ref 31.5–36.5)
MCV RBC AUTO: 92 FL (ref 78–100)
MONOCYTES # BLD AUTO: 0.7 10E3/UL (ref 0–1.3)
MONOCYTES NFR BLD AUTO: 11 %
NEUTROPHILS # BLD AUTO: 3.5 10E3/UL (ref 1.6–8.3)
NEUTROPHILS NFR BLD AUTO: 60 %
NRBC # BLD AUTO: 0 10E3/UL
NRBC BLD AUTO-RTO: 0 /100
PLATELET # BLD AUTO: 223 10E3/UL (ref 150–450)
POTASSIUM SERPL-SCNC: 4.2 MMOL/L (ref 3.4–5.3)
RBC # BLD AUTO: 4.41 10E6/UL (ref 4.4–5.9)
SODIUM SERPL-SCNC: 136 MMOL/L (ref 136–145)
WBC # BLD AUTO: 6 10E3/UL (ref 4–11)

## 2023-05-21 PROCEDURE — 96376 TX/PRO/DX INJ SAME DRUG ADON: CPT

## 2023-05-21 PROCEDURE — 85610 PROTHROMBIN TIME: CPT | Performed by: EMERGENCY MEDICINE

## 2023-05-21 PROCEDURE — 36415 COLL VENOUS BLD VENIPUNCTURE: CPT | Performed by: EMERGENCY MEDICINE

## 2023-05-21 PROCEDURE — 80048 BASIC METABOLIC PNL TOTAL CA: CPT | Performed by: EMERGENCY MEDICINE

## 2023-05-21 PROCEDURE — 99285 EMERGENCY DEPT VISIT HI MDM: CPT | Mod: 25

## 2023-05-21 PROCEDURE — 85004 AUTOMATED DIFF WBC COUNT: CPT | Performed by: EMERGENCY MEDICINE

## 2023-05-21 PROCEDURE — 96374 THER/PROPH/DIAG INJ IV PUSH: CPT

## 2023-05-21 PROCEDURE — 93931 UPPER EXTREMITY STUDY: CPT | Mod: RT

## 2023-05-21 PROCEDURE — 250N000011 HC RX IP 250 OP 636: Performed by: EMERGENCY MEDICINE

## 2023-05-21 PROCEDURE — 93971 EXTREMITY STUDY: CPT | Mod: RT

## 2023-05-21 RX ORDER — HYDROMORPHONE HYDROCHLORIDE 1 MG/ML
0.5 INJECTION, SOLUTION INTRAMUSCULAR; INTRAVENOUS; SUBCUTANEOUS
Status: DISCONTINUED | OUTPATIENT
Start: 2023-05-21 | End: 2023-05-21 | Stop reason: HOSPADM

## 2023-05-21 RX ORDER — HYDROMORPHONE HYDROCHLORIDE 2 MG/1
2 TABLET ORAL EVERY 6 HOURS PRN
Qty: 8 TABLET | Refills: 0 | Status: ON HOLD | OUTPATIENT
Start: 2023-05-21 | End: 2024-02-16

## 2023-05-21 RX ADMIN — HYDROMORPHONE HYDROCHLORIDE 0.5 MG: 1 INJECTION, SOLUTION INTRAMUSCULAR; INTRAVENOUS; SUBCUTANEOUS at 11:25

## 2023-05-21 RX ADMIN — HYDROMORPHONE HYDROCHLORIDE 0.5 MG: 1 INJECTION, SOLUTION INTRAMUSCULAR; INTRAVENOUS; SUBCUTANEOUS at 08:56

## 2023-05-21 ASSESSMENT — ACTIVITIES OF DAILY LIVING (ADL)
ADLS_ACUITY_SCORE: 35

## 2023-05-21 NOTE — ED TRIAGE NOTES
"Patient had heart catheterization last Wednesday. Had bleeding complication d/t \"poking through the artery\". Had to stay overnight d/t swelling in arm. Discharged home Thursday. Started having increased pain on Saturday afternoon. Patient having increased swelling.    Patient has redness and bruising to Right lower arm. Radial pulses palpable. Cap refill is less than 3 seconds LUE. Cap refill 4 seconds to RUE.     Triage Assessment       Row Name 05/21/23 0806       Triage Assessment (Adult)    Airway WDL WDL       Respiratory WDL    Respiratory WDL WDL       Skin Circulation/Temperature WDL    Skin Circulation/Temperature WDL X       Cardiac WDL    Cardiac WDL WDL       Peripheral/Neurovascular WDL    Peripheral Neurovascular WDL X       Cognitive/Neuro/Behavioral WDL    Cognitive/Neuro/Behavioral WDL WDL       Sp Coma Scale    Best Eye Response 4-->(E4) spontaneous    Best Motor Response 6-->(M6) obeys commands    Best Verbal Response 5-->(V5) oriented    [place] Coma Scale Score 15                  "

## 2023-05-21 NOTE — ED PROVIDER NOTES
"  History     Chief Complaint:  Arm Pain       HPI   Jose Tejada is a 78 year old male who presents to the ED complaining of right arm pain.  On 5/17/2023, the patient underwent heart catheterization.  The patient reports that a complication from arterial access had occurred causing \"poking through the artery\".  The patient was admitted to the hospital due to swelling in his right arm.  He was discharged home the next day and symptoms improved.  Yesterday the patient complains of increased pain and swelling to the right arm.  The patient denies chest pain, shortness of breath or trauma to the area.    Independent Historian:   History is gathered from the patient, epic notes, and his daughter who is present in the ED    Review of External Notes:   Danisha Hughes, RN  Registered Nurse Progress Notes  Signed  Date of Service:  5/17/2023  2:46 PM  Creation Time:  5/17/2023  2:46 PM               1208 - RN noticed pt's right forearm was firm and swollen. Manual BP cuff applied to right forearm, Dr. Rodriguez paged.     1215 - Dr. Rodriguez at bedside, BP cuff moved to upper right arm and manual pressure held to RFA x 10 min per MD. RFA remains firm but tightness/hematoma is better.     1230 - RFA continues to be firm but is stable.     1245 - Dr. Rodriguez at bedside to assess - firm but stable RFA, has not worsened. Will continue to monitor.     1345 - Dr. Rodriguez at bedside again to assess, RFA remains unchanged.   TR Band removal per protocol, see flowsheet.               Medications:    clopidogrel (PLAVIX) 75 MG tablet  enoxaparin ANTICOAGULANT (LOVENOX) 100 MG/ML syringe  lisinopril (PRINIVIL/ZESTRIL) 20 MG tablet  warfarin ANTICOAGULANT (COUMADIN ANTICOAGULANT) 5 MG tablet  acetaminophen (TYLENOL) 325 MG tablet  albuterol (PROAIR HFA/PROVENTIL HFA/VENTOLIN HFA) 108 (90 Base) MCG/ACT inhaler  Emollient (VANICREAM) LOTN  gabapentin (NEURONTIN) 400 MG capsule  hydrocortisone acetate 1 % CREA  ipratropium - " albuterol 0.5 mg/2.5 mg/3 mL (DUONEB) 0.5-2.5 (3) MG/3ML neb solution  meclizine (ANTIVERT) 12.5 MG tablet  nitroGLYcerin (NITROSTAT) 0.4 MG sublingual tablet  omeprazole (PRILOSEC) 20 MG CR capsule  rosuvastatin (CRESTOR) 20 MG tablet        Past Medical History:    Past Medical History:   Diagnosis Date     Arthritis      Asthma      CAD (coronary artery disease)      Depressive disorder      DVT (deep venous thrombosis) (H)      Factor 5 Leiden mutation, heterozygous (H)      GERD (gastroesophageal reflux disease)      HTN (hypertension)      Hyperlipidemia      Neuropathy      Neuropathy      Obese      PRIMITIVO (obstructive sleep apnea)      Protein C deficiency (H)      Spinal stenosis      Stented coronary artery      Thrombosis        Past Surgical History:    Past Surgical History:   Procedure Laterality Date     APPENDECTOMY       APPENDECTOMY OPEN  1958     ARTHROPLASTY HIP Left 1997     ARTHROPLASTY REVISION HIP Left 2013     ARTHROPLASTY REVISION HIP Left      ARTHROPLASTY REVISION HIP Left 11/19/2021    Procedure: REVISION LEFT TOTAL HIP ARTHROPLASTY;  Surgeon: Alber Mcfarland MD;  Location: [facility] OR     AS SPINAL FUSION,ANT,EA ADNL LEVEL  2010    L4-L5     CORONARY STENT PLACEMENT  1996     CV CORONARY ANGIOGRAM N/A 10/24/2019    Procedure: Coronary Angiogram;  Surgeon: Valdemar Hinojosa MD;  Location: [facility] CARDIAC CATH LAB     CV CORONARY ANGIOGRAM  1996 1996 angioplasty and stent to the prox LAD, PTCA with intracoronary stent placement of RCA, 70%OM     CV CORONARY ANGIOGRAM N/A 2/28/2020    Procedure: Coronary Angiogram 2nd attempt of RCA  successful--(lad stent ok, Lcx 70% with normal iFR)  Surgeon: Gurvinder Yun MD;  Location: [facility] CARDIAC CATH LAB     CV CORONARY ANGIOGRAM N/A 5/17/2023    Procedure: Coronary Angiogram;  Surgeon: Je Rodriguez MD;  Location: [facility] CARDIAC CATH LAB     CV FRACTIONAL FLOW RATIO WIRE N/A 10/24/2019    Procedure: Fractional  "Flow [place];  Surgeon: Valdemar Hinojosa MD;  Location:  HEART CARDIAC CATH LAB     CV LEFT HEART CATH Bilateral 1/16/2020    Procedure: Left Heart Cath w/wo Left Ventriculogram  ANGIOPLASTY WITH [place] SCI REP PRESENT;  Surgeon: Gurvinder Yun MD;  Location:  HEART CARDIAC CATH LAB     SPINAL FUSION  2010    L4-5     TOTAL HIP ARTHROPLASTY Left      TOTAL HIP ARTHROPLASTY Right 2/10/2016    Procedure:  RIGHT HIP TOTAL ARTHROPLASTY;  Surgeon: Jose Gaytan MD;  Location: [facility];  Service:      [facility] TOTAL HIP ARTHROPLASTY Right 2015    THR        Physical Exam     Patient Vitals for the past 24 hrs:   BP Temp Temp src Pulse Resp SpO2 Height Weight   05/21/23 0830 -- -- -- -- -- 96 % -- --   05/21/23 0815 -- -- -- -- -- 97 % -- --   05/21/23 0806 -- -- -- -- -- 97 % -- --   05/21/23 0803 -- 97.7  F (36.5  C) Oral (!) 46 20 98 % 1.676 m (5' 6\") 99.8 kg (220 lb)   05/21/23 0800 (!) 143/88 -- -- -- -- -- -- --        Physical Exam  General: Alert, No distress. Nontoxic appearance  Head: No signs of trauma.   Mouth/Throat: Oropharynx moist.   Eyes: Conjunctivae are normal. Pupils are equal..   Neck: Normal range of motion.    CV: Appears well perfused.  Regular rate and rhythm.  Resp:No respiratory distress.   MSK: Normal range of motion. No obvious deformity.  Radial pulses are palpable and symmetric  Neuro: The patient is alert and interactive. JOYNER. Speech normal. GCS 15  Skin: No lesion or sign of trauma noted.  Cap refill in the right hand is normal.  Resolving ecchymosis over the flexor compartment of the right forearm.  The lateral portions of the flexor compartment are soft and nontender with palpation.  Psych: normal mood and affect. behavior is normal.               Emergency Department Course     Imaging:  US Upper Extremity Arterial Duplex Right   Final Result   IMPRESSION:    1.  Normal arterial Duplex ultrasound of the right arm.      US Upper Extremity Venous Duplex Right   Final " Result   IMPRESSION:   1.  No deep venous thrombosis in the right upper extremity.           Laboratory:  Labs Ordered and Resulted from Time of ED Arrival to Time of ED Departure   BASIC METABOLIC PANEL - Abnormal       Result Value    Sodium 136      Potassium 4.2      Chloride 103      Carbon Dioxide (CO2) 23      Anion Gap 10      Urea Nitrogen 10.4      Creatinine 0.88      Calcium 9.2      Glucose 109 (*)     GFR Estimate 89     INR - Abnormal    INR 1.29 (*)    CBC WITH PLATELETS AND DIFFERENTIAL    WBC Count 6.0      RBC Count 4.41      Hemoglobin 13.8      Hematocrit 40.7      MCV 92      MCH 31.3      MCHC 33.9      RDW 13.0      Platelet Count 223      % Neutrophils 60      % Lymphocytes 21      % Monocytes 11      % Eosinophils 7      % Basophils 1      % Immature Granulocytes 0      NRBCs per 100 WBC 0      Absolute Neutrophils 3.5      Absolute Lymphocytes 1.3      Absolute Monocytes 0.7      Absolute Eosinophils 0.4      Absolute Basophils 0.1      Absolute Immature Granulocytes 0.0      Absolute NRBCs 0.0          Emergency Department Course & Assessments:    Interventions:  IV Dilaudid    Assessments:  The patient was assessed multiple times during his time in the ED    Independent Interpretation (X-rays, CTs, rhythm strip):  Ultrasound is negative for DVT    Consultations/Discussion of Management or Tests:  I consulted with Dr. Daniel from cardiology       Social Determinants of Health affecting care:   Stress/Adjustment Disorders    Disposition:  The patient was discharged to home.     Impression & Plan      Medical Decision Making:  The patient is presenting to the ED with pain in his right forearm after a complication from cardiac catheterization.  The patient was released to the hospital after improving for 24 hours past the injury.    He was evaluated today for possible pseudoaneurysm, DVT, recurrent hematoma, delayed development of compartment syndrome.  Ultrasound is negative.  I consulted  with Dr. Daniel from cardiology.  He is recommending close follow-up in the cardiology clinic.  The patient's symptoms are likely secondary to a resolving hematoma.  No signs of compartment syndrome with soft lateral borders of the flexor compartment.    Diagnosis:    ICD-10-CM    1. Pain of right forearm  M79.631       2. Hematoma  T14.8XXA            Discharge Medications:  Discharge Medication List as of 5/21/2023 11:58 AM      START taking these medications    Details   HYDROmorphone (DILAUDID) 2 MG tablet Take 1 tablet (2 mg) by mouth every 6 hours as needed for pain, Disp-8 tablet, R-0, Local Print              5/21/2023   Isidro Mayen MD Joing, Todd Roger, MD  05/21/23 5061

## 2023-05-22 ENCOUNTER — TELEPHONE (OUTPATIENT)
Dept: CARDIOLOGY | Facility: CLINIC | Age: 78
End: 2023-05-22
Payer: MEDICARE

## 2023-05-22 ENCOUNTER — NURSE TRIAGE (OUTPATIENT)
Dept: CARDIOLOGY | Facility: CLINIC | Age: 78
End: 2023-05-22
Payer: MEDICARE

## 2023-05-22 NOTE — TELEPHONE ENCOUNTER
"Received triage call on pt with Right wrist pain. Pt was seen in ED yesterday 5/21/23 with arm pain. S/p Right radial access (5/17/23 Cor angiogram).   ED 5/21, Upper ext US done. Dilaudid 2 mg every 6 hrs (8 tablets given).     Pt states today his arm is \"really painful and swollen still\". Dilaudid helps for about 3 hours & pain returns. Pt has only 4 tablets left. Tylenol is not helping. Pt has been elevating his arm overnight and during the day. Attempted icing but it was painful after 2-3 mins. Pt is wrapping his arm as well.     Pt wondering what the plan is since his his Dilaudid will run out. Pt has significant swelling and pain. Pain at a level 8. Denies bleeding, worsening swelling (it never improved since ED), numbness in fingers or hand, lump in wrist.     Per notes on 5/10/23, \"Stop warfarin 5 days prior to procedure.   Two days after stopping warfarin, initiate Lovenox 1mg/kg every 12 hours.   Last dose of Lovenox to be given the evening prior to coronary angiogram.   Plan to resume warfarin and Lovenox post procedure (timing per procedural MD) assuming there are no bleeding issues. Continue Lovenox post procedure until INR reaches 2.0.\"    Pt is currently on Warfarin, Lovenox, & plavix. INR on 5/21 was 1.29. INR scheduled for 5/23. Hx of CAD, Factor V Leiden, DVT. OV on 5/24/23 with Speedy Benavides PA-C.     Routing to primary cardiologist team to review. Pt requesting this is sent to Cardiologist to review and advise. Melissa VERMA not available. ZOHRA Ruano    "

## 2023-05-22 NOTE — TELEPHONE ENCOUNTER
"1. REASON FOR CALL or QUESTION: \"What is your reason for calling today?\" or \"How can I best help you?\" or \"What question do you have that I can help answer?\" Patient had an angiogram done last week. Yesterday he was in ED due to complications of a hematoma to his right arm after angiogram. He had an US completed which was negative and close follow-up with Cardiology was recommended. He says he was sent home with dilaudid which he has been taking. He says this morning his arm feels painful rating at an 8. He has been using hot/cold compresses, elevation and sleeping in his chair to help with comfort. Routing to his care team with Melissa Lawson who last saw patient.     "

## 2023-05-22 NOTE — TELEPHONE ENCOUNTER
Spoke with Pt he is in pain 8 out of 10. Discussed arm, and it is not showing increased swelling, but very discolored. Pt does say how pain full it is. Pt is alternating hot packs with cold packs. He was in ER yesterday and was US, and no new issues. Pt has 4 Dilaudid left, and sees PMD tomorrow and can ask for more if PMD is able to prescribe it. Pt was told to call back if arm increased in swelling or pain , and he was told he can go to urgent care and ER if he felt things were getting worse. MARIA R Blankenship

## 2023-05-22 NOTE — TELEPHONE ENCOUNTER
[facility] Call Center    Phone Message    May a detailed message be left on voicemail: yes     Reason for Call: Symptoms or Concerns     If patient has red-flag symptoms, warm transfer to triage line    Current symptom or concern: Painful right arm after patient had angiogram on 5/17    Pt said pain level is at an 8      Action Taken: Patient transferred to: Triage     Travel Screening: Not Applicable     Thank you!  [facility]

## 2023-05-24 ENCOUNTER — OFFICE VISIT (OUTPATIENT)
Dept: CARDIOLOGY | Facility: CLINIC | Age: 78
End: 2023-05-24
Payer: MEDICARE

## 2023-05-24 VITALS
HEIGHT: 68 IN | SYSTOLIC BLOOD PRESSURE: 97 MMHG | WEIGHT: 226 LBS | DIASTOLIC BLOOD PRESSURE: 62 MMHG | BODY MASS INDEX: 34.25 KG/M2 | HEART RATE: 69 BPM

## 2023-05-24 DIAGNOSIS — Z98.890 S/P CARDIAC CATH: Primary | ICD-10-CM

## 2023-05-24 PROCEDURE — 99214 OFFICE O/P EST MOD 30 MIN: CPT | Performed by: PHYSICIAN ASSISTANT

## 2023-05-24 NOTE — PATIENT INSTRUCTIONS
Continue to try to ice and elevate the arm.      Come on over the clinic Friday between 3 and 4 so we can look at your arm.     Dr. Yun in 3 months, sooner if increasing chest discomfort

## 2023-05-24 NOTE — LETTER
2023    Sara Jhaveri, CNP  [address]  [facility] [place] [address]    RE: Jose Tejada       Dear Colleague,     I had the pleasure of seeing Jose Tejada in the [facility].      CARDIOLOGY CLINIC PROGRESS NOTE    DOS: 2023      Jose Tejada  : 1945, 78 year old  MRN: [id number]      History:  Jose Tejada is a very pleasant 78 year old male with a history of coronary artery disease status post stent to LAD and RCA in , status post successful stenting of proximal RCA  2020, hyperlipidemia, factor V Leiden, protein C deficiency, and history of DVT.     Mr. Tejada did well until earlier this year.  At that time, he began developing intermittent episodes of chest discomfort.  These were nonexertional, lasting 10 to 15 minutes in duration.  Described as a sharp left-sided pain.  At the time of his last appointment with Dr. Yun 2023, he described these episodes of chest discomfort.  Further evaluation was recommended with a Lexiscan stress test.  Additionally, it has been several years since his cholesterol has been checked.     Lipid panel completed 2023 revealed TC 97, HDL 32, triglyceride 93, LDL 46.  Lexiscan stress completed 2023 was abnormal revealing a small to moderate size fixed defect of the basal to mid inferior and inferolateral segments.  This was suspected to be some degree of infarct but a possible component of diaphragmatic attenuation as well.  There was no evidence of ischemia.     Cardiac cath 23:   Prior LAD stent is widely patent.  Prior RCA stents with 40-50% moderate non-obstructive ISR.  Proximal LCx lesion is heavily calcified and probably flow-limiting, but appears unchanged compared to 2019 angiogram when it was medically managed.    Post procedure he had right UE discomfort. He had arterial US the day after his procedure that was negative for bleed of Pseudoaneurysm.     He continued to  have pain so presented to the ER 5/21/23.  He had a repeat arterial US and also had a venous US. There arterial US was normal and there was no DVT on venous US.     He presents today for follow up.   He tells me his INR is 1.8, but he is done with the Lovenox injections because he has significant ecchymosis and swelling in the right arm.   The right arm is tender and difficult to move.   He does have feeling in the arm and can move his fingers, albeit difficult.  He has tried icing, it seems to make it feel worse.   He has tried elevation and this maybe helps.   He has wrapped it for mild compression.   His PCP gave him some pain meds.   He does not have mychart to send a picture for follow up  Chest discomfort occasionally, every 2-3 days. Maybe not quite as bad as before.  Not exertional.   BP today 97/62.  No sxs with this.       ROS:  Skin:        Eyes:       ENT:       Respiratory:       Cardiovascular:    edema;Positive for  Gastroenterology:      Genitourinary:       Musculoskeletal:       Neurologic:       Psychiatric:       Heme/Lymph/Imm:       Endocrine:         PAST MEDICAL HISTORY:  Past Medical History:   Diagnosis Date    Arthritis     Asthma     CAD (coronary artery disease)     1996 angioplasty and stent to the prox LAD, PTCA with intracoronary stent placement of RCA, 70%OM; 10/24/19 Cath: Occluded RCA, prox circumflex lesion - pd/pa not significant, advised medical therapy first by Interventional cardiologist    Depressive disorder     DVT (deep venous thrombosis) (H)     Factor 5 Leiden mutation, heterozygous (H)     GERD (gastroesophageal reflux disease)     HTN (hypertension)     Hyperlipidemia     Neuropathy     wears brace R foot for drop foot    Neuropathy     Obese     PRIMITIVO (obstructive sleep apnea)     cpap    Protein C deficiency (H)     Spinal stenosis     Stented coronary artery     Thrombosis        PAST SURGICAL HISTORY:  Past Surgical History:   Procedure Laterality Date     APPENDECTOMY      APPENDECTOMY OPEN      ARTHROPLASTY HIP Left     ARTHROPLASTY REVISION HIP Left     ARTHROPLASTY REVISION HIP Left     ARTHROPLASTY REVISION HIP Left 2021    Procedure: REVISION LEFT TOTAL HIP ARTHROPLASTY;  Surgeon: Alber Mcfarland MD;  Location: [facility] OR    AS SPINAL FUSION,ANT,EA ADNL LEVEL      L4-L5    CORONARY STENT PLACEMENT      CV CORONARY ANGIOGRAM N/A 10/24/2019    Procedure: Coronary Angiogram;  Surgeon: Valdemar Hinojosa MD;  Location: [facility] CARDIAC CATH LAB    CV CORONARY ANGIOGRAM  1996 angioplasty and stent to the prox LAD, PTCA with intracoronary stent placement of RCA, 70%OM    CV CORONARY ANGIOGRAM N/A 2020    Procedure: Coronary Angiogram 2nd attempt of RCA  successful--(lad stent ok, Lcx 70% with normal iFR)  Surgeon: Gurvinder Yun MD;  Location: [facility] CARDIAC CATH LAB    CV CORONARY ANGIOGRAM N/A 2023    Procedure: Coronary Angiogram;  Surgeon: Je Rodrigeuz MD;  Location: [facility] CARDIAC CATH LAB    CV FRACTIONAL FLOW RATIO WIRE N/A 10/24/2019    Procedure: Fractional Flow [place];  Surgeon: Vlademar Hinojosa MD;  Location: [facility] CARDIAC CATH LAB    CV LEFT HEART CATH Bilateral 2020    Procedure: Left Heart Cath w/wo Left Ventriculogram  ANGIOPLASTY WITH [place] SCI REP PRESENT;  Surgeon: Gurvinder Yun MD;  Location:  HEART CARDIAC CATH LAB    SPINAL FUSION      L4-5    TOTAL HIP ARTHROPLASTY Left     TOTAL HIP ARTHROPLASTY Right 2/10/2016    Procedure:  RIGHT HIP TOTAL ARTHROPLASTY;  Surgeon: Jose Gaytan MD;  Location: [facility] OR;  Service:     [facility] TOTAL HIP ARTHROPLASTY Right     THR       SOCIAL HISTORY:  Social History     Socioeconomic History    Marital status:    Tobacco Use    Smoking status: Former     Types: Cigarettes     Quit date: 3/23/1987     Years since quittin.1    Smokeless tobacco: Never    Tobacco comments:     Smokes  cigars once every 2-3 years   Substance and Sexual Activity    Alcohol use: Yes     Comment: Rarely    Drug use: Never   Other Topics Concern    Caffeine Concern No     Comment: 1 -2 cups daily     Special Diet No    Exercise No       FAMILY HISTORY:  Family History   Problem Relation Age of Onset    Myocardial Infarction Father     Heart Disease Maternal Grandfather        MEDS: acetaminophen (TYLENOL) 325 MG tablet, Take 3 tablets (975 mg) by mouth every 8 hours as needed for other (For optimal non-opioid multimodal pain management to improve pain control.) Max 3000mg/24 hours.  albuterol (PROAIR HFA/PROVENTIL HFA/VENTOLIN HFA) 108 (90 Base) MCG/ACT inhaler, Inhale 2 puffs into the lungs every 4 hours as needed for shortness of breath / dyspnea or wheezing doesn't have current RX  clopidogrel (PLAVIX) 75 MG tablet, Take 1 tablet (75 mg) by mouth daily  Emollient (VANICREAM) LOTN, APPLY THIN LAYER TOPICALLY EVERY DAY FOR DRY SKIN, FOR DERMATITIS  TO AREAS OF DRY SKIN, IDEALLY WITHIN 3 MINUTES AFTER BATH OR SHOWER. FOR DRY SKIN, FOR DERMATITIS  TO AREAS OF DRY SKIN, IDEALLY WITHIN 3 MINUTES AFTER BATH OR SHOWER.  enoxaparin ANTICOAGULANT (LOVENOX) 100 MG/ML syringe, Inject 1 mL (100 mg) Subcutaneous every 12 hours Continue until INR greater than 2.  gabapentin (NEURONTIN) 400 MG capsule, Take 1,200 mg by mouth 3 times daily  hydrocortisone acetate 1 % CREA, APPLY THIN LAYER TOPICALLY EVERY DAY FOR DRY SKIN, FOR DERMATITIS  TO AREAS OF DRY SKIN, IDEALLY WITHIN 3 MINUTES AFTER BATH OR SHOWER. FOR DRY SKIN, FOR DERMATITIS  TO AREAS OF DRY SKIN, IDEALLY WITHIN 3 MINUTES AFTER BATH OR SHOWER.  HYDROmorphone (DILAUDID) 2 MG tablet, Take 1 tablet (2 mg) by mouth every 6 hours as needed for pain  ipratropium - albuterol 0.5 mg/2.5 mg/3 mL (DUONEB) 0.5-2.5 (3) MG/3ML neb solution, Inhale 3 mLs into the lungs daily as needed  lisinopril (PRINIVIL/ZESTRIL) 20 MG tablet, Take 1 tablet (20 mg) by mouth daily  meclizine  "(ANTIVERT) 12.5 MG tablet, Take 2 tablets (25 mg) by mouth 4 times daily as needed for dizziness  nitroGLYcerin (NITROSTAT) 0.4 MG sublingual tablet, For chest pain place 1 tablet under the tongue every 5 minutes for 3 doses. If symptoms persist 5 minutes after 1st dose call 911.  omeprazole (PRILOSEC) 20 MG CR capsule, Take 20 mg by mouth At Bedtime   rosuvastatin (CRESTOR) 20 MG tablet, Take 1 tablet (20 mg) by mouth daily  warfarin ANTICOAGULANT (COUMADIN ANTICOAGULANT) 5 MG tablet, Take 5 mg by mouth daily     No current facility-administered medications on file prior to visit.      ALLERGIES:   Allergies   Allergen Reactions    Metoprolol      Fatigue and bradycardia    Niacin Other (See Comments)     Other reaction(s): Flushing        Norvasc [Amlodipine] Rash    Septra [Sulfamethoxazole W-Trimethoprim] Rash       PHYSICAL EXAM:  Vitals: BP 97/62   Pulse 69   Ht 1.727 m (5' 8\")   Wt 102.5 kg (226 lb)   BMI 34.36 kg/m    Constitutional:  cooperative, alert and oriented, well developed, well nourished, in no acute distress        Skin:  warm and dry to the touch, no apparent skin lesions or masses noted        Head:  normocephalic, no masses or lesions        Eyes:  pupils equal and round;conjunctivae and lids unremarkable;sclera white        ENT:           Neck:  JVP normal        Respiratory:  normal breath sounds, clear to auscultation, normal A-P diameter, normal symmetry, normal respiratory excursion, no use of accessory muscles        Cardiac: regular rhythm, normal S1/S2, no S3 or S4, apical impulse not displaced, no murmurs, gallops or rubs             distant HT so hard to hear    GI:  abdomen soft obese      Vascular:                                   RRA site: 1+ radial pulse, arm is warm, significant ecchymosis (similar to ER note picture 5/21/23) and arm is swollen. he has intact sensation and movement of the forearm and fingers    Extremities and Musculoskeletal:  no deformities, clubbing, " cyanosis, erythema observed   right arm as noted above    Neurological:  no gross motor deficits   known neuropathy of R leg see's neuro          LABS/DATA:  I reviewed the following:  Cardiac cath 5/17/23:  Conclusion  Prior LAD stent is widely patent.  Prior RCA stents with 40-50% moderate non-obstructive ISR.  Proximal LCx lesion is heavily calcified and probably flow-limiting, but appears unchanged compared to 2019 angiogram when it was medically managed.         Plan   Follow bedrest per protocol   Continued medical management and lifestyle modifications for cardiovascular risk factor optimizations.   Discharge today per protocol        - Would recommend initial attempt at optimal medical therapy/antianginal titration.  - If symptoms are not controlled with antianginal medication, could consider return to the Cath Lab for planned PCI of the proximal LCx, but would defer this decision to primary cardiologist, Dr. Yun.       5/18/23 arterial US RUE:  The proximal and distal right radial artery is patent. There is no  evidence for pseudoaneurysm. No significant hematoma identified.      5/21/23:  US Upper Extremity Arterial Duplex Right   Final Result   IMPRESSION:    1.  Normal arterial Duplex ultrasound of the right arm.       US Upper Extremity Venous Duplex Right   Final Result   IMPRESSION:   1.  No deep venous thrombosis in the right upper extremity.       ASSESSMENT/PLAN:  Coronary artery disease  -Status post stent to LAD and the RCA in 1996  -Recurrent chest discomfort in 2020.  -Cor angio 1/2020 revealed patent LAD stent, 70% stenosis in the circumflex, and  RCA  -Cor angio 2/2020 status post successful stenting of proximal RCA  with resolution in patient's chest discomfort  -Recurrent chest pain noted at 4/14/2023 visit  -Lexiscan stress completed 5/2/2023 was abnormal.  There is a small to moderate size fixed defect of the basal to mid inferior and inferior lateral segments  -Cardiac cath  5/17/23: Prior LAD stent is widely patent. Prior RCA stents with 40-50% moderate non-obstructive ISR. Proximal LCx lesion is heavily calcified and probably flow-limiting, but appears unchanged compared to 2019 angiogram when it was medically managed.  -On Plavix 75 mg daily, warfarin  -On lisinopril 20 mg daily, rosuvastatin 20 mg daily  -Not on beta-blocker secondary to chronic sinus bradycardia  -Norvasc caused rash in the past  -Previously was on Ranexa and Imdur (stopped in 2020). Chest pain is reported as a bit less today. Also his BP is running lower today 97/62.  If chest pains worsen, we should retrial Imdur 15 mg and may need to lower lisinopril      Hyperlipidemia  -LDL 46 5/2023  -On rosuvastatin 20 mg daily    Factor V Leiden  Protein C deficiency  History of DVT  -On chronic warfarin therapy    Obstrutive sleep apnea  -on CPAP       Right radial access pain and swelling  5/18/23 Arterial US without evidence of pseudoaneurysm or hematoma.    5/21/23:  US Upper Extremity Arterial Duplex Right   Final Result   IMPRESSION:    1.  Normal arterial Duplex ultrasound of the right arm.       US Upper Extremity Venous Duplex Right   Final Result   IMPRESSION:   1.  No deep venous thrombosis in the right upper extremity.   PCP gave him some pain meds  He will continue to try to ice and elevate  He will come back in on Friday so I can look at it again before the long weekend            Follow up:  Nurse check in 2 days on Friday  Dr. Yun in 3 months, sooner if concerns         Speedy Benavides PA-C      Thank you for allowing me to participate in the care of your patient.      Sincerely,     Speedy Benavides PA-C     [facility]  cc:   No referring provider defined for this encounter.

## 2023-05-24 NOTE — PROGRESS NOTES
CARDIOLOGY CLINIC PROGRESS NOTE    DOS: 2023      Jose Tejada  : 1945, 78 year old  MRN: [id number]      History:  Jose Tejada is a very pleasant 78 year old male with a history of coronary artery disease status post stent to LAD and RCA in , status post successful stenting of proximal RCA  2020, hyperlipidemia, factor V Leiden, protein C deficiency, and history of DVT.     Mr. Tejada did well until earlier this year.  At that time, he began developing intermittent episodes of chest discomfort.  These were nonexertional, lasting 10 to 15 minutes in duration.  Described as a sharp left-sided pain.  At the time of his last appointment with Dr. Yun 2023, he described these episodes of chest discomfort.  Further evaluation was recommended with a Lexiscan stress test.  Additionally, it has been several years since his cholesterol has been checked.     Lipid panel completed 2023 revealed TC 97, HDL 32, triglyceride 93, LDL 46.  Lexiscan stress completed 2023 was abnormal revealing a small to moderate size fixed defect of the basal to mid inferior and inferolateral segments.  This was suspected to be some degree of infarct but a possible component of diaphragmatic attenuation as well.  There was no evidence of ischemia.     Cardiac cath 23:   Prior LAD stent is widely patent.  Prior RCA stents with 40-50% moderate non-obstructive ISR.  Proximal LCx lesion is heavily calcified and probably flow-limiting, but appears unchanged compared to 2019 angiogram when it was medically managed.    Post procedure he had right UE discomfort. He had arterial US the day after his procedure that was negative for bleed of Pseudoaneurysm.     He continued to have pain so presented to the ER 23.  He had a repeat arterial US and also had a venous US. There arterial US was normal and there was no DVT on venous US.     He presents today for follow up.   He tells me his INR is 1.8,  but he is done with the Lovenox injections because he has significant ecchymosis and swelling in the right arm.   The right arm is tender and difficult to move.   He does have feeling in the arm and can move his fingers, albeit difficult.  He has tried icing, it seems to make it feel worse.   He has tried elevation and this maybe helps.   He has wrapped it for mild compression.   His PCP gave him some pain meds.   He does not have mychart to send a picture for follow up  Chest discomfort occasionally, every 2-3 days. Maybe not quite as bad as before.  Not exertional.   BP today 97/62.  No sxs with this.       ROS:  Skin:        Eyes:       ENT:       Respiratory:       Cardiovascular:    edema;Positive for  Gastroenterology:      Genitourinary:       Musculoskeletal:       Neurologic:       Psychiatric:       Heme/Lymph/Imm:       Endocrine:         PAST MEDICAL HISTORY:  Past Medical History:   Diagnosis Date     Arthritis      Asthma      CAD (coronary artery disease)     1996 angioplasty and stent to the prox LAD, PTCA with intracoronary stent placement of RCA, 70%OM; 10/24/19 Cath: Occluded RCA, prox circumflex lesion - pd/pa not significant, advised medical therapy first by Interventional cardiologist     Depressive disorder      DVT (deep venous thrombosis) (H)      Factor 5 Leiden mutation, heterozygous (H)      GERD (gastroesophageal reflux disease)      HTN (hypertension)      Hyperlipidemia      Neuropathy     wears brace R foot for drop foot     Neuropathy      Obese      PRIMITIVO (obstructive sleep apnea)     cpap     Protein C deficiency (H)      Spinal stenosis      Stented coronary artery      Thrombosis        PAST SURGICAL HISTORY:  Past Surgical History:   Procedure Laterality Date     APPENDECTOMY       APPENDECTOMY OPEN  1958     ARTHROPLASTY HIP Left 1997     ARTHROPLASTY REVISION HIP Left 2013     ARTHROPLASTY REVISION HIP Left      ARTHROPLASTY REVISION HIP Left 11/19/2021    Procedure: REVISION  LEFT TOTAL HIP ARTHROPLASTY;  Surgeon: Alber Mcfarland MD;  Location: [facility] OR     AS SPINAL FUSION,ANT,EA ADNL LEVEL      L4-L5     CORONARY STENT PLACEMENT       CV CORONARY ANGIOGRAM N/A 10/24/2019    Procedure: Coronary Angiogram;  Surgeon: Valdemar Hinojosa MD;  Location: [facility] CARDIAC CATH LAB     CV CORONARY ANGIOGRAM  1996 angioplasty and stent to the prox LAD, PTCA with intracoronary stent placement of RCA, 70%OM     CV CORONARY ANGIOGRAM N/A 2020    Procedure: Coronary Angiogram 2nd attempt of RCA  successful--(lad stent ok, Lcx 70% with normal iFR)  Surgeon: Gurvinder Yun MD;  Location: [facility] CARDIAC CATH LAB     CV CORONARY ANGIOGRAM N/A 2023    Procedure: Coronary Angiogram;  Surgeon: Je Rodriguez MD;  Location: [facility] CARDIAC CATH LAB     CV FRACTIONAL FLOW RATIO WIRE N/A 10/24/2019    Procedure: Fractional Flow [place];  Surgeon: Valdemar Hinojosa MD;  Location: [facility] CARDIAC CATH LAB     CV LEFT HEART CATH Bilateral 2020    Procedure: Left Heart Cath w/wo Left Ventriculogram  ANGIOPLASTY WITH [place] SCI REP PRESENT;  Surgeon: Gurvinder Yun MD;  Location:  HEART CARDIAC CATH LAB     SPINAL FUSION      L4-5     TOTAL HIP ARTHROPLASTY Left      TOTAL HIP ARTHROPLASTY Right 2/10/2016    Procedure:  RIGHT HIP TOTAL ARTHROPLASTY;  Surgeon: Jose Gaytan MD;  Location: [facility];  Service:      [facility] TOTAL HIP ARTHROPLASTY Right     THR       SOCIAL HISTORY:  Social History     Socioeconomic History     Marital status:    Tobacco Use     Smoking status: Former     Types: Cigarettes     Quit date: 3/23/1987     Years since quittin.1     Smokeless tobacco: Never     Tobacco comments:     Smokes cigars once every 2-3 years   Substance and Sexual Activity     Alcohol use: Yes     Comment: Rarely     Drug use: Never   Other Topics Concern     Caffeine Concern No     Comment: 1 -2 cups daily       Special Diet No     Exercise No       FAMILY HISTORY:  Family History   Problem Relation Age of Onset     Myocardial Infarction Father      Heart Disease Maternal Grandfather        MEDS: acetaminophen (TYLENOL) 325 MG tablet, Take 3 tablets (975 mg) by mouth every 8 hours as needed for other (For optimal non-opioid multimodal pain management to improve pain control.) Max 3000mg/24 hours.  albuterol (PROAIR HFA/PROVENTIL HFA/VENTOLIN HFA) 108 (90 Base) MCG/ACT inhaler, Inhale 2 puffs into the lungs every 4 hours as needed for shortness of breath / dyspnea or wheezing doesn't have current RX  clopidogrel (PLAVIX) 75 MG tablet, Take 1 tablet (75 mg) by mouth daily  Emollient (VANICREAM) LOTN, APPLY THIN LAYER TOPICALLY EVERY DAY FOR DRY SKIN, FOR DERMATITIS  TO AREAS OF DRY SKIN, IDEALLY WITHIN 3 MINUTES AFTER BATH OR SHOWER. FOR DRY SKIN, FOR DERMATITIS  TO AREAS OF DRY SKIN, IDEALLY WITHIN 3 MINUTES AFTER BATH OR SHOWER.  enoxaparin ANTICOAGULANT (LOVENOX) 100 MG/ML syringe, Inject 1 mL (100 mg) Subcutaneous every 12 hours Continue until INR greater than 2.  gabapentin (NEURONTIN) 400 MG capsule, Take 1,200 mg by mouth 3 times daily  hydrocortisone acetate 1 % CREA, APPLY THIN LAYER TOPICALLY EVERY DAY FOR DRY SKIN, FOR DERMATITIS  TO AREAS OF DRY SKIN, IDEALLY WITHIN 3 MINUTES AFTER BATH OR SHOWER. FOR DRY SKIN, FOR DERMATITIS  TO AREAS OF DRY SKIN, IDEALLY WITHIN 3 MINUTES AFTER BATH OR SHOWER.  HYDROmorphone (DILAUDID) 2 MG tablet, Take 1 tablet (2 mg) by mouth every 6 hours as needed for pain  ipratropium - albuterol 0.5 mg/2.5 mg/3 mL (DUONEB) 0.5-2.5 (3) MG/3ML neb solution, Inhale 3 mLs into the lungs daily as needed  lisinopril (PRINIVIL/ZESTRIL) 20 MG tablet, Take 1 tablet (20 mg) by mouth daily  meclizine (ANTIVERT) 12.5 MG tablet, Take 2 tablets (25 mg) by mouth 4 times daily as needed for dizziness  nitroGLYcerin (NITROSTAT) 0.4 MG sublingual tablet, For chest pain place 1 tablet under the tongue every 5  "minutes for 3 doses. If symptoms persist 5 minutes after 1st dose call 911.  omeprazole (PRILOSEC) 20 MG CR capsule, Take 20 mg by mouth At Bedtime   rosuvastatin (CRESTOR) 20 MG tablet, Take 1 tablet (20 mg) by mouth daily  warfarin ANTICOAGULANT (COUMADIN ANTICOAGULANT) 5 MG tablet, Take 5 mg by mouth daily     No current facility-administered medications on file prior to visit.      ALLERGIES:   Allergies   Allergen Reactions     Metoprolol      Fatigue and bradycardia     Niacin Other (See Comments)     Other reaction(s): Flushing         Norvasc [Amlodipine] Rash     Septra [Sulfamethoxazole W-Trimethoprim] Rash       PHYSICAL EXAM:  Vitals: BP 97/62   Pulse 69   Ht 1.727 m (5' 8\")   Wt 102.5 kg (226 lb)   BMI 34.36 kg/m    Constitutional:  cooperative, alert and oriented, well developed, well nourished, in no acute distress        Skin:  warm and dry to the touch, no apparent skin lesions or masses noted        Head:  normocephalic, no masses or lesions        Eyes:  pupils equal and round;conjunctivae and lids unremarkable;sclera white        ENT:           Neck:  JVP normal        Respiratory:  normal breath sounds, clear to auscultation, normal A-P diameter, normal symmetry, normal respiratory excursion, no use of accessory muscles        Cardiac: regular rhythm, normal S1/S2, no S3 or S4, apical impulse not displaced, no murmurs, gallops or rubs             distant HT so hard to hear    GI:  abdomen soft obese      Vascular:                                   RRA site: 1+ radial pulse, arm is warm, significant ecchymosis (similar to ER note picture 5/21/23) and arm is swollen. he has intact sensation and movement of the forearm and fingers    Extremities and Musculoskeletal:  no deformities, clubbing, cyanosis, erythema observed   right arm as noted above    Neurological:  no gross motor deficits   known neuropathy of R leg see's neuro          LABS/DATA:  I reviewed the following:  Cardiac cath " 5/17/23:  Conclusion  1. Prior LAD stent is widely patent.  2. Prior RCA stents with 40-50% moderate non-obstructive ISR.  3. Proximal LCx lesion is heavily calcified and probably flow-limiting, but appears unchanged compared to 2019 angiogram when it was medically managed.         Plan    Follow bedrest per protocol    Continued medical management and lifestyle modifications for cardiovascular risk factor optimizations.    Discharge today per protocol        - Would recommend initial attempt at optimal medical therapy/antianginal titration.  - If symptoms are not controlled with antianginal medication, could consider return to the Cath Lab for planned PCI of the proximal LCx, but would defer this decision to primary cardiologist, Dr. Yun.       5/18/23 arterial US RUE:  The proximal and distal right radial artery is patent. There is no  evidence for pseudoaneurysm. No significant hematoma identified.      5/21/23:  US Upper Extremity Arterial Duplex Right   Final Result   IMPRESSION:    1.  Normal arterial Duplex ultrasound of the right arm.       US Upper Extremity Venous Duplex Right   Final Result   IMPRESSION:   1.  No deep venous thrombosis in the right upper extremity.       ASSESSMENT/PLAN:  Coronary artery disease  -Status post stent to LAD and the RCA in 1996  -Recurrent chest discomfort in 2020.  -Cor angio 1/2020 revealed patent LAD stent, 70% stenosis in the circumflex, and  RCA  -Cor angio 2/2020 status post successful stenting of proximal RCA  with resolution in patient's chest discomfort  -Recurrent chest pain noted at 4/14/2023 visit  -Lexiscan stress completed 5/2/2023 was abnormal.  There is a small to moderate size fixed defect of the basal to mid inferior and inferior lateral segments  -Cardiac cath 5/17/23: Prior LAD stent is widely patent. Prior RCA stents with 40-50% moderate non-obstructive ISR. Proximal LCx lesion is heavily calcified and probably flow-limiting, but appears  unchanged compared to 2019 angiogram when it was medically managed.  -On Plavix 75 mg daily, warfarin  -On lisinopril 20 mg daily, rosuvastatin 20 mg daily  -Not on beta-blocker secondary to chronic sinus bradycardia  -Norvasc caused rash in the past  -Previously was on Ranexa and Imdur (stopped in 2020). Chest pain is reported as a bit less today. Also his BP is running lower today 97/62.  If chest pains worsen, we should retrial Imdur 15 mg and may need to lower lisinopril      Hyperlipidemia  -LDL 46 5/2023  -On rosuvastatin 20 mg daily    Factor V Leiden  Protein C deficiency  History of DVT  -On chronic warfarin therapy    Obstrutive sleep apnea  -on CPAP       Right radial access pain and swelling  5/18/23 Arterial US without evidence of pseudoaneurysm or hematoma.    5/21/23:  US Upper Extremity Arterial Duplex Right   Final Result   IMPRESSION:    1.  Normal arterial Duplex ultrasound of the right arm.       US Upper Extremity Venous Duplex Right   Final Result   IMPRESSION:   1.  No deep venous thrombosis in the right upper extremity.   PCP gave him some pain meds  He will continue to try to ice and elevate  He will come back in on Friday so I can look at it again before the long weekend            Follow up:  Nurse check in 2 days on Friday  Dr. Yun in 3 months, sooner if concerns         Speedy Benavides PA-C

## 2023-05-26 ENCOUNTER — DOCUMENTATION ONLY (OUTPATIENT)
Dept: CARDIOLOGY | Facility: CLINIC | Age: 78
End: 2023-05-26

## 2023-05-26 ENCOUNTER — ALLIED HEALTH/NURSE VISIT (OUTPATIENT)
Dept: CARDIOLOGY | Facility: CLINIC | Age: 78
End: 2023-05-26
Payer: MEDICARE

## 2023-05-26 VITALS — SYSTOLIC BLOOD PRESSURE: 118 MMHG | DIASTOLIC BLOOD PRESSURE: 60 MMHG

## 2023-05-26 DIAGNOSIS — Z98.890 S/P CARDIAC CATH: ICD-10-CM

## 2023-05-26 PROCEDURE — 99207 PR NO CHARGE LOS: CPT

## 2023-05-26 NOTE — PROGRESS NOTES
Jose returned to clinic so I could recheck his RUE.   He is moving it more today, the arm is softer, and the pain is a bit less. The ecchymosis is evolving.   He will continue to elevate, compress and ice as able.     We reviewed his cath again today as his daughter was with him.     We discussed that should he have any more sxs, we could restart Imdur.     He will make an appt with Dr. Yun in 3 months on his way out today.     Speedy Benavides PA-C 5/26/2023 2:26 PM          
show

## 2023-05-26 NOTE — PROGRESS NOTES
[facility] BLOOD PRESSURE CHECK     Last office visit: 5/24/23    Previous blood pressure: 97/62 mm Hg  Previous heart rate: 69 bpm      Time of visit: 200 pm    Morning medications were taken at: 8 am     Today's blood pressure:  mm Hg  Today's heart rate:  bpm     Home monitor blood pressure: n/a mmHg  Home monitor heart rate: n/a bpm      Additional Comments: pt does not take BP at home.      Results routed to: Speedy Benavides, nursing team      Ordering Provider: Speedy Benavides  In clinic Provider: Dr Stanley

## 2023-08-15 ENCOUNTER — OFFICE VISIT (OUTPATIENT)
Dept: CARDIOLOGY | Facility: CLINIC | Age: 78
End: 2023-08-15
Payer: MEDICARE

## 2023-08-15 VITALS
HEIGHT: 68 IN | OXYGEN SATURATION: 95 % | SYSTOLIC BLOOD PRESSURE: 118 MMHG | DIASTOLIC BLOOD PRESSURE: 70 MMHG | BODY MASS INDEX: 33.84 KG/M2 | HEART RATE: 53 BPM | WEIGHT: 223.3 LBS

## 2023-08-15 DIAGNOSIS — I25.10 CORONARY ARTERY DISEASE INVOLVING NATIVE CORONARY ARTERY OF NATIVE HEART WITHOUT ANGINA PECTORIS: Primary | ICD-10-CM

## 2023-08-15 PROCEDURE — 99214 OFFICE O/P EST MOD 30 MIN: CPT | Performed by: INTERNAL MEDICINE

## 2023-08-15 NOTE — LETTER
8/15/2023    Sara Jhaveri, CNP  [address]  [facility] [place] [id number]    RE: Jose Tejada       Dear Colleague,     I had the pleasure of seeing Jose Tejada in the [facility].  HPI and Plan:   Mr. Tejada returns to discuss his recent angiogram with Dr. Rodriguez.  His medication for years.  Remotely well over 10 to 15 years ago.  The LAD.  In approximately 2020 his right coronary was totally occluded was tried treated medically but recurrent symptoms that took me to tries as able to get the right coronary  but with good result he had a moderate to mildly severely calcified left circumflex proximally which elected to treat medically.  More recently Dr dickinson did an angiogram a couple months ago he did a right radial approach the LAD stent was open the right coronary artery stent was open the PDA vessel he called 60% but I think it might be closer to 70 proximal circumflex and was calcified without significant narrowing but Dr. Rodriguez felt that it was unchanged from the previous angiogram 3 years ago    The patient    Significant pain and ecchymosis in the right arm after the angiogram ultrasound was unrevealing.  Today on exam there is still ecchymosis in the skin that is healing and there is a good radial pulse at 1+.    Today I reviewed with the patient the angiograms going back to 2019 and 2020 we reviewed that the LAD stent from years ago was open the left circumflex had a calcified moderately severe to severe narrowing some approximately the right coronary  stent was patent although again I think the PDA lesion was a little bit worse than he reported.  Patient is reporting no angina and very interestingly he is on 0 antianginal medications.  He rarely had fatigue and bradycardia with metoprolol although I think nebivolol might be an option at 2.5 mg if we need to and I think it certainly Imdur should it be necessary patient had a rash with Norvasc.  I  think complex angioplasty with possible Rotablator are high Lisseth of left circumflex is an option should the symptoms get worse and we could probably fix the right PDA also.      Ordered echocardiogram since the last one was several years ago and the LV ejection fraction was normal the RV function is mildly reduced we have not checked it for 4 years.  We reviewed his lipids today his HDL runs low consistent with metabolic syndrome the HDL was only about 32-36 historically the LDL and triglycerides were normal and this was all reviewed today.  Today's visit was 40 minutes I will recommend office visit 6 months we can add antianginals as above if he develops recurrent chest pain and a possible angiogram again      Orders Placed This Encounter   Procedures    Follow-Up with Cardiology    Echocardiogram Complete     No orders of the defined types were placed in this encounter.    There are no discontinued medications.      Encounter Diagnosis   Name Primary?    Coronary artery disease involving native coronary artery of native heart without angina pectoris Yes       CURRENT MEDICATIONS:  Current Outpatient Medications   Medication Sig Dispense Refill    acetaminophen (TYLENOL) 325 MG tablet Take 3 tablets (975 mg) by mouth every 8 hours as needed for other (For optimal non-opioid multimodal pain management to improve pain control.) Max 3000mg/24 hours. 100 tablet 0    albuterol (PROAIR HFA/PROVENTIL HFA/VENTOLIN HFA) 108 (90 Base) MCG/ACT inhaler Inhale 2 puffs into the lungs every 4 hours as needed for shortness of breath / dyspnea or wheezing doesn't have current RX      clopidogrel (PLAVIX) 75 MG tablet Take 1 tablet (75 mg) by mouth daily 30 tablet 0    Emollient (VANICREAM) LOTN APPLY THIN LAYER TOPICALLY EVERY DAY FOR DRY SKIN, FOR DERMATITIS  TO AREAS OF DRY SKIN, IDEALLY WITHIN 3 MINUTES AFTER BATH OR SHOWER. FOR DRY SKIN, FOR DERMATITIS  TO AREAS OF DRY SKIN, IDEALLY WITHIN 3 MINUTES AFTER BATH OR SHOWER.       enoxaparin ANTICOAGULANT (LOVENOX) 100 MG/ML syringe Inject 1 mL (100 mg) Subcutaneous every 12 hours Continue until INR greater than 2. 10 mL 0    gabapentin (NEURONTIN) 400 MG capsule Take 1,200 mg by mouth 3 times daily      hydrocortisone acetate 1 % CREA APPLY THIN LAYER TOPICALLY EVERY DAY FOR DRY SKIN, FOR DERMATITIS  TO AREAS OF DRY SKIN, IDEALLY WITHIN 3 MINUTES AFTER BATH OR SHOWER. FOR DRY SKIN, FOR DERMATITIS  TO AREAS OF DRY SKIN, IDEALLY WITHIN 3 MINUTES AFTER BATH OR SHOWER.      HYDROmorphone (DILAUDID) 2 MG tablet Take 1 tablet (2 mg) by mouth every 6 hours as needed for pain 8 tablet 0    ipratropium - albuterol 0.5 mg/2.5 mg/3 mL (DUONEB) 0.5-2.5 (3) MG/3ML neb solution Inhale 3 mLs into the lungs daily as needed      lisinopril (PRINIVIL/ZESTRIL) 20 MG tablet Take 1 tablet (20 mg) by mouth daily 30 tablet 0    meclizine (ANTIVERT) 12.5 MG tablet Take 2 tablets (25 mg) by mouth 4 times daily as needed for dizziness 15 tablet 0    nitroGLYcerin (NITROSTAT) 0.4 MG sublingual tablet For chest pain place 1 tablet under the tongue every 5 minutes for 3 doses. If symptoms persist 5 minutes after 1st dose call 911. 20 tablet 0    omeprazole (PRILOSEC) 20 MG CR capsule Take 20 mg by mouth At Bedtime       rosuvastatin (CRESTOR) 20 MG tablet Take 1 tablet (20 mg) by mouth daily 90 tablet 3    warfarin ANTICOAGULANT (COUMADIN ANTICOAGULANT) 5 MG tablet Take 5 mg by mouth daily          ALLERGIES     Allergies   Allergen Reactions    Metoprolol      Fatigue and bradycardia    Niacin Other (See Comments)     Other reaction(s): Flushing        Sulfa Antibiotics     Zonisamide GI Disturbance    Norvasc [Amlodipine] Rash    Septra [Sulfamethoxazole W-Trimethoprim] Rash       PAST MEDICAL HISTORY:  Past Medical History:   Diagnosis Date    Arthritis     Asthma     CAD (coronary artery disease)     1996 angioplasty and stent to the prox LAD, PTCA with intracoronary stent placement of RCA, 70%OM; 10/24/19 Cath:  Occluded RCA, prox circumflex lesion - pd/pa not significant, advised medical therapy first by Interventional cardiologist    Depressive disorder     DVT (deep venous thrombosis) (H)     Factor 5 Leiden mutation, heterozygous (H)     GERD (gastroesophageal reflux disease)     HTN (hypertension)     Hyperlipidemia     Neuropathy     wears brace R foot for drop foot    Neuropathy     Obese     PRIMITIVO (obstructive sleep apnea)     cpap    Protein C deficiency (H)     Spinal stenosis     Stented coronary artery     Thrombosis        PAST SURGICAL HISTORY:  Past Surgical History:   Procedure Laterality Date    APPENDECTOMY      APPENDECTOMY OPEN  1958    ARTHROPLASTY HIP Left 1997    ARTHROPLASTY REVISION HIP Left 2013    ARTHROPLASTY REVISION HIP Left     ARTHROPLASTY REVISION HIP Left 11/19/2021    Procedure: REVISION LEFT TOTAL HIP ARTHROPLASTY;  Surgeon: Alber Mcfarland MD;  Location: [facility] OR    AS SPINAL FUSION,ANT,EA ADNL LEVEL  2010    L4-L5    CORONARY STENT PLACEMENT  1996    CV CORONARY ANGIOGRAM N/A 10/24/2019    Procedure: Coronary Angiogram;  Surgeon: Valdemar Hinojosa MD;  Location: [facility] CARDIAC CATH LAB    CV CORONARY ANGIOGRAM  1996 1996 angioplasty and stent to the prox LAD, PTCA with intracoronary stent placement of RCA, 70%OM    CV CORONARY ANGIOGRAM N/A 2/28/2020    Procedure: Coronary Angiogram 2nd attempt of RCA  successful--(lad stent ok, Lcx 70% with normal iFR)  Surgeon: Gurvinder Yun MD;  Location: [facility] CARDIAC CATH LAB    CV CORONARY ANGIOGRAM N/A 5/17/2023    Procedure: Coronary Angiogram;  Surgeon: Je Rodriguez MD;  Location: [facility] CARDIAC CATH LAB    CV FRACTIONAL FLOW RATIO WIRE N/A 10/24/2019    Procedure: Fractional Flow [place];  Surgeon: Valdemar Hinojosa MD;  Location: [facility] CARDIAC CATH LAB    CV LEFT HEART CATH Bilateral 1/16/2020    Procedure: Left Heart Cath w/wo Left Ventriculogram  ANGIOPLASTY WITH [place] SCI REP PRESENT;   "Surgeon: Gurvinder Yun MD;  Location:  HEART CARDIAC CATH LAB    SPINAL FUSION  2010    L4-5    TOTAL HIP ARTHROPLASTY Left     TOTAL HIP ARTHROPLASTY Right 2/10/2016    Procedure:  RIGHT HIP TOTAL ARTHROPLASTY;  Surgeon: Jsoe Gaytan MD;  Location: [facility];  Service:     [facility] TOTAL HIP ARTHROPLASTY Right 2015    THR       FAMILY HISTORY:  Family History   Problem Relation Age of Onset    Myocardial Infarction Father     Heart Disease Maternal Grandfather        SOCIAL HISTORY:  Social History     Socioeconomic History    Marital status:      Spouse name: None    Number of children: None    Years of education: None    Highest education level: None   Tobacco Use    Smoking status: Former     Types: Cigarettes     Quit date: 3/23/1987     Years since quittin.4    Smokeless tobacco: Never    Tobacco comments:     Smokes cigars once every 2-3 years   Substance and Sexual Activity    Alcohol use: Yes     Comment: Rarely    Drug use: Never   Other Topics Concern    Caffeine Concern No     Comment: 1 -2 cups daily     Special Diet No    Exercise No       Review of Systems:  Skin:        Eyes:       ENT:       Respiratory:       Cardiovascular:       Gastroenterology:      Genitourinary:       Musculoskeletal:       Neurologic:       Psychiatric:       Heme/Lymph/Imm:       Endocrine:         Physical Exam:  Vitals: /70 (BP Location: Right arm, Patient Position: Sitting, Cuff Size: Adult Regular)   Pulse 53   Ht 1.727 m (5' 8\")   Wt 101.3 kg (223 lb 4.8 oz)   SpO2 95%   BMI 33.95 kg/m      Constitutional:  cooperative, alert and oriented, well developed, well nourished, in no acute distress        Skin:  warm and dry to the touch, no apparent skin lesions or masses noted     pt notes a \"pimple\" under chin, it is not red, probably subcut  I suggested derm consult if persists-he says they come and go on face/chin    Head:  normocephalic, no masses or lesions        Eyes:  pupils " equal and round, conjunctivae and lids unremarkable, sclera white, no xanthalasma, EOMS intact, no nystagmus        Lymph:No Cervical lymphadenopathy present;No thyromegaly     ENT:           Neck:  carotid pulses are full and equal bilaterally, JVP normal, no carotid bruit        Respiratory:  normal breath sounds, clear to auscultation, normal A-P diameter, normal symmetry, normal respiratory excursion, no use of accessory muscles         Cardiac: regular rhythm, normal S1/S2, no S3 or S4, apical impulse not displaced, no murmurs, gallops or rubs                                                         GI:  BS normoactive obese      Extremities and Muscular Skeletal:  no deformities, clubbing, cyanosis, erythema observed;no edema         brace on R ankle    Neurological:  no gross motor deficits        Psych:         Recent Lab Results:  LIPID RESULTS:  Lab Results   Component Value Date    CHOL 97 05/05/2023    CHOL 99 01/16/2020    HDL 32 (L) 05/05/2023    HDL 40 01/16/2020    LDL 46 05/05/2023    LDL 39 01/16/2020    TRIG 93 05/05/2023    TRIG 101 01/16/2020    CHOLHDLRATIO 3.5 04/21/2014       LIVER ENZYME RESULTS:  Lab Results   Component Value Date    AST 21 10/22/2019    ALT 18 05/05/2023    ALT 20 12/20/2019       CBC RESULTS:  Lab Results   Component Value Date    WBC 6.0 05/21/2023    WBC 4.7 02/28/2020    RBC 4.41 05/21/2023    RBC 3.92 (L) 02/28/2020    HGB 13.8 05/21/2023    HGB 12.4 (L) 02/28/2020    HCT 40.7 05/21/2023    HCT 36.7 (L) 02/28/2020    MCV 92 05/21/2023    MCV 94 02/28/2020    MCH 31.3 05/21/2023    MCH 31.6 02/28/2020    MCHC 33.9 05/21/2023    MCHC 33.8 02/28/2020    RDW 13.0 05/21/2023    RDW 12.9 02/28/2020     05/21/2023     02/28/2020       BMP RESULTS:  Lab Results   Component Value Date     05/21/2023     (L) 03/04/2020    POTASSIUM 4.2 05/21/2023    POTASSIUM 4.2 11/11/2021    POTASSIUM 4.7 03/04/2020    CHLORIDE 103 05/21/2023    CHLORIDE 102  11/11/2021    CHLORIDE 100 03/04/2020    CO2 23 05/21/2023    CO2 26 11/11/2021    CO2 28 03/04/2020    ANIONGAP 10 05/21/2023    ANIONGAP 8 11/11/2021    ANIONGAP 10.7 03/04/2020     (H) 05/21/2023    GLC 96 11/21/2021     03/04/2020    BUN 10.4 05/21/2023    BUN 9 11/11/2021    BUN 7 03/04/2020    CR 0.88 05/21/2023    CR 0.89 03/04/2020    GFRESTIMATED 89 05/21/2023    GFRESTIMATED 84 03/04/2020    GFRESTBLACK >90 03/04/2020    CODI 9.2 05/21/2023    CODI 9.7 03/04/2020        A1C RESULTS:  No results found for: A1C    INR RESULTS:  Lab Results   Component Value Date    INR 1.29 (H) 05/21/2023    INR 1.06 05/17/2023    INR 2.00 (H) 03/06/2020    INR 1.48 (H) 03/04/2020           CC  No referring provider defined for this encounter.    Orders Placed This Encounter   Procedures    Follow-Up with Cardiology    Echocardiogram Complete     No orders of the defined types were placed in this encounter.    There are no discontinued medications.      Encounter Diagnosis   Name Primary?    Coronary artery disease involving native coronary artery of native heart without angina pectoris Yes       CURRENT MEDICATIONS:  Current Outpatient Medications   Medication Sig Dispense Refill    acetaminophen (TYLENOL) 325 MG tablet Take 3 tablets (975 mg) by mouth every 8 hours as needed for other (For optimal non-opioid multimodal pain management to improve pain control.) Max 3000mg/24 hours. 100 tablet 0    albuterol (PROAIR HFA/PROVENTIL HFA/VENTOLIN HFA) 108 (90 Base) MCG/ACT inhaler Inhale 2 puffs into the lungs every 4 hours as needed for shortness of breath / dyspnea or wheezing doesn't have current RX      clopidogrel (PLAVIX) 75 MG tablet Take 1 tablet (75 mg) by mouth daily 30 tablet 0    Emollient (VANICREAM) LOTN APPLY THIN LAYER TOPICALLY EVERY DAY FOR DRY SKIN, FOR DERMATITIS  TO AREAS OF DRY SKIN, IDEALLY WITHIN 3 MINUTES AFTER BATH OR SHOWER. FOR DRY SKIN, FOR DERMATITIS  TO AREAS OF DRY SKIN, IDEALLY WITHIN  3 MINUTES AFTER BATH OR SHOWER.      enoxaparin ANTICOAGULANT (LOVENOX) 100 MG/ML syringe Inject 1 mL (100 mg) Subcutaneous every 12 hours Continue until INR greater than 2. 10 mL 0    gabapentin (NEURONTIN) 400 MG capsule Take 1,200 mg by mouth 3 times daily      hydrocortisone acetate 1 % CREA APPLY THIN LAYER TOPICALLY EVERY DAY FOR DRY SKIN, FOR DERMATITIS  TO AREAS OF DRY SKIN, IDEALLY WITHIN 3 MINUTES AFTER BATH OR SHOWER. FOR DRY SKIN, FOR DERMATITIS  TO AREAS OF DRY SKIN, IDEALLY WITHIN 3 MINUTES AFTER BATH OR SHOWER.      HYDROmorphone (DILAUDID) 2 MG tablet Take 1 tablet (2 mg) by mouth every 6 hours as needed for pain 8 tablet 0    ipratropium - albuterol 0.5 mg/2.5 mg/3 mL (DUONEB) 0.5-2.5 (3) MG/3ML neb solution Inhale 3 mLs into the lungs daily as needed      lisinopril (PRINIVIL/ZESTRIL) 20 MG tablet Take 1 tablet (20 mg) by mouth daily 30 tablet 0    meclizine (ANTIVERT) 12.5 MG tablet Take 2 tablets (25 mg) by mouth 4 times daily as needed for dizziness 15 tablet 0    nitroGLYcerin (NITROSTAT) 0.4 MG sublingual tablet For chest pain place 1 tablet under the tongue every 5 minutes for 3 doses. If symptoms persist 5 minutes after 1st dose call 911. 20 tablet 0    omeprazole (PRILOSEC) 20 MG CR capsule Take 20 mg by mouth At Bedtime       rosuvastatin (CRESTOR) 20 MG tablet Take 1 tablet (20 mg) by mouth daily 90 tablet 3    warfarin ANTICOAGULANT (COUMADIN ANTICOAGULANT) 5 MG tablet Take 5 mg by mouth daily          ALLERGIES     Allergies   Allergen Reactions    Metoprolol      Fatigue and bradycardia    Niacin Other (See Comments)     Other reaction(s): Flushing        Sulfa Antibiotics     Zonisamide GI Disturbance    Norvasc [Amlodipine] Rash    Septra [Sulfamethoxazole W-Trimethoprim] Rash       PAST MEDICAL HISTORY:  Past Medical History:   Diagnosis Date    Arthritis     Asthma     CAD (coronary artery disease)     1996 angioplasty and stent to the prox LAD, PTCA with intracoronary stent  placement of RCA, 70%OM; 10/24/19 Cath: Occluded RCA, prox circumflex lesion - pd/pa not significant, advised medical therapy first by Interventional cardiologist    Depressive disorder     DVT (deep venous thrombosis) (H)     Factor 5 Leiden mutation, heterozygous (H)     GERD (gastroesophageal reflux disease)     HTN (hypertension)     Hyperlipidemia     Neuropathy     wears brace R foot for drop foot    Neuropathy     Obese     PRIMITIVO (obstructive sleep apnea)     cpap    Protein C deficiency (H)     Spinal stenosis     Stented coronary artery     Thrombosis        PAST SURGICAL HISTORY:  Past Surgical History:   Procedure Laterality Date    APPENDECTOMY      APPENDECTOMY OPEN  1958    ARTHROPLASTY HIP Left 1997    ARTHROPLASTY REVISION HIP Left 2013    ARTHROPLASTY REVISION HIP Left     ARTHROPLASTY REVISION HIP Left 11/19/2021    Procedure: REVISION LEFT TOTAL HIP ARTHROPLASTY;  Surgeon: Alber Mcfarland MD;  Location: [facility] OR    AS SPINAL FUSION,ANT,EA ADNL LEVEL  2010    L4-L5    CORONARY STENT PLACEMENT  1996    CV CORONARY ANGIOGRAM N/A 10/24/2019    Procedure: Coronary Angiogram;  Surgeon: Valdemar Hinojosa MD;  Location: [facility] CARDIAC CATH LAB    CV CORONARY ANGIOGRAM  1996 1996 angioplasty and stent to the prox LAD, PTCA with intracoronary stent placement of RCA, 70%OM    CV CORONARY ANGIOGRAM N/A 2/28/2020    Procedure: Coronary Angiogram 2nd attempt of RCA  successful--(lad stent ok, Lcx 70% with normal iFR)  Surgeon: Gurvinder Yun MD;  Location: [facility] CARDIAC CATH LAB    CV CORONARY ANGIOGRAM N/A 5/17/2023    Procedure: Coronary Angiogram;  Surgeon: Je Rodriguez MD;  Location: [facility] CARDIAC CATH LAB    CV FRACTIONAL FLOW RATIO WIRE N/A 10/24/2019    Procedure: Fractional Flow [place];  Surgeon: Valdemar Hinojosa MD;  Location: [facility] CARDIAC CATH LAB    CV LEFT HEART CATH Bilateral 1/16/2020    Procedure: Left Heart Cath w/wo Left Ventriculogram   "ANGIOPLASTY WITH [place] SCI REP PRESENT;  Surgeon: Gurvinder Yun MD;  Location:  HEART CARDIAC CATH LAB    SPINAL FUSION  2010    L4-5    TOTAL HIP ARTHROPLASTY Left     TOTAL HIP ARTHROPLASTY Right 2/10/2016    Procedure:  RIGHT HIP TOTAL ARTHROPLASTY;  Surgeon: Jose Gaytan MD;  Location: [facility];  Service:     [facility] TOTAL HIP ARTHROPLASTY Right     THR       FAMILY HISTORY:  Family History   Problem Relation Age of Onset    Myocardial Infarction Father     Heart Disease Maternal Grandfather        SOCIAL HISTORY:  Social History     Socioeconomic History    Marital status:      Spouse name: None    Number of children: None    Years of education: None    Highest education level: None   Tobacco Use    Smoking status: Former     Types: Cigarettes     Quit date: 3/23/1987     Years since quittin.4    Smokeless tobacco: Never    Tobacco comments:     Smokes cigars once every 2-3 years   Substance and Sexual Activity    Alcohol use: Yes     Comment: Rarely    Drug use: Never   Other Topics Concern    Caffeine Concern No     Comment: 1 -2 cups daily     Special Diet No    Exercise No       Review of Systems:  Skin:        Eyes:       ENT:       Respiratory:       Cardiovascular:       Gastroenterology:      Genitourinary:       Musculoskeletal:       Neurologic:       Psychiatric:       Heme/Lymph/Imm:       Endocrine:         Physical Exam:  Vitals: /70 (BP Location: Right arm, Patient Position: Sitting, Cuff Size: Adult Regular)   Pulse 53   Ht 1.727 m (5' 8\")   Wt 101.3 kg (223 lb 4.8 oz)   SpO2 95%   BMI 33.95 kg/m      Constitutional:  cooperative, alert and oriented, well developed, well nourished, in no acute distress        Skin:  warm and dry to the touch, no apparent skin lesions or masses noted     pt notes a \"pimple\" under chin, it is not red, probably subcut  I suggested derm consult if persists-he says they come and go on face/chin    Head:  normocephalic, no " masses or lesions        Eyes:  pupils equal and round, conjunctivae and lids unremarkable, sclera white, no xanthalasma, EOMS intact, no nystagmus        Lymph:No Cervical lymphadenopathy present;No thyromegaly     ENT:           Neck:  carotid pulses are full and equal bilaterally, JVP normal, no carotid bruit        Respiratory:  normal breath sounds, clear to auscultation, normal A-P diameter, normal symmetry, normal respiratory excursion, no use of accessory muscles         Cardiac: regular rhythm, normal S1/S2, no S3 or S4, apical impulse not displaced, no murmurs, gallops or rubs                                                         GI:  BS normoactive obese      Extremities and Muscular Skeletal:  no deformities, clubbing, cyanosis, erythema observed;no edema         brace on R ankle    Neurological:  no gross motor deficits        Psych:         Recent Lab Results:  LIPID RESULTS:  Lab Results   Component Value Date    CHOL 97 05/05/2023    CHOL 99 01/16/2020    HDL 32 (L) 05/05/2023    HDL 40 01/16/2020    LDL 46 05/05/2023    LDL 39 01/16/2020    TRIG 93 05/05/2023    TRIG 101 01/16/2020    CHOLHDLRATIO 3.5 04/21/2014       LIVER ENZYME RESULTS:  Lab Results   Component Value Date    AST 21 10/22/2019    ALT 18 05/05/2023    ALT 20 12/20/2019       CBC RESULTS:  Lab Results   Component Value Date    WBC 6.0 05/21/2023    WBC 4.7 02/28/2020    RBC 4.41 05/21/2023    RBC 3.92 (L) 02/28/2020    HGB 13.8 05/21/2023    HGB 12.4 (L) 02/28/2020    HCT 40.7 05/21/2023    HCT 36.7 (L) 02/28/2020    MCV 92 05/21/2023    MCV 94 02/28/2020    MCH 31.3 05/21/2023    MCH 31.6 02/28/2020    MCHC 33.9 05/21/2023    MCHC 33.8 02/28/2020    RDW 13.0 05/21/2023    RDW 12.9 02/28/2020     05/21/2023     02/28/2020       BMP RESULTS:  Lab Results   Component Value Date     05/21/2023     (L) 03/04/2020    POTASSIUM 4.2 05/21/2023    POTASSIUM 4.2 11/11/2021    POTASSIUM 4.7 03/04/2020    CHLORIDE  103 05/21/2023    CHLORIDE 102 11/11/2021    CHLORIDE 100 03/04/2020    CO2 23 05/21/2023    CO2 26 11/11/2021    CO2 28 03/04/2020    ANIONGAP 10 05/21/2023    ANIONGAP 8 11/11/2021    ANIONGAP 10.7 03/04/2020     (H) 05/21/2023    GLC 96 11/21/2021     03/04/2020    BUN 10.4 05/21/2023    BUN 9 11/11/2021    BUN 7 03/04/2020    CR 0.88 05/21/2023    CR 0.89 03/04/2020    GFRESTIMATED 89 05/21/2023    GFRESTIMATED 84 03/04/2020    GFRESTBLACK >90 03/04/2020    CODI 9.2 05/21/2023    CODI 9.7 03/04/2020        A1C RESULTS:  No results found for: A1C    INR RESULTS:  Lab Results   Component Value Date    INR 1.29 (H) 05/21/2023    INR 1.06 05/17/2023    INR 2.00 (H) 03/06/2020    INR 1.48 (H) 03/04/2020       CC  No referring provider defined for this encounter.    Thank you for allowing me to participate in the care of your patient.    Sincerely,   Gurvinder Yun MD   [facility]

## 2023-08-15 NOTE — PROGRESS NOTES
HPI and Plan:   Mr. Tejada returns to discuss his recent angiogram with Dr. Rodriguez.  His medication for years.  Remotely well over 10 to 15 years ago.  The LAD.  In approximately 2020 his right coronary was totally occluded was tried treated medically but recurrent symptoms that took me to tries as able to get the right coronary  but with good result he had a moderate to mildly severely calcified left circumflex proximally which elected to treat medically.  More recently Dr dickinson did an angiogram a couple months ago he did a right radial approach the LAD stent was open the right coronary artery stent was open the PDA vessel he called 60% but I think it might be closer to 70 proximal circumflex and was calcified without significant narrowing but Dr. Rodriguez felt that it was unchanged from the previous angiogram 3 years ago    The patient    Significant pain and ecchymosis in the right arm after the angiogram ultrasound was unrevealing.  Today on exam there is still ecchymosis in the skin that is healing and there is a good radial pulse at 1+.    Today I reviewed with the patient the angiograms going back to 2019 and 2020 we reviewed that the LAD stent from years ago was open the left circumflex had a calcified moderately severe to severe narrowing some approximately the right coronary  stent was patent although again I think the PDA lesion was a little bit worse than he reported.  Patient is reporting no angina and very interestingly he is on 0 antianginal medications.  He rarely had fatigue and bradycardia with metoprolol although I think nebivolol might be an option at 2.5 mg if we need to and I think it certainly Imdur should it be necessary patient had a rash with Norvasc.  I think complex angioplasty with possible Rotablator are high Lisseth of left circumflex is an option should the symptoms get worse and we could probably fix the right PDA also.      Ordered echocardiogram since the last one was  several years ago and the LV ejection fraction was normal the RV function is mildly reduced we have not checked it for 4 years.  We reviewed his lipids today his HDL runs low consistent with metabolic syndrome the HDL was only about 32-36 historically the LDL and triglycerides were normal and this was all reviewed today.  Today's visit was 40 minutes I will recommend office visit 6 months we can add antianginals as above if he develops recurrent chest pain and a possible angiogram again      Orders Placed This Encounter   Procedures    Follow-Up with Cardiology    Echocardiogram Complete     No orders of the defined types were placed in this encounter.    There are no discontinued medications.      Encounter Diagnosis   Name Primary?    Coronary artery disease involving native coronary artery of native heart without angina pectoris Yes       CURRENT MEDICATIONS:  Current Outpatient Medications   Medication Sig Dispense Refill    acetaminophen (TYLENOL) 325 MG tablet Take 3 tablets (975 mg) by mouth every 8 hours as needed for other (For optimal non-opioid multimodal pain management to improve pain control.) Max 3000mg/24 hours. 100 tablet 0    albuterol (PROAIR HFA/PROVENTIL HFA/VENTOLIN HFA) 108 (90 Base) MCG/ACT inhaler Inhale 2 puffs into the lungs every 4 hours as needed for shortness of breath / dyspnea or wheezing doesn't have current RX      clopidogrel (PLAVIX) 75 MG tablet Take 1 tablet (75 mg) by mouth daily 30 tablet 0    Emollient (VANICREAM) LOTN APPLY THIN LAYER TOPICALLY EVERY DAY FOR DRY SKIN, FOR DERMATITIS  TO AREAS OF DRY SKIN, IDEALLY WITHIN 3 MINUTES AFTER BATH OR SHOWER. FOR DRY SKIN, FOR DERMATITIS  TO AREAS OF DRY SKIN, IDEALLY WITHIN 3 MINUTES AFTER BATH OR SHOWER.      enoxaparin ANTICOAGULANT (LOVENOX) 100 MG/ML syringe Inject 1 mL (100 mg) Subcutaneous every 12 hours Continue until INR greater than 2. 10 mL 0    gabapentin (NEURONTIN) 400 MG capsule Take 1,200 mg by mouth 3 times daily       hydrocortisone acetate 1 % CREA APPLY THIN LAYER TOPICALLY EVERY DAY FOR DRY SKIN, FOR DERMATITIS  TO AREAS OF DRY SKIN, IDEALLY WITHIN 3 MINUTES AFTER BATH OR SHOWER. FOR DRY SKIN, FOR DERMATITIS  TO AREAS OF DRY SKIN, IDEALLY WITHIN 3 MINUTES AFTER BATH OR SHOWER.      HYDROmorphone (DILAUDID) 2 MG tablet Take 1 tablet (2 mg) by mouth every 6 hours as needed for pain 8 tablet 0    ipratropium - albuterol 0.5 mg/2.5 mg/3 mL (DUONEB) 0.5-2.5 (3) MG/3ML neb solution Inhale 3 mLs into the lungs daily as needed      lisinopril (PRINIVIL/ZESTRIL) 20 MG tablet Take 1 tablet (20 mg) by mouth daily 30 tablet 0    meclizine (ANTIVERT) 12.5 MG tablet Take 2 tablets (25 mg) by mouth 4 times daily as needed for dizziness 15 tablet 0    nitroGLYcerin (NITROSTAT) 0.4 MG sublingual tablet For chest pain place 1 tablet under the tongue every 5 minutes for 3 doses. If symptoms persist 5 minutes after 1st dose call 911. 20 tablet 0    omeprazole (PRILOSEC) 20 MG CR capsule Take 20 mg by mouth At Bedtime       rosuvastatin (CRESTOR) 20 MG tablet Take 1 tablet (20 mg) by mouth daily 90 tablet 3    warfarin ANTICOAGULANT (COUMADIN ANTICOAGULANT) 5 MG tablet Take 5 mg by mouth daily          ALLERGIES     Allergies   Allergen Reactions    Metoprolol      Fatigue and bradycardia    Niacin Other (See Comments)     Other reaction(s): Flushing        Sulfa Antibiotics     Zonisamide GI Disturbance    Norvasc [Amlodipine] Rash    Septra [Sulfamethoxazole W-Trimethoprim] Rash       PAST MEDICAL HISTORY:  Past Medical History:   Diagnosis Date    Arthritis     Asthma     CAD (coronary artery disease)     1996 angioplasty and stent to the prox LAD, PTCA with intracoronary stent placement of RCA, 70%OM; 10/24/19 Cath: Occluded RCA, prox circumflex lesion - pd/pa not significant, advised medical therapy first by Interventional cardiologist    Depressive disorder     DVT (deep venous thrombosis) (H)     Factor 5 Leiden mutation, heterozygous  (H)     GERD (gastroesophageal reflux disease)     HTN (hypertension)     Hyperlipidemia     Neuropathy     wears brace R foot for drop foot    Neuropathy     Obese     PRIMITIVO (obstructive sleep apnea)     cpap    Protein C deficiency (H)     Spinal stenosis     Stented coronary artery     Thrombosis        PAST SURGICAL HISTORY:  Past Surgical History:   Procedure Laterality Date    APPENDECTOMY      APPENDECTOMY OPEN  1958    ARTHROPLASTY HIP Left 1997    ARTHROPLASTY REVISION HIP Left 2013    ARTHROPLASTY REVISION HIP Left     ARTHROPLASTY REVISION HIP Left 11/19/2021    Procedure: REVISION LEFT TOTAL HIP ARTHROPLASTY;  Surgeon: Alber Mcfarland MD;  Location: [facility] OR    AS SPINAL FUSION,ANT,EA ADNL LEVEL  2010    L4-L5    CORONARY STENT PLACEMENT  1996    CV CORONARY ANGIOGRAM N/A 10/24/2019    Procedure: Coronary Angiogram;  Surgeon: Valdemar Hinojosa MD;  Location: [facility] CARDIAC CATH LAB    CV CORONARY ANGIOGRAM  1996 1996 angioplasty and stent to the prox LAD, PTCA with intracoronary stent placement of RCA, 70%OM    CV CORONARY ANGIOGRAM N/A 2/28/2020    Procedure: Coronary Angiogram 2nd attempt of RCA  successful--(lad stent ok, Lcx 70% with normal iFR)  Surgeon: Gurvinder Yun MD;  Location: [facility] CARDIAC CATH LAB    CV CORONARY ANGIOGRAM N/A 5/17/2023    Procedure: Coronary Angiogram;  Surgeon: Je Rodriguez MD;  Location: [facility] CARDIAC CATH LAB    CV FRACTIONAL FLOW RATIO WIRE N/A 10/24/2019    Procedure: Fractional Flow [place];  Surgeon: Valdemar Hinojosa MD;  Location: [facility] CARDIAC CATH LAB    CV LEFT HEART CATH Bilateral 1/16/2020    Procedure: Left Heart Cath w/wo Left Ventriculogram  ANGIOPLASTY WITH B[place]ON SCI REP PRESENT;  Surgeon: Gurvinder Yun MD;  Location:  HEART CARDIAC CATH LAB    SPINAL FUSION  2010    L4-5    TOTAL HIP ARTHROPLASTY Left     TOTAL HIP ARTHROPLASTY Right 2/10/2016    Procedure:  RIGHT HIP TOTAL ARTHROPLASTY;  Surgeon:  "Jose Gaytan MD;  Location: [facility] OR;  Service:     [facility] TOTAL HIP ARTHROPLASTY Right 2015    THR       FAMILY HISTORY:  Family History   Problem Relation Age of Onset    Myocardial Infarction Father     Heart Disease Maternal Grandfather        SOCIAL HISTORY:  Social History     Socioeconomic History    Marital status:      Spouse name: None    Number of children: None    Years of education: None    Highest education level: None   Tobacco Use    Smoking status: Former     Types: Cigarettes     Quit date: 3/23/1987     Years since quittin.4    Smokeless tobacco: Never    Tobacco comments:     Smokes cigars once every 2-3 years   Substance and Sexual Activity    Alcohol use: Yes     Comment: Rarely    Drug use: Never   Other Topics Concern    Caffeine Concern No     Comment: 1 -2 cups daily     Special Diet No    Exercise No       Review of Systems:  Skin:        Eyes:       ENT:       Respiratory:       Cardiovascular:       Gastroenterology:      Genitourinary:       Musculoskeletal:       Neurologic:       Psychiatric:       Heme/Lymph/Imm:       Endocrine:         Physical Exam:  Vitals: /70 (BP Location: Right arm, Patient Position: Sitting, Cuff Size: Adult Regular)   Pulse 53   Ht 1.727 m (5' 8\")   Wt 101.3 kg (223 lb 4.8 oz)   SpO2 95%   BMI 33.95 kg/m      Constitutional:  cooperative, alert and oriented, well developed, well nourished, in no acute distress        Skin:  warm and dry to the touch, no apparent skin lesions or masses noted     pt notes a \"pimple\" under chin, it is not red, probably subcut  I suggested derm consult if persists-he says they come and go on face/chin    Head:  normocephalic, no masses or lesions        Eyes:  pupils equal and round, conjunctivae and lids unremarkable, sclera white, no xanthalasma, EOMS intact, no nystagmus        Lymph:No Cervical lymphadenopathy present;No thyromegaly     ENT:           Neck:  carotid pulses are full and equal " bilaterally, JVP normal, no carotid bruit        Respiratory:  normal breath sounds, clear to auscultation, normal A-P diameter, normal symmetry, normal respiratory excursion, no use of accessory muscles         Cardiac: regular rhythm, normal S1/S2, no S3 or S4, apical impulse not displaced, no murmurs, gallops or rubs                                                         GI:  BS normoactive obese      Extremities and Muscular Skeletal:  no deformities, clubbing, cyanosis, erythema observed;no edema         brace on R ankle    Neurological:  no gross motor deficits        Psych:         Recent Lab Results:  LIPID RESULTS:  Lab Results   Component Value Date    CHOL 97 05/05/2023    CHOL 99 01/16/2020    HDL 32 (L) 05/05/2023    HDL 40 01/16/2020    LDL 46 05/05/2023    LDL 39 01/16/2020    TRIG 93 05/05/2023    TRIG 101 01/16/2020    CHOLHDLRATIO 3.5 04/21/2014       LIVER ENZYME RESULTS:  Lab Results   Component Value Date    AST 21 10/22/2019    ALT 18 05/05/2023    ALT 20 12/20/2019       CBC RESULTS:  Lab Results   Component Value Date    WBC 6.0 05/21/2023    WBC 4.7 02/28/2020    RBC 4.41 05/21/2023    RBC 3.92 (L) 02/28/2020    HGB 13.8 05/21/2023    HGB 12.4 (L) 02/28/2020    HCT 40.7 05/21/2023    HCT 36.7 (L) 02/28/2020    MCV 92 05/21/2023    MCV 94 02/28/2020    MCH 31.3 05/21/2023    MCH 31.6 02/28/2020    MCHC 33.9 05/21/2023    MCHC 33.8 02/28/2020    RDW 13.0 05/21/2023    RDW 12.9 02/28/2020     05/21/2023     02/28/2020       BMP RESULTS:  Lab Results   Component Value Date     05/21/2023     (L) 03/04/2020    POTASSIUM 4.2 05/21/2023    POTASSIUM 4.2 11/11/2021    POTASSIUM 4.7 03/04/2020    CHLORIDE 103 05/21/2023    CHLORIDE 102 11/11/2021    CHLORIDE 100 03/04/2020    CO2 23 05/21/2023    CO2 26 11/11/2021    CO2 28 03/04/2020    ANIONGAP 10 05/21/2023    ANIONGAP 8 11/11/2021    ANIONGAP 10.7 03/04/2020     (H) 05/21/2023    GLC 96 11/21/2021      03/04/2020    BUN 10.4 05/21/2023    BUN 9 11/11/2021    BUN 7 03/04/2020    CR 0.88 05/21/2023    CR 0.89 03/04/2020    GFRESTIMATED 89 05/21/2023    GFRESTIMATED 84 03/04/2020    GFRESTBLACK >90 03/04/2020    CODI 9.2 05/21/2023    CODI 9.7 03/04/2020        A1C RESULTS:  No results found for: A1C    INR RESULTS:  Lab Results   Component Value Date    INR 1.29 (H) 05/21/2023    INR 1.06 05/17/2023    INR 2.00 (H) 03/06/2020    INR 1.48 (H) 03/04/2020           CC  No referring provider defined for this encounter.    Orders Placed This Encounter   Procedures    Follow-Up with Cardiology    Echocardiogram Complete     No orders of the defined types were placed in this encounter.    There are no discontinued medications.      Encounter Diagnosis   Name Primary?    Coronary artery disease involving native coronary artery of native heart without angina pectoris Yes       CURRENT MEDICATIONS:  Current Outpatient Medications   Medication Sig Dispense Refill    acetaminophen (TYLENOL) 325 MG tablet Take 3 tablets (975 mg) by mouth every 8 hours as needed for other (For optimal non-opioid multimodal pain management to improve pain control.) Max 3000mg/24 hours. 100 tablet 0    albuterol (PROAIR HFA/PROVENTIL HFA/VENTOLIN HFA) 108 (90 Base) MCG/ACT inhaler Inhale 2 puffs into the lungs every 4 hours as needed for shortness of breath / dyspnea or wheezing doesn't have current RX      clopidogrel (PLAVIX) 75 MG tablet Take 1 tablet (75 mg) by mouth daily 30 tablet 0    Emollient (VANICREAM) LOTN APPLY THIN LAYER TOPICALLY EVERY DAY FOR DRY SKIN, FOR DERMATITIS  TO AREAS OF DRY SKIN, IDEALLY WITHIN 3 MINUTES AFTER BATH OR SHOWER. FOR DRY SKIN, FOR DERMATITIS  TO AREAS OF DRY SKIN, IDEALLY WITHIN 3 MINUTES AFTER BATH OR SHOWER.      enoxaparin ANTICOAGULANT (LOVENOX) 100 MG/ML syringe Inject 1 mL (100 mg) Subcutaneous every 12 hours Continue until INR greater than 2. 10 mL 0    gabapentin (NEURONTIN) 400 MG capsule Take 1,200 mg  by mouth 3 times daily      hydrocortisone acetate 1 % CREA APPLY THIN LAYER TOPICALLY EVERY DAY FOR DRY SKIN, FOR DERMATITIS  TO AREAS OF DRY SKIN, IDEALLY WITHIN 3 MINUTES AFTER BATH OR SHOWER. FOR DRY SKIN, FOR DERMATITIS  TO AREAS OF DRY SKIN, IDEALLY WITHIN 3 MINUTES AFTER BATH OR SHOWER.      HYDROmorphone (DILAUDID) 2 MG tablet Take 1 tablet (2 mg) by mouth every 6 hours as needed for pain 8 tablet 0    ipratropium - albuterol 0.5 mg/2.5 mg/3 mL (DUONEB) 0.5-2.5 (3) MG/3ML neb solution Inhale 3 mLs into the lungs daily as needed      lisinopril (PRINIVIL/ZESTRIL) 20 MG tablet Take 1 tablet (20 mg) by mouth daily 30 tablet 0    meclizine (ANTIVERT) 12.5 MG tablet Take 2 tablets (25 mg) by mouth 4 times daily as needed for dizziness 15 tablet 0    nitroGLYcerin (NITROSTAT) 0.4 MG sublingual tablet For chest pain place 1 tablet under the tongue every 5 minutes for 3 doses. If symptoms persist 5 minutes after 1st dose call 911. 20 tablet 0    omeprazole (PRILOSEC) 20 MG CR capsule Take 20 mg by mouth At Bedtime       rosuvastatin (CRESTOR) 20 MG tablet Take 1 tablet (20 mg) by mouth daily 90 tablet 3    warfarin ANTICOAGULANT (COUMADIN ANTICOAGULANT) 5 MG tablet Take 5 mg by mouth daily          ALLERGIES     Allergies   Allergen Reactions    Metoprolol      Fatigue and bradycardia    Niacin Other (See Comments)     Other reaction(s): Flushing        Sulfa Antibiotics     Zonisamide GI Disturbance    Norvasc [Amlodipine] Rash    Septra [Sulfamethoxazole W-Trimethoprim] Rash       PAST MEDICAL HISTORY:  Past Medical History:   Diagnosis Date    Arthritis     Asthma     CAD (coronary artery disease)     1996 angioplasty and stent to the prox LAD, PTCA with intracoronary stent placement of RCA, 70%OM; 10/24/19 Cath: Occluded RCA, prox circumflex lesion - pd/pa not significant, advised medical therapy first by Interventional cardiologist    Depressive disorder     DVT (deep venous thrombosis) (H)     Factor 5 Leiden  mutation, heterozygous (H)     GERD (gastroesophageal reflux disease)     HTN (hypertension)     Hyperlipidemia     Neuropathy     wears brace R foot for drop foot    Neuropathy     Obese     PRIMITIVO (obstructive sleep apnea)     cpap    Protein C deficiency (H)     Spinal stenosis     Stented coronary artery     Thrombosis        PAST SURGICAL HISTORY:  Past Surgical History:   Procedure Laterality Date    APPENDECTOMY      APPENDECTOMY OPEN  1958    ARTHROPLASTY HIP Left 1997    ARTHROPLASTY REVISION HIP Left 2013    ARTHROPLASTY REVISION HIP Left     ARTHROPLASTY REVISION HIP Left 11/19/2021    Procedure: REVISION LEFT TOTAL HIP ARTHROPLASTY;  Surgeon: Alber Mcfarland MD;  Location: [facility] OR    AS SPINAL FUSION,ANT,EA ADNL LEVEL  2010    L4-L5    CORONARY STENT PLACEMENT  1996    CV CORONARY ANGIOGRAM N/A 10/24/2019    Procedure: Coronary Angiogram;  Surgeon: Valdemar Hinojsoa MD;  Location: [facility] CARDIAC CATH LAB    CV CORONARY ANGIOGRAM  1996 1996 angioplasty and stent to the prox LAD, PTCA with intracoronary stent placement of RCA, 70%OM    CV CORONARY ANGIOGRAM N/A 2/28/2020    Procedure: Coronary Angiogram 2nd attempt of RCA  successful--(lad stent ok, Lcx 70% with normal iFR)  Surgeon: Gurvinder Yun MD;  Location: [facility] CARDIAC CATH LAB    CV CORONARY ANGIOGRAM N/A 5/17/2023    Procedure: Coronary Angiogram;  Surgeon: Je Rodriguez MD;  Location: [facility] CARDIAC CATH LAB    CV FRACTIONAL FLOW RATIO WIRE N/A 10/24/2019    Procedure: Fractional Flow [place];  Surgeon: Valdemar Hinojosa MD;  Location: [facility] CARDIAC CATH LAB    CV LEFT HEART CATH Bilateral 1/16/2020    Procedure: Left Heart Cath w/wo Left Ventriculogram  ANGIOPLASTY WITH [place] SCI REP PRESENT;  Surgeon: Gurvinder Yun MD;  Location:  HEART CARDIAC CATH LAB    SPINAL FUSION  2010    L4-5    TOTAL HIP ARTHROPLASTY Left     TOTAL HIP ARTHROPLASTY Right 2/10/2016    Procedure:  RIGHT HIP TOTAL  "ARTHROPLASTY;  Surgeon: Jose Gaytan MD;  Location: [facility] OR;  Service:     [facility] TOTAL HIP ARTHROPLASTY Right 2015    THR       FAMILY HISTORY:  Family History   Problem Relation Age of Onset    Myocardial Infarction Father     Heart Disease Maternal Grandfather        SOCIAL HISTORY:  Social History     Socioeconomic History    Marital status:      Spouse name: None    Number of children: None    Years of education: None    Highest education level: None   Tobacco Use    Smoking status: Former     Types: Cigarettes     Quit date: 3/23/1987     Years since quittin.4    Smokeless tobacco: Never    Tobacco comments:     Smokes cigars once every 2-3 years   Substance and Sexual Activity    Alcohol use: Yes     Comment: Rarely    Drug use: Never   Other Topics Concern    Caffeine Concern No     Comment: 1 -2 cups daily     Special Diet No    Exercise No       Review of Systems:  Skin:        Eyes:       ENT:       Respiratory:       Cardiovascular:       Gastroenterology:      Genitourinary:       Musculoskeletal:       Neurologic:       Psychiatric:       Heme/Lymph/Imm:       Endocrine:         Physical Exam:  Vitals: /70 (BP Location: Right arm, Patient Position: Sitting, Cuff Size: Adult Regular)   Pulse 53   Ht 1.727 m (5' 8\")   Wt 101.3 kg (223 lb 4.8 oz)   SpO2 95%   BMI 33.95 kg/m      Constitutional:  cooperative, alert and oriented, well developed, well nourished, in no acute distress        Skin:  warm and dry to the touch, no apparent skin lesions or masses noted     pt notes a \"pimple\" under chin, it is not red, probably subcut  I suggested derm consult if persists-he says they come and go on face/chin    Head:  normocephalic, no masses or lesions        Eyes:  pupils equal and round, conjunctivae and lids unremarkable, sclera white, no xanthalasma, EOMS intact, no nystagmus        Lymph:No Cervical lymphadenopathy present;No thyromegaly     ENT:           Neck:  carotid " pulses are full and equal bilaterally, JVP normal, no carotid bruit        Respiratory:  normal breath sounds, clear to auscultation, normal A-P diameter, normal symmetry, normal respiratory excursion, no use of accessory muscles         Cardiac: regular rhythm, normal S1/S2, no S3 or S4, apical impulse not displaced, no murmurs, gallops or rubs                                                         GI:  BS normoactive obese      Extremities and Muscular Skeletal:  no deformities, clubbing, cyanosis, erythema observed;no edema         brace on R ankle    Neurological:  no gross motor deficits        Psych:         Recent Lab Results:  LIPID RESULTS:  Lab Results   Component Value Date    CHOL 97 05/05/2023    CHOL 99 01/16/2020    HDL 32 (L) 05/05/2023    HDL 40 01/16/2020    LDL 46 05/05/2023    LDL 39 01/16/2020    TRIG 93 05/05/2023    TRIG 101 01/16/2020    CHOLHDLRATIO 3.5 04/21/2014       LIVER ENZYME RESULTS:  Lab Results   Component Value Date    AST 21 10/22/2019    ALT 18 05/05/2023    ALT 20 12/20/2019       CBC RESULTS:  Lab Results   Component Value Date    WBC 6.0 05/21/2023    WBC 4.7 02/28/2020    RBC 4.41 05/21/2023    RBC 3.92 (L) 02/28/2020    HGB 13.8 05/21/2023    HGB 12.4 (L) 02/28/2020    HCT 40.7 05/21/2023    HCT 36.7 (L) 02/28/2020    MCV 92 05/21/2023    MCV 94 02/28/2020    MCH 31.3 05/21/2023    MCH 31.6 02/28/2020    MCHC 33.9 05/21/2023    MCHC 33.8 02/28/2020    RDW 13.0 05/21/2023    RDW 12.9 02/28/2020     05/21/2023     02/28/2020       BMP RESULTS:  Lab Results   Component Value Date     05/21/2023     (L) 03/04/2020    POTASSIUM 4.2 05/21/2023    POTASSIUM 4.2 11/11/2021    POTASSIUM 4.7 03/04/2020    CHLORIDE 103 05/21/2023    CHLORIDE 102 11/11/2021    CHLORIDE 100 03/04/2020    CO2 23 05/21/2023    CO2 26 11/11/2021    CO2 28 03/04/2020    ANIONGAP 10 05/21/2023    ANIONGAP 8 11/11/2021    ANIONGAP 10.7 03/04/2020     (H) 05/21/2023    GLC 96  11/21/2021     03/04/2020    BUN 10.4 05/21/2023    BUN 9 11/11/2021    BUN 7 03/04/2020    CR 0.88 05/21/2023    CR 0.89 03/04/2020    GFRESTIMATED 89 05/21/2023    GFRESTIMATED 84 03/04/2020    GFRESTBLACK >90 03/04/2020    CODI 9.2 05/21/2023    CODI 9.7 03/04/2020        A1C RESULTS:  No results found for: A1C    INR RESULTS:  Lab Results   Component Value Date    INR 1.29 (H) 05/21/2023    INR 1.06 05/17/2023    INR 2.00 (H) 03/06/2020    INR 1.48 (H) 03/04/2020           CC  No referring provider defined for this encounter.

## 2024-01-26 NOTE — PROGRESS NOTES
Discharge plan according to [facility]:     01/08/24 0654   Discharge Planning   Patient/Family Anticipates Transition to home   Concerns to be Addressed all concerns addressed in this encounter   Living Arrangements   People in Home spouse   Type of Residence Private Residence   Is your private residence a single family home or apartment? Single family home   Number of Stairs, Within Home, Primary none   Once home, are you able to live on one level? Yes   Which level? Main Level   Bathroom Shower/Tub Walk-in shower   Equipment Currently Used at Home none   Support System   Support Systems Spouse/Significant Other   Do you have someone available to stay with you one or two nights once you are home? Yes

## 2024-01-31 ENCOUNTER — TELEPHONE (OUTPATIENT)
Dept: CARDIOLOGY | Facility: CLINIC | Age: 79
End: 2024-01-31
Payer: MEDICARE

## 2024-01-31 NOTE — TELEPHONE ENCOUNTER
Pt called in he is to have TKA 2/16/24. There asking him to hold Plavix, and he will be bridged with Lovenox when holding coumadin. Pt not on aspirin. Informed Pt would message provider, Pt has had extensive CAD.      Per last Office visit note:     Today I reviewed with the patient the angiograms going back to 2019 and 2020 we reviewed that the LAD stent from years ago was open the left circumflex had a calcified moderately severe to severe narrowing some approximately the right coronary  stent was patent although again I think the PDA lesion was a little bit worse than he reported.  Patient is reporting no angina and very interestingly he is on 0 antianginal medications.  He rarely had fatigue and bradycardia with metoprolol although I think nebivolol might be an option at 2.5 mg if we need to and I think it certainly Imdur should it be necessary patient had a rash with Norvasc.  I think complex angioplasty with possible Rotablator are high Lisseth of left circumflex is an option should the symptoms get worse and we could probably fix the right PDA also.     MARIA R Redd RN

## 2024-02-02 NOTE — H&P (VIEW-ONLY)
[facility]      Preoperative Consultation   Jose Tejada   : 1945   Gender: male    Date of Encounter: 2024    Nursing Notes:   Thaddeus Parish  2024 10:09 AM  Signed  Jose Tejada is a 78 y.o. male (1945) who presents for preop evaluation undergoing RIGHT TOTAL KNEE ARTHROPLASTY     Date of Surgery: 24  Surgical Specialty: Orthopedic/Spine  Alber Mcfarland MD  Hospital/Surgical Facility: [facility]  Fax number: [phone]  Surgery type: outpatient  Primary Physician: Sara Jhaveri LPN...........0:06 AM           History of Present Illness   Jose is a 79yo male with pmh paroxysmal atrial fibrillation on chronic anticoagulation with warfarin, CAD with mi , hypertension, 1st degree av block, gerd, factor 5 leiden, history of dvt, asthma, neuropathy, depression primitivo, copd, s/p bilateral hip replacements here today for preoperative clearance prior to right total knee replacement.    Received instructions for clopidogrel from surgeon and has call out to cardiologist regarding warfarin.     Review of Systems   A comprehensive review of systems was negative except for items noted in HPI.    Patient Active Problem List   Diagnosis Code     Coagulopathy. Hx of DVT D68.9     Spinal stenosis of lumbar region. Lumbar decompression L3-4; L4-5 adn PSF L4-5 on 2010 M48.061     Foot drop, right M21.371     CAD (coronary artery disease). Hx of PTCA and MI in  I25.10     Extrinsic asthma, unspecified J45.909     GERD (gastroesophageal reflux disease) K21.9     HTN (hypertension) I10     Hyperlipidemia E78.5     Abnormality of gait R26.9     Scalp laceration S01.01XA     Head injury, unspecified S09.90XA     PRIMITIVO (obstructive sleep apnea) G47.33     Factor 5 Leiden mutation, heterozygous (HC) D68.51     Ocular hypertension OU H40.059     Nuclear cataract OU [id number]     Restless leg syndrome G25.81     Protein C deficiency  (HC) D68.59     Anticoagulation goal of INR 2 to 3 Z51.81, Z79.01     Atherosclerosis of native coronary artery of native heart with angina pectoris (HC) I25.119     Status post right hip replacement Z96.641     Pre-op exam Z01.818     1st degree AV block I44.0     Anticoagulation monitoring, INR range 2-3 Z79.01     Stable angina I20.89     Unstable angina (HC) I20.0     S/P PTCA (percutaneous transluminal coronary angioplasty) Z98.61     Coronary artery disease involving native coronary artery of native heart without angina pectoris I25.10     Neuropathy G62.9     Coronary artery chronic total occlusion I25.82     Status post coronary angiogram Z98.890     Chronic obstructive pulmonary disease, unspecified COPD type (HC) J44.9     Recurrent major depressive disorder, remission status unspecified (HC) F33.9     Paroxysmal atrial fibrillation (HC) I48.0     Pes planus M21.40     Sciatica M54.30     Unequal leg length (acquired) M21.70     Visual discomfort H53.149     Hypertrophy of prostate with urinary obstruction and other lower urinary tract symptoms (LUTS) N40.1, N13.8     Hypertrophy of nail L60.2     Dysthymia F34.1     Benign essential hypertension I10     Personal history of malignant neoplasm of skin Z85.828     Thrombophilia (HC) D68.59     Depression F32.A     Chronic pain G89.29     Failure of left total hip arthroplasty (HC) T84.011A     Dyslipidemia E78.5     History of DVT (deep vein thrombosis) Z86.718     Abnormal cardiovascular stress test R94.39     Current Outpatient Medications   Medication Sig     albuterol HFA (PRO-AIR; VENTOLIN; PROVENTIL) 90 mcg/actuation inhaler Inhale 2 Puffs by mouth every 4 hours if needed for Shortness of Breath 1st choice or Wheezing 1st choice.     albuterol-ipratropium (DUONEB) (2.5-0.5 mg) in 3 mL NEBULIZATION solution Inhale 3 mL via a nebulizer every 6 hours if needed for Shortness Of Breath or Wheezing (cough).     clopidogreL (PLAVIX) 75 mg tablet Take 1 tablet  by mouth every morning.     CPAP CPAP machine for home use at pressure: 8-11 cmw , Heated humidifier x 1 q 5 yr, Humidifier chamber x 1 q 6 mo, Full face mask x1 q 3mos,  with cushion x 1 q mo, Heated tubing x 1 q 3 mo, Headgear x 1 q 6 mo, Filters: Disposable x 2 q mo non-disposable filters x1 q 6mo, Length of Need: 99 months, Frequency of use: Daily     emollient combination no.115 (Vanicream Moisturizing) lotn APPLY THIN LAYER TOPICALLY EVERY DAY FOR DRY SKIN, FOR DERMATITIS  TO AREAS OF DRY SKIN, IDEALLY WITHIN 3 MINUTES AFTER BATH OR SHOWER. FOR DRY SKIN, FOR DERMATITIS  TO AREAS OF DRY SKIN, IDEALLY WITHIN 3 MINUTES AFTER BATH OR SHOWER.     gabapentin (NEURONTIN) 400 mg capsule 3 Capsules (1,200 mg) 3 times daily.     hydrocortisone 1 % cream      hydrocortisone acetate (Vanicream HC) 1 % cream APPLY THIN LAYER TOPICALLY EVERY DAY FOR DRY SKIN, FOR DERMATITIS  TO AREAS OF DRY SKIN, IDEALLY WITHIN 3 MINUTES AFTER BATH OR SHOWER. FOR DRY SKIN, FOR DERMATITIS  TO AREAS OF DRY SKIN, IDEALLY WITHIN 3 MINUTES AFTER BATH OR SHOWER.     isosorbide mononitrate (IMDUR) 30 mg extended release tablet 24 Hour Take 30 mg by mouth.     lisinopril (PRINIVIL; ZESTRIL) 40 mg tablet      meclizine (ANTIVERT) 25 mg tablet Take 1 Tablet (25 mg) by mouth 3 times daily if needed for Vertigo.     mirtazapine (REMERON) 7.5 mg tablet Take 1 Tablet (7.5 mg) by mouth at bedtime. Increase to 2 tabs (15mg) daily after 1 week if no improvement     Nebulizer Nebulizer, disposable neb kit x 4, reuseable neb kit x 1, mask x 1, filters x 1.   Frequency of use: daily;  Medication: DuoNeb  Length of need: 1 months     NITROGLYCERIN 0.4 MG SUBLINGUAL TAB place 1 tab (0.4 mg) under the tongue at the first sign of attack; may repeat every 5 min, until relief or a total of 3 tabs in 15 minutes     omeprazole (PRILOSEC) 20 mg Delayed-Release capsule Take 20 mg by mouth.     rosuvastatin (CRESTOR) 40 mg tablet Take 0.5 Tablets by mouth once daily in  "the evening.     warfarin (COUMADIN) 5 mg tablet Take by mouth 6/7: 10 mg; 6/8: 7.5 mg; Otherwise 7.5 mg every Mon, Fri; 5 mg all other days     Current Facility-Administered Medications   Medication Dose Route Frequency Last Rate     NaCl 0.9% 250 mL infusion  25 mL/hr Intravenous continuous Stopped (04/22/22 1044)     Medications have been reviewed by me and are current to the best of my knowledge and ability.     Allergies   Allergen Reactions     Amlodipine Hives and Rash     Metoprolol Bradycardia     Fatigue and bradycardia     Niacin Flushing     flushing     Septra [Sulfamethoxazole-Trimethoprim] Rash     Zonisamide GI Upset     Sulfa (Sulfonamide Antibiotics) Rash     Past Surgical History:   . Laterality Date     APPENDECTOMY  1958     CORONARY STENT PLACEMENT  1996    Coronary stent placement     HIP REPLACEMENT Left 1997    Lt hip     SPINAL FUSION  2010    L4-L5     TOTAL HIP ARTHROPLASTY Left 2013    revision     Social History     Tobacco Use     Smoking status: Former     Average packs/day: 0.4 packs/day for 57.1 years (20.0 ttl pk-yrs)     Types: Cigarettes     Start date: 1967     Smokeless tobacco: Never     Tobacco comments:     no exposure   Vaping Use     Vaping Use: Never used   Substance Use Topics     Alcohol use: Yes     Comment: occasional glass of wine     Drug use: No     Family History   Problem Relation Age of Onset     Other Other         Negative for  cancer     PAST DIFFICULTY WITH ANESTHESIA: None     Physical Exam   /72 (Cuff Site: Left Arm, Position: Sitting, Cuff Size: Adult Large)   Pulse 56   Temp 97.6  F (36.4  C) (Oral)   Resp 16   Ht 1.72 m (5' 7.72\")   Wt 99 kg (218 lb 3.2 oz)   SpO2 95%   BMI 33.46 kg/m   Body mass index is 33.46 kg/m .  General Appearance: Pleasant, alert, appropriate appearance for age. No acute distress  Head Exam: Normocephalic, without obvious abnormality.  Eye Exam: Normal external eye, conjunctiva, lids. CHRISTINA.  Ear Exam: Normal TM's " bilaterally. Normal auditory canals and external ears. Non-tender.  OroPharynx Exam: Dental hygiene adequate. Normal buccal mucosa. Normal pharynx.  Neck Exam: neck supple with no rigidity  Chest/Respiratory Exam: Normal chest wall and respirations. Clear to auscultation.  Cardiovascular Exam: Regular rate and rhythm. No murmur.  No lower extremity edema.  Gastrointestinal Exam: Soft, nontender  Musculoskeletal Exam: brace right lower leg  Skin: no rash or abnormalities  Neurologic Exam: Nonfocal; normal gross motor movement, tone, and coordination. No tremor.  Psychiatric Exam: Alert and oriented, appropriate affect.     Assessment / Plan       The Pre-Op Tool    Recommendations      Intermediate Risk Procedure    Risk of CV Complication (RCRI)  1%    Current Cardiac Status  Good exertional capacity ( > 4 mets )    Cardiac History  History of atrial fibrillation; not present at preoperative exam.  History of MI           Labs  HGB within last 30 days  Potassium within last 30 days  EKG  Baseline EKG within the last 12 months  CXR  Not indicated    Stress Testing  Not indicated    * Testing recommendations are intended to assist, but not direct, clinical decisions.           INR to be drawn by anesthesiologist in pre-op area  Type & Screen should be obtained by Anesthesia only if the risk of transfusion is > 5% for the procedure         Hold Lisinopril (Prinvil, Zestril) the evening before and/or morning of the procedure.  Hold Clopidogrel (Plavix) for 5 days prior to the procedure.  Take your other medications as usual prior to the procedure  Hold vitamins and/or supplements for 1 week prior to the procedure  Hold fish oil for 2 weeks prior to the procedure  Okay to take Acetaminophen (Tylenol) up until the procedure  Hold / avoid NSAIDs (e.g. ibuprofen, naproxen) prior to procedure: 2 days for ibuprofen (Advil) and 4 days for naproxen (Aleve).    Anticoagulation / Bridging  Procedure date: 02-  Thrombotic  Risk Factor(s):  Atrial fibrillation with a CHADS-2 score of 2  Last dose of Warfarin to be taken 02-  Based on this patient s individual thrombotic risk factors, bridging therapy was considered and it was determined that the risks of bridging outweighed those of not bridging.  * Medication recommendations are not intended to be exhaustive; they are limited to common medications that are potentially dangerous if incorrectly managed            Labs  * Data supports elimination of  routine  laboratory testing in favor of focused,  indicated  testing based on medical co-morbidities. A 2009 study randomized 1061 patients undergoing ambulatory, non-cataract surgery to routine or to indicated testing. Perioperative adverse events were similar (Anesthesia & Analgesia 2009;108:467-75; Anesthesiol. Clin. 2016 Mar;34(1):43-58).  Stress Testing  * Data from high risk patients undergoing major vascular surgery have failed to demonstrate benefit from either preoperative stress testing or prophylactic revascularization. A large, observational study found low risk of major perioperative adverse events in patients able to achieve =4 mets. Taken together with existing knowledge, for patients with known or suspected CAD, our experts recommend preoperative stress testing only if it is indicated regardless of the planned surgery (N Engl J Med 2004;351:2795-80; J Am Baldev Cardiol 2014;64:6868-5954; RAFY 2020;324:274-290;).  Anticoagulation Management  * Based on the BRIDGE trial and other studies, in most circumstances we recommend against bridging patients with CHADS-2 scores of 0-4 (NEJM 2015; 373(9):823-33; JACC 2015;66:1392-403; JACC 2017;69:87-98; CHEST 2018;154:1121-201).  Antiplatelet Therapy  * Continuation of P2Y12 inhibitors increases the perioperative risk of major bleeding. In the 2014 POISE-2 trial, continuation of aspirin in high cardiovascular risk patients undergoing non-cardiac procedures resulted in increased  major bleeding without reducing cardiovascular events. Our experts recommend holding P2Y12 inhibitors in these patients (JACC. 2014;64:2013-4717; NEJ. 2014;370:1494-03; JAC. 2017;1861-70; RAFY. 2017;120-1).  RAAS Antagonist  * Hypotension during anesthesia is associated with continuing renin-angiotensin system (RAAS) antagonist. While it is unclear if holding RAAS antagonists reduces postoperative complications, in most circumstances our experts recommend holding RAAS antagonist 24 hours prior to surgery. (Postgrad Med J 2011;87:472-81; Anest 2017;126:16-27; BMC Anesthesiol 2018;18:26.)     Session ID: [id number]  Endnotes and bibliography available upon request: [email or web address]    Labs: pending  ECG: yes, same as previous    ICD-10-CM    1. Pre-op evaluation  Z01.818 HEMOGLOBIN     BASIC METABOLIC PANEL     PROTIME-INR     RI READING EKG - NO CHARGE, COMP ONLY     EKG 12 LEAD     RI ECG ROUTINE ECG W/LEAST 12 LDS W/I&R      2. Chronic pain of right knee  M25.561 HEMOGLOBIN    G89.29 BASIC METABOLIC PANEL     PROTIME-INR     RI READING EKG - NO CHARGE, COMP ONLY     EKG 12 LEAD     RI ECG ROUTINE ECG W/LEAST 12 LDS W/I&R      3. Anticoagulation monitoring, INR range 2-3  Z79.01 PROTIME-INR        Patient is cleared, pending tests ordered today, for planned procedure.   Electronically Signed by:   Sara Jhaveri NP ....................  2/2/2024   10:53 AM  2/2/2024

## 2024-02-05 NOTE — TELEPHONE ENCOUNTER
Pt informed of DR Yun's recommendations.     Gurvinder Yun MD  You2 hours ago (9:23 AM)     SH  Hold plavix 5 day before surgery  If surgeon will allow would give asa 81mg daily starting 5 day before surgery--if not ok with surgeon then no asa and no plavix   He said he would discuss with surgeon if feasible. MARIA R Redd RN

## 2024-02-07 NOTE — PROGRESS NOTES
I am evaluating this patient for upcoming Right Total Knee Arthroplasty with Dr. Mcfarland at [facility] on 2/16/24:    - Patient has history of coronary artery disease s/p stent in 1996, history of factor V Leiden deficiency and protein C deficiency with previous DVT and PE.  Patient is on both Plavix and Warfarin. Given OK by Cardiologist to hold Plavix 5 days before surgery but they recommended that patient take ASA 81 mg daily for 5 days before his knee surgery. Patient was also instructed to hold warfarin for 5 full days before surgery and to bridge with full therapeutic dose Lovenox 100 mg SubQ, every 12 hrs. I discussed this case with Dr. Mcfarland and Dr. Ricci from Anesthesia. Per Dr. Ricci- ok to proceed and patient should be ok to have spinal anesthesia and peripheral nerve block with this surgery as long as he holds Plavix, Warfarin and Lovenox according to the following plan:      Hold Plavix and Warfarin 5 full days before surgery (take last dose on 2/10/24, then hold for surgery). Start taking ASA 81 mg daily starting on 2/11/24 and continuing it through day of surgery. Start bridging with Lovenox 100 mg SubQ every 12 hrs starting on the morning of 2/12/24. Patient will take last dose of Lovenox 24 hrs before surgery (take last dose before 9 am on 2/15/24, then hold). I called patient and went over the above hold instructions multiple times. Patient wrote down these instructions and read them back to me confirming his understanding. I left patient my phone number if he has any further questions before surgery. Full Treatment Plan note to follow next week. Call me below if any questions.       BRENDAN Boateng, CNP   Advanced Practice Nurse Navigator- Orthopedics  [facility]   Office Phone: [phone]  Direct Fax: [phone]

## 2024-02-12 RX ORDER — ASPIRIN 81 MG/1
81 TABLET ORAL DAILY
Status: ON HOLD | COMMUNITY
End: 2024-02-16

## 2024-02-14 NOTE — TREATMENT PLAN
Orthopedic Surgery Pre-Op Plan: Jose Tejada  pre-op review. This is NOT an H&P   Surgeon: Dr. Mcfarland    [facility]: [facility]  Name of Surgery: Right Total Knee Arthroplasty   Date of Surgery: 2/16/24  H&P: Completed on 2/2/24 by Sara Jhaveri NP at [facility].   History of ASA, NSAIDS, vitamin and/or herbal supplements, GLP-1 Agonist or SGLT Inhibitor medication taken within 10 days?: Yes- on ASA 81 mg daily for CAD S/P stent in 1996- will stay on ASA 81 mg through day of surgery per recommendations of Cardiology.   History of blood thinners?: Yes- on antiplatelet therapy with clopidogrel (Plavix) for CAD-s/p stent in 1996 and in 2020.  Patient was given okay by Cardiologist to hold Plavix for 5 days before surgery (will take last dose on 2/10/2024, then hold), but Cardiology recommended that patient take ASA 81 mg daily for 5 days before surgery while he is holding Plavix.  Patient is also on chronic anticoagulation with warfarin for Factor V Leiden mutation and Protein C Deficiency with history of previous DVT.  Patient was instructed to hold warfarin for 5 full days before surgery (take last dose on 2/10/2024, then hold).  He will bridge with full therapeutic dose Lovenox 100 mg subcu every 12 hours starting on the morning of 2/12/2024.  Patient was instructed to take last dose of Lovenox 24 hours before surgery (will take last dose before 9 AM on 2/15/2024, then hold).    Plan:   1) Discharge Plan: Home POD 1 with assist of Spouse. Please see Discharge Planning section near bottom of this note for further details.     2) Factor V Leiden Mutation (heterozygous) and Protein C Deficiency with history of DVT: On chronic anticoagulation with warfarin.  Patient was instructed to hold warfarin for 5 full days before surgery (take last dose on 2/10/2024, then hold).  He will bridge with full therapeutic dose Lovenox 100 mg subcu every 12 hours starting on the morning of 2/12/2024.   Patient was instructed to make sure to take last dose of Lovenox 24 hours before surgery (will take last dose before 9 AM on 2/15/2024, then hold).  After surgery, if okay with Dr. Mcfarland team, would plan to resume warfarin on evening of surgery and resume bridging with Lovenox 24-48 hours after surgery. Continue bridging with Lovenox until INR is back therapeutic on warfarin.  [facility]  They will be sending out a home care nurse to check his INR a few days after surgery.     3) Coronary Artery Disease with history of Myocardial Infarction in 1996 and S/P stent to LAD in 1996. S/P stent to RCA chronic total occlusion 2/28/2020: Reviewed most recent Cardiology visit note with Dr. Yun, [facility] from 8/15/23: most recent coronary angiogram on 5/17/2023 showed prior LAD stent is widely patent.  Prior RCA stents with 40-50% moderate non-obstructive in-stent restenosis.  Proximal left circumflex lesion is heavily calcified and probably flow-limiting, but appears unchanged compared to 2019 angiogram when it was medically managed.  Patient denies any chest pain/chest discomfort, JENNINGS, palpitations dizziness or syncope.  Continues medical management of CAD with Plavix, statin, and nitroglycerin as needed.  Per telephone encounter 2/5/24: Dr. uYn gave okay to hold Plavix for 5 days before surgery (will take last dose on 2/10/2024, then hold), but recommended that patient take ASA 81 mg daily for 5 days before surgery while he is holding Plavix. I spoke to patient and he will take ASA 81 mg daily through day of surgery as recommended by his Cardiologist.  After surgery, Plavix should be resumed once safe from a bleeding standpoint.  Patient does NOT need to continue on ASA 81 mg once Plavix has been resumed.     4) Hyperlipidemia: On rosuvastatin.    5) Hypertension: Appears well-controlled on lisinopril.  Patient was instructed to hold  lisinopril on the morning of surgery.    6) Obstructive Sleep Apnea: uses CPAP.  Reminded patient to bring CPAP machine to the hospital and use it whenever sleeping or napping.     7) Asthma: Stable.  Denies any recent exacerbations.  On albuterol inhaler as needed.    8) Neuropathy with Right Foot Drop: Wears AFO brace on right lower extremity.  On gabapentin    9) GERD: On omeprazole.    10) Depression: Not on medication for depression at this time.    11) Oxycodone Ineffective: Patient states he has had multiple hip replacement surgeries and oxycodone has been ineffective in the past and managing his pain.  Reports that Dilaudid works much better.  I will alert Dr. Mcfarland team about this.     Patient appears medically optimized for upcoming surgery. I would recommend Hospitalist Consult to assist with medical management. Please call me below with any questions on this patient.       Review of Systems Notable for: Factor V Leiden mutation, protein C deficiency-with history of previous DVT-on chronic anticoagulation with warfarin, coronary artery disease-history of MI in 1996, s/p stent in 1996, s/p stent in 2020-on Plavix, hyperlipidemia, hypertension, obstructive sleep apnea-on CPAP, asthma, neuropathy with right foot drop-wears AFO brace, GERD, depression, oxycodone ineffective-per patient report, states that Dilaudid works much better for pain control.    Past Medical History:   Past Medical History:   Diagnosis Date    Antiplatelet or antithrombotic long-term use     Arthritis     Asthma     CAD (coronary artery disease)     1996 angioplasty and stent to the prox LAD, PTCA with intracoronary stent placement of RCA, 70%OM; 10/24/19 Cath: Occluded RCA, prox circumflex lesion - pd/pa not significant, advised medical therapy first by Interventional cardiologist    Depressive disorder     DVT (deep venous thrombosis) (H)     Factor 5 Leiden mutation, heterozygous (H24)     GERD (gastroesophageal reflux disease)      Heart attack (H)     HTN (hypertension)     Hyperlipidemia     Neuropathy     wears brace R foot for drop foot    Neuropathy     Obese     PRIMITIVO (obstructive sleep apnea)     cpap    Protein C deficiency (H24)     Spinal stenosis     Stented coronary artery     Thrombosis      Past Surgical History:   Procedure Laterality Date    APPENDECTOMY      APPENDECTOMY OPEN  1958    ARTHROPLASTY HIP Left 1997    ARTHROPLASTY REVISION HIP Left 2013    ARTHROPLASTY REVISION HIP Left     ARTHROPLASTY REVISION HIP Left 11/19/2021    Procedure: REVISION LEFT TOTAL HIP ARTHROPLASTY;  Surgeon: Alber Mcfarland MD;  Location: [facility] OR    AS SPINAL FUSION,ANT,EA ADNL LEVEL  2010    L4-L5    CORONARY STENT PLACEMENT  1996    CV CORONARY ANGIOGRAM N/A 10/24/2019    Procedure: Coronary Angiogram;  Surgeon: Valdemar Hinojosa MD;  Location: [facility] CARDIAC CATH LAB    CV CORONARY ANGIOGRAM  1996 1996 angioplasty and stent to the prox LAD, PTCA with intracoronary stent placement of RCA, 70%OM    CV CORONARY ANGIOGRAM N/A 2/28/2020    Procedure: Coronary Angiogram 2nd attempt of RCA  successful--(lad stent ok, Lcx 70% with normal iFR)  Surgeon: Gurvinder Yun MD;  Location: [facility] CARDIAC CATH LAB    CV CORONARY ANGIOGRAM N/A 5/17/2023    Procedure: Coronary Angiogram;  Surgeon: Je Rodriguez MD;  Location: [facility] CARDIAC CATH LAB    CV FRACTIONAL FLOW RATIO WIRE N/A 10/24/2019    Procedure: Fractional Flow [place];  Surgeon: Valdemar Hinojosa MD;  Location: [facility] CARDIAC CATH LAB    CV LEFT HEART CATH Bilateral 1/16/2020    Procedure: Left Heart Cath w/wo Left Ventriculogram  ANGIOPLASTY WITH [place] SCI REP PRESENT;  Surgeon: Gurvinder Yun MD;  Location:  HEART CARDIAC CATH LAB    SPINAL FUSION  2010    L4-5    TOTAL HIP ARTHROPLASTY Left     TOTAL HIP ARTHROPLASTY Right 2/10/2016    Procedure:  RIGHT HIP TOTAL ARTHROPLASTY;  Surgeon: Jose Gaytan MD;  Location: [facility];   Service:     [facility] TOTAL HIP ARTHROPLASTY Right 2015    THR       Current Medications:  Patient's Medications   New Prescriptions    No medications on file   Previous Medications    ACETAMINOPHEN (TYLENOL) 325 MG TABLET    Take 3 tablets (975 mg) by mouth every 8 hours as needed for other (For optimal non-opioid multimodal pain management to improve pain control.) Max 3000mg/24 hours.    ALBUTEROL (PROAIR HFA/PROVENTIL HFA/VENTOLIN HFA) 108 (90 BASE) MCG/ACT INHALER    Inhale 2 puffs into the lungs every 4 hours as needed for shortness of breath / dyspnea or wheezing doesn't have current RX    ASPIRIN 81 MG EC TABLET    Take 81 mg by mouth daily    CLOPIDOGREL (PLAVIX) 75 MG TABLET    Take 1 tablet (75 mg) by mouth daily    EMOLLIENT (VANICREAM) LOTN    APPLY THIN LAYER TOPICALLY EVERY DAY FOR DRY SKIN, FOR DERMATITIS  TO AREAS OF DRY SKIN, IDEALLY WITHIN 3 MINUTES AFTER BATH OR SHOWER. FOR DRY SKIN, FOR DERMATITIS  TO AREAS OF DRY SKIN, IDEALLY WITHIN 3 MINUTES AFTER BATH OR SHOWER.    ENOXAPARIN ANTICOAGULANT (LOVENOX) 100 MG/ML SYRINGE    Inject 1 mL (100 mg) Subcutaneous every 12 hours Continue until INR greater than 2.    GABAPENTIN (NEURONTIN) 400 MG CAPSULE    Take 1,200 mg by mouth 3 times daily    HYDROCORTISONE ACETATE 1 % CREA    APPLY THIN LAYER TOPICALLY EVERY DAY FOR DRY SKIN, FOR DERMATITIS  TO AREAS OF DRY SKIN, IDEALLY WITHIN 3 MINUTES AFTER BATH OR SHOWER. FOR DRY SKIN, FOR DERMATITIS  TO AREAS OF DRY SKIN, IDEALLY WITHIN 3 MINUTES AFTER BATH OR SHOWER.    HYDROMORPHONE (DILAUDID) 2 MG TABLET    Take 1 tablet (2 mg) by mouth every 6 hours as needed for pain    IPRATROPIUM - ALBUTEROL 0.5 MG/2.5 MG/3 ML (DUONEB) 0.5-2.5 (3) MG/3ML NEB SOLUTION    Inhale 3 mLs into the lungs daily as needed    LISINOPRIL (PRINIVIL/ZESTRIL) 20 MG TABLET    Take 1 tablet (20 mg) by mouth daily    MECLIZINE (ANTIVERT) 12.5 MG TABLET    Take 2 tablets (25 mg) by mouth 4 times daily as needed for dizziness    NITROGLYCERIN  (NITROSTAT) 0.4 MG SUBLINGUAL TABLET    For chest pain place 1 tablet under the tongue every 5 minutes for 3 doses. If symptoms persist 5 minutes after 1st dose call 911.    OMEPRAZOLE (PRILOSEC) 20 MG CR CAPSULE    Take 20 mg by mouth At Bedtime     ROSUVASTATIN (CRESTOR) 20 MG TABLET    Take 1 tablet (20 mg) by mouth daily    WARFARIN ANTICOAGULANT (COUMADIN ANTICOAGULANT) 5 MG TABLET    Take 5 mg by mouth daily    Modified Medications    No medications on file   Discontinued Medications    No medications on file       ALLERGIES:  Allergies   Allergen Reactions    Metoprolol      Fatigue and bradycardia    Niacin Other (See Comments)     Other reaction(s): Flushing        Sulfa Antibiotics     Zonisamide GI Disturbance    Norvasc [Amlodipine] Rash    Septra [Sulfamethoxazole W-Trimethoprim] Rash       Social History  Social History     Tobacco Use    Smoking status: Former     Types: Cigarettes     Quit date: 3/23/1987     Years since quittin.9    Smokeless tobacco: Never    Tobacco comments:     Smokes cigars once every 2-3 years   Substance Use Topics    Alcohol use: Yes     Comment: Rarely    Drug use: Never       Any Abnormal Recent Diagnostics? Yes  INR 2.0 on 2024: Patient is on chronic anticoagulation with warfarin for factor V Leiden mutation and protein C deficiency with history of previous DVT.  Will hold warfarin 5 days before surgery and bridge with Lovenox.  We will recheck INR in preop on day of surgery and will monitor INR postop.     Discharge Planning:   Discharge plan according to [facility]:    Home POD 1 with assist of Spouse     [phone]   Discharge Planning   Patient/Family Anticipates Transition to home   Concerns to be Addressed all concerns addressed in this encounter   Living Arrangements   People in Home spouse   Type of Residence Private Residence   Is your private residence a single family home or apartment? Single family home   Number of Stairs, Within Home,  Primary none   Once home, are you able to live on one level? Yes   Which level? Main Level   Bathroom Shower/Tub Walk-in shower   Equipment Currently Used at Home none   Support System   Support Systems Spouse/Significant Other   Do you have someone available to stay with you one or two nights once you are home? Yes       BRENDAN Boateng, CNP   Advanced Practice Nurse Navigator- Orthopedics  [facility]   Phone: [phone]

## 2024-02-16 ENCOUNTER — ANESTHESIA EVENT (OUTPATIENT)
Dept: SURGERY | Facility: CLINIC | Age: 79
End: 2024-02-16
Payer: MEDICARE

## 2024-02-16 ENCOUNTER — HOSPITAL ENCOUNTER (OUTPATIENT)
Facility: CLINIC | Age: 79
Discharge: HOME | End: 2024-02-17
Attending: ORTHOPAEDIC SURGERY | Admitting: ORTHOPAEDIC SURGERY
Payer: MEDICARE

## 2024-02-16 ENCOUNTER — APPOINTMENT (OUTPATIENT)
Dept: RADIOLOGY | Facility: CLINIC | Age: 79
End: 2024-02-16
Attending: ORTHOPAEDIC SURGERY
Payer: MEDICARE

## 2024-02-16 ENCOUNTER — ANESTHESIA (OUTPATIENT)
Dept: SURGERY | Facility: CLINIC | Age: 79
End: 2024-02-16
Payer: MEDICARE

## 2024-02-16 DIAGNOSIS — Z96.641 STATUS POST TOTAL REPLACEMENT OF RIGHT HIP: ICD-10-CM

## 2024-02-16 DIAGNOSIS — Z96.642 STATUS POST TOTAL REPLACEMENT OF LEFT HIP: ICD-10-CM

## 2024-02-16 DIAGNOSIS — Z96.60 STATUS POST JOINT REPLACEMENT: Primary | ICD-10-CM

## 2024-02-16 DIAGNOSIS — J38.7 THROAT ULCER: ICD-10-CM

## 2024-02-16 DIAGNOSIS — Z86.718 HISTORY OF DEEP VENOUS THROMBOSIS: ICD-10-CM

## 2024-02-16 LAB
APTT PPP: 33 SECONDS (ref 22–38)
CREAT SERPL-MCNC: 0.92 MG/DL (ref 0.67–1.17)
EGFRCR SERPLBLD CKD-EPI 2021: 86 ML/MIN/1.73M2
GLUCOSE BLDC GLUCOMTR-MCNC: 87 MG/DL (ref 70–99)
HOLD SPECIMEN: NORMAL
INR PPP: 1.02 (ref 0.85–1.15)
PLATELET # BLD AUTO: 265 10E3/UL (ref 150–450)

## 2024-02-16 PROCEDURE — 250N000013 HC RX MED GY IP 250 OP 250 PS 637: Performed by: HOSPITALIST

## 2024-02-16 PROCEDURE — 258N000003 HC RX IP 258 OP 636: Performed by: PHYSICIAN ASSISTANT

## 2024-02-16 PROCEDURE — 999N000141 HC STATISTIC PRE-PROCEDURE NURSING ASSESSMENT: Performed by: ORTHOPAEDIC SURGERY

## 2024-02-16 PROCEDURE — C1776 JOINT DEVICE (IMPLANTABLE): HCPCS | Performed by: ORTHOPAEDIC SURGERY

## 2024-02-16 PROCEDURE — 710N000010 HC RECOVERY PHASE 1, LEVEL 2, PER MIN: Performed by: ORTHOPAEDIC SURGERY

## 2024-02-16 PROCEDURE — 272N000001 HC OR GENERAL SUPPLY STERILE: Performed by: ORTHOPAEDIC SURGERY

## 2024-02-16 PROCEDURE — 250N000011 HC RX IP 250 OP 636: Performed by: STUDENT IN AN ORGANIZED HEALTH CARE EDUCATION/TRAINING PROGRAM

## 2024-02-16 PROCEDURE — 250N000011 HC RX IP 250 OP 636: Performed by: ORTHOPAEDIC SURGERY

## 2024-02-16 PROCEDURE — 85730 THROMBOPLASTIN TIME PARTIAL: CPT | Performed by: STUDENT IN AN ORGANIZED HEALTH CARE EDUCATION/TRAINING PROGRAM

## 2024-02-16 PROCEDURE — 250N000013 HC RX MED GY IP 250 OP 250 PS 637: Performed by: PHYSICIAN ASSISTANT

## 2024-02-16 PROCEDURE — 36415 COLL VENOUS BLD VENIPUNCTURE: CPT | Performed by: ORTHOPAEDIC SURGERY

## 2024-02-16 PROCEDURE — 85610 PROTHROMBIN TIME: CPT | Performed by: STUDENT IN AN ORGANIZED HEALTH CARE EDUCATION/TRAINING PROGRAM

## 2024-02-16 PROCEDURE — 250N000011 HC RX IP 250 OP 636: Performed by: PHYSICIAN ASSISTANT

## 2024-02-16 PROCEDURE — 82962 GLUCOSE BLOOD TEST: CPT

## 2024-02-16 PROCEDURE — 99221 1ST HOSP IP/OBS SF/LOW 40: CPT | Performed by: HOSPITALIST

## 2024-02-16 PROCEDURE — 82565 ASSAY OF CREATININE: CPT | Performed by: ORTHOPAEDIC SURGERY

## 2024-02-16 PROCEDURE — C1713 ANCHOR/SCREW BN/BN,TIS/BN: HCPCS | Performed by: ORTHOPAEDIC SURGERY

## 2024-02-16 PROCEDURE — 85049 AUTOMATED PLATELET COUNT: CPT | Performed by: ORTHOPAEDIC SURGERY

## 2024-02-16 PROCEDURE — 258N000003 HC RX IP 258 OP 636: Performed by: STUDENT IN AN ORGANIZED HEALTH CARE EDUCATION/TRAINING PROGRAM

## 2024-02-16 PROCEDURE — 250N000013 HC RX MED GY IP 250 OP 250 PS 637: Performed by: STUDENT IN AN ORGANIZED HEALTH CARE EDUCATION/TRAINING PROGRAM

## 2024-02-16 PROCEDURE — 370N000017 HC ANESTHESIA TECHNICAL FEE, PER MIN: Performed by: ORTHOPAEDIC SURGERY

## 2024-02-16 PROCEDURE — 258N000001 HC RX 258: Performed by: ORTHOPAEDIC SURGERY

## 2024-02-16 PROCEDURE — 999N000065 XR KNEE PORT RIGHT 1/2 VIEWS: Mod: RT

## 2024-02-16 PROCEDURE — 250N000013 HC RX MED GY IP 250 OP 250 PS 637: Performed by: ORTHOPAEDIC SURGERY

## 2024-02-16 PROCEDURE — 250N000009 HC RX 250: Performed by: STUDENT IN AN ORGANIZED HEALTH CARE EDUCATION/TRAINING PROGRAM

## 2024-02-16 PROCEDURE — 250N000025 HC SEVOFLURANE, PER MIN: Performed by: ORTHOPAEDIC SURGERY

## 2024-02-16 PROCEDURE — 360N000077 HC SURGERY LEVEL 4, PER MIN: Performed by: ORTHOPAEDIC SURGERY

## 2024-02-16 DEVICE — PRIMARY TIBIAL BASEPLATE
Type: IMPLANTABLE DEVICE | Site: KNEE | Status: FUNCTIONAL
Brand: TRIATHLON

## 2024-02-16 DEVICE — CRUCIATE RETAINING FEMORAL
Type: IMPLANTABLE DEVICE | Site: KNEE | Status: FUNCTIONAL
Brand: TRIATHLON

## 2024-02-16 DEVICE — FULL DOSE BONE CEMENT, 10 PACK CATALOG NUMBER IS 6191-1-010
Type: IMPLANTABLE DEVICE | Site: KNEE | Status: FUNCTIONAL
Brand: SIMPLEX

## 2024-02-16 DEVICE — TIBIAL BEARING INSERT
Type: IMPLANTABLE DEVICE | Site: KNEE | Status: FUNCTIONAL
Brand: TRIATHLON

## 2024-02-16 DEVICE — TOBRA FULL DOSE ANTIBIOTIC BONE CEMENT, 10 PACK CATALOG NUMBER IS 6197-9-010
Type: IMPLANTABLE DEVICE | Site: KNEE | Status: FUNCTIONAL
Brand: SIMPLEX

## 2024-02-16 DEVICE — PATELLA
Type: IMPLANTABLE DEVICE | Site: KNEE | Status: FUNCTIONAL
Brand: TRIATHLON

## 2024-02-16 RX ORDER — FENTANYL CITRATE 50 UG/ML
25 INJECTION, SOLUTION INTRAMUSCULAR; INTRAVENOUS EVERY 5 MIN PRN
Status: DISCONTINUED | OUTPATIENT
Start: 2024-02-16 | End: 2024-02-16 | Stop reason: HOSPADM

## 2024-02-16 RX ORDER — POLYETHYLENE GLYCOL 3350 17 G/17G
17 POWDER, FOR SOLUTION ORAL DAILY
Status: DISCONTINUED | OUTPATIENT
Start: 2024-02-17 | End: 2024-02-17 | Stop reason: HOSPADM

## 2024-02-16 RX ORDER — NALOXONE HYDROCHLORIDE 0.4 MG/ML
0.2 INJECTION, SOLUTION INTRAMUSCULAR; INTRAVENOUS; SUBCUTANEOUS
Status: DISCONTINUED | OUTPATIENT
Start: 2024-02-16 | End: 2024-02-17 | Stop reason: HOSPADM

## 2024-02-16 RX ORDER — AMOXICILLIN 250 MG
1 CAPSULE ORAL 2 TIMES DAILY
Status: DISCONTINUED | OUTPATIENT
Start: 2024-02-16 | End: 2024-02-17 | Stop reason: HOSPADM

## 2024-02-16 RX ORDER — HYDROMORPHONE HCL IN WATER/PF 6 MG/30 ML
0.2 PATIENT CONTROLLED ANALGESIA SYRINGE INTRAVENOUS EVERY 5 MIN PRN
Status: DISCONTINUED | OUTPATIENT
Start: 2024-02-16 | End: 2024-02-16 | Stop reason: HOSPADM

## 2024-02-16 RX ORDER — METHOCARBAMOL 500 MG/1
500 TABLET, FILM COATED ORAL 4 TIMES DAILY PRN
Qty: 30 TABLET | Refills: 0 | Status: SHIPPED | OUTPATIENT
Start: 2024-02-16

## 2024-02-16 RX ORDER — OXYCODONE HYDROCHLORIDE 5 MG/1
5 TABLET ORAL
Status: DISCONTINUED | OUTPATIENT
Start: 2024-02-16 | End: 2024-02-16 | Stop reason: HOSPADM

## 2024-02-16 RX ORDER — MAGNESIUM SULFATE 4 G/50ML
4 INJECTION INTRAVENOUS ONCE
Status: COMPLETED | OUTPATIENT
Start: 2024-02-16 | End: 2024-02-16

## 2024-02-16 RX ORDER — HALOPERIDOL 5 MG/ML
1 INJECTION INTRAMUSCULAR
Status: DISCONTINUED | OUTPATIENT
Start: 2024-02-16 | End: 2024-02-16 | Stop reason: HOSPADM

## 2024-02-16 RX ORDER — FENTANYL CITRATE 50 UG/ML
50 INJECTION, SOLUTION INTRAMUSCULAR; INTRAVENOUS EVERY 5 MIN PRN
Status: DISCONTINUED | OUTPATIENT
Start: 2024-02-16 | End: 2024-02-16 | Stop reason: HOSPADM

## 2024-02-16 RX ORDER — KETAMINE HYDROCHLORIDE 10 MG/ML
INJECTION INTRAMUSCULAR; INTRAVENOUS PRN
Status: DISCONTINUED | OUTPATIENT
Start: 2024-02-16 | End: 2024-02-16

## 2024-02-16 RX ORDER — SODIUM CHLORIDE, SODIUM LACTATE, POTASSIUM CHLORIDE, CALCIUM CHLORIDE 600; 310; 30; 20 MG/100ML; MG/100ML; MG/100ML; MG/100ML
INJECTION, SOLUTION INTRAVENOUS CONTINUOUS
Status: DISCONTINUED | OUTPATIENT
Start: 2024-02-16 | End: 2024-02-16 | Stop reason: HOSPADM

## 2024-02-16 RX ORDER — GLYCOPYRROLATE 0.2 MG/ML
INJECTION, SOLUTION INTRAMUSCULAR; INTRAVENOUS PRN
Status: DISCONTINUED | OUTPATIENT
Start: 2024-02-16 | End: 2024-02-16

## 2024-02-16 RX ORDER — KETOROLAC TROMETHAMINE 30 MG/ML
15 INJECTION, SOLUTION INTRAMUSCULAR; INTRAVENOUS
Status: DISCONTINUED | OUTPATIENT
Start: 2024-02-16 | End: 2024-02-16 | Stop reason: HOSPADM

## 2024-02-16 RX ORDER — ACETAMINOPHEN 325 MG/1
650 TABLET ORAL EVERY 4 HOURS PRN
Status: DISCONTINUED | OUTPATIENT
Start: 2024-02-19 | End: 2024-02-17 | Stop reason: HOSPADM

## 2024-02-16 RX ORDER — ACETAMINOPHEN 325 MG/1
975 TABLET ORAL EVERY 8 HOURS
Qty: 27 TABLET | Refills: 0 | Status: DISCONTINUED | OUTPATIENT
Start: 2024-02-16 | End: 2024-02-17 | Stop reason: HOSPADM

## 2024-02-16 RX ORDER — NALOXONE HYDROCHLORIDE 0.4 MG/ML
0.4 INJECTION, SOLUTION INTRAMUSCULAR; INTRAVENOUS; SUBCUTANEOUS
Status: DISCONTINUED | OUTPATIENT
Start: 2024-02-16 | End: 2024-02-17 | Stop reason: HOSPADM

## 2024-02-16 RX ORDER — ENOXAPARIN SODIUM 100 MG/ML
100 INJECTION SUBCUTANEOUS 2 TIMES DAILY
Status: DISCONTINUED | OUTPATIENT
Start: 2024-02-18 | End: 2024-02-17 | Stop reason: HOSPADM

## 2024-02-16 RX ORDER — CEFADROXIL 500 MG/1
500 CAPSULE ORAL 2 TIMES DAILY
Qty: 28 CAPSULE | Refills: 0 | Status: SHIPPED | OUTPATIENT
Start: 2024-02-16 | End: 2024-03-01

## 2024-02-16 RX ORDER — ONDANSETRON 2 MG/ML
4 INJECTION INTRAMUSCULAR; INTRAVENOUS EVERY 30 MIN PRN
Status: DISCONTINUED | OUTPATIENT
Start: 2024-02-16 | End: 2024-02-16 | Stop reason: HOSPADM

## 2024-02-16 RX ORDER — BISACODYL 10 MG
10 SUPPOSITORY, RECTAL RECTAL DAILY PRN
Status: DISCONTINUED | OUTPATIENT
Start: 2024-02-16 | End: 2024-02-17 | Stop reason: HOSPADM

## 2024-02-16 RX ORDER — MIRTAZAPINE 7.5 MG/1
7.5 TABLET, FILM COATED ORAL
COMMUNITY
Start: 2023-10-27

## 2024-02-16 RX ORDER — MEPERIDINE HYDROCHLORIDE 25 MG/ML
12.5 INJECTION INTRAMUSCULAR; INTRAVENOUS; SUBCUTANEOUS EVERY 5 MIN PRN
Status: DISCONTINUED | OUTPATIENT
Start: 2024-02-16 | End: 2024-02-16 | Stop reason: HOSPADM

## 2024-02-16 RX ORDER — GABAPENTIN 400 MG/1
1200 CAPSULE ORAL 3 TIMES DAILY
Status: DISCONTINUED | OUTPATIENT
Start: 2024-02-16 | End: 2024-02-17 | Stop reason: HOSPADM

## 2024-02-16 RX ORDER — OXYCODONE HYDROCHLORIDE 5 MG/1
10 TABLET ORAL EVERY 4 HOURS PRN
Status: DISCONTINUED | OUTPATIENT
Start: 2024-02-16 | End: 2024-02-17

## 2024-02-16 RX ORDER — PROCHLORPERAZINE MALEATE 5 MG
5 TABLET ORAL EVERY 6 HOURS PRN
Status: DISCONTINUED | OUTPATIENT
Start: 2024-02-16 | End: 2024-02-17 | Stop reason: HOSPADM

## 2024-02-16 RX ORDER — OXYCODONE HYDROCHLORIDE 5 MG/1
10 TABLET ORAL
Status: DISCONTINUED | OUTPATIENT
Start: 2024-02-16 | End: 2024-02-16 | Stop reason: HOSPADM

## 2024-02-16 RX ORDER — ASPIRIN 81 MG/1
81 TABLET ORAL DAILY
Status: DISCONTINUED | OUTPATIENT
Start: 2024-02-16 | End: 2024-02-17 | Stop reason: HOSPADM

## 2024-02-16 RX ORDER — HYDROMORPHONE HYDROCHLORIDE 2 MG/1
2 TABLET ORAL EVERY 4 HOURS PRN
Qty: 25 TABLET | Refills: 0 | Status: SHIPPED | OUTPATIENT
Start: 2024-02-16 | End: 2024-02-20

## 2024-02-16 RX ORDER — KETOROLAC TROMETHAMINE 30 MG/ML
15 INJECTION, SOLUTION INTRAMUSCULAR; INTRAVENOUS EVERY 6 HOURS
Qty: 4 ML | Refills: 0 | Status: COMPLETED | OUTPATIENT
Start: 2024-02-16 | End: 2024-02-17

## 2024-02-16 RX ORDER — ROSUVASTATIN CALCIUM 10 MG/1
20 TABLET, COATED ORAL DAILY
Status: DISCONTINUED | OUTPATIENT
Start: 2024-02-17 | End: 2024-02-17 | Stop reason: HOSPADM

## 2024-02-16 RX ORDER — HYDROMORPHONE HCL IN WATER/PF 6 MG/30 ML
0.2 PATIENT CONTROLLED ANALGESIA SYRINGE INTRAVENOUS
Status: DISCONTINUED | OUTPATIENT
Start: 2024-02-16 | End: 2024-02-17 | Stop reason: HOSPADM

## 2024-02-16 RX ORDER — ONDANSETRON 4 MG/1
4 TABLET, ORALLY DISINTEGRATING ORAL EVERY 30 MIN PRN
Status: DISCONTINUED | OUTPATIENT
Start: 2024-02-16 | End: 2024-02-16 | Stop reason: HOSPADM

## 2024-02-16 RX ORDER — LISINOPRIL 20 MG/1
20 TABLET ORAL DAILY
Status: DISCONTINUED | OUTPATIENT
Start: 2024-02-16 | End: 2024-02-17 | Stop reason: HOSPADM

## 2024-02-16 RX ORDER — IBUPROFEN 200 MG
400 TABLET ORAL EVERY 6 HOURS PRN
Status: ON HOLD | COMMUNITY
End: 2024-02-16

## 2024-02-16 RX ORDER — LIDOCAINE 40 MG/G
CREAM TOPICAL
Status: DISCONTINUED | OUTPATIENT
Start: 2024-02-16 | End: 2024-02-16 | Stop reason: HOSPADM

## 2024-02-16 RX ORDER — CEFAZOLIN SODIUM/WATER 2 G/20 ML
2 SYRINGE (ML) INTRAVENOUS
Status: COMPLETED | OUTPATIENT
Start: 2024-02-16 | End: 2024-02-16

## 2024-02-16 RX ORDER — ONDANSETRON 2 MG/ML
INJECTION INTRAMUSCULAR; INTRAVENOUS PRN
Status: DISCONTINUED | OUTPATIENT
Start: 2024-02-16 | End: 2024-02-16

## 2024-02-16 RX ORDER — SODIUM CHLORIDE, SODIUM LACTATE, POTASSIUM CHLORIDE, CALCIUM CHLORIDE 600; 310; 30; 20 MG/100ML; MG/100ML; MG/100ML; MG/100ML
INJECTION, SOLUTION INTRAVENOUS CONTINUOUS
Status: DISCONTINUED | OUTPATIENT
Start: 2024-02-16 | End: 2024-02-17 | Stop reason: HOSPADM

## 2024-02-16 RX ORDER — ONDANSETRON 2 MG/ML
4 INJECTION INTRAMUSCULAR; INTRAVENOUS EVERY 6 HOURS PRN
Status: DISCONTINUED | OUTPATIENT
Start: 2024-02-16 | End: 2024-02-17 | Stop reason: HOSPADM

## 2024-02-16 RX ORDER — CLOPIDOGREL BISULFATE 75 MG/1
75 TABLET ORAL DAILY
Status: DISCONTINUED | OUTPATIENT
Start: 2024-02-17 | End: 2024-02-17 | Stop reason: HOSPADM

## 2024-02-16 RX ORDER — WARFARIN SODIUM 5 MG/1
5 TABLET ORAL DAILY
Status: DISCONTINUED | OUTPATIENT
Start: 2024-02-16 | End: 2024-02-17 | Stop reason: HOSPADM

## 2024-02-16 RX ORDER — HYDROMORPHONE HCL IN WATER/PF 6 MG/30 ML
0.4 PATIENT CONTROLLED ANALGESIA SYRINGE INTRAVENOUS EVERY 5 MIN PRN
Status: DISCONTINUED | OUTPATIENT
Start: 2024-02-16 | End: 2024-02-16 | Stop reason: HOSPADM

## 2024-02-16 RX ORDER — PANTOPRAZOLE SODIUM 20 MG/1
20 TABLET, DELAYED RELEASE ORAL AT BEDTIME
Status: DISCONTINUED | OUTPATIENT
Start: 2024-02-16 | End: 2024-02-17 | Stop reason: HOSPADM

## 2024-02-16 RX ORDER — OXYCODONE HYDROCHLORIDE 5 MG/1
5 TABLET ORAL EVERY 4 HOURS PRN
Status: DISCONTINUED | OUTPATIENT
Start: 2024-02-16 | End: 2024-02-17

## 2024-02-16 RX ORDER — TRANEXAMIC ACID 650 MG/1
1950 TABLET ORAL ONCE
Status: COMPLETED | OUTPATIENT
Start: 2024-02-16 | End: 2024-02-16

## 2024-02-16 RX ORDER — PROPOFOL 10 MG/ML
INJECTION, EMULSION INTRAVENOUS CONTINUOUS PRN
Status: DISCONTINUED | OUTPATIENT
Start: 2024-02-16 | End: 2024-02-16

## 2024-02-16 RX ORDER — CEFAZOLIN SODIUM/WATER 2 G/20 ML
2 SYRINGE (ML) INTRAVENOUS SEE ADMIN INSTRUCTIONS
Status: DISCONTINUED | OUTPATIENT
Start: 2024-02-16 | End: 2024-02-16 | Stop reason: HOSPADM

## 2024-02-16 RX ORDER — ENOXAPARIN SODIUM 100 MG/ML
100 INJECTION SUBCUTANEOUS 2 TIMES DAILY
Status: ON HOLD | COMMUNITY
Start: 2024-01-24 | End: 2024-02-17

## 2024-02-16 RX ORDER — ALBUTEROL SULFATE 90 UG/1
AEROSOL, METERED RESPIRATORY (INHALATION) PRN
Status: DISCONTINUED | OUTPATIENT
Start: 2024-02-16 | End: 2024-02-16

## 2024-02-16 RX ORDER — CEFAZOLIN SODIUM 2 G/100ML
2 INJECTION, SOLUTION INTRAVENOUS EVERY 8 HOURS
Qty: 200 ML | Refills: 0 | Status: COMPLETED | OUTPATIENT
Start: 2024-02-16 | End: 2024-02-17

## 2024-02-16 RX ORDER — DEXAMETHASONE SODIUM PHOSPHATE 10 MG/ML
INJECTION, SOLUTION INTRAMUSCULAR; INTRAVENOUS PRN
Status: DISCONTINUED | OUTPATIENT
Start: 2024-02-16 | End: 2024-02-16

## 2024-02-16 RX ORDER — EPHEDRINE SULFATE 50 MG/ML
INJECTION, SOLUTION INTRAMUSCULAR; INTRAVENOUS; SUBCUTANEOUS PRN
Status: DISCONTINUED | OUTPATIENT
Start: 2024-02-16 | End: 2024-02-16

## 2024-02-16 RX ORDER — SENNA AND DOCUSATE SODIUM 50; 8.6 MG/1; MG/1
1 TABLET, FILM COATED ORAL 2 TIMES DAILY PRN
Qty: 30 TABLET | Refills: 0 | Status: SHIPPED | OUTPATIENT
Start: 2024-02-16

## 2024-02-16 RX ORDER — FENTANYL CITRATE 50 UG/ML
INJECTION, SOLUTION INTRAMUSCULAR; INTRAVENOUS PRN
Status: DISCONTINUED | OUTPATIENT
Start: 2024-02-16 | End: 2024-02-16

## 2024-02-16 RX ORDER — ONDANSETRON 4 MG/1
4 TABLET, ORALLY DISINTEGRATING ORAL EVERY 6 HOURS PRN
Status: DISCONTINUED | OUTPATIENT
Start: 2024-02-16 | End: 2024-02-17 | Stop reason: HOSPADM

## 2024-02-16 RX ORDER — HYDROMORPHONE HCL IN WATER/PF 6 MG/30 ML
0.4 PATIENT CONTROLLED ANALGESIA SYRINGE INTRAVENOUS
Status: DISCONTINUED | OUTPATIENT
Start: 2024-02-16 | End: 2024-02-17 | Stop reason: HOSPADM

## 2024-02-16 RX ORDER — ACETAMINOPHEN 325 MG/1
975 TABLET ORAL ONCE
Status: COMPLETED | OUTPATIENT
Start: 2024-02-16 | End: 2024-02-16

## 2024-02-16 RX ORDER — LORAZEPAM 2 MG/ML
.5-1 INJECTION INTRAMUSCULAR
Status: DISCONTINUED | OUTPATIENT
Start: 2024-02-16 | End: 2024-02-16 | Stop reason: HOSPADM

## 2024-02-16 RX ORDER — LIDOCAINE 40 MG/G
CREAM TOPICAL
Status: DISCONTINUED | OUTPATIENT
Start: 2024-02-16 | End: 2024-02-17 | Stop reason: HOSPADM

## 2024-02-16 RX ORDER — PROPOFOL 10 MG/ML
INJECTION, EMULSION INTRAVENOUS PRN
Status: DISCONTINUED | OUTPATIENT
Start: 2024-02-16 | End: 2024-02-16

## 2024-02-16 RX ORDER — ACETAMINOPHEN 500 MG
500-1000 TABLET ORAL EVERY 6 HOURS PRN
Qty: 60 TABLET | Refills: 0 | Status: SHIPPED | OUTPATIENT
Start: 2024-02-16

## 2024-02-16 RX ADMIN — SUCCINYLCHOLINE CHLORIDE 100 MG: 20 INJECTION, SOLUTION INTRAMUSCULAR; INTRAVENOUS; PARENTERAL at 12:58

## 2024-02-16 RX ADMIN — KETAMINE HYDROCHLORIDE 20 MG: 10 INJECTION INTRAMUSCULAR; INTRAVENOUS at 13:33

## 2024-02-16 RX ADMIN — GABAPENTIN 1200 MG: 400 CAPSULE ORAL at 20:06

## 2024-02-16 RX ADMIN — PHENYLEPHRINE HYDROCHLORIDE 0.5 MCG/KG/MIN: 10 INJECTION INTRAVENOUS at 13:05

## 2024-02-16 RX ADMIN — DEXAMETHASONE SODIUM PHOSPHATE 10 MG: 10 INJECTION, SOLUTION INTRAMUSCULAR; INTRAVENOUS at 13:25

## 2024-02-16 RX ADMIN — HYDROMORPHONE HYDROCHLORIDE 0.4 MG: 0.2 INJECTION, SOLUTION INTRAMUSCULAR; INTRAVENOUS; SUBCUTANEOUS at 20:09

## 2024-02-16 RX ADMIN — MAGNESIUM SULFATE HEPTAHYDRATE 4 G: 4 INJECTION, SOLUTION INTRAVENOUS at 11:13

## 2024-02-16 RX ADMIN — TRANEXAMIC ACID 1950 MG: 650 TABLET ORAL at 10:35

## 2024-02-16 RX ADMIN — ACETAMINOPHEN 975 MG: 325 TABLET ORAL at 10:35

## 2024-02-16 RX ADMIN — Medication 10 MG: at 13:03

## 2024-02-16 RX ADMIN — KETOROLAC TROMETHAMINE 15 MG: 30 INJECTION, SOLUTION INTRAMUSCULAR; INTRAVENOUS at 18:18

## 2024-02-16 RX ADMIN — ALBUTEROL SULFATE 8 PUFF: 90 AEROSOL, METERED RESPIRATORY (INHALATION) at 14:32

## 2024-02-16 RX ADMIN — PANTOPRAZOLE SODIUM 20 MG: 20 TABLET, DELAYED RELEASE ORAL at 20:07

## 2024-02-16 RX ADMIN — FENTANYL CITRATE 100 MCG: 50 INJECTION INTRAMUSCULAR; INTRAVENOUS at 12:47

## 2024-02-16 RX ADMIN — Medication 2 G: at 12:37

## 2024-02-16 RX ADMIN — SODIUM CHLORIDE, POTASSIUM CHLORIDE, SODIUM LACTATE AND CALCIUM CHLORIDE: 600; 310; 30; 20 INJECTION, SOLUTION INTRAVENOUS at 11:13

## 2024-02-16 RX ADMIN — Medication 5 MG: at 13:58

## 2024-02-16 RX ADMIN — FENTANYL CITRATE 50 MCG: 50 INJECTION INTRAMUSCULAR; INTRAVENOUS at 14:55

## 2024-02-16 RX ADMIN — CEFAZOLIN SODIUM 2 G: 2 INJECTION, SOLUTION INTRAVENOUS at 18:42

## 2024-02-16 RX ADMIN — SENNOSIDES AND DOCUSATE SODIUM 1 TABLET: 8.6; 5 TABLET ORAL at 20:07

## 2024-02-16 RX ADMIN — ASPIRIN 81 MG: 81 TABLET, COATED ORAL at 18:19

## 2024-02-16 RX ADMIN — SODIUM CHLORIDE, POTASSIUM CHLORIDE, SODIUM LACTATE AND CALCIUM CHLORIDE: 600; 310; 30; 20 INJECTION, SOLUTION INTRAVENOUS at 13:35

## 2024-02-16 RX ADMIN — PROPOFOL 50 MCG/KG/MIN: 10 INJECTION, EMULSION INTRAVENOUS at 13:02

## 2024-02-16 RX ADMIN — HYDROMORPHONE HYDROCHLORIDE 0.5 MG: 1 INJECTION, SOLUTION INTRAMUSCULAR; INTRAVENOUS; SUBCUTANEOUS at 14:06

## 2024-02-16 RX ADMIN — WARFARIN SODIUM 5 MG: 5 TABLET ORAL at 20:06

## 2024-02-16 RX ADMIN — PROPOFOL 150 MG: 10 INJECTION, EMULSION INTRAVENOUS at 12:47

## 2024-02-16 RX ADMIN — ONDANSETRON 4 MG: 2 INJECTION INTRAMUSCULAR; INTRAVENOUS at 14:06

## 2024-02-16 RX ADMIN — BENZOCAINE AND MENTHOL 1 LOZENGE: 15; 3.6 LOZENGE ORAL at 22:18

## 2024-02-16 RX ADMIN — GLYCOPYRROLATE 0.3 MG: 0.2 INJECTION INTRAMUSCULAR; INTRAVENOUS at 13:50

## 2024-02-16 RX ADMIN — Medication 10 MG: at 13:49

## 2024-02-16 RX ADMIN — MIDAZOLAM 1 MG: 1 INJECTION INTRAMUSCULAR; INTRAVENOUS at 12:39

## 2024-02-16 RX ADMIN — LISINOPRIL 20 MG: 20 TABLET ORAL at 18:19

## 2024-02-16 RX ADMIN — FENTANYL CITRATE 50 MCG: 50 INJECTION INTRAMUSCULAR; INTRAVENOUS at 15:00

## 2024-02-16 RX ADMIN — LIDOCAINE HYDROCHLORIDE 50 MG: 10 INJECTION, SOLUTION EPIDURAL; INFILTRATION; INTRACAUDAL; PERINEURAL at 12:47

## 2024-02-16 RX ADMIN — PROPOFOL 50 MG: 10 INJECTION, EMULSION INTRAVENOUS at 12:58

## 2024-02-16 RX ADMIN — ACETAMINOPHEN 975 MG: 325 TABLET ORAL at 18:18

## 2024-02-16 RX ADMIN — PHENYLEPHRINE HYDROCHLORIDE 100 MCG: 10 INJECTION INTRAVENOUS at 14:09

## 2024-02-16 RX ADMIN — HYDROMORPHONE HYDROCHLORIDE 0.2 MG: 0.2 INJECTION, SOLUTION INTRAMUSCULAR; INTRAVENOUS; SUBCUTANEOUS at 15:08

## 2024-02-16 ASSESSMENT — ACTIVITIES OF DAILY LIVING (ADL)
ADLS_ACUITY_SCORE: 25
ADLS_ACUITY_SCORE: 29

## 2024-02-16 NOTE — OP NOTE
Operative Report    PATIENT Jose Tejada   DATE OF SURGERY:  2/16/2024    PREOPERATIVE DIAGNOSIS   Aftercare following joint replacement surgery [Z47.1].    POSTOPERATIVE DIAGNOSIS   Aftercare following joint replacement surgery [Z47.1].    PROCEDURE PERFORMED   RIGHT total knee arthroplasty    IMPLANTS  Implant Name Type Inv. Item Serial No.  Lot No. LRB No. Used Action   BONE CEMENT SIMPLEX W/TOBRAMYCIN 6197-9-001 - [id number] Cement, Bone BONE CEMENT SIMPLEX W/TOBRAMYCIN 6197-9-001  [employer] [id number] Right 1 Implanted   BONE CEMENT SIMPLEX FULL DOSE 6191-1-001 - [id number] Cement, Bone BONE CEMENT SIMPLEX FULL DOSE 6191-1-001  [employer] [id number] Right 1 Implanted   IMP BASEPLATE TIBIAL HOWM TRI 5 5520-B-500 - [id number] Total Joint Component/Insert IMP BASEPLATE TIBIAL HOWM TRI 5 5520-B-500  [employer] [id number] Right 1 Implanted   IMP COMP PATELLA HOWM TRI 35 10MM 5551-L-350 - [id number] Total Joint Component/Insert IMP COMP PATELLA HOWM TRI 35 10MM 5551-L-350  [employer] [id number] Right 1 Implanted   IMP COMP FEM STRK TRIATHLN CR RT 4 5510-F-402 - [id number] Total Joint Component/Insert IMP COMP FEM STRK TRIATHLN CR RT 4 5510-F-402  [employer] [id number] Right 1 Implanted   INSRT TIBIAL ARTICULR  SZ 5 13MM THK POLYETHYLENE 5531-P-513 - [id number] Total Joint Component/Insert INSRT TIBIAL ARTICULR  SZ 5 13MM THK POLYETHYLENE 5531-P-513  [employer] [id number] Right 1 Implanted       SURGEON  Alber Mcfarland MD     ASSISTANT   Helena Schmitz PA-C; assistant was required for patient positioning, surgical assistance, wound closure and monitoring patient's safety throughout the case.  ANA CRISTINA Narayan    ANESTHESIA  Choice      FINDINGS:  Tricompartmental cartilage loss.     SPECIMENS:  none    ESTIMATED BLOOD LOSS:  50cc    COMPLICATIONS   None.        INDICATION FOR PROCEDURE  Jose Tejada is a 78 year old male who I evaluated in my clinic for severe RIGHT  knee pain.  X-rays confirmed end-stage osteoarthritis.  All conservative treatments were exhausted including: Activity modification, NSAIDs and intra-articular injections. These no longer provided symptom relief.  The patient's quality of life and activities of daily living were being greatly affected.  Due to this, the patient was indicated for total knee arthroplasty.      PREOPERATIVE EXAMINATION:   ROM 5-130 degrees.     INFORMED CONSENT  Jose Tejada was identified in the preoperative holding area and was identified using medical record number, name, and date of birth, all of which were confirmed. The operative extremity was marked using an indelible marker. Once again, the risks, benefits and expected outcomes of the procedure were discussed in full.  This discussion included, but was not limited to: Bleeding, nerve/vascular injury, fracture, instability, implant complications, continued pain, stiffness, scarring/incision numbness, infection, anesthetic/medical complications, death.  After this discussion patient elected to proceed with procedure, and did sign informed consent.    DESCRIPTION OF PROCEDURE   Jose Tejada received a regional block in the preoperative holding area.  They were then brought back to the operating room and placed on the operating table in the supine position.  All bony prominences were well-padded.  A well-padded tourniquet was placed high on the operative thigh. The operative leg was then sterilely prepped and draped in the usual fashion with ChloraPrep.     A timeout was performed prior to the start of the procedure.  This confirmed the patient's name, correct site and side of surgery, and the procedure being performed.  Allergies were also confirmed.  All necessary implants were confirmed and available.  Administration of IV antibiotics and TXA were confirmed. Three independent staff members agreed with the information discussed in the timeout.     The leg was  exsanguinated and the tourniquet was inflated.  A midline approach to the knee was utilized.  Sharp dissection was performed down to the level of the capsule. Medial and lateral skin flaps were raised.  A medial parapatellar arthrotomy was performed. The anterior horn of the medial meniscus was resected and a medial release was performed around the tibial plateau. Medial osteophytes were resected.  The knee was extended and patellar fat pad was excised being careful to protect the patellar tendon.      Attention was turned back to the femur.  The ACL was sacrificed. This the opening drill was utilized to open the femoral canal.  The flexible intramedullary guide was then placed.  The distal femoral cutting jig was pinned into place in 5 degrees of valgus with a depth of 8 mm.  Intramedullary guide was then removed and distal femoral cut was then made.  This was ensured to be flat.  The pins were left in place and attention was turned to the tibia.     With the knee flexed and retractors placed to protect ligaments and vascular structures, the extramedullary tibial guide was placed.   The slope was checked and the distal aspect was centered on the ankle.  The jig was pinned, and depth of resection was verified.  The proximal tibial resection was performed using an oscillating saw being careful to protect the bone block, PCL and posterior structures.  The jig was removed with the pins were left in place.  At this point, the knee was brought down to full extension and a 9 mm spacer block was placed.  Checked the medial and lateral gaps which were found to be acceptable after medial release and osteophyte resection.  The 2 pins in the tibia and the 2 pins in the femur were then removed.     Attention was turned back to the distal femur.  The knee was flexed to 90 degrees.  Using the lettrsmedica tensioner, the rotation of the flexion gap was checked and found to be 3 degrees.  4-in-1 cutting guide was then set to this  amount of external rotation.  This was then applied to the distal femur and the femur was appropriately sized.  The  holes were drilled.  The 4-in-1 cutting guide was then pinned into place.  The posterior cut was made first and again a 9 mm spacer block was utilized to ensure that the flexion space would accept a 9 mm poly.  Once this was done the remainder of the cuts were made.    A laminar  was placed in the lateral side.  The medial meniscus was removed.  The posterior medial osteophytes were removed.  The shoulder was then moved to the medial side and the lateral meniscus was removed.  The posterior lateral osteophytes were also removed.     At this point, we moved to trialing.  A femoral trial was placed.  A tibial baseplate with a 9 mm poly preloaded onto it were then placed onto the tibia and the knee was brought down into extension.  Full extension was obtained.  The medial-lateral balance was acceptable.  Flexion to 130 degrees was obtained.  The medial and lateral balance at 90 degrees of flexion was also found to be acceptable. Drawer exam was also found to be acceptable.     We then everted the patella and placed towel clamps.  This was cut with a freehand technique.   The patella was sized with a caliper, and an oscillating saw was used to make a flush cut.  The size of the patella was determined, and the lugs holes were drilled.  A trial patellar component was then placed onto the patella the patella was reduced and the knee was flexed up ensuring adequate patellar tracking. The lugs were drilled for the femur.      The trial components were then removed.  The proximal tibia was exposed with retractors in place.  The tibial component was appropriately sized and set to the appropriate rotation and then pinned into place.  The tibia was then punched.  The baseplate was then removed.  Local injection cocktail was placed in the soft tissues surrounding the knee.  The bony surfaces were then  thoroughly irrigated and dried with a sponge in preparation for cementing.     The cement was mixed and the components were sequentially cemented.  The tibia was impacted down into place and excess cement was removed.  The final poly was placed into the tibial baseplate and this was reduced back under the femur.  Cement was applied to the distal femur.  Femoral component was then impacted into place and cement was removed.  The knee was brought down into full extension.  Cement was applied to the patella and the patella was gently placed and the excess cement was again removed.  At this point, the cement was allowed to fully cure.    Again the balance both in flexion and extension was found to be adequate.  Full extension was obtained.  130 degrees of flexion was obtained.     The wound was closed in layers with a #2 vicryl and #1 Stratafix for the capsular closure, 2-0 vicryl, and 3-0 monocryl, followed by skin glue. The aquacel dressing was applied, followed by a full leg ACE wrap.      The patient was then transferred to the postoperative bed, awoken from anesthesia and taken to recovery in stable condition.  There were no complications.  The patient tolerated the procedure well.    POSTOPERATIVE PLAN:  -Pain control  -PT/OT, WBAT  -24 hours IV antibiotics  -Restart home warfarin, plavix. Bridging enoxaparin to start in 48 hours.   -X-rays to be completed in PACU    Alber Mcfarland MD  [facility]

## 2024-02-16 NOTE — ANESTHESIA CARE TRANSFER NOTE
Patient: Jose Tejada    Procedure: Procedure(s):  RIGHT TOTAL KNEE ARTHROPLASTY       Diagnosis: Aftercare following joint replacement surgery [Z47.1]  Diagnosis Additional Information: No value filed.    Anesthesia Type:   General     Note:      Level of Consciousness: awake    Level of Supplemental Oxygen (L/min / FiO2): 8  Independent Airway: airway patency satisfactory and stable    Vital Signs Stable: post-procedure vital signs reviewed and stable  Report to RN Given: handoff report given  Patient transferred to: PACU    Handoff Report: Identifed the Patient, Identified the Reponsible Provider, Reviewed the pertinent medical history, Discussed the surgical course, Reviewed Intra-OP anesthesia mangement and issues during anesthesia, Set expectations for post-procedure period and Allowed opportunity for questions and acknowledgement of understanding      Vitals:  Vitals Value Taken Time   /76 02/16/24 1445   Temp 36.9  C (98.4  F) 02/16/24 1442   Pulse 96 02/16/24 1445   Resp 19 02/16/24 1445   SpO2 92 % 02/16/24 1445   Vitals shown include unfiled device data.    Electronically Signed By: BRENDAN Sepulveda CRNA  February 16, 2024  2:46 PM

## 2024-02-16 NOTE — CONSULTS
"[facility]  Consult Note - Hospitalist Service  Date of Admission:  2/16/2024  Consult Requested by: orthopaedics  Reason for Consult: post operative medical management    Assessment & Plan   Jose Tejada is a 78 year old male admitted s/p R TKA.  He is clinically stable.  Recommendations as below.    # s/p R TKA  - per orthopaedics    # HTN  - lisinopril    # CAD  - timing resume aspirin, clopidogrel per orthopaedics    # Hx DVT  - timing to resume warfarin per orthopaedics    # Outpatient followup  - systolic murmur    Addendum:  Notified by nursing regarding wound in patient's throat.  Patient reports some improvement in throat discomfort with lozenge.    On exam, he has a dime sized lesion in the midline posterior oropharynx.  There is evidence of bleeding without clear signs of active bleeding.  There is a central ecchymotic vs necrotic region within the area of interest.    Per nursing, patient is able to eat and drink.  Orthopaedics notified by nursing.  Suspect this is trauma related to endotracheal intubation during surgery.  Plan to monitor clinically.       Clinically Significant Risk Factors Present on Admission               # Drug Induced Coagulation Defect: home medication list includes an anticoagulant medication  # Drug Induced Platelet Defect: home medication list includes an antiplatelet medication   # Hypertension: Noted on problem list      # Obesity: Estimated body mass index is 34.97 kg/m  as calculated from the following:    Height as of this encounter: 1.727 m (5' 8\").    Weight as of this encounter: 104.3 kg (230 lb).       # Asthma: noted on problem list        Alber Doyle MD  Hospitalist Service  Securely message with [employer] (more info)  Text page via [employer] Paging/Directory   ______________________________________________________________________    Chief Complaint   S/p R TKA    History is obtained from the patient    History of Present Illness "   Jose Tejada is a 78 year old male with history of CAD s/p MI, DVT, and HTN.  He reports pain currently is localized to the knee.  Denies numbness or tingling in the legs.  However, he reports baseline dropfoot and numbness in the dorsum of the right foot associated with neuropathy.  He denies chest pain/pressure, dyspnea, nausea, or vomiting.    Past Medical History    Past Medical History:   Diagnosis Date    Antiplatelet or antithrombotic long-term use     Arthritis     Asthma     CAD (coronary artery disease)     1996 angioplasty and stent to the prox LAD, PTCA with intracoronary stent placement of RCA, 70%OM; 10/24/19 Cath: Occluded RCA, prox circumflex lesion - pd/pa not significant, advised medical therapy first by Interventional cardiologist    Depressive disorder     DVT (deep venous thrombosis) (H)     Factor 5 Leiden mutation, heterozygous (H24)     GERD (gastroesophageal reflux disease)     Heart attack (H)     HTN (hypertension)     Hyperlipidemia     Neuropathy     wears brace R foot for drop foot    Neuropathy     Obese     PRIMITIVO (obstructive sleep apnea)     cpap    Protein C deficiency (H24)     Spinal stenosis     Stented coronary artery     Thrombosis        Past Surgical History   Past Surgical History:   Procedure Laterality Date    APPENDECTOMY      APPENDECTOMY OPEN  1958    ARTHROPLASTY HIP Left 1997    ARTHROPLASTY REVISION HIP Left 2013    ARTHROPLASTY REVISION HIP Left     ARTHROPLASTY REVISION HIP Left 11/19/2021    Procedure: REVISION LEFT TOTAL HIP ARTHROPLASTY;  Surgeon: Alber Mcfarland MD;  Location: [facility] OR    AS SPINAL FUSION,ANT,EA ADNL LEVEL  2010    L4-L5    CORONARY STENT PLACEMENT  1996    CV CORONARY ANGIOGRAM N/A 10/24/2019    Procedure: Coronary Angiogram;  Surgeon: Valdemar Hinojosa MD;  Location: [facility] CARDIAC CATH LAB    CV CORONARY ANGIOGRAM  1996 1996 angioplasty and stent to the prox LAD, PTCA with intracoronary stent placement of RCA, 70%OM     CV CORONARY ANGIOGRAM N/A 2/28/2020    Procedure: Coronary Angiogram 2nd attempt of RCA  successful--(lad stent ok, Lcx 70% with normal iFR)  Surgeon: Gurvinder Yun MD;  Location:  HEART CARDIAC CATH LAB    CV CORONARY ANGIOGRAM N/A 5/17/2023    Procedure: Coronary Angiogram;  Surgeon: Je Rodriguez MD;  Location:  HEART CARDIAC CATH LAB    CV FRACTIONAL FLOW RATIO WIRE N/A 10/24/2019    Procedure: Fractional Flow [place];  Surgeon: Valdemar Hinojosa MD;  Location: [facility] CARDIAC CATH LAB    CV LEFT HEART CATH Bilateral 1/16/2020    Procedure: Left Heart Cath w/wo Left Ventriculogram  ANGIOPLASTY WITH BOSTON SCI REP PRESENT;  Surgeon: Gurvinder Yun MD;  Location: [facility] CARDIAC CATH LAB    SPINAL FUSION  2010    L4-5    TOTAL HIP ARTHROPLASTY Left     TOTAL HIP ARTHROPLASTY Right 2/10/2016    Procedure:  RIGHT HIP TOTAL ARTHROPLASTY;  Surgeon: Jose Gaytan MD;  Location: [facility];  Service:     [facility] TOTAL HIP ARTHROPLASTY Right 2015    THR       Medications   I have reviewed this patient's current medications          Physical Exam   Vital Signs: Temp: (P) 98.4  F (36.9  C) Temp src: (P) Axillary BP: (!) (P) 165/79 Pulse: (P) 77   Resp: (P) 16 SpO2: (P) 95 % O2 Device: (P) Nasal cannula Oxygen Delivery: (P) 2 LPM  Weight: 230 lbs 0 oz    Gen:  sitting in bed in no extremis  Neuro:  alert, conversant; decreased sensation on dorsum of right foot, sensation otherwise grossly intact in the feet; moves toes in both feet  CV: nl rate, regular rhythm, faint systolic murmur at the RUSB  Pulm: no acute resp distress, ctab anteriorly  GI:  abdomen soft, NTTP      Medical Decision Making             Data   Reviewed:    INR 1    XR R knee  IMPRESSION: Status post recent right total knee arthroplasty. No immediate hardware complication. Expected postsurgical soft tissue swelling, subcutaneous emphysema, and gas containing joint effusion. No acute fracture or malalignment.

## 2024-02-16 NOTE — ANESTHESIA PROCEDURE NOTES
Airway       Patient location during procedure: OR  Staff -        CRNA: Kimberly Simon APRN CRNA       Performed By: CRNAIndications and Patient Condition       Induction type:intravenous       Mask difficulty assessment: 1 - vent by mask    Final Airway Details       Final airway type: endotracheal airway       Successful airway: ETT - single  Endotracheal Airway Details        ETT size (mm): 7.0       Cuffed: yes       Successful intubation technique: direct laryngoscopy (aretenoids seen)       DL Blade Type: MAC 4       Grade View of Cords: 2       Adjucts: stylet       Position: Right       Measured from: gums/teeth       Secured at (cm): 24       Bite block used: None    Post intubation assessment        Placement verified by: capnometry, equal breath sounds and chest rise        Number of attempts at approach: 1       Number of other approaches attempted: 0       Secured with: tape       Ease of procedure: easy       Dentition: Intact

## 2024-02-16 NOTE — PHARMACY-ADMISSION MEDICATION HISTORY
Pharmacist Admission Medication History    Admission medication history is complete. The information provided in this note is only as accurate as the sources available at the time of the update.    Information Source(s): Patient and CareEverywhere/SureScripts via in-person    Pertinent Information: [facility]  He requested to have any medications ordered on discharge to be at [facility] on [facility] in [place]    Allergies reviewed with patient and updates made in EHR: yes    Medication History Completed By: Dolores Parekh, Pharm D, BCPS, BCCCP  2/16/2024 10:44 AM    PTA Med List   Medication Sig Note Last Dose    acetaminophen (TYLENOL) 325 MG tablet Take 3 tablets (975 mg) by mouth every 8 hours as needed for other (For optimal non-opioid multimodal pain management to improve pain control.) Max 3000mg/24 hours.  Unknown at unknown    albuterol (PROAIR HFA/PROVENTIL HFA/VENTOLIN HFA) 108 (90 Base) MCG/ACT inhaler Inhale 2 puffs into the lungs every 4 hours as needed for shortness of breath / dyspnea or wheezing doesn't have current RX  More than a month at unknown    aspirin 81 MG EC tablet Take 81 mg by mouth daily 2/12/2024: Will stay on ASA 81 mg through day of surgery per Cardiologist recommendations since holding Plavix 5 days before knee surgery.  2/14/2024 at unknown    clopidogrel (PLAVIX) 75 MG tablet Take 1 tablet (75 mg) by mouth daily 2/7/2024: LD 2/10/24 per instructions from Cardiology.    2/10/2024 at am    Emollient (VANICREAM) LOTN APPLY THIN LAYER TOPICALLY EVERY DAY FOR DRY SKIN, FOR DERMATITIS  TO AREAS OF DRY SKIN, IDEALLY WITHIN 3 MINUTES AFTER BATH OR SHOWER. FOR DRY SKIN, FOR DERMATITIS  TO AREAS OF DRY SKIN, IDEALLY WITHIN 3 MINUTES AFTER BATH OR SHOWER.  Unknown at unknown    enoxaparin ANTICOAGULANT (LOVENOX) 100 MG/ML syringe Inject 100 mg Subcutaneous 2 times daily  2/15/2024 at am    gabapentin (NEURONTIN) 400 MG capsule Take 1,200 mg by  mouth 3 times daily  2/16/2024 at 0700    ibuprofen (ADVIL/MOTRIN) 200 MG tablet Take 400 mg by mouth every 6 hours as needed for pain  2/13/2024 at unknown    lisinopril (PRINIVIL/ZESTRIL) 20 MG tablet Take 1 tablet (20 mg) by mouth daily  2/15/2024 at am    meclizine (ANTIVERT) 12.5 MG tablet Take 2 tablets (25 mg) by mouth 4 times daily as needed for dizziness  2/9/2024 at unknown    mirtazapine (REMERON) 7.5 MG tablet Take 7.5 mg by mouth nightly as needed (headache)  2/12/2024 at unknown    nitroGLYcerin (NITROSTAT) 0.4 MG sublingual tablet For chest pain place 1 tablet under the tongue every 5 minutes for 3 doses. If symptoms persist 5 minutes after 1st dose call 911.  More than a month at unknown    omeprazole (PRILOSEC) 20 MG CR capsule Take 20 mg by mouth At Bedtime   2/14/2024 at pm    rosuvastatin (CRESTOR) 20 MG tablet Take 1 tablet (20 mg) by mouth daily  2/14/2024 at pm    warfarin ANTICOAGULANT (COUMADIN ANTICOAGULANT) 5 MG tablet Take 5 mg by mouth daily  1/31/2024: Last dose 2/10/24 2/10/2024 at unknown

## 2024-02-16 NOTE — INTERVAL H&P NOTE
"I have reviewed the surgical (or preoperative) H&P that is linked to this encounter, and examined the patient. There are no significant changes    Clinical Conditions Present on Arrival:  Clinically Significant Risk Factors Present on Admission                # Drug Induced Coagulation Defect: home medication list includes an anticoagulant medication  # Drug Induced Platelet Defect: home medication list includes an antiplatelet medication  # Obesity: Estimated body mass index is 34.97 kg/m  as calculated from the following:    Height as of this encounter: 1.727 m (5' 8\").    Weight as of this encounter: 104.3 kg (230 lb).       "

## 2024-02-16 NOTE — ANESTHESIA POSTPROCEDURE EVALUATION
Patient: Jose Tejada    Procedure: Procedure(s):  RIGHT TOTAL KNEE ARTHROPLASTY       Anesthesia Type:  General    Note:  Disposition: Outpatient   Postop Pain Control: Uneventful            Sign Out: Well controlled pain   PONV: No   Neuro/Psych: Uneventful            Sign Out: Acceptable/Baseline neuro status   Airway/Respiratory: Uneventful            Sign Out: Acceptable/Baseline resp. status   CV/Hemodynamics: Uneventful            Sign Out: Acceptable CV status; No obvious hypovolemia; No obvious fluid overload   Other NRE: NONE   DID A NON-ROUTINE EVENT OCCUR? No           Last vitals:  Vitals Value Taken Time   /70 02/16/24 1531   Temp 37.2  C (98.9  F) 02/16/24 1530   Pulse 82 02/16/24 1533   Resp 35 02/16/24 1532   SpO2 95 % 02/16/24 1533   Vitals shown include unfiled device data.    Electronically Signed By: Daniel Murphy MD  February 16, 2024  4:00 PM

## 2024-02-16 NOTE — ANESTHESIA PREPROCEDURE EVALUATION
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]    Procedure : Procedure(s):  RIGHT TOTAL KNEE ARTHROPLASTY          Past Medical History:   Diagnosis Date    Antiplatelet or antithrombotic long-term use     Arthritis     Asthma     CAD (coronary artery disease)      angioplasty and stent to the prox LAD, PTCA with intracoronary stent placement of RCA, 70%OM; 10/24/19 Cath: Occluded RCA, prox circumflex lesion - pd/pa not significant, advised medical therapy first by Interventional cardiologist    Depressive disorder     DVT (deep venous thrombosis) (H)     Factor 5 Leiden mutation, heterozygous (H24)     GERD (gastroesophageal reflux disease)     Heart attack (H)     HTN (hypertension)     Hyperlipidemia     Neuropathy     wears brace R foot for drop foot    Neuropathy     Obese     PRIMITIVO (obstructive sleep apnea)     cpap    Protein C deficiency (H24)     Spinal stenosis     Stented coronary artery     Thrombosis       Past Surgical History:   Procedure Laterality Date    APPENDECTOMY      APPENDECTOMY OPEN      ARTHROPLASTY HIP Left     ARTHROPLASTY REVISION HIP Left     ARTHROPLASTY REVISION HIP Left     ARTHROPLASTY REVISION HIP Left 2021    Procedure: REVISION LEFT TOTAL HIP ARTHROPLASTY;  Surgeon: Alber Mcfarland MD;  Location: [facility] OR    AS SPINAL FUSION,ANT,EA ADNL LEVEL      L4-L5    CORONARY STENT PLACEMENT      CV CORONARY ANGIOGRAM N/A 10/24/2019    Procedure: Coronary Angiogram;  Surgeon: Valdemar Hinojosa MD;  Location: [facility] CARDIAC CATH LAB    CV CORONARY ANGIOGRAM  1996 angioplasty and stent to the prox LAD, PTCA with intracoronary stent placement of RCA, 70%OM    CV CORONARY ANGIOGRAM N/A 2020    Procedure: Coronary Angiogram 2nd attempt of RCA  successful--(lad stent ok, Lcx 70% with normal iFR)   Surgeon: Gurvinder Yun MD;  Location:  HEART CARDIAC CATH LAB    CV CORONARY ANGIOGRAM N/A 2023    Procedure: Coronary Angiogram;  Surgeon: Je Rodriguez MD;  Location:  HEART CARDIAC CATH LAB    CV FRACTIONAL FLOW RATIO WIRE N/A 10/24/2019    Procedure: Fractional Flow [place];  Surgeon: Valdemar Hinojosa MD;  Location:  HEART CARDIAC CATH LAB    CV LEFT HEART CATH Bilateral 2020    Procedure: Left Heart Cath w/wo Left Ventriculogram  ANGIOPLASTY WITH B[place]ON SCI REP PRESENT;  Surgeon: Gurvinder Yun MD;  Location:  HEART CARDIAC CATH LAB    SPINAL FUSION  2010    L4-5    TOTAL HIP ARTHROPLASTY Left     TOTAL HIP ARTHROPLASTY Right 2/10/2016    Procedure:  RIGHT HIP TOTAL ARTHROPLASTY;  Surgeon: Jose Gaytan MD;  Location: [facility] OR;  Service:     [facility] TOTAL HIP ARTHROPLASTY Right     THR      Allergies   Allergen Reactions    Metoprolol      Fatigue and bradycardia    Niacin Other (See Comments)     Other reaction(s): Flushing        Sulfa Antibiotics     Zonisamide GI Disturbance    Norvasc [Amlodipine] Rash    Septra [Sulfamethoxazole W-Trimethoprim] Rash      Social History     Tobacco Use    Smoking status: Former     Types: Cigarettes     Quit date: 3/23/1987     Years since quittin.9    Smokeless tobacco: Never    Tobacco comments:     Smokes cigars once every 2-3 years   Substance Use Topics    Alcohol use: Yes     Comment: Rarely      Wt Readings from Last 1 Encounters:   08/15/23 101.3 kg (223 lb 4.8 oz)        Anesthesia Evaluation   Pt has had prior anesthetic. Type: General and Regional.    No history of anesthetic complications       ROS/MED HX  ENT/Pulmonary:     (+) sleep apnea,                     asthma  Treatment: Inhaler prn,                 Neurologic: Comment: Recent vertigo resulting in ED visit - resolved      Cardiovascular: Comment: 2023 stress     The nuclear stress test is abnormal.     Small to moderate sized fixed defect  of the basal to mid inferior and inferior lateral segments.  Suspect this is some degree of infarct, may be some component of diaphragm attenuation as well.  No ischemia.     The left ventricular ejection fraction at stress is 53%.     Nuclear stress 2018 reported reversible apical lateral and mid to basal inferior lateral defect consistent with mild ischemia.  Images not available for direct comparison.        (+)  hypertension- -  CAD -  - stent-   Taking blood thinners (last plavix dose 2/10/24, last lovenox dose 2/15/24 in am. warfarin dose last taken 6 days ago. Asa 81 continued.) Pt has received instructions: Instructions Given to patient: plavix held for 5 days, warfarin held 5 days, bridge with asa 81 and lovenox bid..                              (-) angina and angina   METS/Exercise Tolerance:     Hematologic:     (+) History of blood clots (On Coumadin),    pt is anticoagulated,           Musculoskeletal:       GI/Hepatic:     (+) GERD, Asymptomatic on medication,                  Renal/Genitourinary:  - neg Renal ROS     Endo:     (+)               Obesity,       Psychiatric/Substance Use:  - neg psychiatric ROS     Infectious Disease:       Malignancy:       Other:            Physical Exam    Airway        Mallampati: III   TM distance: > 3 FB    Mouth opening: > 3 cm    Respiratory Devices and Support         Dental  no notable dental history     (+) Modest Abnormalities - crowns, retainers, 1 or 2 missing teeth    B=Bridge, C=Chipped, L=Loose, M=Missing    Cardiovascular   cardiovascular exam normal          Pulmonary   pulmonary exam normal                OUTSIDE LABS:  CBC:   Lab Results   Component Value Date    WBC 6.0 05/21/2023    WBC 5.6 05/18/2023    HGB 13.8 05/21/2023    HGB 13.8 05/18/2023    HCT 40.7 05/21/2023    HCT 41.3 05/18/2023     05/21/2023     05/18/2023     BMP:   Lab Results   Component Value Date     05/21/2023     05/18/2023    POTASSIUM 4.2  "05/21/2023    POTASSIUM 4.2 05/18/2023    CHLORIDE 103 05/21/2023    CHLORIDE 106 05/18/2023    CO2 23 05/21/2023    CO2 22 05/18/2023    BUN 10.4 05/21/2023    BUN 9.5 05/18/2023    CR 0.88 05/21/2023    CR 0.90 05/18/2023     (H) 05/21/2023     (H) 05/18/2023     COAGS:   Lab Results   Component Value Date    PTT 36 02/28/2020    INR 1.29 (H) 05/21/2023     POC: No results found for: \"BGM\", \"HCG\", \"HCGS\"  HEPATIC:   Lab Results   Component Value Date    ALBUMIN 4.2 10/22/2019    PROTTOTAL 8.2 10/22/2019    ALT 18 05/05/2023    AST 21 10/22/2019    ALKPHOS 65 10/22/2019    BILITOTAL 0.6 10/22/2019     OTHER:   Lab Results   Component Value Date    CODI 9.2 05/21/2023    TSH 1.35 10/22/2019       Anesthesia Plan    ASA Status:  3    NPO Status:  NPO Appropriate    Anesthesia Type: General.     - Airway: LMA              Consents    Anesthesia Plan(s) and associated risks, benefits, and realistic alternatives discussed. Questions answered and patient/representative(s) expressed understanding.     - Discussed:     - Discussed with:  Patient            Postoperative Care    Pain management: IV analgesics, Peripheral nerve block (Single Shot).   PONV prophylaxis: Ondansetron (or other 5HT-3), Dexamethasone or Solumedrol     Comments:    Other Comments: 2 puffs of inhaler prior to induction.    Patient declines Nerve block after discussion of RBA. All questions answered.    .25mcg/kg precedex, 4 g mag, dilaudid for pain.     GA LMA given Plavix use within the last 7 days.     If ETT patient requests smaller ETT plan for 7.0.     Decadron 10, Zofran 4, phenyl gtt prn, map goals >75 given history of CAD             Daniel Murphy MD  "

## 2024-02-17 ENCOUNTER — APPOINTMENT (OUTPATIENT)
Dept: PHYSICAL THERAPY | Facility: CLINIC | Age: 79
End: 2024-02-17
Attending: ORTHOPAEDIC SURGERY
Payer: MEDICARE

## 2024-02-17 VITALS
BODY MASS INDEX: 34.86 KG/M2 | TEMPERATURE: 97.7 F | HEART RATE: 63 BPM | DIASTOLIC BLOOD PRESSURE: 71 MMHG | RESPIRATION RATE: 16 BRPM | OXYGEN SATURATION: 96 % | HEIGHT: 68 IN | WEIGHT: 230 LBS | SYSTOLIC BLOOD PRESSURE: 129 MMHG

## 2024-02-17 PROBLEM — J38.7 THROAT ULCER: Status: ACTIVE | Noted: 2024-02-17

## 2024-02-17 LAB
FASTING STATUS PATIENT QL REPORTED: ABNORMAL
GLUCOSE SERPL-MCNC: 158 MG/DL (ref 70–99)
HGB BLD-MCNC: 12.3 G/DL (ref 13.3–17.7)
INR PPP: 1.02 (ref 0.85–1.15)
PLATELET # BLD AUTO: 223 10E3/UL (ref 150–450)

## 2024-02-17 PROCEDURE — 250N000011 HC RX IP 250 OP 636: Performed by: ORTHOPAEDIC SURGERY

## 2024-02-17 PROCEDURE — 99207 PR NO BILLABLE SERVICE THIS VISIT: CPT | Performed by: HOSPITALIST

## 2024-02-17 PROCEDURE — 82947 ASSAY GLUCOSE BLOOD QUANT: CPT | Performed by: ORTHOPAEDIC SURGERY

## 2024-02-17 PROCEDURE — 36415 COLL VENOUS BLD VENIPUNCTURE: CPT | Performed by: ORTHOPAEDIC SURGERY

## 2024-02-17 PROCEDURE — 250N000013 HC RX MED GY IP 250 OP 250 PS 637: Performed by: ORTHOPAEDIC SURGERY

## 2024-02-17 PROCEDURE — 85049 AUTOMATED PLATELET COUNT: CPT | Performed by: ORTHOPAEDIC SURGERY

## 2024-02-17 PROCEDURE — 85018 HEMOGLOBIN: CPT | Performed by: ORTHOPAEDIC SURGERY

## 2024-02-17 PROCEDURE — 85610 PROTHROMBIN TIME: CPT | Performed by: ORTHOPAEDIC SURGERY

## 2024-02-17 PROCEDURE — 250N000013 HC RX MED GY IP 250 OP 250 PS 637: Performed by: HOSPITALIST

## 2024-02-17 PROCEDURE — 97161 PT EVAL LOW COMPLEX 20 MIN: CPT | Mod: GP

## 2024-02-17 PROCEDURE — 97116 GAIT TRAINING THERAPY: CPT | Mod: GP

## 2024-02-17 PROCEDURE — 97110 THERAPEUTIC EXERCISES: CPT | Mod: GP

## 2024-02-17 PROCEDURE — 999N000111 HC STATISTIC OT IP EVAL DEFER

## 2024-02-17 RX ORDER — HYDROMORPHONE HYDROCHLORIDE 4 MG/1
2-4 TABLET ORAL EVERY 4 HOURS PRN
Status: DISCONTINUED | OUTPATIENT
Start: 2024-02-17 | End: 2024-02-17 | Stop reason: HOSPADM

## 2024-02-17 RX ORDER — ENOXAPARIN SODIUM 100 MG/ML
100 INJECTION SUBCUTANEOUS 2 TIMES DAILY
Start: 2024-02-20

## 2024-02-17 RX ADMIN — CLOPIDOGREL BISULFATE 75 MG: 75 TABLET ORAL at 08:14

## 2024-02-17 RX ADMIN — KETOROLAC TROMETHAMINE 15 MG: 30 INJECTION, SOLUTION INTRAMUSCULAR; INTRAVENOUS at 12:57

## 2024-02-17 RX ADMIN — ACETAMINOPHEN 975 MG: 325 TABLET ORAL at 02:54

## 2024-02-17 RX ADMIN — HYDROMORPHONE HYDROCHLORIDE 0.2 MG: 0.2 INJECTION, SOLUTION INTRAMUSCULAR; INTRAVENOUS; SUBCUTANEOUS at 07:46

## 2024-02-17 RX ADMIN — ACETAMINOPHEN 975 MG: 325 TABLET ORAL at 12:00

## 2024-02-17 RX ADMIN — KETOROLAC TROMETHAMINE 15 MG: 30 INJECTION, SOLUTION INTRAMUSCULAR; INTRAVENOUS at 00:13

## 2024-02-17 RX ADMIN — GABAPENTIN 1200 MG: 400 CAPSULE ORAL at 08:14

## 2024-02-17 RX ADMIN — ROSUVASTATIN CALCIUM 20 MG: 10 TABLET, FILM COATED ORAL at 08:14

## 2024-02-17 RX ADMIN — KETOROLAC TROMETHAMINE 15 MG: 30 INJECTION, SOLUTION INTRAMUSCULAR; INTRAVENOUS at 06:50

## 2024-02-17 RX ADMIN — LISINOPRIL 20 MG: 20 TABLET ORAL at 08:14

## 2024-02-17 RX ADMIN — GABAPENTIN 1200 MG: 400 CAPSULE ORAL at 13:00

## 2024-02-17 RX ADMIN — ASPIRIN 81 MG: 81 TABLET, COATED ORAL at 08:14

## 2024-02-17 RX ADMIN — SENNOSIDES AND DOCUSATE SODIUM 1 TABLET: 8.6; 5 TABLET ORAL at 08:14

## 2024-02-17 RX ADMIN — CEFAZOLIN SODIUM 2 G: 2 INJECTION, SOLUTION INTRAVENOUS at 02:55

## 2024-02-17 ASSESSMENT — ACTIVITIES OF DAILY LIVING (ADL)
ADLS_ACUITY_SCORE: 29

## 2024-02-17 NOTE — PROGRESS NOTES
I received a call from the internal medicine physician regarding a pharyngeal abrasion. I evaluated the patient and there is some erythema along the posterior pharynx and uvula. There was what appeared to be a scab with no active bleeding.Per Dr. Doyle, the abrasion looked improved compared to yesterday.    Patient complained of a sore throat and minor bleeding after eating a meal.  Patient denies difficulty breathing, swallowing and speaking. There is no documentation in the chart regarding a difficult airway or any trauma caused by the anesthesia team.    I suggested conservative management at this time. To eat softer foods and follow up with his PCP. I also advised that the patient return to the ED if he experiences increased bleeding, or difficulty swallowing, breathing or with phonation. I also recommended that the IM team touch base with the surgical team regarding anticoagulation.

## 2024-02-17 NOTE — PROGRESS NOTES
OT: Orders received. Chart reviewed and discussed with care team.  OT not indicated due to pt having good home setup w/ all necessary DME from previous surgery and is currently Mod I w/ ADLs, per PT.  Defer discharge recommendations to ortho team.  Will complete orders.

## 2024-02-17 NOTE — PROGRESS NOTES
02/17/24 0780   Appointment Info   Signing Clinician's Name / Credentials (PT) Sylvia Houser, PT, DPT   Living Environment   People in Home spouse   Current Living Arrangements house   Home Accessibility stairs to enter home   Number of Stairs, Main Entrance 1   Stair Railings, Main Entrance railing on left side (ascending)   Number of Stairs, Within Home, Primary none   Self-Care   Usual Activity Tolerance good   Current Activity Tolerance moderate   Equipment Currently Used at Home walker, rolling;other (see comments);cane, straight  (4WW)   Activity/Exercise/Self-Care Comment used cane prior to surgery due to pain   General Information   Onset of Illness/Injury or Date of Surgery 02/16/24   Referring Physician Alber Mcfarland MD   Patient/Family Therapy Goals Statement (PT) walk without pain   Pertinent History of Current Problem (include personal factors and/or comorbidities that impact the POC) s/p R TKA   Existing Precautions/Restrictions no pivoting or twisting   Weight-Bearing Status - LLE full weight-bearing   Weight-Bearing Status - RLE weight-bearing as tolerated   Range of Motion (ROM)   ROM Comment decreased ROM s/p R TKA   Strength (Manual Muscle Testing)   Strength Comments decreased strength s/p R TKA   Bed Mobility   Comment, (Bed Mobility) in chair   Transfers   Transfers sit-stand transfer   Sit-Stand Transfer   Sit-Stand [place] (Transfers) contact guard;verbal cues   Assistive Device (Sit-Stand Transfers) walker, front-wheeled   Gait/Stairs (Locomotion)   [place] Level (Gait) contact guard;verbal cues   Assistive Device (Gait) walker, front-wheeled   Distance in Feet (Gait) 10'   Pattern (Gait) step-to;step-through   Clinical Impression   Criteria for Skilled Therapeutic Intervention Yes, treatment indicated   PT Diagnosis (PT) impaired functional mobility   Influenced by the following impairments pain, decreased ROM, impaired balance, decreased strength   Functional limitations due  to impairments gait, stairs, transfers   Clinical Presentation (PT Evaluation Complexity) stable   Clinical Presentation Rationale pt presents as medically diagnosed   Clinical Decision Making (Complexity) low complexity   Planned Therapy Interventions (PT) balance training;bed mobility training;gait training;home exercise program;patient/family education;ROM (range of motion);stair training;strengthening;transfer training   Risk & Benefits of therapy have been explained care plan/treatment goals reviewed;patient   PT Total Evaluation Time   PT Eval, Low Complexity Minutes (41603) 10   Physical Therapy Goals   PT Frequency One time eval and treatment only   PT Predicted Duration/Target Date for Goal Attainment 02/17/24   PT Goals PT Goal 1;Gait;Transfers;Stairs   PT: Transfers Modified independent;Sit to/from stand;Assistive device;Goal Met   PT: Gait Modified independent;Rolling walker;Goal Met;100 feet   PT: Stairs 1 stair;Assistive device;Supervision/stand-by assist;Goal Met  (FWW, platform step)   PT: Goal 1 Independent with written HEP for LE strengthening and ROM, Goal Met   Interventions   Interventions Quick Adds Therapeutic Procedure;Therapeutic Activity;Gait Training   Therapeutic Procedure/Exercise   Ther. Procedure: strength, endurance, ROM, flexibillity Minutes (43670) 15   Treatment Detail/Skilled Intervention TKA protocol therex x10 reps, cueing for technique, Independent with written HEP following education, leg  for SLR (owns leg  at home)   Therapeutic Activity   Treatment Detail/Skilled Intervention sit to/from stand, cueing for safe hand placement and LE positioning, Mod I following education   Gait Training   Gait Training Minutes (67454) 24   Symptoms Noted During/After Treatment (Gait Training) increased pain   Treatment Detail/Skilled Intervention cueing for posture and breathing techniques for pain, pt steady, tolerates well, slow pace, step to patterning, STAIRS: 1 platform step  with FWW, up/down, cueing for non-reciprocal techniques, steady, SBA   Distance in Feet 160'   [place] Level (Gait Training) independent   Physical Assistance Level (Gait Training) supervision;verbal cues   Weight Bearing (Gait Training) weight-bearing as tolerated   Assistive Device (Gait Training) rolling walker   Pattern Analysis (Gait Training) swing-to gait   Gait Analysis Deviations decreased garth;decreased step length   PT Discharge Planning   PT Plan d/c PT   PT Discharge Recommendation (DC Rec) other (see comments)   PT Rationale for DC Rec defer to ortho   PT Brief overview of current status 160' with FWW, Mod I, 1 step with FWW, SBA   PT Equipment Needed at Discharge cane, straight;walker, rolling   Total Session Time   Timed Code Treatment Minutes 39   Total Session Time (sum of timed and untimed services) 49     Physical Therapy Discharge Summary    Reason for therapy discharge:    All goals and outcomes met, no further needs identified.    Progress towards therapy goal(s). [facility] for goal details.  Goals met    Therapy recommendation(s):    Continue home exercise program.

## 2024-02-17 NOTE — PLAN OF CARE
Patient vital signs are at baseline: No,  Reason:  O2 @ 2L.   Patient able to ambulate as they were prior to admission or with assist devices provided by therapies during their stay:  Yes, Up with stand -by assistance, gait bel, and walker.   Patient MUST void prior to discharge:  Yes  Patient able to tolerate oral intake:  Yes  Pain has adequate pain control using Oral analgesics:  NO, required IV Dilaudid for breakthrough pain.   Does patient have an identified :  Yes  Has goal D/C date and time been discussed with patient:  Yes  Goal Outcome Evaluation:

## 2024-02-17 NOTE — PROGRESS NOTES
"[facility]    Medicine Progress Note - Hospitalist Service    Date of Admission:  2/16/2024    Assessment & Plan   Jose Tejada is a 78 year old male admitted s/p R TKA.  He is clinically stable.  Course complicated by suspected ET tube trauma in the posterior oropharynx.  There is improvement on exam compared to yesterday.  Patient was evaluated at bedside with anaesthesia, who recommended monitoring for now.  They also communicated return precautions to the patient.     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  Given the bleeding associated with the ulceration in his oropharynx, I recommended to the patient that he hold bridging anticoagulation with enoxaparin for the next three days and to check in with this primary care team on 2/20/23 for an INR check and prior to starting bridging anticoagulation (if needed).  He can continue warfarin as prescribed.  I also placed recommendations for followup with this PCP to evaluate for the ulceration.          Diet: Advance Diet as Tolerated: Regular Diet Adult  Discharge Instruction - Regular Diet Adult    Avery Catheter: Not present  Lines: None     Cardiac Monitoring: None  Code Status: Full Code      Clinically Significant Risk Factors Present on Admission               # Drug Induced Coagulation Defect: home medication list includes an anticoagulant medication  # Drug Induced Platelet Defect: home medication list includes an antiplatelet medication   # Hypertension: Noted on problem list      # Obesity: Estimated body mass index is 34.97 kg/m  as calculated from the following:    Height as of this encounter: 1.727 m (5' 8\").    Weight as of this encounter: 104.3 kg (230 lb).       # Asthma: noted on problem list        Disposition Plan      Expected Discharge Date: 02/17/2024,  3:00 PM    Destination: home;home with family  Discharge Comments: 2L O2; throat abrasion            Alber Doyle, " MD  Hospitalist Service  [facility]  Securely message with [employer] (more info)  Text page via [employer] Paging/Directory   ______________________________________________________________________    Interval History   Patient reports he coughed up blood on a napkin.  He denies dysphagia, though he reports associated pain in the back of his throat.  Denies chest pain/pressure or dyspnea.     Physical Exam   Vital Signs: Temp: 97.7  F (36.5  C) Temp src: Oral BP: 129/71 Pulse: 63   Resp: 16 SpO2: 96 % O2 Device: None (Room air) Oxygen Delivery: 2 LPM  Weight: 230 lbs 0 oz    On exam, patient is alert and conversant.  HR is normal and regular in rhythm.  Posterior oropharynx is notable for an appx 5mm area of ulceration to the right of the uvula.  There is no active bleeding associated with the ulceration.  Overall there is interval improvement compared to exam from yesterday.       Medical Decision Making             Data   Reviewed:    INR 1  Hgb 12

## 2024-02-17 NOTE — PHARMACY-ANTICOAGULATION SERVICE
Clinical Pharmacy - Warfarin Dosing Consult     Pharmacy has been consulted to manage this patient s warfarin therapy.  Indication: DVT/PE Prophylaxis  Therapy Goal: INR 2-3  Provider/Team: NICHOLE  Warfarin Prior to Admission: Yes  Warfarin PTA Regimen: 5mg daily  Significant drug interactions: None  Dose Comments: Will resume home dose of 5mg today    INR   Date Value Ref Range Status   02/16/2024 1.02 0.85 - 1.15 Final   05/21/2023 1.29 (H) 0.85 - 1.15 Final       Recommend warfarin 5 mg today.  Pharmacy will monitor Jose Tejada daily and order warfarin doses to achieve specified goal.      Please contact pharmacy as soon as possible if the warfarin needs to be held for a procedure or if the warfarin goals change.

## 2024-02-17 NOTE — PROGRESS NOTES
"ADDENDUM:     I spoke to hospitalist, Dr. Doyle, about the patient's oropharynx. Has improving trauma from ETT, but was bleeding. Will plan to hold bridge enoxparin for a few days (Dr. Doyle has updated orders) as there is risk of bleeding into oropharynx with therapeutic anticoagulation. Plan will be for Mr. Tejada to follow up with PCP this week to get INR checked and to see if therapeutic anticoagulation is indicated for bridge.    Tima Horton MD  [facility]        Orthopedic Surgery Progress Note    Subjective: Jose reports overall pain is tolerable in the RLE. Has been up with PT. Has abrasion on back of throat from ETT yesterday. Hospitalist planning to evaluate.    Exam:  /71   Pulse 63   Temp 97.7  F (36.5  C) (Oral)   Resp 16   Ht 1.727 m (5' 8\")   Wt 104.3 kg (230 lb)   SpO2 96%   BMI 34.97 kg/m    Gen: Awake, alert, NAD  Resp: breathing equal and non-labored  Extremities:    RLE: Dressing c/d/i. AFO in place. Has sensation intact along medial ankle at baseline. Able to plantarflex. Does not dorsiflex at baseline. Foot wwp.    Labs:    Recent Labs   Lab 02/17/24  0640 02/16/24  1101   HGB 12.3*  --     265     Recent Labs   Lab 02/17/24  0659 02/16/24  1109 02/16/24  1101   CR  --   --  0.92   * 87  --      Recent Labs   Lab 02/17/24  0640 02/16/24  1056   INR 1.02 1.02   PTT  --  33     Assessment:   78 year old male s/p right TKA with Dr. Mcfarland 2/16/2024.    Plan:  Ortho Primary  Activity: Up with assist. Until independent.  Weight bearing status: WBAT.  Antibiotics: Ancef x 24 hours.  Diet: Begin with clear fluids and progress diet as tolerated.  DVT prophylaxis: Resume home warfarin. Lovenox bridge starts 48 hours postop. Mechanical while inpatient.  Wound Care: Keep dressing in place until follow up  Pain management: transition from IV to orals as tolerated.   Physical Therapy: ROM, ADL's.  Occupational Therapy: ADL's.  Labs: Trend Hgb. Hgb 12.3 " today.  Consults: PT, OT. [place], appreciate assistance in caring for this patient.  Follow-up: Clinic with Dr. Mcfarland Team in 2 weeks  Disposition: Pending progress with therapies, pain control on orals, and medical stability, anticipate discharge to home today or tomorrow.    Tima Horton MD  [facility]

## 2024-03-14 ENCOUNTER — TELEPHONE (OUTPATIENT)
Dept: CARDIOLOGY | Facility: CLINIC | Age: 79
End: 2024-03-14
Payer: MEDICARE

## 2024-03-14 NOTE — TELEPHONE ENCOUNTER
[facility] Call Center    Phone Message    May a detailed message be left on voicemail: yes     Reason for Call: Other: pt is wanting to know if he can take ibuprofen and how much. Please call pt back to discuss.      Action Taken: Other: cardiology     Travel Screening: Not Applicable                             Thank you!  [facility]

## 2024-03-14 NOTE — TELEPHONE ENCOUNTER
Called Pt and informed him he needs to call PMD for pain medication, Ibuprofen would not be good for new knee, swelling or because he is on warfarin. MARIA R Redd RN

## 2024-04-22 ENCOUNTER — HOSPITAL ENCOUNTER (OUTPATIENT)
Dept: CARDIOLOGY | Facility: CLINIC | Age: 79
Discharge: HOME | End: 2024-04-22
Attending: INTERNAL MEDICINE | Admitting: INTERNAL MEDICINE
Payer: MEDICARE

## 2024-04-22 DIAGNOSIS — I25.10 CORONARY ARTERY DISEASE INVOLVING NATIVE CORONARY ARTERY OF NATIVE HEART WITHOUT ANGINA PECTORIS: ICD-10-CM

## 2024-04-22 LAB — LVEF ECHO: NORMAL

## 2024-04-22 PROCEDURE — 93306 TTE W/DOPPLER COMPLETE: CPT | Mod: 26 | Performed by: INTERNAL MEDICINE

## 2024-04-22 PROCEDURE — 93306 TTE W/DOPPLER COMPLETE: CPT

## 2024-05-01 ENCOUNTER — OFFICE VISIT (OUTPATIENT)
Dept: CARDIOLOGY | Facility: CLINIC | Age: 79
End: 2024-05-01
Attending: INTERNAL MEDICINE
Payer: MEDICARE

## 2024-05-01 VITALS
SYSTOLIC BLOOD PRESSURE: 122 MMHG | DIASTOLIC BLOOD PRESSURE: 78 MMHG | OXYGEN SATURATION: 96 % | HEIGHT: 68 IN | BODY MASS INDEX: 31.08 KG/M2 | HEART RATE: 70 BPM | WEIGHT: 205.1 LBS

## 2024-05-01 DIAGNOSIS — I25.10 CORONARY ARTERY DISEASE INVOLVING NATIVE CORONARY ARTERY OF NATIVE HEART WITHOUT ANGINA PECTORIS: ICD-10-CM

## 2024-05-01 PROCEDURE — 99213 OFFICE O/P EST LOW 20 MIN: CPT | Performed by: INTERNAL MEDICINE

## 2024-05-01 NOTE — LETTER
5/1/2024    Sara Jhaveri, CNP  [address]  [facility] [place] [address]    RE: Jose Noblemoon       Dear Colleague,     I had the pleasure of seeing Jose Tejada in the [facility].  HPI and Plan:   HPI and Plan:   I the pleasure of seeing Mr. Tejada in follow-up.  We met in 1996 when he had his first coronary event since that time he has done well his last angiogram was a year ago he had a stent to the LAD which is patent he had stents in the RCA but it closed and became a  of the right were able to open it and following the last angiogram last year the right coronary was open with no more than moderate disease however the ostium of the right PDA is moderate to mildly severely narrowed and the left circumflex has a calcified moderate lesion because those lesions were stable from the angiogram before last year we have not recommended intervention he reports [place] no anginal symptoms and doing well.    Today reviewed his echocardiogram also he has an ascending aortic aneurysm fortunately it is barely changed size since 2019 currently reports aortic root 42 mm ascending aorta 45 mm the ejection fraction is normal at 55 to 60% without wall motion abnormality and no valve disease is seen.  The patient has not had a cholesterol panel yet we reviewed last year as his HDL is a little bit low normal is in the 40s and dropped below that LDL and triglycerides are excellent.  Will be having a office visit with his internist later this year otherwise I told him to give us a call and we can do it next year.    Patient reports no cardiovascular symptoms whatsoever he has been a most kind gentleman but enjoyed taking care of him.  Will schedule him to come back next year I do not anticipate another echo or image of his aorta for at least 2 years.  Should he have recurrent anginal symptoms we recommend angiography and not a stress test.  Today's visit was 25 minutes all counseling  and reviewing the actual angiogram pictures from last year and the echocardiogram from this year.  I did not talk to him slightly about extra virgin olive oil as a natural approach to try to raise HDL he can add a teaspoon a day to his salad dressing etc. if he wishes to try that    No orders of the defined types were placed in this encounter.    No orders of the defined types were placed in this encounter.    There are no discontinued medications.      Encounter Diagnosis   Name Primary?    Coronary artery disease involving native coronary artery of native heart without angina pectoris        CURRENT MEDICATIONS:  Current Outpatient Medications   Medication Sig Dispense Refill    acetaminophen (TYLENOL) 500 MG tablet Take 1-2 tablets (500-1,000 mg) by mouth every 6 hours as needed for other (For optimal non-opioid multimodal pain management to improve pain control.) Max 3000mg/24 hours. 60 tablet 0    albuterol (PROAIR HFA/PROVENTIL HFA/VENTOLIN HFA) 108 (90 Base) MCG/ACT inhaler Inhale 2 puffs into the lungs every 4 hours as needed for shortness of breath / dyspnea or wheezing doesn't have current RX      benzocaine-menthol (CEPACOL) 15-3.6 MG lozenge Place 1 lozenge inside cheek every 2 hours as needed for sore throat 36 lozenge 0    clopidogrel (PLAVIX) 75 MG tablet Take 1 tablet (75 mg) by mouth daily 30 tablet 0    Emollient (VANICREAM) LOTN APPLY THIN LAYER TOPICALLY EVERY DAY FOR DRY SKIN, FOR DERMATITIS  TO AREAS OF DRY SKIN, IDEALLY WITHIN 3 MINUTES AFTER BATH OR SHOWER. FOR DRY SKIN, FOR DERMATITIS  TO AREAS OF DRY SKIN, IDEALLY WITHIN 3 MINUTES AFTER BATH OR SHOWER.      enoxaparin ANTICOAGULANT (LOVENOX) 100 MG/ML syringe Inject 1 mL (100 mg) Subcutaneous 2 times daily      gabapentin (NEURONTIN) 400 MG capsule Take 1,200 mg by mouth 3 times daily      lisinopril (PRINIVIL/ZESTRIL) 20 MG tablet Take 1 tablet (20 mg) by mouth daily 30 tablet 0    meclizine (ANTIVERT) 12.5 MG tablet Take 2 tablets (25 mg)  by mouth 4 times daily as needed for dizziness 15 tablet 0    methocarbamol (ROBAXIN) 500 MG tablet Take 1 tablet (500 mg) by mouth 4 times daily as needed for muscle spasms 30 tablet 0    mirtazapine (REMERON) 7.5 MG tablet Take 7.5 mg by mouth nightly as needed (headache)      nitroGLYcerin (NITROSTAT) 0.4 MG sublingual tablet For chest pain place 1 tablet under the tongue every 5 minutes for 3 doses. If symptoms persist 5 minutes after 1st dose call 911. 20 tablet 0    omeprazole (PRILOSEC) 20 MG CR capsule Take 20 mg by mouth At Bedtime       rosuvastatin (CRESTOR) 20 MG tablet Take 1 tablet (20 mg) by mouth daily 90 tablet 3    SENNA-docusate sodium (SENNA S) 8.6-50 MG tablet Take 1 tablet by mouth 2 times daily as needed (constipation) 30 tablet 0    warfarin ANTICOAGULANT (COUMADIN ANTICOAGULANT) 5 MG tablet Take 5 mg by mouth daily          ALLERGIES     Allergies   Allergen Reactions    Metoprolol      Fatigue and bradycardia    Niacin Other (See Comments)     Other reaction(s): Flushing        Zonisamide GI Disturbance    Norvasc [Amlodipine] Rash    Septra [Sulfamethoxazole W-Trimethoprim] Rash    Sulfa Antibiotics Rash       PAST MEDICAL HISTORY:  Past Medical History:   Diagnosis Date    Aneurysm of thoracic aorta (H24)     Antiplatelet or antithrombotic long-term use     Arthritis     Asthma     CAD (coronary artery disease)     1996 angioplasty and stent to the prox LAD, PTCA with intracoronary stent placement of RCA, 70%OM; 10/24/19 Cath: Occluded RCA, prox circumflex lesion - pd/pa not significant,2023 cath: Lad and RCA stents open(RCA was ), mod/sev lesion Lcx, RPDA medical rx    Depressive disorder     DVT (deep venous thrombosis) (H)     Factor 5 Leiden mutation, heterozygous (H24)     GERD (gastroesophageal reflux disease)     Heart attack (H)     HTN (hypertension)     Hyperlipidemia     Neuropathy     wears brace R foot for drop foot    Obese     PRIMITIVO (obstructive sleep apnea)     cpap     Protein C deficiency (H24)     Spinal stenosis     Stented coronary artery        PAST SURGICAL HISTORY:  Past Surgical History:   Procedure Laterality Date    APPENDECTOMY      APPENDECTOMY OPEN  1958    ARTHROPLASTY HIP Left 1997    ARTHROPLASTY KNEE Right 2/16/2024    Procedure: RIGHT TOTAL KNEE ARTHROPLASTY;  Surgeon: Alber Mcfarland MD;  Location: [facility] Main OR    ARTHROPLASTY REVISION HIP Left 2013    ARTHROPLASTY REVISION HIP Left     ARTHROPLASTY REVISION HIP Left 11/19/2021    Procedure: REVISION LEFT TOTAL HIP ARTHROPLASTY;  Surgeon: Alber Mcfarland MD;  Location: [facility] OR    AS SPINAL FUSION,ANT,EA ADNL LEVEL  2010    L4-L5    CORONARY STENT PLACEMENT  1996    CV CORONARY ANGIOGRAM N/A 10/24/2019    Procedure: Coronary Angiogram;  Surgeon: Valdemar Hinojosa MD;  Location: [facility] CARDIAC CATH LAB    CV CORONARY ANGIOGRAM  1996 1996 angioplasty and stent to the prox LAD, PTCA with intracoronary stent placement of RCA, 70%OM    CV CORONARY ANGIOGRAM N/A 2/28/2020    Procedure: Coronary Angiogram 2nd attempt of RCA  successful--(lad stent ok, Lcx 70% with normal iFR)  Surgeon: Gurvinder Yun MD;  Location: [facility] CARDIAC CATH LAB    CV CORONARY ANGIOGRAM N/A 5/17/2023    Procedure: Coronary Angiogram;  Surgeon: Je Rodriguez MD;  Location: [facility] CARDIAC CATH LAB    CV FRACTIONAL FLOW RATIO WIRE N/A 10/24/2019    Procedure: Fractional Flow [place];  Surgeon: Valdemar Hinojosa MD;  Location: [facility] CARDIAC CATH LAB    CV LEFT HEART CATH Bilateral 1/16/2020    Procedure: Left Heart Cath w/wo Left Ventriculogram  ANGIOPLASTY WITH BOSTON SCI REP PRESENT;  Surgeon: Gurvinder Yun MD;  Location:  HEART CARDIAC CATH LAB    SPINAL FUSION  2010    L4-5    TOTAL HIP ARTHROPLASTY Left     TOTAL HIP ARTHROPLASTY Right 2/10/2016    Procedure:  RIGHT HIP TOTAL ARTHROPLASTY;  Surgeon: Jose Gaytan MD;  Location: [facility];  Service:     [facility] TOTAL HIP  ARTHROPLASTY Right 2015    THR       FAMILY HISTORY:  Family History   Problem Relation Age of Onset    Myocardial Infarction Father     Heart Disease Maternal Grandfather        SOCIAL HISTORY:  Social History     Socioeconomic History    Marital status:      Spouse name: None    Number of children: None    Years of education: None    Highest education level: None   Tobacco Use    Smoking status: Former     Current packs/day: 0.00     Types: Cigarettes     Quit date: 3/23/1987     Years since quittin.1    Smokeless tobacco: Never    Tobacco comments:     Smokes cigars once every 2-3 years   Substance and Sexual Activity    Alcohol use: Yes     Comment: Rarely    Drug use: Never   Other Topics Concern    Caffeine Concern No     Comment: 1 -2 cups daily     Special Diet No    Exercise No     Social Determinants of Health     Financial Resource Strain: Low Risk  (2024)    [facility], [facility]    Financial Resource Strain     Difficulty of Paying Living Expenses: 3   Food Insecurity: No Food Insecurity (2024)    Received from [facility] [employer] [facility], [facility] [employer] [facility]    Food Insecurity     Worried About Running Out of Food in the Last Year: 1   Transportation Needs: No Transportation Needs (2024)    [facility] & [facility]    Transportation Needs     Lack of Transportation (Medical): 1   Social Connections: Socially Integrated (2024)    [facility], [facility] & [facility]    Social Connections     Frequency of Communication with Friends and Family: 0   Housing Stability: Low Risk  (2024)    [facility], [facility] [employer] [facility] [employer] [facility]    Housing  "Stability     Unable to Pay for Housing in the Last Year: 1       Review of Systems:  Skin:        Eyes:       ENT:       Respiratory:  Positive for sleep apnea;CPAP  Cardiovascular:    Positive for;fatigue  Gastroenterology:      Genitourinary:       Musculoskeletal:       Neurologic:       Psychiatric:       Heme/Lymph/Imm:       Endocrine:         Physical Exam:  Vitals: /78 (BP Location: Right arm, Patient Position: Sitting, Cuff Size: Adult Regular)   Pulse 70   Ht 1.727 m (5' 8\")   Wt 93 kg (205 lb 1.6 oz)   SpO2 96%   BMI 31.19 kg/m   Orthostatic Vitals from 04/29/24 0850 to 05/01/24 0850    Date and Time Orthostatic BP Orthostatic Pulse Patient Position BP   Location Cuff Size   05/01/24 0812 -- -- Sitting Right arm Adult Regular         Constitutional:           Skin:             Head:           Eyes:           Lymph:      ENT:           Neck:           Respiratory:            Cardiac:                                                           GI:           Extremities and Muscular Skeletal:                 Neurological:           Psych:         Recent Lab Results:  LIPID RESULTS:  Lab Results   Component Value Date    CHOL 97 05/05/2023    CHOL 99 01/16/2020    HDL 32 (L) 05/05/2023    HDL 40 01/16/2020    LDL 46 05/05/2023    LDL 39 01/16/2020    TRIG 93 05/05/2023    TRIG 101 01/16/2020    CHOLHDLRATIO 3.5 04/21/2014       LIVER ENZYME RESULTS:  Lab Results   Component Value Date    AST 21 10/22/2019    ALT 18 05/05/2023    ALT 20 12/20/2019       CBC RESULTS:  Lab Results   Component Value Date    WBC 6.0 05/21/2023    WBC 4.7 02/28/2020    RBC 4.41 05/21/2023    RBC 3.92 (L) 02/28/2020    HGB 12.3 (L) 02/17/2024    HGB 12.4 (L) 02/28/2020    HCT 40.7 05/21/2023    HCT 36.7 (L) 02/28/2020    MCV 92 05/21/2023    MCV 94 02/28/2020    MCH 31.3 05/21/2023    MCH 31.6 02/28/2020    MCHC 33.9 05/21/2023    MCHC 33.8 02/28/2020    RDW 13.0 05/21/2023    RDW 12.9 02/28/2020     02/17/2024    " " 02/28/2020       BMP RESULTS:  Lab Results   Component Value Date     05/21/2023     (L) 03/04/2020    POTASSIUM 4.2 05/21/2023    POTASSIUM 4.2 11/11/2021    POTASSIUM 4.7 03/04/2020    CHLORIDE 103 05/21/2023    CHLORIDE 102 11/11/2021    CHLORIDE 100 03/04/2020    CO2 23 05/21/2023    CO2 26 11/11/2021    CO2 28 03/04/2020    ANIONGAP 10 05/21/2023    ANIONGAP 8 11/11/2021    ANIONGAP 10.7 03/04/2020     (H) 02/17/2024    GLC 87 02/16/2024    GLC 96 11/21/2021     03/04/2020    BUN 10.4 05/21/2023    BUN 9 11/11/2021    BUN 7 03/04/2020    CR 0.92 02/16/2024    CR 0.89 03/04/2020    GFRESTIMATED 86 02/16/2024    GFRESTIMATED 84 03/04/2020    GFRESTBLACK >90 03/04/2020    CODI 9.2 05/21/2023    CODI 9.7 03/04/2020        A1C RESULTS:  No results found for: \"A1C\"    INR RESULTS:  Lab Results   Component Value Date    INR 1.02 02/17/2024    INR 1.02 02/16/2024    INR 2.00 (H) 03/06/2020    INR 1.48 (H) 03/04/2020           ALICIA Lawson PA-C  [address] [place] [address]    No orders of the defined types were placed in this encounter.    No orders of the defined types were placed in this encounter.    There are no discontinued medications.      Encounter Diagnosis   Name Primary?    Coronary artery disease involving native coronary artery of native heart without angina pectoris        CURRENT MEDICATIONS:  Current Outpatient Medications   Medication Sig Dispense Refill    acetaminophen (TYLENOL) 500 MG tablet Take 1-2 tablets (500-1,000 mg) by mouth every 6 hours as needed for other (For optimal non-opioid multimodal pain management to improve pain control.) Max 3000mg/24 hours. 60 tablet 0    albuterol (PROAIR HFA/PROVENTIL HFA/VENTOLIN HFA) 108 (90 Base) MCG/ACT inhaler Inhale 2 puffs into the lungs every 4 hours as needed for shortness of breath / dyspnea or wheezing doesn't have current RX      benzocaine-menthol (CEPACOL) 15-3.6 MG lozenge Place 1 lozenge inside " cheek every 2 hours as needed for sore throat 36 lozenge 0    clopidogrel (PLAVIX) 75 MG tablet Take 1 tablet (75 mg) by mouth daily 30 tablet 0    Emollient (VANICREAM) LOTN APPLY THIN LAYER TOPICALLY EVERY DAY FOR DRY SKIN, FOR DERMATITIS  TO AREAS OF DRY SKIN, IDEALLY WITHIN 3 MINUTES AFTER BATH OR SHOWER. FOR DRY SKIN, FOR DERMATITIS  TO AREAS OF DRY SKIN, IDEALLY WITHIN 3 MINUTES AFTER BATH OR SHOWER.      enoxaparin ANTICOAGULANT (LOVENOX) 100 MG/ML syringe Inject 1 mL (100 mg) Subcutaneous 2 times daily      gabapentin (NEURONTIN) 400 MG capsule Take 1,200 mg by mouth 3 times daily      lisinopril (PRINIVIL/ZESTRIL) 20 MG tablet Take 1 tablet (20 mg) by mouth daily 30 tablet 0    meclizine (ANTIVERT) 12.5 MG tablet Take 2 tablets (25 mg) by mouth 4 times daily as needed for dizziness 15 tablet 0    methocarbamol (ROBAXIN) 500 MG tablet Take 1 tablet (500 mg) by mouth 4 times daily as needed for muscle spasms 30 tablet 0    mirtazapine (REMERON) 7.5 MG tablet Take 7.5 mg by mouth nightly as needed (headache)      nitroGLYcerin (NITROSTAT) 0.4 MG sublingual tablet For chest pain place 1 tablet under the tongue every 5 minutes for 3 doses. If symptoms persist 5 minutes after 1st dose call 911. 20 tablet 0    omeprazole (PRILOSEC) 20 MG CR capsule Take 20 mg by mouth At Bedtime       rosuvastatin (CRESTOR) 20 MG tablet Take 1 tablet (20 mg) by mouth daily 90 tablet 3    SENNA-docusate sodium (SENNA S) 8.6-50 MG tablet Take 1 tablet by mouth 2 times daily as needed (constipation) 30 tablet 0    warfarin ANTICOAGULANT (COUMADIN ANTICOAGULANT) 5 MG tablet Take 5 mg by mouth daily          ALLERGIES     Allergies   Allergen Reactions    Metoprolol      Fatigue and bradycardia    Niacin Other (See Comments)     Other reaction(s): Flushing        Zonisamide GI Disturbance    Norvasc [Amlodipine] Rash    Septra [Sulfamethoxazole W-Trimethoprim] Rash    Sulfa Antibiotics Rash       PAST MEDICAL HISTORY:  Past Medical  History:   Diagnosis Date    Aneurysm of thoracic aorta (H24)     Antiplatelet or antithrombotic long-term use     Arthritis     Asthma     CAD (coronary artery disease)     1996 angioplasty and stent to the prox LAD, PTCA with intracoronary stent placement of RCA, 70%OM; 10/24/19 Cath: Occluded RCA, prox circumflex lesion - pd/pa not significant,2023 cath: Lad and RCA stents open(RCA was ), mod/sev lesion Lcx, RPDA medical rx    Depressive disorder     DVT (deep venous thrombosis) (H)     Factor 5 Leiden mutation, heterozygous (H24)     GERD (gastroesophageal reflux disease)     Heart attack (H)     HTN (hypertension)     Hyperlipidemia     Neuropathy     wears brace R foot for drop foot    Obese     PRIMITIVO (obstructive sleep apnea)     cpap    Protein C deficiency (H24)     Spinal stenosis     Stented coronary artery        PAST SURGICAL HISTORY:  Past Surgical History:   Procedure Laterality Date    APPENDECTOMY      APPENDECTOMY OPEN  1958    ARTHROPLASTY HIP Left 1997    ARTHROPLASTY KNEE Right 2/16/2024    Procedure: RIGHT TOTAL KNEE ARTHROPLASTY;  Surgeon: Alber Mcfarland MD;  Location: [facility] Main OR    ARTHROPLASTY REVISION HIP Left 2013    ARTHROPLASTY REVISION HIP Left     ARTHROPLASTY REVISION HIP Left 11/19/2021    Procedure: REVISION LEFT TOTAL HIP ARTHROPLASTY;  Surgeon: Alber Mcfarland MD;  Location: [facility] Main OR    AS SPINAL FUSION,ANT,EA ADNL LEVEL  2010    L4-L5    CORONARY STENT PLACEMENT  1996    CV CORONARY ANGIOGRAM N/A 10/24/2019    Procedure: Coronary Angiogram;  Surgeon: Valdemar Hinojosa MD;  Location: [facility] CARDIAC CATH LAB    CV CORONARY ANGIOGRAM  1996 1996 angioplasty and stent to the prox LAD, PTCA with intracoronary stent placement of RCA, 70%OM    CV CORONARY ANGIOGRAM N/A 2/28/2020    Procedure: Coronary Angiogram 2nd attempt of RCA  successful--(lad stent ok, Lcx 70% with normal iFR)  Surgeon: Gurvinder Yun MD;  Location:  HEART CARDIAC CATH LAB    CV  CORONARY ANGIOGRAM N/A 2023    Procedure: Coronary Angiogram;  Surgeon: Je Rodriguez MD;  Location:  HEART CARDIAC CATH LAB    CV FRACTIONAL FLOW RATIO WIRE N/A 10/24/2019    Procedure: Fractional Flow [place];  Surgeon: Valdemar Hinojosa MD;  Location:  HEART CARDIAC CATH LAB    CV LEFT HEART CATH Bilateral 2020    Procedure: Left Heart Cath w/wo Left Ventriculogram  ANGIOPLASTY WITH B[place]ON SCI REP PRESENT;  Surgeon: Gurvinder Yun MD;  Location:  HEART CARDIAC CATH LAB    SPINAL FUSION      L4-5    TOTAL HIP ARTHROPLASTY Left     TOTAL HIP ARTHROPLASTY Right 2/10/2016    Procedure:  RIGHT HIP TOTAL ARTHROPLASTY;  Surgeon: Jose Gaytan MD;  Location: [facility] OR;  Service:     [facility] TOTAL HIP ARTHROPLASTY Right 2015    THR       FAMILY HISTORY:  Family History   Problem Relation Age of Onset    Myocardial Infarction Father     Heart Disease Maternal Grandfather        SOCIAL HISTORY:  Social History     Socioeconomic History    Marital status:      Spouse name: None    Number of children: None    Years of education: None    Highest education level: None   Tobacco Use    Smoking status: Former     Current packs/day: 0.00     Types: Cigarettes     Quit date: 3/23/1987     Years since quittin.1    Smokeless tobacco: Never    Tobacco comments:     Smokes cigars once every 2-3 years   Substance and Sexual Activity    Alcohol use: Yes     Comment: Rarely    Drug use: Never   Other Topics Concern    Caffeine Concern No     Comment: 1 -2 cups daily     Special Diet No    Exercise No     Social Determinants of Health     Financial Resource Strain: Low Risk  (2024)    Received from [facility] [employer] [facility], [facility] [employer] [facility]    Financial Resource Strain     Difficulty of Paying Living Expenses: 3   Food Insecurity: No Food Insecurity (2024)    [facility] [employer] [facility], [facility]  "[facility]    Food Insecurity     Worried About Running Out of Food in the Last Year: 1   Transportation Needs: No Transportation Needs (2/2/2024)    Received from [facility], [facility]    Transportation Needs     Lack of Transportation (Medical): 1   Social Connections: Socially Integrated (2/2/2024)    Received from [facility], [facility]    Social Connections     Frequency of Communication with Friends and Family: 0   Housing Stability: Low Risk  (2/2/2024)    Received from [facility], [facility]    Housing Stability     Unable to Pay for Housing in the Last Year: 1       Review of Systems:  Skin:        Eyes:       ENT:       Respiratory:  Positive for sleep apnea;CPAP  Cardiovascular:    Positive for;fatigue  Gastroenterology:      Genitourinary:       Musculoskeletal:       Neurologic:       Psychiatric:       Heme/Lymph/Imm:       Endocrine:         Physical Exam:  Vitals: /78 (BP Location: Right arm, Patient Position: Sitting, Cuff Size: Adult Regular)   Pulse 70   Ht 1.727 m (5' 8\")   Wt 93 kg (205 lb 1.6 oz)   SpO2 96%   BMI 31.19 kg/m   Orthostatic Vitals from 04/29/24 0850 to 05/01/24 0850    Date and Time Orthostatic BP Orthostatic Pulse Patient Position BP   Location Cuff Size   05/01/24 0812 -- -- Sitting Right arm Adult Regular         Constitutional:           Skin:             Head:           Eyes:           Lymph:      ENT:           Neck:           Respiratory:            Cardiac:                                                           GI:           Extremities and Muscular Skeletal:                 Neurological:           Psych:         Recent Lab Results:  LIPID RESULTS:  Lab Results   Component Value Date    CHOL 97 05/05/2023    CHOL 99 01/16/2020    " "HDL 32 (L) 05/05/2023    HDL 40 01/16/2020    LDL 46 05/05/2023    LDL 39 01/16/2020    TRIG 93 05/05/2023    TRIG 101 01/16/2020    CHOLHDLRATIO 3.5 04/21/2014       LIVER ENZYME RESULTS:  Lab Results   Component Value Date    AST 21 10/22/2019    ALT 18 05/05/2023    ALT 20 12/20/2019       CBC RESULTS:  Lab Results   Component Value Date    WBC 6.0 05/21/2023    WBC 4.7 02/28/2020    RBC 4.41 05/21/2023    RBC 3.92 (L) 02/28/2020    HGB 12.3 (L) 02/17/2024    HGB 12.4 (L) 02/28/2020    HCT 40.7 05/21/2023    HCT 36.7 (L) 02/28/2020    MCV 92 05/21/2023    MCV 94 02/28/2020    MCH 31.3 05/21/2023    MCH 31.6 02/28/2020    MCHC 33.9 05/21/2023    MCHC 33.8 02/28/2020    RDW 13.0 05/21/2023    RDW 12.9 02/28/2020     02/17/2024     02/28/2020       BMP RESULTS:  Lab Results   Component Value Date     05/21/2023     (L) 03/04/2020    POTASSIUM 4.2 05/21/2023    POTASSIUM 4.2 11/11/2021    POTASSIUM 4.7 03/04/2020    CHLORIDE 103 05/21/2023    CHLORIDE 102 11/11/2021    CHLORIDE 100 03/04/2020    CO2 23 05/21/2023    CO2 26 11/11/2021    CO2 28 03/04/2020    ANIONGAP 10 05/21/2023    ANIONGAP 8 11/11/2021    ANIONGAP 10.7 03/04/2020     (H) 02/17/2024    GLC 87 02/16/2024    GLC 96 11/21/2021     03/04/2020    BUN 10.4 05/21/2023    BUN 9 11/11/2021    BUN 7 03/04/2020    CR 0.92 02/16/2024    CR 0.89 03/04/2020    GFRESTIMATED 86 02/16/2024    GFRESTIMATED 84 03/04/2020    GFRESTBLACK >90 03/04/2020    CODI 9.2 05/21/2023    CODI 9.7 03/04/2020        A1C RESULTS:  No results found for: \"A1C\"    INR RESULTS:  Lab Results   Component Value Date    INR 1.02 02/17/2024    INR 1.02 02/16/2024    INR 2.00 (H) 03/06/2020    INR 1.48 (H) 03/04/2020           CC  Melissa Lawson, PA-C  [address] [place] [address]      Thank you for allowing me to participate in the care of your patient.      Sincerely,     Gurvinder Yun MD     [facility]" [facility]

## 2024-05-01 NOTE — PROGRESS NOTES
HPI and Plan:   HPI and Plan:   I the pleasure of seeing Mr. Tejada in follow-up.  We met in 1996 when he had his first coronary event since that time he has done well his last angiogram was a year ago he had a stent to the LAD which is patent he had stents in the RCA but it closed and became a  of the right were able to open it and following the last angiogram last year the right coronary was open with no more than moderate disease however the ostium of the right PDA is moderate to mildly severely narrowed and the left circumflex has a calcified moderate lesion because those lesions were stable from the angiogram before last year we have not recommended intervention he reports [place] no anginal symptoms and doing well.    Today reviewed his echocardiogram also he has an ascending aortic aneurysm fortunately it is barely changed size since 2019 currently reports aortic root 42 mm ascending aorta 45 mm the ejection fraction is normal at 55 to 60% without wall motion abnormality and no valve disease is seen.  The patient has not had a cholesterol panel yet we reviewed last year as his HDL is a little bit low normal is in the 40s and dropped below that LDL and triglycerides are excellent.  Will be having a office visit with his internist later this year otherwise I told him to give us a call and we can do it next year.    Patient reports no cardiovascular symptoms whatsoever he has been a most kind gentleman but enjoyed taking care of him.  Will schedule him to come back next year I do not anticipate another echo or image of his aorta for at least 2 years.  Should he have recurrent anginal symptoms we recommend angiography and not a stress test.  Today's visit was 25 minutes all counseling and reviewing the actual angiogram pictures from last year and the echocardiogram from this year.  I did not talk to him slightly about extra virgin olive oil as a natural approach to try to raise HDL he can add a teaspoon a day  to his salad dressing etc. if he wishes to try that    No orders of the defined types were placed in this encounter.    No orders of the defined types were placed in this encounter.    There are no discontinued medications.      Encounter Diagnosis   Name Primary?    Coronary artery disease involving native coronary artery of native heart without angina pectoris        CURRENT MEDICATIONS:  Current Outpatient Medications   Medication Sig Dispense Refill    acetaminophen (TYLENOL) 500 MG tablet Take 1-2 tablets (500-1,000 mg) by mouth every 6 hours as needed for other (For optimal non-opioid multimodal pain management to improve pain control.) Max 3000mg/24 hours. 60 tablet 0    albuterol (PROAIR HFA/PROVENTIL HFA/VENTOLIN HFA) 108 (90 Base) MCG/ACT inhaler Inhale 2 puffs into the lungs every 4 hours as needed for shortness of breath / dyspnea or wheezing doesn't have current RX      benzocaine-menthol (CEPACOL) 15-3.6 MG lozenge Place 1 lozenge inside cheek every 2 hours as needed for sore throat 36 lozenge 0    clopidogrel (PLAVIX) 75 MG tablet Take 1 tablet (75 mg) by mouth daily 30 tablet 0    Emollient (VANICREAM) LOTN APPLY THIN LAYER TOPICALLY EVERY DAY FOR DRY SKIN, FOR DERMATITIS  TO AREAS OF DRY SKIN, IDEALLY WITHIN 3 MINUTES AFTER BATH OR SHOWER. FOR DRY SKIN, FOR DERMATITIS  TO AREAS OF DRY SKIN, IDEALLY WITHIN 3 MINUTES AFTER BATH OR SHOWER.      enoxaparin ANTICOAGULANT (LOVENOX) 100 MG/ML syringe Inject 1 mL (100 mg) Subcutaneous 2 times daily      gabapentin (NEURONTIN) 400 MG capsule Take 1,200 mg by mouth 3 times daily      lisinopril (PRINIVIL/ZESTRIL) 20 MG tablet Take 1 tablet (20 mg) by mouth daily 30 tablet 0    meclizine (ANTIVERT) 12.5 MG tablet Take 2 tablets (25 mg) by mouth 4 times daily as needed for dizziness 15 tablet 0    methocarbamol (ROBAXIN) 500 MG tablet Take 1 tablet (500 mg) by mouth 4 times daily as needed for muscle spasms 30 tablet 0    mirtazapine (REMERON) 7.5 MG tablet  Take 7.5 mg by mouth nightly as needed (headache)      nitroGLYcerin (NITROSTAT) 0.4 MG sublingual tablet For chest pain place 1 tablet under the tongue every 5 minutes for 3 doses. If symptoms persist 5 minutes after 1st dose call 911. 20 tablet 0    omeprazole (PRILOSEC) 20 MG CR capsule Take 20 mg by mouth At Bedtime       rosuvastatin (CRESTOR) 20 MG tablet Take 1 tablet (20 mg) by mouth daily 90 tablet 3    SENNA-docusate sodium (SENNA S) 8.6-50 MG tablet Take 1 tablet by mouth 2 times daily as needed (constipation) 30 tablet 0    warfarin ANTICOAGULANT (COUMADIN ANTICOAGULANT) 5 MG tablet Take 5 mg by mouth daily          ALLERGIES     Allergies   Allergen Reactions    Metoprolol      Fatigue and bradycardia    Niacin Other (See Comments)     Other reaction(s): Flushing        Zonisamide GI Disturbance    Norvasc [Amlodipine] Rash    Septra [Sulfamethoxazole W-Trimethoprim] Rash    Sulfa Antibiotics Rash       PAST MEDICAL HISTORY:  Past Medical History:   Diagnosis Date    Aneurysm of thoracic aorta (H24)     Antiplatelet or antithrombotic long-term use     Arthritis     Asthma     CAD (coronary artery disease)     1996 angioplasty and stent to the prox LAD, PTCA with intracoronary stent placement of RCA, 70%OM; 10/24/19 Cath: Occluded RCA, prox circumflex lesion - pd/pa not significant,2023 cath: Lad and RCA stents open(RCA was ), mod/sev lesion Lcx, RPDA medical rx    Depressive disorder     DVT (deep venous thrombosis) (H)     Factor 5 Leiden mutation, heterozygous (H24)     GERD (gastroesophageal reflux disease)     Heart attack (H)     HTN (hypertension)     Hyperlipidemia     Neuropathy     wears brace R foot for drop foot    Obese     PRIMITIVO (obstructive sleep apnea)     cpap    Protein C deficiency (H24)     Spinal stenosis     Stented coronary artery        PAST SURGICAL HISTORY:  Past Surgical History:   Procedure Laterality Date    APPENDECTOMY      APPENDECTOMY OPEN  1958    ARTHROPLASTY HIP Left  1997    ARTHROPLASTY KNEE Right 2/16/2024    Procedure: RIGHT TOTAL KNEE ARTHROPLASTY;  Surgeon: Alber Mcfarland MD;  Location: [facility] Main OR    ARTHROPLASTY REVISION HIP Left 2013    ARTHROPLASTY REVISION HIP Left     ARTHROPLASTY REVISION HIP Left 11/19/2021    Procedure: REVISION LEFT TOTAL HIP ARTHROPLASTY;  Surgeon: Alber Mcfarland MD;  Location: [facility] Main OR    AS SPINAL FUSION,ANT,EA ADNL LEVEL  2010    L4-L5    CORONARY STENT PLACEMENT  1996    CV CORONARY ANGIOGRAM N/A 10/24/2019    Procedure: Coronary Angiogram;  Surgeon: Valdemar Hinojosa MD;  Location: [facility] CARDIAC CATH LAB    CV CORONARY ANGIOGRAM  1996 1996 angioplasty and stent to the prox LAD, PTCA with intracoronary stent placement of RCA, 70%OM    CV CORONARY ANGIOGRAM N/A 2/28/2020    Procedure: Coronary Angiogram 2nd attempt of RCA  successful--(lad stent ok, Lcx 70% with normal iFR)  Surgeon: Gurvinder Yun MD;  Location: [facility] CARDIAC CATH LAB    CV CORONARY ANGIOGRAM N/A 5/17/2023    Procedure: Coronary Angiogram;  Surgeon: Je Rodriguez MD;  Location: [facility] CARDIAC CATH LAB    CV FRACTIONAL FLOW RATIO WIRE N/A 10/24/2019    Procedure: Fractional Flow [place];  Surgeon: Valdemar Hinojosa MD;  Location: [facility] CARDIAC CATH LAB    CV LEFT HEART CATH Bilateral 1/16/2020    Procedure: Left Heart Cath w/wo Left Ventriculogram  ANGIOPLASTY WITH BOSTON SCI REP PRESENT;  Surgeon: Gurvinder Yun MD;  Location:  HEART CARDIAC CATH LAB    SPINAL FUSION  2010    L4-5    TOTAL HIP ARTHROPLASTY Left     TOTAL HIP ARTHROPLASTY Right 2/10/2016    Procedure:  RIGHT HIP TOTAL ARTHROPLASTY;  Surgeon: Jose Gaytan MD;  Location: [facility] OR;  Service:     [facility] TOTAL HIP ARTHROPLASTY Right 2015    THR       FAMILY HISTORY:  Family History   Problem Relation Age of Onset    Myocardial Infarction Father     Heart Disease Maternal Grandfather        SOCIAL HISTORY:  Social History     Socioeconomic  History    Marital status:      Spouse name: None    Number of children: None    Years of education: None    Highest education level: None   Tobacco Use    Smoking status: Former     Current packs/day: 0.00     Types: Cigarettes     Quit date: 3/23/1987     Years since quittin.1    Smokeless tobacco: Never    Tobacco comments:     Smokes cigars once every 2-3 years   Substance and Sexual Activity    Alcohol use: Yes     Comment: Rarely    Drug use: Never   Other Topics Concern    Caffeine Concern No     Comment: 1 -2 cups daily     Special Diet No    Exercise No     Social Determinants of Health     Financial Resource Strain: Low Risk  (2024)    [facility], [facility]    Financial Resource Strain     Difficulty of Paying Living Expenses: 3   Food Insecurity: No Food Insecurity (2024)    Received from [facility], [facility]    Food Insecurity     Worried About Running Out of Food in the Last Year: 1   Transportation Needs: No Transportation Needs (2024)    Received from [facility], [facility]    Transportation Needs     Lack of Transportation (Medical): 1   Social Connections: Socially Integrated (2024)    Received from [facility], [facility]    Social Connections     Frequency of Communication with Friends and Family: 0   Housing Stability: Low Risk  (2024)    Received from [facility], [facility]    Housing Stability     Unable to Pay for Housing in the Last Year: 1       Review of Systems:  Skin:        Eyes:       ENT:       Respiratory:  Positive for sleep apnea;CPAP  Cardiovascular:    Positive for;fatigue  Gastroenterology:     "  Genitourinary:       Musculoskeletal:       Neurologic:       Psychiatric:       Heme/Lymph/Imm:       Endocrine:         Physical Exam:  Vitals: /78 (BP Location: Right arm, Patient Position: Sitting, Cuff Size: Adult Regular)   Pulse 70   Ht 1.727 m (5' 8\")   Wt 93 kg (205 lb 1.6 oz)   SpO2 96%   BMI 31.19 kg/m   Orthostatic Vitals from 04/29/24 0850 to 05/01/24 0850    Date and Time Orthostatic BP Orthostatic Pulse Patient Position BP   Location Cuff Size   05/01/24 0812 -- -- Sitting Right arm Adult Regular         Constitutional:           Skin:             Head:           Eyes:           Lymph:      ENT:           Neck:           Respiratory:            Cardiac:                                                           GI:           Extremities and Muscular Skeletal:                 Neurological:           Psych:         Recent Lab Results:  LIPID RESULTS:  Lab Results   Component Value Date    CHOL 97 05/05/2023    CHOL 99 01/16/2020    HDL 32 (L) 05/05/2023    HDL 40 01/16/2020    LDL 46 05/05/2023    LDL 39 01/16/2020    TRIG 93 05/05/2023    TRIG 101 01/16/2020    CHOLHDLRATIO 3.5 04/21/2014       LIVER ENZYME RESULTS:  Lab Results   Component Value Date    AST 21 10/22/2019    ALT 18 05/05/2023    ALT 20 12/20/2019       CBC RESULTS:  Lab Results   Component Value Date    WBC 6.0 05/21/2023    WBC 4.7 02/28/2020    RBC 4.41 05/21/2023    RBC 3.92 (L) 02/28/2020    HGB 12.3 (L) 02/17/2024    HGB 12.4 (L) 02/28/2020    HCT 40.7 05/21/2023    HCT 36.7 (L) 02/28/2020    MCV 92 05/21/2023    MCV 94 02/28/2020    MCH 31.3 05/21/2023    MCH 31.6 02/28/2020    MCHC 33.9 05/21/2023    MCHC 33.8 02/28/2020    RDW 13.0 05/21/2023    RDW 12.9 02/28/2020     02/17/2024     02/28/2020       BMP RESULTS:  Lab Results   Component Value Date     05/21/2023     (L) 03/04/2020    POTASSIUM 4.2 05/21/2023    POTASSIUM 4.2 11/11/2021    POTASSIUM 4.7 03/04/2020    CHLORIDE 103 05/21/2023 " "   CHLORIDE 102 11/11/2021    CHLORIDE 100 03/04/2020    CO2 23 05/21/2023    CO2 26 11/11/2021    CO2 28 03/04/2020    ANIONGAP 10 05/21/2023    ANIONGAP 8 11/11/2021    ANIONGAP 10.7 03/04/2020     (H) 02/17/2024    GLC 87 02/16/2024    GLC 96 11/21/2021     03/04/2020    BUN 10.4 05/21/2023    BUN 9 11/11/2021    BUN 7 03/04/2020    CR 0.92 02/16/2024    CR 0.89 03/04/2020    GFRESTIMATED 86 02/16/2024    GFRESTIMATED 84 03/04/2020    GFRESTBLACK >90 03/04/2020    CODI 9.2 05/21/2023    CODI 9.7 03/04/2020        A1C RESULTS:  No results found for: \"A1C\"    INR RESULTS:  Lab Results   Component Value Date    INR 1.02 02/17/2024    INR 1.02 02/16/2024    INR 2.00 (H) 03/06/2020    INR 1.48 (H) 03/04/2020           CC  Melissa Lawson PA-C  [id number] [place] [id number]    No orders of the defined types were placed in this encounter.    No orders of the defined types were placed in this encounter.    There are no discontinued medications.      Encounter Diagnosis   Name Primary?    Coronary artery disease involving native coronary artery of native heart without angina pectoris        CURRENT MEDICATIONS:  Current Outpatient Medications   Medication Sig Dispense Refill    acetaminophen (TYLENOL) 500 MG tablet Take 1-2 tablets (500-1,000 mg) by mouth every 6 hours as needed for other (For optimal non-opioid multimodal pain management to improve pain control.) Max 3000mg/24 hours. 60 tablet 0    albuterol (PROAIR HFA/PROVENTIL HFA/VENTOLIN HFA) 108 (90 Base) MCG/ACT inhaler Inhale 2 puffs into the lungs every 4 hours as needed for shortness of breath / dyspnea or wheezing doesn't have current RX      benzocaine-menthol (CEPACOL) 15-3.6 MG lozenge Place 1 lozenge inside cheek every 2 hours as needed for sore throat 36 lozenge 0    clopidogrel (PLAVIX) 75 MG tablet Take 1 tablet (75 mg) by mouth daily 30 tablet 0    Emollient (VANICREAM) LOTN APPLY THIN LAYER TOPICALLY EVERY DAY FOR DRY SKIN, FOR " DERMATITIS  TO AREAS OF DRY SKIN, IDEALLY WITHIN 3 MINUTES AFTER BATH OR SHOWER. FOR DRY SKIN, FOR DERMATITIS  TO AREAS OF DRY SKIN, IDEALLY WITHIN 3 MINUTES AFTER BATH OR SHOWER.      enoxaparin ANTICOAGULANT (LOVENOX) 100 MG/ML syringe Inject 1 mL (100 mg) Subcutaneous 2 times daily      gabapentin (NEURONTIN) 400 MG capsule Take 1,200 mg by mouth 3 times daily      lisinopril (PRINIVIL/ZESTRIL) 20 MG tablet Take 1 tablet (20 mg) by mouth daily 30 tablet 0    meclizine (ANTIVERT) 12.5 MG tablet Take 2 tablets (25 mg) by mouth 4 times daily as needed for dizziness 15 tablet 0    methocarbamol (ROBAXIN) 500 MG tablet Take 1 tablet (500 mg) by mouth 4 times daily as needed for muscle spasms 30 tablet 0    mirtazapine (REMERON) 7.5 MG tablet Take 7.5 mg by mouth nightly as needed (headache)      nitroGLYcerin (NITROSTAT) 0.4 MG sublingual tablet For chest pain place 1 tablet under the tongue every 5 minutes for 3 doses. If symptoms persist 5 minutes after 1st dose call 911. 20 tablet 0    omeprazole (PRILOSEC) 20 MG CR capsule Take 20 mg by mouth At Bedtime       rosuvastatin (CRESTOR) 20 MG tablet Take 1 tablet (20 mg) by mouth daily 90 tablet 3    SENNA-docusate sodium (SENNA S) 8.6-50 MG tablet Take 1 tablet by mouth 2 times daily as needed (constipation) 30 tablet 0    warfarin ANTICOAGULANT (COUMADIN ANTICOAGULANT) 5 MG tablet Take 5 mg by mouth daily          ALLERGIES     Allergies   Allergen Reactions    Metoprolol      Fatigue and bradycardia    Niacin Other (See Comments)     Other reaction(s): Flushing        Zonisamide GI Disturbance    Norvasc [Amlodipine] Rash    Septra [Sulfamethoxazole W-Trimethoprim] Rash    Sulfa Antibiotics Rash       PAST MEDICAL HISTORY:  Past Medical History:   Diagnosis Date    Aneurysm of thoracic aorta (H24)     Antiplatelet or antithrombotic long-term use     Arthritis     Asthma     CAD (coronary artery disease)     1996 angioplasty and stent to the prox LAD, PTCA with  intracoronary stent placement of RCA, 70%OM; 10/24/19 Cath: Occluded RCA, prox circumflex lesion - pd/pa not significant,2023 cath: Lad and RCA stents open(RCA was ), mod/sev lesion Lcx, RPDA medical rx    Depressive disorder     DVT (deep venous thrombosis) (H)     Factor 5 Leiden mutation, heterozygous (H24)     GERD (gastroesophageal reflux disease)     Heart attack (H)     HTN (hypertension)     Hyperlipidemia     Neuropathy     wears brace R foot for drop foot    Obese     PRIMITIVO (obstructive sleep apnea)     cpap    Protein C deficiency (H24)     Spinal stenosis     Stented coronary artery        PAST SURGICAL HISTORY:  Past Surgical History:   Procedure Laterality Date    APPENDECTOMY      APPENDECTOMY OPEN  1958    ARTHROPLASTY HIP Left 1997    ARTHROPLASTY KNEE Right 2/16/2024    Procedure: RIGHT TOTAL KNEE ARTHROPLASTY;  Surgeon: Alber Mcfarland MD;  Location: [facility] Main OR    ARTHROPLASTY REVISION HIP Left 2013    ARTHROPLASTY REVISION HIP Left     ARTHROPLASTY REVISION HIP Left 11/19/2021    Procedure: REVISION LEFT TOTAL HIP ARTHROPLASTY;  Surgeon: Alber Mcfarland MD;  Location: [facility] Main OR    AS SPINAL FUSION,ANT,EA ADNL LEVEL  2010    L4-L5    CORONARY STENT PLACEMENT  1996    CV CORONARY ANGIOGRAM N/A 10/24/2019    Procedure: Coronary Angiogram;  Surgeon: Valdemar Hinojosa MD;  Location: [facility] CARDIAC CATH LAB    CV CORONARY ANGIOGRAM  1996 1996 angioplasty and stent to the prox LAD, PTCA with intracoronary stent placement of RCA, 70%OM    CV CORONARY ANGIOGRAM N/A 2/28/2020    Procedure: Coronary Angiogram 2nd attempt of RCA  successful--(lad stent ok, Lcx 70% with normal iFR)  Surgeon: Gurvinder Yun MD;  Location: [facility] CARDIAC CATH LAB    CV CORONARY ANGIOGRAM N/A 5/17/2023    Procedure: Coronary Angiogram;  Surgeon: Je Rodriguez MD;  Location: [facility] CARDIAC CATH LAB    CV FRACTIONAL FLOW RATIO WIRE N/A 10/24/2019    Procedure: Fractional Flow  [place];  Surgeon: Valdemar Hinojosa MD;  Location:  HEART CARDIAC CATH LAB    CV LEFT HEART CATH Bilateral 2020    Procedure: Left Heart Cath w/wo Left Ventriculogram  ANGIOPLASTY WITH BOSTON SCI REP PRESENT;  Surgeon: Gurvinder Yun MD;  Location:  HEART CARDIAC CATH LAB    SPINAL FUSION      L4-5    TOTAL HIP ARTHROPLASTY Left     TOTAL HIP ARTHROPLASTY Right 2/10/2016    Procedure:  RIGHT HIP TOTAL ARTHROPLASTY;  Surgeon: Jose Gaytan MD;  Location: [facility] OR;  Service:     [facility] TOTAL HIP ARTHROPLASTY Right     THR       FAMILY HISTORY:  Family History   Problem Relation Age of Onset    Myocardial Infarction Father     Heart Disease Maternal Grandfather        SOCIAL HISTORY:  Social History     Socioeconomic History    Marital status:      Spouse name: None    Number of children: None    Years of education: None    Highest education level: None   Tobacco Use    Smoking status: Former     Current packs/day: 0.00     Types: Cigarettes     Quit date: 3/23/1987     Years since quittin.1    Smokeless tobacco: Never    Tobacco comments:     Smokes cigars once every 2-3 years   Substance and Sexual Activity    Alcohol use: Yes     Comment: Rarely    Drug use: Never   Other Topics Concern    Caffeine Concern No     Comment: 1 -2 cups daily     Special Diet No    Exercise No     Social Determinants of Health     Financial Resource Strain: Low Risk  (2024)    [facility] [employer] [facility]    Financial Resource Strain     Difficulty of Paying Living Expenses: 3   Food Insecurity: No Food Insecurity (2024)    Received from [employer] [facility], [facility]    Food Insecurity     Worried About Running Out of Food in the Last Year: 1   Transportation Needs: No Transportation Needs (2024)    Received from [employer] &  "[facility], [facility] & [facility]    Transportation Needs     Lack of Transportation (Medical): 1   Social Connections: Socially Integrated (2/2/2024)    Received from [facility], [facility] & [facility]    Social Connections     Frequency of Communication with Friends and Family: 0   Housing Stability: Low Risk  (2/2/2024)    Received from [facility], [facility] & [facility]    Housing Stability     Unable to Pay for Housing in the Last Year: 1       Review of Systems:  Skin:        Eyes:       ENT:       Respiratory:  Positive for sleep apnea;CPAP  Cardiovascular:    Positive for;fatigue  Gastroenterology:      Genitourinary:       Musculoskeletal:       Neurologic:       Psychiatric:       Heme/Lymph/Imm:       Endocrine:         Physical Exam:  Vitals: /78 (BP Location: Right arm, Patient Position: Sitting, Cuff Size: Adult Regular)   Pulse 70   Ht 1.727 m (5' 8\")   Wt 93 kg (205 lb 1.6 oz)   SpO2 96%   BMI 31.19 kg/m   Orthostatic Vitals from 04/29/24 0850 to 05/01/24 0850    Date and Time Orthostatic BP Orthostatic Pulse Patient Position BP   Location Cuff Size   05/01/24 0812 -- -- Sitting Right arm Adult Regular         Constitutional:           Skin:             Head:           Eyes:           Lymph:      ENT:           Neck:           Respiratory:            Cardiac:                                                           GI:           Extremities and Muscular Skeletal:                 Neurological:           Psych:         Recent Lab Results:  LIPID RESULTS:  Lab Results   Component Value Date    CHOL 97 05/05/2023    CHOL 99 01/16/2020    HDL 32 (L) 05/05/2023    HDL 40 01/16/2020    LDL 46 05/05/2023    LDL 39 01/16/2020    TRIG 93 05/05/2023    TRIG 101 01/16/2020    CHOLHDLRATIO 3.5 04/21/2014       LIVER ENZYME RESULTS:  Lab Results   Component " "Value Date    AST 21 10/22/2019    ALT 18 05/05/2023    ALT 20 12/20/2019       CBC RESULTS:  Lab Results   Component Value Date    WBC 6.0 05/21/2023    WBC 4.7 02/28/2020    RBC 4.41 05/21/2023    RBC 3.92 (L) 02/28/2020    HGB 12.3 (L) 02/17/2024    HGB 12.4 (L) 02/28/2020    HCT 40.7 05/21/2023    HCT 36.7 (L) 02/28/2020    MCV 92 05/21/2023    MCV 94 02/28/2020    MCH 31.3 05/21/2023    MCH 31.6 02/28/2020    MCHC 33.9 05/21/2023    MCHC 33.8 02/28/2020    RDW 13.0 05/21/2023    RDW 12.9 02/28/2020     02/17/2024     02/28/2020       BMP RESULTS:  Lab Results   Component Value Date     05/21/2023     (L) 03/04/2020    POTASSIUM 4.2 05/21/2023    POTASSIUM 4.2 11/11/2021    POTASSIUM 4.7 03/04/2020    CHLORIDE 103 05/21/2023    CHLORIDE 102 11/11/2021    CHLORIDE 100 03/04/2020    CO2 23 05/21/2023    CO2 26 11/11/2021    CO2 28 03/04/2020    ANIONGAP 10 05/21/2023    ANIONGAP 8 11/11/2021    ANIONGAP 10.7 03/04/2020     (H) 02/17/2024    GLC 87 02/16/2024    GLC 96 11/21/2021     03/04/2020    BUN 10.4 05/21/2023    BUN 9 11/11/2021    BUN 7 03/04/2020    CR 0.92 02/16/2024    CR 0.89 03/04/2020    GFRESTIMATED 86 02/16/2024    GFRESTIMATED 84 03/04/2020    GFRESTBLACK >90 03/04/2020    CODI 9.2 05/21/2023    CODI 9.7 03/04/2020        A1C RESULTS:  No results found for: \"A1C\"    INR RESULTS:  Lab Results   Component Value Date    INR 1.02 02/17/2024    INR 1.02 02/16/2024    INR 2.00 (H) 03/06/2020    INR 1.48 (H) 03/04/2020           CC  Melissa Lawson PA-C  [id number] [id number]  "

## 2025-05-12 ENCOUNTER — TELEPHONE (OUTPATIENT)
Dept: CARDIOLOGY | Facility: CLINIC | Age: 80
End: 2025-05-12
Payer: MEDICARE

## 2025-05-12 NOTE — TELEPHONE ENCOUNTER
Return call letf message for Pt he can call and make office visit with a different provider, this nurse dose not have ability to put orders in for these providers.  MARIA R Redd RN

## 2025-05-12 NOTE — TELEPHONE ENCOUNTER
[facility] Call Center    Phone Message    May a detailed message be left on voicemail: yes     Reason for Call: Other: Patient would like to change regions. Patient did not move. Patient just wants to see Dr. Banerjee or Dr. Nava. Please review and reach out. Thank you      Action Taken: Other: cardiology     Travel Screening: Not Applicable     Date of Service:

## 2025-08-11 ENCOUNTER — OFFICE VISIT (OUTPATIENT)
Dept: CARDIOLOGY | Facility: CLINIC | Age: 80
End: 2025-08-11
Attending: INTERNAL MEDICINE
Payer: COMMERCIAL

## 2025-08-11 ASSESSMENT — PAIN SCALES - GENERAL: PAINLEVEL_OUTOF10: NO PAIN (0)

## 2025-08-12 ENCOUNTER — PATIENT OUTREACH (OUTPATIENT)
Dept: CARE COORDINATION | Facility: CLINIC | Age: 80
End: 2025-08-12
Payer: COMMERCIAL

## 2025-09-01 ENCOUNTER — APPOINTMENT (OUTPATIENT)
Dept: CT IMAGING | Facility: CLINIC | Age: 80
End: 2025-09-01
Attending: SOCIAL WORKER
Payer: COMMERCIAL

## 2025-09-01 ENCOUNTER — HOSPITAL ENCOUNTER (EMERGENCY)
Facility: CLINIC | Age: 80
Discharge: HOME | End: 2025-09-01
Attending: SOCIAL WORKER | Admitting: SOCIAL WORKER
Payer: COMMERCIAL

## 2025-09-01 ENCOUNTER — APPOINTMENT (OUTPATIENT)
Dept: ULTRASOUND IMAGING | Facility: CLINIC | Age: 80
End: 2025-09-01
Attending: SOCIAL WORKER
Payer: COMMERCIAL

## 2025-09-01 VITALS
WEIGHT: 225 LBS | DIASTOLIC BLOOD PRESSURE: 76 MMHG | HEART RATE: 75 BPM | RESPIRATION RATE: 16 BRPM | BODY MASS INDEX: 35.31 KG/M2 | HEIGHT: 67 IN | TEMPERATURE: 97 F | SYSTOLIC BLOOD PRESSURE: 138 MMHG | OXYGEN SATURATION: 100 %

## 2025-09-01 DIAGNOSIS — N50.82 SCROTAL PAIN: Primary | ICD-10-CM

## 2025-09-01 DIAGNOSIS — L03.311 CELLULITIS OF ABDOMINAL WALL: ICD-10-CM

## 2025-09-01 DIAGNOSIS — S30.0XXA HEMATOMA OF LEFT BUTTOCK: ICD-10-CM

## 2025-09-01 LAB
ANION GAP SERPL CALCULATED.3IONS-SCNC: 9 MMOL/L (ref 7–15)
BASOPHILS # BLD AUTO: 0.06 10E3/UL (ref 0–0.2)
BASOPHILS NFR BLD AUTO: 0.8 %
BUN SERPL-MCNC: 9.5 MG/DL (ref 8–23)
CALCIUM SERPL-MCNC: 9.5 MG/DL (ref 8.8–10.4)
CHLORIDE SERPL-SCNC: 103 MMOL/L (ref 98–107)
CREAT SERPL-MCNC: 0.86 MG/DL (ref 0.67–1.17)
EGFRCR SERPLBLD CKD-EPI 2021: 89 ML/MIN/1.73M2
EOSINOPHIL # BLD AUTO: 0.25 10E3/UL (ref 0–0.7)
EOSINOPHIL NFR BLD AUTO: 3.5 %
ERYTHROCYTE [DISTWIDTH] IN BLOOD BY AUTOMATED COUNT: 15.8 % (ref 10–15)
GLUCOSE SERPL-MCNC: 98 MG/DL (ref 70–99)
HCO3 SERPL-SCNC: 26 MMOL/L (ref 22–29)
HCT VFR BLD AUTO: 39.7 % (ref 40–53)
HGB BLD-MCNC: 12.8 G/DL (ref 13.3–17.7)
IMM GRANULOCYTES # BLD: 0.03 10E3/UL
IMM GRANULOCYTES NFR BLD: 0.4 %
INR PPP: 2.09 (ref 0.85–1.15)
LYMPHOCYTES # BLD AUTO: 2.16 10E3/UL (ref 0.8–5.3)
LYMPHOCYTES NFR BLD AUTO: 30 %
MCH RBC QN AUTO: 30.9 PG (ref 26.5–33)
MCHC RBC AUTO-ENTMCNC: 32.2 G/DL (ref 31.5–36.5)
MCV RBC AUTO: 95.9 FL (ref 78–100)
MONOCYTES # BLD AUTO: 0.72 10E3/UL (ref 0–1.3)
MONOCYTES NFR BLD AUTO: 10 %
NEUTROPHILS # BLD AUTO: 3.97 10E3/UL (ref 1.6–8.3)
NEUTROPHILS NFR BLD AUTO: 55.3 %
NRBC # BLD AUTO: <0.03 10E3/UL
NRBC BLD AUTO-RTO: 0 /100
PLATELET # BLD AUTO: 315 10E3/UL (ref 150–450)
POTASSIUM SERPL-SCNC: 4.6 MMOL/L (ref 3.4–5.3)
PROTHROMBIN TIME: 22.7 SECONDS (ref 11.8–14.8)
RBC # BLD AUTO: 4.14 10E6/UL (ref 4.4–5.9)
SODIUM SERPL-SCNC: 138 MMOL/L (ref 135–145)
WBC # BLD AUTO: 7.19 10E3/UL (ref 4–11)

## 2025-09-01 PROCEDURE — 76870 US EXAM SCROTUM: CPT

## 2025-09-01 PROCEDURE — 85610 PROTHROMBIN TIME: CPT | Performed by: SOCIAL WORKER

## 2025-09-01 PROCEDURE — 99285 EMERGENCY DEPT VISIT HI MDM: CPT | Mod: 25 | Performed by: SOCIAL WORKER

## 2025-09-01 PROCEDURE — 74177 CT ABD & PELVIS W/CONTRAST: CPT

## 2025-09-01 PROCEDURE — 84132 ASSAY OF SERUM POTASSIUM: CPT | Performed by: SOCIAL WORKER

## 2025-09-01 PROCEDURE — 85025 COMPLETE CBC W/AUTO DIFF WBC: CPT | Performed by: SOCIAL WORKER

## 2025-09-01 PROCEDURE — 36415 COLL VENOUS BLD VENIPUNCTURE: CPT | Performed by: SOCIAL WORKER

## 2025-09-01 PROCEDURE — 250N000009 HC RX 250: Performed by: SOCIAL WORKER

## 2025-09-01 PROCEDURE — 255N000002 HC RX 255 OP 636: Performed by: SOCIAL WORKER

## 2025-09-01 RX ORDER — DOXYCYCLINE 100 MG/1
100 CAPSULE ORAL 2 TIMES DAILY
Qty: 28 CAPSULE | Refills: 0 | Status: SHIPPED | OUTPATIENT
Start: 2025-09-01 | End: 2025-09-15

## 2025-09-01 RX ADMIN — IOHEXOL 119 ML: 350 INJECTION, SOLUTION INTRAVENOUS at 16:46

## 2025-09-01 RX ADMIN — SODIUM CHLORIDE 74 ML: 9 INJECTION, SOLUTION INTRAVENOUS at 16:46

## 2025-09-01 ASSESSMENT — ACTIVITIES OF DAILY LIVING (ADL)
ADLS_ACUITY_SCORE: 51

## 2025-10-22 ENCOUNTER — PRE VISIT (OUTPATIENT)
Dept: NEUROLOGY | Facility: CLINIC | Age: 80
End: 2025-10-22

## (undated) DEVICE — CATH GUIDELINER 6FR 5571

## (undated) DEVICE — CATH BALLOON NC EUPHORA 2.75X15MM NCEUP27515X

## (undated) DEVICE — CATH JACKY 5FR 3.5 CURVE 40-5023

## (undated) DEVICE — 1.5MM X 12MM TAKERU RX PTCA BALLOON CATHETER

## (undated) DEVICE — INTRO SHEATH 8FRX45CM PINNACLE DESTINATION STR 54-84501

## (undated) DEVICE — SOL WATER IRRIG 1000ML BOTTLE 2F7114

## (undated) DEVICE — GLIDEWIRE TERUMO .035X180CM 1.5,, J-TIP GR3525

## (undated) DEVICE — KIT LG BORE TOUHY ACCESS PLUS MAP152

## (undated) DEVICE — CATH BALLOON NC EMERGE 4.00X12MM H7493926712400

## (undated) DEVICE — GLOVE BIOGEL PI ULTRATOUCH G SZ 7.5 42175

## (undated) DEVICE — GLOVE BIOGEL INDICATOR 7.5 LF 41675

## (undated) DEVICE — NDL PERC ENTRY THINWALL 18GA 7.0" G00166

## (undated) DEVICE — CATH MICRO CORSAIR PRO 2.6FR 1

## (undated) DEVICE — SU STRATAFIX PDS PLUS 1 CT-1 18" SXPP1A404

## (undated) DEVICE — GUIDEWIRE VASC 0.014INX190CM J TIP CGRXT190HJ

## (undated) DEVICE — CUSTOM PACK TOTAL KNEE ACCESSORY SOP5BTAHEA

## (undated) DEVICE — DRSG ABD TNDRSRB WET PRUF 8IN X 10IN STRL  9194A

## (undated) DEVICE — SOLUTION IRRIG 2B7127 .9NS 3000ML BAG

## (undated) DEVICE — DRILL BIT BIOM QUICK CONNECTING RING LOCK 3.2X30MM 31-323230

## (undated) DEVICE — DEFIB PRO-PADZ LVP LQD GEL ADULT 8900-2105-01

## (undated) DEVICE — SUCTION IRR SYSTEM W/O TIP INTERPULSE HANDPIECE 0210-100-000

## (undated) DEVICE — HOLDER LIMB VELCRO OR 0814-1533

## (undated) DEVICE — GUIDEWIRE FIGHTER STRAIGHT 190

## (undated) DEVICE — CAST PADDING 6IN COTTON WEBRIL STERILE 2554

## (undated) DEVICE — CATH ANGIO 6FRX110CM INFINITI 534623T

## (undated) DEVICE — WIRE GUIDE 0.035"X260CM SAFE-T-J EXCHANGE G00517

## (undated) DEVICE — PAD HIP POSITIONING MCGUIRE 707-CPM

## (undated) DEVICE — RAD INFLATOR BASIC COMPAK  IN4130

## (undated) DEVICE — CATH GD 7FR MACH 1 GUIDING CAT

## (undated) DEVICE — TOTE ANGIO CORP PC15AT SAN32CC83O

## (undated) DEVICE — CATH TURNPIKE LP 135CM

## (undated) DEVICE — Device

## (undated) DEVICE — BONE CLEANING TIP INTERPULSE  0210-010-000

## (undated) DEVICE — CATH BALLOON NC EUPHORA 2.25X15MM NCEUP22515X

## (undated) DEVICE — WIRE GUIDE HI-TRQ PILOT 50 JTIP 0.014"X190CM 1010480-HJ

## (undated) DEVICE — ESU ELEC BLADE 6" COATED E1450-6

## (undated) DEVICE — CATH BALLOON NC EMERGE 3.25X12MM H7493926712320

## (undated) DEVICE — PLATE GROUNDING ADULT W/CORD 9165L

## (undated) DEVICE — INFL DVC KIT W/10CC NITRO IN4530

## (undated) DEVICE — DECANTER VIAL 2006S

## (undated) DEVICE — DRESSING MEPILEX AG SILVER 4X12 395990

## (undated) DEVICE — SUCTION MANIFOLD NEPTUNE 2 SYS 4 PORT 0702-020-000

## (undated) DEVICE — GUIDEWIRE VASC 0.035INX150CM INQWIRE J TIP IQ35F150J3F/A

## (undated) DEVICE — MANIFOLD KIT ANGIO AUTOMATED 014613

## (undated) DEVICE — ESU BIPOLAR SEALER AQUAMANTYS 6MM 23-112-1

## (undated) DEVICE — CATH GUIDING 8FR MACH 1 CLS3.5 8FR

## (undated) DEVICE — KIT HAND CONTROL ANGIOTOUCH ACIST 65CM AT-P65

## (undated) DEVICE — CATH BALLOON EMERGE 2.5X15MM H7493918915250

## (undated) DEVICE — INTRO GLIDESHEATH SLENDER 6FR 10X45CM 60-1060

## (undated) DEVICE — CATH LAUNCHER 6FR AL 1.0 LA6AL10

## (undated) DEVICE — HOOD FLYTE SURGICOOL

## (undated) DEVICE — 1.5MM X 15MM TAKERU RX PTCA BALLOON CATHETER

## (undated) DEVICE — SU DERMABOND ADVANCED .7ML DNX12

## (undated) DEVICE — GLOVE BIOGEL PI INDICATOR 8.0 LF 41680

## (undated) DEVICE — GUIDEWIRE VASC 0.014"X180CM PLATIN 3CM TIP LF AHW14R004S

## (undated) DEVICE — CATH GUIDING BLUE YELLOW PTFE 3DRC 6FRX100CM 67013000

## (undated) DEVICE — SUTURE VICRYL+ 0 27IN CT-1 UND VCP260H

## (undated) DEVICE — A3 SUPPLIES- SEE NURSING INFO PAGE

## (undated) DEVICE — PUMP UNIT NEGATIVE PRESSURE PREVENA PLUS PRE4010.S

## (undated) DEVICE — SUTURE VICRYL+ 2-0 27IN CT-1 UND VCP259H

## (undated) DEVICE — DRAPE IOBAN INCISE 36X23" 6651EZ

## (undated) DEVICE — SYR ANGIOGRAPHY MULTIUSE KIT ACIST 014612

## (undated) DEVICE — CUTTING BALLOON WOLVERINE 3X10MM

## (undated) DEVICE — SU ETHIBOND 5 V-37 4X30" MB66G

## (undated) DEVICE — CATH BALLOON IABP 7.5FRX40ML INTRA-AORTIC 0684-00-0568-01

## (undated) DEVICE — GOWN IMPERVIOUS BREATHABLE SMART XLG 89045

## (undated) DEVICE — RAD CLOSURE ANGIOSEAL 8FR  610131

## (undated) DEVICE — GUIDEWIRE HI-TORQUE PILOT 200 STRT 300CM

## (undated) DEVICE — GW VASC 190CM .014IN HI-TRQ 1009660J

## (undated) DEVICE — CUSTOM PACK TOTAL KNEE SOP5BTKHEC

## (undated) DEVICE — CATH MICROCATH 1.9FRX135CML ASAHI CARAVEL CRV135-19P

## (undated) DEVICE — CUSTOM PACK TOTAL HIP SOP5BTHHEA

## (undated) DEVICE — CATH BALLOON EMERGE 2.25X15MMH7493918915220

## (undated) DEVICE — CATH BALLOON EMERGE 2.75X15MMH7493918915270

## (undated) DEVICE — SLEEVE TR BAND RADIAL COMPRESSION DEVICE 24CM TRB24-REG

## (undated) DEVICE — CATH LAUNCHER 6FR LA6EBU375

## (undated) DEVICE — GUIDEWIRE ASAHI FIELDER XT 190

## (undated) DEVICE — GW .014 X 190CM STR HI-TORQUE

## (undated) DEVICE — DILATOR VASCULAR 6FRX19CM 405504

## (undated) DEVICE — BLADE SAGITTAL 25MM X 90MM X 1.19MM 6125-119-090

## (undated) DEVICE — CATH BALLOON EMERGE 2.75X20MMH7493918920270

## (undated) DEVICE — SOL NACL 0.9% IRRIG 1000ML BOTTLE 2F7124

## (undated) DEVICE — CATH LASER ARTHRECTOMY ELCA .9MMX135CM

## (undated) DEVICE — GLOVE BIOGEL PI ORTHOPRO SZ 7.5 47675

## (undated) DEVICE — GUIDEWIRE VERRATA PLUS PRESSURE 185CM JTIP 10185JP

## (undated) DEVICE — PREP CHLORAPREP W/ORANGE TINT 10.5ML 930715

## (undated) DEVICE — 2MM X 12MM TAKERU RX PTCA BALLOON CATHETER

## (undated) DEVICE — DRSG GAUZE 4X4" 3033

## (undated) DEVICE — CATH BALLOON EUPHORA RX 1.5 X10MEUP1510X

## (undated) DEVICE — SUTURE MONOCRYL+ 2-0 CT-1 36" UNDYED MCP945H

## (undated) DEVICE — GUIDEWIRE VASC 0.014IN DIA 185

## (undated) DEVICE — CATH ANGIO JUDKINS JL5 6FRX100CM INFINITI 534622T

## (undated) DEVICE — VALVE HEMOSTASIS .096" COPILOT MECH 1003331

## (undated) DEVICE — CATH BALLOON NC EUPHORA 3.00X12MM NCEUP3012X

## (undated) DEVICE — ELECTRODE PATIENT RETURN ADULT L10 FT 2 PLATE CORD 0855C

## (undated) DEVICE — GUIDEWIRE VASC 0.014IN DIA 325CML SS

## (undated) DEVICE — SUTURE MONOCRYL 3-0 18 PS2 UND MCP497G

## (undated) RX ORDER — HEPARIN SODIUM 200 [USP'U]/100ML
INJECTION, SOLUTION INTRAVENOUS
Status: DISPENSED
Start: 2020-02-28

## (undated) RX ORDER — NITROGLYCERIN 5 MG/ML
VIAL (ML) INTRAVENOUS
Status: DISPENSED
Start: 2020-01-16

## (undated) RX ORDER — FENTANYL CITRATE 50 UG/ML
INJECTION, SOLUTION INTRAMUSCULAR; INTRAVENOUS
Status: DISPENSED
Start: 2024-02-16

## (undated) RX ORDER — TRANEXAMIC ACID 100 MG/ML
INJECTION, SOLUTION INTRAVENOUS
Status: DISPENSED
Start: 2024-02-16

## (undated) RX ORDER — LIDOCAINE HYDROCHLORIDE 10 MG/ML
INJECTION, SOLUTION EPIDURAL; INFILTRATION; INTRACAUDAL; PERINEURAL
Status: DISPENSED
Start: 2024-02-16

## (undated) RX ORDER — POTASSIUM CHLORIDE 1500 MG/1
TABLET, EXTENDED RELEASE ORAL
Status: DISPENSED
Start: 2020-01-16

## (undated) RX ORDER — FENTANYL CITRATE 50 UG/ML
INJECTION, SOLUTION INTRAMUSCULAR; INTRAVENOUS
Status: DISPENSED
Start: 2019-10-24

## (undated) RX ORDER — FENTANYL CITRATE 50 UG/ML
INJECTION, SOLUTION INTRAMUSCULAR; INTRAVENOUS
Status: DISPENSED
Start: 2020-02-28

## (undated) RX ORDER — NALOXONE HYDROCHLORIDE 0.4 MG/ML
INJECTION, SOLUTION INTRAMUSCULAR; INTRAVENOUS; SUBCUTANEOUS
Status: DISPENSED
Start: 2020-02-28

## (undated) RX ORDER — HEPARIN SODIUM 1000 [USP'U]/ML
INJECTION, SOLUTION INTRAVENOUS; SUBCUTANEOUS
Status: DISPENSED
Start: 2020-01-16

## (undated) RX ORDER — EPHEDRINE SULFATE 50 MG/ML
INJECTION, SOLUTION INTRAMUSCULAR; INTRAVENOUS; SUBCUTANEOUS
Status: DISPENSED
Start: 2024-02-16

## (undated) RX ORDER — FENTANYL CITRATE 50 UG/ML
INJECTION, SOLUTION INTRAMUSCULAR; INTRAVENOUS
Status: DISPENSED
Start: 2023-05-17

## (undated) RX ORDER — CLOPIDOGREL BISULFATE 75 MG/1
TABLET ORAL
Status: DISPENSED
Start: 2020-02-28

## (undated) RX ORDER — LIDOCAINE HYDROCHLORIDE 10 MG/ML
INJECTION, SOLUTION EPIDURAL; INFILTRATION; INTRACAUDAL; PERINEURAL
Status: DISPENSED
Start: 2020-01-16

## (undated) RX ORDER — FENTANYL CITRATE 50 UG/ML
INJECTION, SOLUTION INTRAMUSCULAR; INTRAVENOUS
Status: DISPENSED
Start: 2020-01-16

## (undated) RX ORDER — HEPARIN SODIUM 200 [USP'U]/100ML
INJECTION, SOLUTION INTRAVENOUS
Status: DISPENSED
Start: 2023-05-17

## (undated) RX ORDER — HEPARIN SODIUM 200 [USP'U]/100ML
INJECTION, SOLUTION INTRAVENOUS
Status: DISPENSED
Start: 2020-01-16

## (undated) RX ORDER — CLOPIDOGREL 300 MG/1
TABLET, FILM COATED ORAL
Status: DISPENSED
Start: 2020-01-16

## (undated) RX ORDER — LIDOCAINE HYDROCHLORIDE 10 MG/ML
INJECTION, SOLUTION EPIDURAL; INFILTRATION; INTRACAUDAL; PERINEURAL
Status: DISPENSED
Start: 2020-02-28

## (undated) RX ORDER — PROPOFOL 10 MG/ML
INJECTION, EMULSION INTRAVENOUS
Status: DISPENSED
Start: 2024-02-16

## (undated) RX ORDER — REGADENOSON 0.08 MG/ML
INJECTION, SOLUTION INTRAVENOUS
Status: DISPENSED
Start: 2018-03-15

## (undated) RX ORDER — REGADENOSON 0.08 MG/ML
INJECTION, SOLUTION INTRAVENOUS
Status: DISPENSED
Start: 2023-05-02

## (undated) RX ORDER — HEPARIN SODIUM 1000 [USP'U]/ML
INJECTION, SOLUTION INTRAVENOUS; SUBCUTANEOUS
Status: DISPENSED
Start: 2023-05-17

## (undated) RX ORDER — ADENOSINE 3 MG/ML
INJECTION, SOLUTION INTRAVENOUS
Status: DISPENSED
Start: 2019-10-24

## (undated) RX ORDER — NITROGLYCERIN 5 MG/ML
VIAL (ML) INTRAVENOUS
Status: DISPENSED
Start: 2020-02-28

## (undated) RX ORDER — HEPARIN SODIUM 1000 [USP'U]/ML
INJECTION, SOLUTION INTRAVENOUS; SUBCUTANEOUS
Status: DISPENSED
Start: 2020-02-28

## (undated) RX ORDER — ACETAMINOPHEN 325 MG/1
TABLET ORAL
Status: DISPENSED
Start: 2023-05-17

## (undated) RX ORDER — ALUMINA, MAGNESIA, AND SIMETHICONE 2400; 2400; 240 MG/30ML; MG/30ML; MG/30ML
SUSPENSION ORAL
Status: DISPENSED
Start: 2020-02-28

## (undated) RX ORDER — NITROGLYCERIN 5 MG/ML
VIAL (ML) INTRAVENOUS
Status: DISPENSED
Start: 2023-05-17

## (undated) RX ORDER — LIDOCAINE HYDROCHLORIDE 10 MG/ML
INJECTION, SOLUTION EPIDURAL; INFILTRATION; INTRACAUDAL; PERINEURAL
Status: DISPENSED
Start: 2023-05-17